# Patient Record
Sex: MALE | Race: OTHER | NOT HISPANIC OR LATINO | URBAN - METROPOLITAN AREA
[De-identification: names, ages, dates, MRNs, and addresses within clinical notes are randomized per-mention and may not be internally consistent; named-entity substitution may affect disease eponyms.]

---

## 2017-02-25 ENCOUNTER — EMERGENCY (EMERGENCY)
Facility: HOSPITAL | Age: 57
LOS: 1 days | Discharge: HOME | End: 2017-02-25
Attending: EMERGENCY MEDICINE | Admitting: EMERGENCY MEDICINE
Payer: MEDICAID

## 2017-02-25 VITALS
RESPIRATION RATE: 16 BRPM | SYSTOLIC BLOOD PRESSURE: 150 MMHG | DIASTOLIC BLOOD PRESSURE: 81 MMHG | OXYGEN SATURATION: 98 % | HEART RATE: 97 BPM

## 2017-02-25 VITALS
DIASTOLIC BLOOD PRESSURE: 96 MMHG | HEART RATE: 88 BPM | OXYGEN SATURATION: 100 % | RESPIRATION RATE: 16 BRPM | TEMPERATURE: 98 F | SYSTOLIC BLOOD PRESSURE: 150 MMHG

## 2017-02-25 LAB
ALBUMIN SERPL ELPH-MCNC: 4.5 G/DL — SIGNIFICANT CHANGE UP (ref 3.3–5)
ALP SERPL-CCNC: 87 U/L — SIGNIFICANT CHANGE UP (ref 40–120)
ALT FLD-CCNC: 23 U/L — SIGNIFICANT CHANGE UP (ref 4–41)
APTT BLD: 37.6 SEC — HIGH (ref 27.5–37.4)
AST SERPL-CCNC: 20 U/L — SIGNIFICANT CHANGE UP (ref 4–40)
BASOPHILS # BLD AUTO: 0.04 K/UL — SIGNIFICANT CHANGE UP (ref 0–0.2)
BASOPHILS NFR BLD AUTO: 0.4 % — SIGNIFICANT CHANGE UP (ref 0–2)
BILIRUB SERPL-MCNC: 0.6 MG/DL — SIGNIFICANT CHANGE UP (ref 0.2–1.2)
BLD GP AB SCN SERPL QL: NEGATIVE — SIGNIFICANT CHANGE UP
BUN SERPL-MCNC: 18 MG/DL — SIGNIFICANT CHANGE UP (ref 7–23)
CALCIUM SERPL-MCNC: 9.4 MG/DL — SIGNIFICANT CHANGE UP (ref 8.4–10.5)
CHLORIDE SERPL-SCNC: 100 MMOL/L — SIGNIFICANT CHANGE UP (ref 98–107)
CK SERPL-CCNC: 110 U/L — SIGNIFICANT CHANGE UP (ref 30–200)
CO2 SERPL-SCNC: 25 MMOL/L — SIGNIFICANT CHANGE UP (ref 22–31)
CREAT SERPL-MCNC: 1.01 MG/DL — SIGNIFICANT CHANGE UP (ref 0.5–1.3)
EOSINOPHIL # BLD AUTO: 0.17 K/UL — SIGNIFICANT CHANGE UP (ref 0–0.5)
EOSINOPHIL NFR BLD AUTO: 1.6 % — SIGNIFICANT CHANGE UP (ref 0–6)
GLUCOSE SERPL-MCNC: 138 MG/DL — HIGH (ref 70–99)
HCT VFR BLD CALC: 44.4 % — SIGNIFICANT CHANGE UP (ref 39–50)
HGB BLD-MCNC: 15.1 G/DL — SIGNIFICANT CHANGE UP (ref 13–17)
IMM GRANULOCYTES NFR BLD AUTO: 0.1 % — SIGNIFICANT CHANGE UP (ref 0–1.5)
INR BLD: 1.07 — SIGNIFICANT CHANGE UP (ref 0.87–1.18)
LYMPHOCYTES # BLD AUTO: 1.01 K/UL — SIGNIFICANT CHANGE UP (ref 1–3.3)
LYMPHOCYTES # BLD AUTO: 9.4 % — LOW (ref 13–44)
MCHC RBC-ENTMCNC: 27.8 PG — SIGNIFICANT CHANGE UP (ref 27–34)
MCHC RBC-ENTMCNC: 34 % — SIGNIFICANT CHANGE UP (ref 32–36)
MCV RBC AUTO: 81.6 FL — SIGNIFICANT CHANGE UP (ref 80–100)
MONOCYTES # BLD AUTO: 0.9 K/UL — SIGNIFICANT CHANGE UP (ref 0–0.9)
MONOCYTES NFR BLD AUTO: 8.4 % — SIGNIFICANT CHANGE UP (ref 2–14)
NEUTROPHILS # BLD AUTO: 8.59 K/UL — HIGH (ref 1.8–7.4)
NEUTROPHILS NFR BLD AUTO: 80.1 % — HIGH (ref 43–77)
OB PNL STL: POSITIVE — SIGNIFICANT CHANGE UP
PLATELET # BLD AUTO: 305 K/UL — SIGNIFICANT CHANGE UP (ref 150–400)
PMV BLD: 10.7 FL — SIGNIFICANT CHANGE UP (ref 7–13)
POTASSIUM SERPL-MCNC: 4 MMOL/L — SIGNIFICANT CHANGE UP (ref 3.5–5.3)
POTASSIUM SERPL-SCNC: 4 MMOL/L — SIGNIFICANT CHANGE UP (ref 3.5–5.3)
PROT SERPL-MCNC: 8 G/DL — SIGNIFICANT CHANGE UP (ref 6–8.3)
PROTHROM AB SERPL-ACNC: 12.2 SEC — SIGNIFICANT CHANGE UP (ref 10–13.1)
RBC # BLD: 5.44 M/UL — SIGNIFICANT CHANGE UP (ref 4.2–5.8)
RBC # FLD: 14.2 % — SIGNIFICANT CHANGE UP (ref 10.3–14.5)
RH IG SCN BLD-IMP: POSITIVE — SIGNIFICANT CHANGE UP
SODIUM SERPL-SCNC: 139 MMOL/L — SIGNIFICANT CHANGE UP (ref 135–145)
TROPONIN T SERPL-MCNC: < 0.06 NG/ML — SIGNIFICANT CHANGE UP (ref 0–0.06)
WBC # BLD: 10.72 K/UL — HIGH (ref 3.8–10.5)
WBC # FLD AUTO: 10.72 K/UL — HIGH (ref 3.8–10.5)

## 2017-02-25 PROCEDURE — 99284 EMERGENCY DEPT VISIT MOD MDM: CPT | Mod: 25

## 2017-02-25 PROCEDURE — 93010 ELECTROCARDIOGRAM REPORT: CPT

## 2017-02-25 PROCEDURE — 71010: CPT | Mod: 26

## 2017-02-25 RX ORDER — PANTOPRAZOLE SODIUM 20 MG/1
1 TABLET, DELAYED RELEASE ORAL
Qty: 30 | Refills: 0 | OUTPATIENT
Start: 2017-02-25 | End: 2017-03-27

## 2017-02-25 RX ORDER — PANTOPRAZOLE SODIUM 20 MG/1
80 TABLET, DELAYED RELEASE ORAL ONCE
Qty: 0 | Refills: 0 | Status: COMPLETED | OUTPATIENT
Start: 2017-02-25 | End: 2017-02-25

## 2017-02-25 RX ADMIN — PANTOPRAZOLE SODIUM 80 MILLIGRAM(S): 20 TABLET, DELAYED RELEASE ORAL at 17:53

## 2017-02-25 NOTE — ED PROVIDER NOTE - OBJECTIVE STATEMENT
57 y/o male with PMH of HTN and sinusitis s/p sinus surgery here with epigastric pain and black stool. Patient reports undergoing sinus surgery on 1/;30/17 and 4 days of decreased PO intake and black stool. Also endorses nausea. Patient reports pain radiates to his back. Denies lightheadedness, SOB, fever, hematemesis, CP, HA, hx of alcohol, skin rash or trauma. 55 y/o male with PMH of HTN and sinusitis s/p sinus surgery here with epigastric pain and black stool. Patient reports undergoing sinus surgery on 1/;30/17 and 4 days of decreased PO intake and black stool. Also endorses nausea. Patient reports pain radiates to his back. Denies lightheadedness, SOB, fever, hematemesis, CP, HA, hx of alcohol, skin rash or trauma.    Josee att: 56M hx htn, sinus surgery 3 wks ago p/w epigastric pain x 6 hours. Patient in usual health, occ passes clots from nare, today sudden onset severe epigastric pain, patient self palpates and elicits pain, nausea, decr po. Denies cp, sob, diaphoresis, rad arm neck jaw. Denies hx gerd, ulcer, egd, nsaid, heamtemesis, etoh. Per resident catarino pos.

## 2017-02-25 NOTE — ED PROVIDER NOTE - MEDICAL DECISION MAKING DETAILS
55 y/o male with HTN here with melena for 4 days and decreased Po intake. Consider PUD vs Gastric ulcer vs Gastrtitis vs Pancreatitis vs Posterior nasal bleed vs Ruptured viscous. Plan CBC, CMP, Guaiac, CXR, EKG,

## 2017-02-25 NOTE — ED PROVIDER NOTE - ATTENDING CONTRIBUTION TO CARE
Dr. Tripp: I have personally seen and examined this patient at the bedside. I have fully participated in the care of this patient. I have reviewed all pertinent clinical information, including history, physical exam, plan and the Resident's note and agree except as noted. HPI above as by me. PE above as by me, afebrile, epigastric exquisitely tender and pos guiac. DDX gerd, pancreatitis, suspect mirza if lft elevated PLAN ekg cbc cmp lipase cxr protonix

## 2017-02-25 NOTE — ED ADULT TRIAGE NOTE - CHIEF COMPLAINT QUOTE
Pt s/p sinus sx 1/30, c/o burning chest/epigastric pain and SOB starting earlier this morning along with weakness the past few days. Pt states the pain is worse when he lies down. 20g in left AC

## 2017-02-25 NOTE — ED PROVIDER NOTE - PHYSICAL EXAMINATION
Josee att: mild discomfort, aaox3, ctabl, rrr, s1s2, abd obese epigas tender to palpation neg guard neg rebound neg winters mcburney, neg le edema, guiac pos

## 2017-02-25 NOTE — ED ADULT NURSE NOTE - OBJECTIVE STATEMENT
Pt presents with c/o epigastric pain radiating to back, and abdominal pain. Positive epigastric tenderness, abdomen mildly distended.  Pt arrives with 20 g butterfly IV.  Pt placed on CM.  IV access obtained, labs drawn and sent.  SR up x 2, call bell within reach. Endorsed to nurse in area.

## 2018-03-05 PROBLEM — Z00.00 ENCOUNTER FOR PREVENTIVE HEALTH EXAMINATION: Status: ACTIVE | Noted: 2018-03-05

## 2018-04-17 ENCOUNTER — APPOINTMENT (OUTPATIENT)
Dept: NEUROLOGY | Facility: CLINIC | Age: 58
End: 2018-04-17
Payer: MEDICAID

## 2018-04-17 VITALS
HEIGHT: 68 IN | HEART RATE: 77 BPM | TEMPERATURE: 97.5 F | WEIGHT: 212 LBS | OXYGEN SATURATION: 96 % | BODY MASS INDEX: 32.13 KG/M2 | SYSTOLIC BLOOD PRESSURE: 100 MMHG | DIASTOLIC BLOOD PRESSURE: 70 MMHG

## 2018-04-17 DIAGNOSIS — R20.0 ANESTHESIA OF SKIN: ICD-10-CM

## 2018-04-17 DIAGNOSIS — R20.2 ANESTHESIA OF SKIN: ICD-10-CM

## 2018-04-17 DIAGNOSIS — Z82.49 FAMILY HISTORY OF ISCHEMIC HEART DISEASE AND OTHER DISEASES OF THE CIRCULATORY SYSTEM: ICD-10-CM

## 2018-04-17 DIAGNOSIS — Z86.79 PERSONAL HISTORY OF OTHER DISEASES OF THE CIRCULATORY SYSTEM: ICD-10-CM

## 2018-04-17 DIAGNOSIS — Z78.9 OTHER SPECIFIED HEALTH STATUS: ICD-10-CM

## 2018-04-17 PROCEDURE — 99205 OFFICE O/P NEW HI 60 MIN: CPT

## 2018-04-18 LAB
ANION GAP SERPL CALC-SCNC: 11 MMOL/L
BASOPHILS # BLD AUTO: 0.05 K/UL
BASOPHILS NFR BLD AUTO: 0.6 %
BUN SERPL-MCNC: 17 MG/DL
CALCIUM SERPL-MCNC: 9.4 MG/DL
CHLORIDE SERPL-SCNC: 101 MMOL/L
CO2 SERPL-SCNC: 28 MMOL/L
CREAT SERPL-MCNC: 0.92 MG/DL
CRP SERPL-MCNC: 0.3 MG/DL
EOSINOPHIL # BLD AUTO: 0.27 K/UL
EOSINOPHIL NFR BLD AUTO: 3 %
ERYTHROCYTE [SEDIMENTATION RATE] IN BLOOD BY WESTERGREN METHOD: 15 MM/HR
GLUCOSE SERPL-MCNC: 84 MG/DL
HCT VFR BLD CALC: 46 %
HGB BLD-MCNC: 14.6 G/DL
IMM GRANULOCYTES NFR BLD AUTO: 0.2 %
LYMPHOCYTES # BLD AUTO: 1.53 K/UL
LYMPHOCYTES NFR BLD AUTO: 16.9 %
MAN DIFF?: NORMAL
MCHC RBC-ENTMCNC: 26.6 PG
MCHC RBC-ENTMCNC: 31.7 GM/DL
MCV RBC AUTO: 83.8 FL
MONOCYTES # BLD AUTO: 1.14 K/UL
MONOCYTES NFR BLD AUTO: 12.6 %
NEUTROPHILS # BLD AUTO: 6.04 K/UL
NEUTROPHILS NFR BLD AUTO: 66.7 %
PLATELET # BLD AUTO: 267 K/UL
POTASSIUM SERPL-SCNC: 4.6 MMOL/L
RBC # BLD: 5.49 M/UL
RBC # FLD: 15.1 %
SODIUM SERPL-SCNC: 140 MMOL/L
WBC # FLD AUTO: 9.05 K/UL

## 2018-04-24 ENCOUNTER — OUTPATIENT (OUTPATIENT)
Dept: OUTPATIENT SERVICES | Facility: HOSPITAL | Age: 58
LOS: 1 days | End: 2018-04-24
Payer: MEDICAID

## 2018-04-24 ENCOUNTER — APPOINTMENT (OUTPATIENT)
Dept: MRI IMAGING | Facility: HOSPITAL | Age: 58
End: 2018-04-24
Payer: MEDICAID

## 2018-04-24 DIAGNOSIS — R51 HEADACHE: ICD-10-CM

## 2018-04-24 PROCEDURE — 70553 MRI BRAIN STEM W/O & W/DYE: CPT | Mod: 26

## 2018-04-24 PROCEDURE — 70553 MRI BRAIN STEM W/O & W/DYE: CPT

## 2018-05-04 LAB
INR PPP: 0.99 RATIO
PT BLD: 11.2 SEC

## 2018-05-08 ENCOUNTER — APPOINTMENT (OUTPATIENT)
Dept: INTERVENTIONAL RADIOLOGY/VASCULAR | Facility: HOSPITAL | Age: 58
End: 2018-05-08

## 2018-05-08 ENCOUNTER — OUTPATIENT (OUTPATIENT)
Dept: OUTPATIENT SERVICES | Facility: HOSPITAL | Age: 58
LOS: 1 days | End: 2018-05-08
Payer: MEDICAID

## 2018-05-08 DIAGNOSIS — R51 HEADACHE: ICD-10-CM

## 2018-05-08 LAB
APPEARANCE CSF: CLEAR — SIGNIFICANT CHANGE UP
APPEARANCE SPUN FLD: COLORLESS — SIGNIFICANT CHANGE UP
COLOR CSF: SIGNIFICANT CHANGE UP
CSF COMMENTS: SIGNIFICANT CHANGE UP
GLUCOSE CSF-MCNC: 68 MG/DL — SIGNIFICANT CHANGE UP (ref 40–70)
GRAM STN FLD: SIGNIFICANT CHANGE UP
LYMPHOCYTES # CSF: 50 % — SIGNIFICANT CHANGE UP (ref 40–80)
MONOS+MACROS NFR CSF: 50 % — HIGH (ref 15–45)
NEUTROPHILS # CSF: 0 % — SIGNIFICANT CHANGE UP (ref 0–6)
NIGHT BLUE STAIN TISS: SIGNIFICANT CHANGE UP
NRBC NFR CSF: <1 /UL — SIGNIFICANT CHANGE UP (ref 0–5)
PROT CSF-MCNC: 50 MG/DL — HIGH (ref 15–45)
RBC # CSF: 1 /UL — HIGH (ref 0–0)
SPECIMEN SOURCE: SIGNIFICANT CHANGE UP
SPECIMEN SOURCE: SIGNIFICANT CHANGE UP
TUBE TYPE: SIGNIFICANT CHANGE UP

## 2018-05-08 PROCEDURE — 89051 BODY FLUID CELL COUNT: CPT

## 2018-05-08 PROCEDURE — 82945 GLUCOSE OTHER FLUID: CPT

## 2018-05-08 PROCEDURE — 87205 SMEAR GRAM STAIN: CPT

## 2018-05-08 PROCEDURE — 87116 MYCOBACTERIA CULTURE: CPT

## 2018-05-08 PROCEDURE — 87070 CULTURE OTHR SPECIMN AEROBIC: CPT

## 2018-05-08 PROCEDURE — 84157 ASSAY OF PROTEIN OTHER: CPT

## 2018-05-09 ENCOUNTER — OUTPATIENT (OUTPATIENT)
Dept: OUTPATIENT SERVICES | Facility: HOSPITAL | Age: 58
LOS: 1 days | End: 2018-05-09
Payer: COMMERCIAL

## 2018-05-09 ENCOUNTER — APPOINTMENT (OUTPATIENT)
Dept: SLEEP CENTER | Facility: HOSPITAL | Age: 58
End: 2018-05-09

## 2018-05-09 DIAGNOSIS — G47.33 OBSTRUCTIVE SLEEP APNEA (ADULT) (PEDIATRIC): ICD-10-CM

## 2018-05-09 PROCEDURE — 95810 POLYSOM 6/> YRS 4/> PARAM: CPT | Mod: 26

## 2018-05-09 PROCEDURE — 95810 POLYSOM 6/> YRS 4/> PARAM: CPT

## 2018-05-11 LAB
CULTURE RESULTS: NO GROWTH — SIGNIFICANT CHANGE UP
SPECIMEN SOURCE: SIGNIFICANT CHANGE UP

## 2018-06-18 ENCOUNTER — APPOINTMENT (OUTPATIENT)
Dept: NEUROLOGY | Facility: CLINIC | Age: 58
End: 2018-06-18
Payer: MEDICAID

## 2018-06-18 VITALS
DIASTOLIC BLOOD PRESSURE: 60 MMHG | OXYGEN SATURATION: 97 % | WEIGHT: 205 LBS | BODY MASS INDEX: 31.07 KG/M2 | HEART RATE: 70 BPM | TEMPERATURE: 97.4 F | SYSTOLIC BLOOD PRESSURE: 104 MMHG | HEIGHT: 68 IN

## 2018-06-18 PROCEDURE — 95886 MUSC TEST DONE W/N TEST COMP: CPT

## 2018-06-18 PROCEDURE — 95913 NRV CNDJ TEST 13/> STUDIES: CPT

## 2018-06-27 LAB
CULTURE RESULTS: SIGNIFICANT CHANGE UP
SPECIMEN SOURCE: SIGNIFICANT CHANGE UP

## 2018-07-05 ENCOUNTER — APPOINTMENT (OUTPATIENT)
Dept: NEUROLOGY | Facility: CLINIC | Age: 58
End: 2018-07-05
Payer: MEDICAID

## 2018-07-05 VITALS
HEIGHT: 68 IN | OXYGEN SATURATION: 98 % | BODY MASS INDEX: 31.07 KG/M2 | TEMPERATURE: 98.4 F | WEIGHT: 205 LBS | DIASTOLIC BLOOD PRESSURE: 76 MMHG | SYSTOLIC BLOOD PRESSURE: 132 MMHG | HEART RATE: 77 BPM

## 2018-07-05 PROCEDURE — 99214 OFFICE O/P EST MOD 30 MIN: CPT

## 2018-08-06 ENCOUNTER — APPOINTMENT (OUTPATIENT)
Dept: PULMONOLOGY | Facility: CLINIC | Age: 58
End: 2018-08-06

## 2018-08-23 ENCOUNTER — APPOINTMENT (OUTPATIENT)
Dept: PULMONOLOGY | Facility: CLINIC | Age: 58
End: 2018-08-23
Payer: MEDICAID

## 2018-08-23 VITALS
RESPIRATION RATE: 15 BRPM | DIASTOLIC BLOOD PRESSURE: 86 MMHG | SYSTOLIC BLOOD PRESSURE: 110 MMHG | WEIGHT: 216 LBS | TEMPERATURE: 97.8 F | HEART RATE: 68 BPM | BODY MASS INDEX: 32.74 KG/M2 | HEIGHT: 68 IN

## 2018-08-23 DIAGNOSIS — G47.00 INSOMNIA, UNSPECIFIED: ICD-10-CM

## 2018-08-23 PROCEDURE — 99203 OFFICE O/P NEW LOW 30 MIN: CPT

## 2018-09-14 ENCOUNTER — INPATIENT (INPATIENT)
Facility: HOSPITAL | Age: 58
LOS: 0 days | Discharge: HOME | DRG: 313 | End: 2018-09-15
Attending: HOSPITALIST | Admitting: HOSPITALIST
Payer: MEDICAID

## 2018-09-14 VITALS
OXYGEN SATURATION: 97 % | HEIGHT: 67.5 IN | WEIGHT: 212.08 LBS | TEMPERATURE: 98 F | HEART RATE: 78 BPM | DIASTOLIC BLOOD PRESSURE: 97 MMHG | RESPIRATION RATE: 20 BRPM | SYSTOLIC BLOOD PRESSURE: 153 MMHG

## 2018-09-14 LAB
ALBUMIN SERPL ELPH-MCNC: 4.4 G/DL — SIGNIFICANT CHANGE UP (ref 3.3–5)
ALP SERPL-CCNC: 71 U/L — SIGNIFICANT CHANGE UP (ref 40–120)
ALT FLD-CCNC: 15 U/L — SIGNIFICANT CHANGE UP (ref 10–45)
ANION GAP SERPL CALC-SCNC: 15 MMOL/L — SIGNIFICANT CHANGE UP (ref 5–17)
APTT BLD: 32.3 SEC — SIGNIFICANT CHANGE UP (ref 27.5–37.4)
AST SERPL-CCNC: 13 U/L — SIGNIFICANT CHANGE UP (ref 10–40)
BASOPHILS # BLD AUTO: 0 K/UL — SIGNIFICANT CHANGE UP (ref 0–0.2)
BASOPHILS NFR BLD AUTO: 0.2 % — SIGNIFICANT CHANGE UP (ref 0–2)
BILIRUB SERPL-MCNC: 0.5 MG/DL — SIGNIFICANT CHANGE UP (ref 0.2–1.2)
BUN SERPL-MCNC: 27 MG/DL — HIGH (ref 7–23)
CALCIUM SERPL-MCNC: 9.6 MG/DL — SIGNIFICANT CHANGE UP (ref 8.4–10.5)
CHLORIDE SERPL-SCNC: 98 MMOL/L — SIGNIFICANT CHANGE UP (ref 96–108)
CO2 SERPL-SCNC: 21 MMOL/L — LOW (ref 22–31)
CREAT SERPL-MCNC: 1.12 MG/DL — SIGNIFICANT CHANGE UP (ref 0.5–1.3)
EOSINOPHIL # BLD AUTO: 0 K/UL — SIGNIFICANT CHANGE UP (ref 0–0.5)
EOSINOPHIL NFR BLD AUTO: 0.1 % — SIGNIFICANT CHANGE UP (ref 0–6)
GLUCOSE SERPL-MCNC: 95 MG/DL — SIGNIFICANT CHANGE UP (ref 70–99)
HCT VFR BLD CALC: 50 % — SIGNIFICANT CHANGE UP (ref 39–50)
HGB BLD-MCNC: 16.8 G/DL — SIGNIFICANT CHANGE UP (ref 13–17)
INR BLD: 1.06 RATIO — SIGNIFICANT CHANGE UP (ref 0.88–1.16)
LYMPHOCYTES # BLD AUTO: 0.9 K/UL — LOW (ref 1–3.3)
LYMPHOCYTES # BLD AUTO: 7.8 % — LOW (ref 13–44)
MCHC RBC-ENTMCNC: 27.9 PG — SIGNIFICANT CHANGE UP (ref 27–34)
MCHC RBC-ENTMCNC: 33.6 GM/DL — SIGNIFICANT CHANGE UP (ref 32–36)
MCV RBC AUTO: 83 FL — SIGNIFICANT CHANGE UP (ref 80–100)
MONOCYTES # BLD AUTO: 1.2 K/UL — HIGH (ref 0–0.9)
MONOCYTES NFR BLD AUTO: 9.7 % — SIGNIFICANT CHANGE UP (ref 2–14)
NEUTROPHILS # BLD AUTO: 9.7 K/UL — HIGH (ref 1.8–7.4)
NEUTROPHILS NFR BLD AUTO: 82.3 % — HIGH (ref 43–77)
PLATELET # BLD AUTO: 292 K/UL — SIGNIFICANT CHANGE UP (ref 150–400)
POTASSIUM SERPL-MCNC: 4.6 MMOL/L — SIGNIFICANT CHANGE UP (ref 3.5–5.3)
POTASSIUM SERPL-SCNC: 4.6 MMOL/L — SIGNIFICANT CHANGE UP (ref 3.5–5.3)
PROT SERPL-MCNC: 7.7 G/DL — SIGNIFICANT CHANGE UP (ref 6–8.3)
PROTHROM AB SERPL-ACNC: 11.6 SEC — SIGNIFICANT CHANGE UP (ref 9.8–12.7)
RBC # BLD: 6.03 M/UL — HIGH (ref 4.2–5.8)
RBC # FLD: 13.5 % — SIGNIFICANT CHANGE UP (ref 10.3–14.5)
SODIUM SERPL-SCNC: 134 MMOL/L — LOW (ref 135–145)
TROPONIN T, HIGH SENSITIVITY RESULT: 17 NG/L — SIGNIFICANT CHANGE UP (ref 0–51)
WBC # BLD: 11.8 K/UL — HIGH (ref 3.8–10.5)
WBC # FLD AUTO: 11.8 K/UL — HIGH (ref 3.8–10.5)

## 2018-09-14 PROCEDURE — 99284 EMERGENCY DEPT VISIT MOD MDM: CPT | Mod: 25

## 2018-09-14 PROCEDURE — 71045 X-RAY EXAM CHEST 1 VIEW: CPT | Mod: 26

## 2018-09-14 PROCEDURE — 93010 ELECTROCARDIOGRAM REPORT: CPT

## 2018-09-14 RX ORDER — ASPIRIN/CALCIUM CARB/MAGNESIUM 324 MG
325 TABLET ORAL ONCE
Qty: 0 | Refills: 0 | Status: COMPLETED | OUTPATIENT
Start: 2018-09-14 | End: 2018-09-14

## 2018-09-14 RX ADMIN — Medication 325 MILLIGRAM(S): at 21:56

## 2018-09-14 NOTE — ED PROVIDER NOTE - PHYSICAL EXAMINATION
CONSTITUTIONAL: Middle aged male in no acute respiratory distress  HEAD: Normocephalic; atraumatic  EYES: PERRLA, EOMI, no nystagmus  ENMT: External appears normal; normal oropharynx  CARD: Normal Sl, S2; no audible murmurs,rubs, or gallops  RESP: Breathing comfortably on RA, normal wob, lungs ctab/l, no audible wheezes/rales/rhonchi  ABD: Soft, non-distended; non-tender; no rebound or guarding  EXT: No cyanosis/clubbing/edema, normal ROM in all four extremities; non-tender to palpation; distal pulses intact  SKIN: Warm, dry, no rashes  NEURO: aaox3 moving all extremities spontaneously

## 2018-09-14 NOTE — ED ADULT TRIAGE NOTE - CHIEF COMPLAINT QUOTE
pressure mid chest from yesterday. Seen by Md today, ekg done & advised to come to ER. Decrease appetite.

## 2018-09-14 NOTE — ED PROVIDER NOTE - OBJECTIVE STATEMENT
57M w/ HTN p/w 1 day of chest pressure, substernal, non radiating, associated with nausea, no diaphoresis, non exertional non pleuritic. States the pressure comes and goes, feels like a gas type pain. States he has this pain about once a year and his PMD typically prescribes him GERD meds which improve his symptoms. Today he had an EKG done which was abnormal, prompting ED visit.

## 2018-09-14 NOTE — ED PROVIDER NOTE - NS ED ROS FT
General: denies fever, chills  HENT: denies nasal congestion, sore throat, rhinorrhea, ear pain  Eyes: denies visual changes, blurred vision  Neck: denies neck pain, neck swelling  CV: denies chest pain, palpitations  Resp: denies difficulty breathing, cough  Abdominal: denies nausea, vomiting, diarrhea, abdominal pain  MSK: denies muscle aches, bony pain, leg pain, leg swelling  Neuro: denies headaches, numbness, tingling

## 2018-09-14 NOTE — ED PROVIDER NOTE - ATTENDING CONTRIBUTION TO CARE
58 y/o male with the above documented history and HPI who on exam appears well and comfortable. VSs noted, neck supple, lungs CTA, cardiac sounds s/ audible m/r/g, abdomen soft, NT/ND, extremities s/ asymmetry, calf ttp or palpable cord, skin s/ rash or edema and neurologically intact. Initial EKG c/ non-specific changes. Repeated EKG no dynamic change. CXR clear. CBC resulted. Other labs pending. If his 1st HST is unremarkable, he should be suitable for placement in the CDU so long as provocative testing can be performed tomorrow. If not, he will then require admission. There is nothing clinically evident to suggest any alternative emergent etiology of his presenting complaint, be it Ao catastrophe, PE, PTX, complicated alexandra/myocarditis, Boerhaave's, etc.

## 2018-09-14 NOTE — ED PROVIDER NOTE - PRIOR EKG STATUS
there is no prior EKG available for comparison the EKG is unchanged from prior EKG/No apparent dynamic change from earlier EKG

## 2018-09-15 ENCOUNTER — TRANSCRIPTION ENCOUNTER (OUTPATIENT)
Age: 58
End: 2018-09-15

## 2018-09-15 VITALS
DIASTOLIC BLOOD PRESSURE: 83 MMHG | OXYGEN SATURATION: 98 % | HEART RATE: 69 BPM | SYSTOLIC BLOOD PRESSURE: 137 MMHG | TEMPERATURE: 97 F | RESPIRATION RATE: 15 BRPM

## 2018-09-15 DIAGNOSIS — R51 HEADACHE: ICD-10-CM

## 2018-09-15 DIAGNOSIS — Z29.9 ENCOUNTER FOR PROPHYLACTIC MEASURES, UNSPECIFIED: ICD-10-CM

## 2018-09-15 DIAGNOSIS — E87.1 HYPO-OSMOLALITY AND HYPONATREMIA: ICD-10-CM

## 2018-09-15 DIAGNOSIS — R07.9 CHEST PAIN, UNSPECIFIED: ICD-10-CM

## 2018-09-15 DIAGNOSIS — R10.13 EPIGASTRIC PAIN: ICD-10-CM

## 2018-09-15 DIAGNOSIS — I10 ESSENTIAL (PRIMARY) HYPERTENSION: ICD-10-CM

## 2018-09-15 DIAGNOSIS — R07.89 OTHER CHEST PAIN: ICD-10-CM

## 2018-09-15 LAB
ANION GAP SERPL CALC-SCNC: 13 MMOL/L — SIGNIFICANT CHANGE UP (ref 5–17)
BUN SERPL-MCNC: 28 MG/DL — HIGH (ref 7–23)
CALCIUM SERPL-MCNC: 9.1 MG/DL — SIGNIFICANT CHANGE UP (ref 8.4–10.5)
CHLORIDE SERPL-SCNC: 101 MMOL/L — SIGNIFICANT CHANGE UP (ref 96–108)
CHOLEST SERPL-MCNC: 210 MG/DL — HIGH (ref 10–199)
CK SERPL-CCNC: 64 U/L — SIGNIFICANT CHANGE UP (ref 30–200)
CO2 SERPL-SCNC: 21 MMOL/L — LOW (ref 22–31)
CREAT SERPL-MCNC: 0.98 MG/DL — SIGNIFICANT CHANGE UP (ref 0.5–1.3)
GLUCOSE SERPL-MCNC: 109 MG/DL — HIGH (ref 70–99)
HBA1C BLD-MCNC: 6.2 % — HIGH (ref 4–5.6)
HCT VFR BLD CALC: 46.2 % — SIGNIFICANT CHANGE UP (ref 39–50)
HDLC SERPL-MCNC: 44 MG/DL — SIGNIFICANT CHANGE UP
HGB BLD-MCNC: 15.2 G/DL — SIGNIFICANT CHANGE UP (ref 13–17)
LIPID PNL WITH DIRECT LDL SERPL: 148 MG/DL — HIGH
MCHC RBC-ENTMCNC: 27.2 PG — SIGNIFICANT CHANGE UP (ref 27–34)
MCHC RBC-ENTMCNC: 32.9 GM/DL — SIGNIFICANT CHANGE UP (ref 32–36)
MCV RBC AUTO: 82.6 FL — SIGNIFICANT CHANGE UP (ref 80–100)
PLATELET # BLD AUTO: 278 K/UL — SIGNIFICANT CHANGE UP (ref 150–400)
POTASSIUM SERPL-MCNC: 4.7 MMOL/L — SIGNIFICANT CHANGE UP (ref 3.5–5.3)
POTASSIUM SERPL-SCNC: 4.7 MMOL/L — SIGNIFICANT CHANGE UP (ref 3.5–5.3)
RBC # BLD: 5.59 M/UL — SIGNIFICANT CHANGE UP (ref 4.2–5.8)
RBC # FLD: 14.6 % — HIGH (ref 10.3–14.5)
SODIUM SERPL-SCNC: 135 MMOL/L — SIGNIFICANT CHANGE UP (ref 135–145)
TOTAL CHOLESTEROL/HDL RATIO MEASUREMENT: 4.8 RATIO — SIGNIFICANT CHANGE UP (ref 3.4–9.6)
TRIGL SERPL-MCNC: 92 MG/DL — SIGNIFICANT CHANGE UP (ref 10–149)
TROPONIN T, HIGH SENSITIVITY RESULT: 18 NG/L — SIGNIFICANT CHANGE UP (ref 0–51)
TSH SERPL-MCNC: 2.36 UIU/ML — SIGNIFICANT CHANGE UP (ref 0.27–4.2)
WBC # BLD: 11.65 K/UL — HIGH (ref 3.8–10.5)
WBC # FLD AUTO: 11.65 K/UL — HIGH (ref 3.8–10.5)

## 2018-09-15 PROCEDURE — 99223 1ST HOSP IP/OBS HIGH 75: CPT

## 2018-09-15 PROCEDURE — 12345: CPT | Mod: NC

## 2018-09-15 RX ORDER — ENOXAPARIN SODIUM 100 MG/ML
40 INJECTION SUBCUTANEOUS EVERY 24 HOURS
Qty: 0 | Refills: 0 | Status: DISCONTINUED | OUTPATIENT
Start: 2018-09-15 | End: 2018-09-15

## 2018-09-15 RX ORDER — LOSARTAN POTASSIUM 100 MG/1
50 TABLET, FILM COATED ORAL DAILY
Qty: 0 | Refills: 0 | Status: DISCONTINUED | OUTPATIENT
Start: 2018-09-15 | End: 2018-09-15

## 2018-09-15 RX ORDER — ASPIRIN/CALCIUM CARB/MAGNESIUM 324 MG
1 TABLET ORAL
Qty: 0 | Refills: 0 | COMMUNITY
Start: 2018-09-15

## 2018-09-15 RX ORDER — PANTOPRAZOLE SODIUM 20 MG/1
1 TABLET, DELAYED RELEASE ORAL
Qty: 0 | Refills: 0 | DISCHARGE
Start: 2018-09-15

## 2018-09-15 RX ORDER — PANTOPRAZOLE SODIUM 20 MG/1
40 TABLET, DELAYED RELEASE ORAL DAILY
Qty: 0 | Refills: 0 | Status: DISCONTINUED | OUTPATIENT
Start: 2018-09-15 | End: 2018-09-15

## 2018-09-15 RX ORDER — ACETAMINOPHEN 500 MG
975 TABLET ORAL EVERY 8 HOURS
Qty: 0 | Refills: 0 | Status: DISCONTINUED | OUTPATIENT
Start: 2018-09-15 | End: 2018-09-15

## 2018-09-15 RX ORDER — PANTOPRAZOLE SODIUM 20 MG/1
1 TABLET, DELAYED RELEASE ORAL
Qty: 0 | Refills: 0 | COMMUNITY
Start: 2018-09-15

## 2018-09-15 RX ORDER — ATORVASTATIN CALCIUM 80 MG/1
20 TABLET, FILM COATED ORAL AT BEDTIME
Qty: 0 | Refills: 0 | Status: DISCONTINUED | OUTPATIENT
Start: 2018-09-15 | End: 2018-09-15

## 2018-09-15 RX ORDER — FAMOTIDINE 10 MG/ML
20 INJECTION INTRAVENOUS
Qty: 0 | Refills: 0 | Status: DISCONTINUED | OUTPATIENT
Start: 2018-09-15 | End: 2018-09-15

## 2018-09-15 RX ORDER — ASPIRIN/CALCIUM CARB/MAGNESIUM 324 MG
81 TABLET ORAL DAILY
Qty: 0 | Refills: 0 | Status: DISCONTINUED | OUTPATIENT
Start: 2018-09-15 | End: 2018-09-15

## 2018-09-15 RX ORDER — INFLUENZA VIRUS VACCINE 15; 15; 15; 15 UG/.5ML; UG/.5ML; UG/.5ML; UG/.5ML
0.5 SUSPENSION INTRAMUSCULAR ONCE
Qty: 0 | Refills: 0 | Status: COMPLETED | OUTPATIENT
Start: 2018-09-15 | End: 2018-09-15

## 2018-09-15 RX ADMIN — INFLUENZA VIRUS VACCINE 0.5 MILLILITER(S): 15; 15; 15; 15 SUSPENSION INTRAMUSCULAR at 18:09

## 2018-09-15 RX ADMIN — LOSARTAN POTASSIUM 50 MILLIGRAM(S): 100 TABLET, FILM COATED ORAL at 05:18

## 2018-09-15 RX ADMIN — Medication 81 MILLIGRAM(S): at 11:19

## 2018-09-15 RX ADMIN — Medication 30 MILLILITER(S): at 15:08

## 2018-09-15 RX ADMIN — PANTOPRAZOLE SODIUM 40 MILLIGRAM(S): 20 TABLET, DELAYED RELEASE ORAL at 15:41

## 2018-09-15 RX ADMIN — Medication 975 MILLIGRAM(S): at 03:15

## 2018-09-15 RX ADMIN — ENOXAPARIN SODIUM 40 MILLIGRAM(S): 100 INJECTION SUBCUTANEOUS at 05:18

## 2018-09-15 RX ADMIN — FAMOTIDINE 20 MILLIGRAM(S): 10 INJECTION INTRAVENOUS at 03:15

## 2018-09-15 RX ADMIN — Medication 30 MILLILITER(S): at 08:52

## 2018-09-15 NOTE — H&P ADULT - PROBLEM SELECTOR PLAN 2
--severe epigastric pain that is short lived, out of proportion with exam. Differential includes gastritis, PUD, mesenteric ischemia, GERD, biliary disease, referred chest pain  --pain seems to have resolved, but if recurs will check CTA A/P to rule out mesenteric ischemia.   --H2 blocker and maalox PRN  --will add on lipase, but low suspicion for pancreatitis

## 2018-09-15 NOTE — H&P ADULT - PROBLEM SELECTOR PLAN 4
--patient with extensive work up and being followed by neurology as an outpatient for exertional headaches with pain being attributed to cervical radiculopathy. Hold NSAIDs for now while evaluating for CAD. Tylenol PRN.

## 2018-09-15 NOTE — ED ADULT NURSE NOTE - OBJECTIVE STATEMENT
2245 pt 57y m c/o cp x 2 days, was seen earlier at his pmd, sent to ed for eval/vss no distress able to verbalize concerns,/angel

## 2018-09-15 NOTE — H&P ADULT - NSHPLABSRESULTS_GEN_ALL_CORE
EKG, labs, and imaging personally reviewed and interpreted.     EKG: NSR, nonspecific ST-T wave changes I, aVL, V2-V6. Repeat EKGs in the ED unchanged during ED stay. No prior EKG is available for comparison.     LABS:                        16.8   11.8  )-----------( 292      ( 14 Sep 2018 22:09 )             50.0     09-14    134<L>  |  98  |  27<H>  ----------------------------<  95  4.6   |  21<L>  |  1.12    Ca    9.6      14 Sep 2018 22:09    TPro  7.7  /  Alb  4.4  /  TBili  0.5  /  DBili  x   /  AST  13  /  ALT  15  /  AlkPhos  71  09-14    PT/INR - ( 14 Sep 2018 23:19 )   PT: 11.6 sec;   INR: 1.06 ratio      PTT - ( 14 Sep 2018 23:19 )  PTT:32.3 sec  CARDIAC MARKERS ( 15 Sep 2018 00:30 )  x     / x     / 64 U/L / x     / x        RADIOLOGY & ADDITIONAL TESTS:  Imaging Personally Reviewed:  CXR: INTERPRETATION:  no emergent finding  follow up official report      Consultant(s) Notes Reviewed:    Care Discussed with Consultants/Other Providers: EKG, labs, and imaging personally reviewed and interpreted.     EKG: NSR, nonspecific ST-T wave changes I, aVL, V2-V6. Repeat EKGs in the ED unchanged during ED stay. No prior EKG is available for comparison.     LABS:                        16.8   11.8  )-----------( 292      ( 14 Sep 2018 22:09 )             50.0     09-14    134<L>  |  98  |  27<H>  ----------------------------<  95  4.6   |  21<L>  |  1.12    Ca    9.6      14 Sep 2018 22:09    TPro  7.7  /  Alb  4.4  /  TBili  0.5  /  DBili  x   /  AST  13  /  ALT  15  /  AlkPhos  71  09-14    PT/INR - ( 14 Sep 2018 23:19 )   PT: 11.6 sec;   INR: 1.06 ratio      PTT - ( 14 Sep 2018 23:19 )  PTT:32.3 sec  CARDIAC MARKERS ( 15 Sep 2018 00:30 )  x     / x     / 64 U/L / x     / x        hsTrop 17 -->18    RADIOLOGY & ADDITIONAL TESTS:  Imaging Personally Reviewed:  CXR: INTERPRETATION:  no emergent finding  follow up official report    Consultant(s) Notes Reviewed:  Yes  Care Discussed with Consultants/Other Providers: Yes

## 2018-09-15 NOTE — H&P ADULT - PROBLEM SELECTOR PLAN 5
--Minimal, Na 134. May be secondary to pain/nausea and SIADH vs mild dehydration (has elevated BUN/Cr ration and concentrated CBC).   --encourage PO hydration for now, trend BMP in the morning.

## 2018-09-15 NOTE — PROGRESS NOTE ADULT - SUBJECTIVE AND OBJECTIVE BOX
Patient is a 57y old  Male who presents with a chief complaint of chest pain/epigastric pain (15 Sep 2018 01:53)      INTERVAL HPI/OVERNIGHT EVENTS:  Admitted overnight pain improved       Chest pain  Family history of MI (myocardial infarction) (Father)  Handoff  MEWS Score  Cervical radiculopathy  HTN (hypertension)  Chest pain  Need for prophylactic measure  Hyponatremia  Headache  HTN (hypertension)  Epigastric abdominal pain  Chest pain of unknown etiology  No significant past surgical history  CHEST PAIN      Review of Systems: 12 point review of systems otherwise negative  ( - )fevers/chills  ( - ) dyspnea  ( - ) cough  ( - ) chest pain  ( - ) palpatations  ( - ) dizziness/lightheadedness  ( - ) nausea/vomiting  ( - ) abd pain  ( - ) diarrhea  ( - ) melena  ( - ) hematochezia  ( - ) dysuria  ( - ) hematuria  ( - ) leg swelling  ( -) calf tenderness  ( - ) motor weakness  ( - ) extremity numbness  ( - ) back pain  ( + ) tolerating POs  ( + ) BM    MEDICATIONS  (STANDING):  aspirin enteric coated 81 milliGRAM(s) Oral daily  atorvastatin 20 milliGRAM(s) Oral at bedtime  enoxaparin Injectable 40 milliGRAM(s) SubCutaneous every 24 hours  influenza   Vaccine 0.5 milliLiter(s) IntraMuscular once  losartan 50 milliGRAM(s) Oral daily    MEDICATIONS  (PRN):  acetaminophen   Tablet .. 975 milliGRAM(s) Oral every 8 hours PRN Mild Pain (1 - 3), Moderate Pain (4 - 6)  aluminum hydroxide/magnesium hydroxide/simethicone Suspension 30 milliLiter(s) Oral every 6 hours PRN Dyspepsia  famotidine    Tablet 20 milliGRAM(s) Oral two times a day PRN abdominal pain      Allergies    No Known Allergies    Intolerances          Vital Signs Last 24 Hrs  T(C): 36.6 (15 Sep 2018 05:14), Max: 36.7 (15 Sep 2018 00:46)  T(F): 97.8 (15 Sep 2018 05:14), Max: 98 (15 Sep 2018 00:46)  HR: 71 (15 Sep 2018 05:14) (66 - 78)  BP: 112/75 (15 Sep 2018 05:14) (112/75 - 160/99)  BP(mean): --  RR: 16 (15 Sep 2018 05:14) (16 - 20)  SpO2: 97% (15 Sep 2018 05:14) (97% - 97%)  CAPILLARY BLOOD GLUCOSE            Physical Exam:    Daily Height in cm: 170.18 (15 Sep 2018 03:03)    Daily   General:  Well appearing, NAD, not cachetic  HEENT:  Nonicteric, PERRLA  CV:  RRR, no murmur  Lungs:  CTA B/L, no wheezes  Abdomen:  Soft, non-tender, no distended  Extremities:  2+ pulses, no c/c, no edema  Skin:  Warm and dry, no rashes  :  No miles  Neuro:  AAOx3, non-focal, CN II-XII grossly intact  No Restraints    LABS:                        15.2   11.65 )-----------( 278      ( 15 Sep 2018 07:06 )             46.2     09-15    135  |  101  |  28<H>  ----------------------------<  109<H>  4.7   |  21<L>  |  0.98    Ca    9.1      15 Sep 2018 06:44    TPro  7.7  /  Alb  4.4  /  TBili  0.5  /  DBili  x   /  AST  13  /  ALT  15  /  AlkPhos  71  09-14    PT/INR - ( 14 Sep 2018 23:19 )   PT: 11.6 sec;   INR: 1.06 ratio         PTT - ( 14 Sep 2018 23:19 )  PTT:32.3 sec        RADIOLOGY & ADDITIONAL TESTS:    ---------------------------------------------------------------------------  I personally reviewed: [  ]EKG   [  ]CXR    [  ] CT    [  ]Other  ---------------------------------------------------------------------------  PLEASE CHECK WHEN PRESENT:     [  ]Heart Failure     [  ] Acute     [  ] Acute on Chronic     [  ] Chronic  -------------------------------------------------------------------     [  ]Diastolic [HFpEF]     [  ]Systolic [HFrEF]     [  ]Combined [HFpEF & HFrEF]     [  ]Other:  -------------------------------------------------------------------  [  ]JUNE     [  ]ATN     [  ]Reneal Medullary Necrosis     [  ]Renal Cortical Necrosis     [  ]Other Pathological Lesions:    [  ]CKD 1  [  ]CKD 2  [  ]CKD 3  [  ]CKD 4  [  ]CKD 5  [  ]Other  -------------------------------------------------------------------  [  ]Other/Unspecified:    --------------------------------------------------------------------    Abdominal Nutritional Status  [  ]Malnutrition: See Nutrition Note  [  ]Cachexia  [  ]Other:   [  ]Supplement Ordered:  [  ]Morbid Obesity (BMI >=40]

## 2018-09-15 NOTE — DISCHARGE NOTE ADULT - CARE PROVIDER_API CALL
Manan Thakkar), Internal Medicine  [address] [place] [address]  Phone: [phone]  Fax: [phone]

## 2018-09-15 NOTE — H&P ADULT - ASSESSMENT
57M PMH HTN, obesity, cervical radiculopathy presents with multiple complaints, including chest pain and epigastric pain. Admitted for provocative testing to eval for CAD given elevated HEART score.

## 2018-09-15 NOTE — H&P ADULT - NSHPPHYSICALEXAM_GEN_ALL_CORE
Vital Signs Last 24 Hrs  T(C): 36.7 (09-15-18 @ 00:46)  T(F): 98 (09-15-18 @ 00:46), Max: 98 (09-15-18 @ 00:46)  HR: 77 (09-15-18 @ 00:46) (77 - 78)  BP: 155/82 (09-15-18 @ 00:46)  BP(mean): --  RR: 18 (09-15-18 @ 00:46) (18 - 20)  SpO2: 97% (09-15-18 @ 00:46) (97% - 97%)  Wt(kg): --    PHYSICAL EXAM:  GENERAL: NAD, well-developed, overweight  HEAD:  Atraumatic, Normocephalic  EYES: EOMI, PERRLA, conjunctiva and sclera clear  NECK: Supple, No JVD  CHEST/LUNG: Clear to auscultation bilaterally; No wheeze  HEART: Regular rate and rhythm; No murmurs, rubs, or gallops  ABDOMEN: Soft, Nontender, Nondistended; Bowel sounds present  EXTREMITIES:  2+ Peripheral Pulses, No clubbing, cyanosis, or edema  PSYCH: AAOx3, pleasant  NEUROLOGY: non-focal  SKIN: No rashes or lesions Vital Signs Last 24 Hrs  T(C): 36.7 (09-15-18 @ 00:46)  T(F): 98 (09-15-18 @ 00:46), Max: 98 (09-15-18 @ 00:46)  HR: 77 (09-15-18 @ 00:46) (77 - 78)  BP: 155/82 (09-15-18 @ 00:46)  BP(mean): --  RR: 18 (09-15-18 @ 00:46) (18 - 20)  SpO2: 97% (09-15-18 @ 00:46) (97% - 97%)  Wt(kg): --    PHYSICAL EXAM:  GENERAL: NAD, well-developed, overweight, well appearing  HEAD:  Atraumatic, Normocephalic  EYES: EOMI, PERRLA, conjunctiva and sclera clear  NECK: Supple, No JVD  CHEST/LUNG: Clear to auscultation bilaterally; No wheeze  HEART: Regular rate and rhythm; No murmurs, rubs, or gallops  ABDOMEN: Soft, Nontender, Nondistended; Bowel sounds present  EXTREMITIES:  2+ Peripheral Pulses, No clubbing, cyanosis, or edema  PSYCH: AAOx3, pleasant  NEUROLOGY: non-focal  SKIN: No rashes or lesions

## 2018-09-15 NOTE — ED ADULT NURSE REASSESSMENT NOTE - NS ED NURSE REASSESS COMMENT FT1
0135 pts floor nurse unable to recd report at this time d/t attending to another pt and wants ed to hold til she is available, informed personnell will call back /noted/angel

## 2018-09-15 NOTE — DISCHARGE NOTE ADULT - MEDICATION SUMMARY - MEDICATIONS TO TAKE
I will START or STAY ON the medications listed below when I get home from the hospital:    meloxicam 15 mg oral tablet  -- 1 tab(s) by mouth once a day  -- Indication: For Analgesic     aspirin 81 mg oral delayed release tablet  -- 1 tab(s) by mouth once a day  -- Indication: For Prophylactic     irbesartan 150 mg oral tablet  -- 1 tab(s) by mouth once a day  -- Indication: For HTN (hypertension)    pantoprazole 40 mg oral delayed release tablet  -- 1 tab(s) by mouth once a day  -- Indication: For GERD

## 2018-09-15 NOTE — H&P ADULT - PROBLEM SELECTOR PLAN 1
--patient with elevated risk for CAD given h/o HTN, family history, obesity, nonspecific EKG changes  --initial troponins 17 and 18 (indeterminant). Will check stress test given elevated HEART score and reasonable suspicion for CAD.   --aspirin 81mg daily  --check lipids, hba1c, tsh  --continue with BP control

## 2018-09-15 NOTE — H&P ADULT - NSHPREVIEWOFSYSTEMS_GEN_ALL_CORE
Review of Systems:   CONSTITUTIONAL: No fever, weight loss, or fatigue  EYES: No eye pain, visual disturbances, or discharge  ENMT:  No difficulty hearing, tinnitus, vertigo; No sinus or throat pain  NECK: +NECK PAIN; No stiffness  BREASTS: No pain, masses, or nipple discharge  RESPIRATORY: No cough, wheezing, chills or hemoptysis; No shortness of breath  CARDIOVASCULAR: +CHEST PAIN; No palpitations, dizziness, or leg swelling  GASTROINTESTINAL: +EPIGASTRIC PAIN, NAUSEA; No abdominal pain. No vomiting, or hematemesis; No diarrhea or constipation. No melena or hematochezia.  GENITOURINARY: No dysuria, frequency, hematuria, or incontinence  NEUROLOGICAL: +HEADACHES WITH EXERTION; No memory loss, loss of strength, numbness, or tremors  SKIN: No itching, burning, rashes, or lesions   LYMPH NODES: No enlarged glands  ENDOCRINE: No heat or cold intolerance; No hair loss  MUSCULOSKELETAL: No joint pain or swelling; No muscle, back, or extremity pain  PSYCHIATRIC: No depression, anxiety, mood swings, or difficulty sleeping  HEME/LYMPH: No easy bruising, or bleeding gums  ALLERY AND IMMUNOLOGIC: No hives or eczema  [X] all other systems negative or as per HPI

## 2018-09-15 NOTE — DISCHARGE NOTE ADULT - CARE PLAN
Principal Discharge DX:	Chest pain  Goal:	Resolved  Assessment and plan of treatment:	Please follow up with Dr. Thakkar to set up outpatient Nuclear Stress Test   HOME CARE INSTRUCTIONS  For the next few days, avoid physical activities that bring on chest pain. Continue physical activities as directed.  Do not smoke.  Avoid drinking alcohol.   Only take over-the-counter or prescription medicine for pain, discomfort, or fever as directed by your caregiver.  Follow your caregiver's suggestions for further testing if your chest pain does not go away.  Keep any follow-up appointments you made. If you do not go to an appointment, you could develop lasting (chronic) problems with pain. If there is any problem keeping an appointment, you must call to reschedule.   SEEK MEDICAL CARE IF:  You think you are having problems from the medicine you are taking. Read your medicine instructions carefully.  Your chest pain does not go away, even after treatment.  You develop a rash with blisters on your chest.  SEEK IMMEDIATE MEDICAL CARE IF:  You have increased chest pain or pain that spreads to your arm, neck, jaw, back, or abdomen.   You develop shortness of breath, an increasing cough, or you are coughing up blood.  You have severe back or abdominal pain, feel nauseous, or vomit.  You develop severe weakness, fainting, or chills.  You have a fever.  THIS IS AN EMERGENCY. Do not wait to see if the pain will go away. Get medical help at once. Call your local emergency services 911. Do not drive yourself to the hospital.  Secondary Diagnosis:	Epigastric abdominal pain  Assessment and plan of treatment:	Continue Protonix and Mylanta for pain or discomfort.   Please continue follow up care with your PMD if symptoms continue and return to Emergency Department if they worsen.  Secondary Diagnosis:	Essential hypertension  Assessment and plan of treatment:	Low salt diet  Activity as tolerated.  Take all medication as prescribed.  Follow up with your medical doctor for routine blood pressure monitoring at your next visit.  Notify your doctor if you have any of the following symptoms:   Dizziness, Lightheadedness, Blurry vision, Headache, Chest pain, Shortness of breath  Secondary Diagnosis:	Cervical radiculopathy  Assessment and plan of treatment:	You may continue pain management regimen   Meloxicam as needed

## 2018-09-15 NOTE — DISCHARGE NOTE ADULT - PLAN OF CARE
Resolved Please follow up with Dr. Thakkar to set up outpatient Nuclear Stress Test   HOME CARE INSTRUCTIONS  For the next few days, avoid physical activities that bring on chest pain. Continue physical activities as directed.  Do not smoke.  Avoid drinking alcohol.   Only take over-the-counter or prescription medicine for pain, discomfort, or fever as directed by your caregiver.  Follow your caregiver's suggestions for further testing if your chest pain does not go away.  Keep any follow-up appointments you made. If you do not go to an appointment, you could develop lasting (chronic) problems with pain. If there is any problem keeping an appointment, you must call to reschedule.   SEEK MEDICAL CARE IF:  You think you are having problems from the medicine you are taking. Read your medicine instructions carefully.  Your chest pain does not go away, even after treatment.  You develop a rash with blisters on your chest.  SEEK IMMEDIATE MEDICAL CARE IF:  You have increased chest pain or pain that spreads to your arm, neck, jaw, back, or abdomen.   You develop shortness of breath, an increasing cough, or you are coughing up blood.  You have severe back or abdominal pain, feel nauseous, or vomit.  You develop severe weakness, fainting, or chills.  You have a fever.  THIS IS AN EMERGENCY. Do not wait to see if the pain will go away. Get medical help at once. Call your local emergency services 911. Do not drive yourself to the hospital. Continue Protonix and Mylanta for pain or discomfort.   Please continue follow up care with your PMD if symptoms continue and return to Emergency Department if they worsen. Low salt diet  Activity as tolerated.  Take all medication as prescribed.  Follow up with your medical doctor for routine blood pressure monitoring at your next visit.  Notify your doctor if you have any of the following symptoms:   Dizziness, Lightheadedness, Blurry vision, Headache, Chest pain, Shortness of breath You may continue pain management regimen   Meloxicam as needed

## 2018-09-15 NOTE — H&P ADULT - HISTORY OF PRESENT ILLNESS
57M PMH HTN, obesity, cervical radiculopathy presents with multiple complaints, including chest pain and epigastric pain. He is asymptomatic currently during my evaluation.    Chest pain - Started one day ago, "pressure" quality, substernal, no radiation, associated with nausea, intermittent. Typically lasting 5-15 minutes. Non exertional. Denies heart burn, SOB, palpitations, syncope. Reports prior cardiac testing >5 years ago, unknown result, no further testing pursued, no stents. Had EKG performed in the office which was abnormal, so was sent to ED. Father had MI at age 59. Never smoker.    Epigastric pain - Same time frame, located in epigastric area, non radiating. Can be severe (8/10). Episodic, lasts 10-15 minutes, sharp, then resolves completely. With associated nausea. Does not notice a correlation or change with food intake or exertional component. Feels like he is "bloated/has gas." No constipation, diarrhea, melena, hematochezia, heartburn. States he has had this before about a year ago that was treated with GERD medication and resolved.     Posterior headaches - occurring for the past few months. Occurring predominantly with activity/exertion/significant upper extremity use, lasts for 15 minutes. Had MRI (unknown if MRA) of head and neck with outpatient neurology which were essentially normal except some disc abnormality around C6-7 which they attributed the pain to. Has been using meloxicam 15mg daily. No other NSAIDs. 57M PMH HTN, obesity, cervical radiculopathy presents with multiple complaints, including chest pain and epigastric pain. He is asymptomatic currently during my evaluation.    Chest pain - Started one day ago, "pressure" quality, substernal, no radiation, associated with nausea, intermittent. Typically lasting 5-15 minutes. Non exertional. Denies heart burn, SOB, palpitations, syncope. Reports prior cardiac testing >5 years ago, unknown result, no further testing pursued, no stents. Had EKG performed in the office which was abnormal, so was sent to ED. Father had MI at age 59. Never smoker.    Epigastric pain - Same time frame, located in epigastric area, non radiating. Can be severe (8/10). Episodic, lasts 10-15 minutes, sharp, then resolves completely. With associated nausea. Does not notice a correlation or change with food intake or exertional component. Feels like he is "bloated/has gas." No constipation, diarrhea, melena, hematochezia, heartburn. States he has had this before about a year ago that was treated with GERD medication and resolved. Reports having this pain about 3 times while in the ED. When it resolves, he is completely asymptomatic.     Posterior headaches - occurring for the past few months. Occurring predominantly with activity/exertion/significant upper extremity use, lasts for 15 minutes. Had MRI (unknown if MRA) of head and neck with outpatient neurology which were essentially normal except some disc abnormality around C6-7 which they attributed the pain to. Has been using meloxicam 15mg daily. No other NSAIDs.

## 2018-09-15 NOTE — DISCHARGE NOTE ADULT - PATIENT PORTAL LINK FT
You can access the [facility], offered by [facility], by registering with the following website: [email or web address]

## 2018-09-15 NOTE — DISCHARGE NOTE ADULT - HOSPITAL COURSE
57M PMH HTN, obesity, cervical radiculopathy presents with multiple complaints, including Headache, chest pain and epigastric pain. Troponin x 3 are neg and symptoms have improved. Patient is eager for discharge home and will follo wup with Dr. Thakkar to schedule outpt Nuclear stress test.

## 2018-09-15 NOTE — DISCHARGE NOTE ADULT - ADDITIONAL INSTRUCTIONS
Please follow up with Dr. Thakkar within 2-3 days. You will need an outpatient Nuclear Stress test and follow up care on your Epigastric pain.

## 2018-09-19 ENCOUNTER — FORM ENCOUNTER (OUTPATIENT)
Age: 58
End: 2018-09-19

## 2018-10-05 PROBLEM — I10 ESSENTIAL (PRIMARY) HYPERTENSION: Chronic | Status: ACTIVE | Noted: 2018-09-14

## 2018-10-05 PROBLEM — M54.12 RADICULOPATHY, CERVICAL REGION: Chronic | Status: ACTIVE | Noted: 2018-09-15

## 2018-11-06 ENCOUNTER — APPOINTMENT (OUTPATIENT)
Dept: NEUROLOGY | Facility: CLINIC | Age: 58
End: 2018-11-06
Payer: MEDICAID

## 2018-11-06 VITALS
TEMPERATURE: 97.9 F | SYSTOLIC BLOOD PRESSURE: 110 MMHG | HEART RATE: 71 BPM | DIASTOLIC BLOOD PRESSURE: 80 MMHG | HEIGHT: 67.5 IN | BODY MASS INDEX: 31.18 KG/M2 | WEIGHT: 201 LBS | OXYGEN SATURATION: 98 %

## 2018-11-06 PROCEDURE — 99213 OFFICE O/P EST LOW 20 MIN: CPT

## 2018-11-06 RX ORDER — GABAPENTIN 300 MG/1
300 CAPSULE ORAL
Qty: 90 | Refills: 2 | Status: DISCONTINUED | COMMUNITY
Start: 2018-04-17 | End: 2018-11-06

## 2018-12-14 ENCOUNTER — APPOINTMENT (OUTPATIENT)
Dept: PULMONOLOGY | Facility: CLINIC | Age: 58
End: 2018-12-14

## 2018-12-26 PROCEDURE — 85027 COMPLETE CBC AUTOMATED: CPT

## 2018-12-26 PROCEDURE — 83036 HEMOGLOBIN GLYCOSYLATED A1C: CPT

## 2018-12-26 PROCEDURE — 80048 BASIC METABOLIC PNL TOTAL CA: CPT

## 2018-12-26 PROCEDURE — 82550 ASSAY OF CK (CPK): CPT

## 2018-12-26 PROCEDURE — 80053 COMPREHEN METABOLIC PANEL: CPT

## 2018-12-26 PROCEDURE — 83690 ASSAY OF LIPASE: CPT

## 2018-12-26 PROCEDURE — 84484 ASSAY OF TROPONIN QUANT: CPT

## 2018-12-26 PROCEDURE — G0378: CPT

## 2018-12-26 PROCEDURE — 99285 EMERGENCY DEPT VISIT HI MDM: CPT

## 2018-12-26 PROCEDURE — 71045 X-RAY EXAM CHEST 1 VIEW: CPT

## 2018-12-26 PROCEDURE — 93005 ELECTROCARDIOGRAM TRACING: CPT

## 2018-12-26 PROCEDURE — 85610 PROTHROMBIN TIME: CPT

## 2018-12-26 PROCEDURE — 80061 LIPID PANEL: CPT

## 2018-12-26 PROCEDURE — 85730 THROMBOPLASTIN TIME PARTIAL: CPT

## 2018-12-26 PROCEDURE — 84443 ASSAY THYROID STIM HORMONE: CPT

## 2018-12-26 PROCEDURE — 90686 IIV4 VACC NO PRSV 0.5 ML IM: CPT

## 2019-01-01 ENCOUNTER — OUTPATIENT (OUTPATIENT)
Dept: OUTPATIENT SERVICES | Facility: HOSPITAL | Age: 59
LOS: 1 days | End: 2019-01-01
Payer: MEDICAID

## 2019-01-01 PROCEDURE — G9001: CPT

## 2019-01-09 ENCOUNTER — INPATIENT (INPATIENT)
Facility: HOSPITAL | Age: 59
LOS: 7 days | Discharge: HOME | DRG: 233 | End: 2019-01-17
Attending: THORACIC SURGERY (CARDIOTHORACIC VASCULAR SURGERY) | Admitting: INTERNAL MEDICINE
Payer: MEDICAID

## 2019-01-09 VITALS
SYSTOLIC BLOOD PRESSURE: 104 MMHG | OXYGEN SATURATION: 100 % | HEIGHT: 67.5 IN | RESPIRATION RATE: 18 BRPM | TEMPERATURE: 97 F | WEIGHT: 201.94 LBS | HEART RATE: 104 BPM | DIASTOLIC BLOOD PRESSURE: 73 MMHG

## 2019-01-09 DIAGNOSIS — I10 ESSENTIAL (PRIMARY) HYPERTENSION: ICD-10-CM

## 2019-01-09 DIAGNOSIS — I21.4 NON-ST ELEVATION (NSTEMI) MYOCARDIAL INFARCTION: ICD-10-CM

## 2019-01-09 DIAGNOSIS — Z96.641 PRESENCE OF RIGHT ARTIFICIAL HIP JOINT: Chronic | ICD-10-CM

## 2019-01-09 LAB
ALBUMIN SERPL ELPH-MCNC: 4 G/DL — SIGNIFICANT CHANGE UP (ref 3.3–5)
ALBUMIN SERPL ELPH-MCNC: 4.3 G/DL — SIGNIFICANT CHANGE UP (ref 3.3–5)
ALP SERPL-CCNC: 64 U/L — SIGNIFICANT CHANGE UP (ref 40–120)
ALP SERPL-CCNC: 68 U/L — SIGNIFICANT CHANGE UP (ref 40–120)
ALT FLD-CCNC: 14 U/L — SIGNIFICANT CHANGE UP (ref 10–45)
ALT FLD-CCNC: 17 U/L — SIGNIFICANT CHANGE UP (ref 10–45)
ANION GAP SERPL CALC-SCNC: 14 MMOL/L — SIGNIFICANT CHANGE UP (ref 5–17)
ANION GAP SERPL CALC-SCNC: 16 MMOL/L — SIGNIFICANT CHANGE UP (ref 5–17)
APPEARANCE UR: CLEAR — SIGNIFICANT CHANGE UP
APTT BLD: 25.5 SEC — LOW (ref 27.5–36.3)
APTT BLD: 34 SEC — SIGNIFICANT CHANGE UP (ref 27.5–36.3)
AST SERPL-CCNC: 12 U/L — SIGNIFICANT CHANGE UP (ref 10–40)
AST SERPL-CCNC: 43 U/L — HIGH (ref 10–40)
BACTERIA # UR AUTO: NEGATIVE — SIGNIFICANT CHANGE UP
BASE EXCESS BLDV CALC-SCNC: 1.2 MMOL/L — SIGNIFICANT CHANGE UP (ref -2–2)
BASOPHILS # BLD AUTO: 0 K/UL — SIGNIFICANT CHANGE UP (ref 0–0.2)
BASOPHILS # BLD AUTO: 0 K/UL — SIGNIFICANT CHANGE UP (ref 0–0.2)
BASOPHILS NFR BLD AUTO: 0.4 % — SIGNIFICANT CHANGE UP (ref 0–2)
BASOPHILS NFR BLD AUTO: 0.5 % — SIGNIFICANT CHANGE UP (ref 0–2)
BILIRUB SERPL-MCNC: 0.4 MG/DL — SIGNIFICANT CHANGE UP (ref 0.2–1.2)
BILIRUB SERPL-MCNC: 0.4 MG/DL — SIGNIFICANT CHANGE UP (ref 0.2–1.2)
BILIRUB UR-MCNC: NEGATIVE — SIGNIFICANT CHANGE UP
BLD GP AB SCN SERPL QL: NEGATIVE — SIGNIFICANT CHANGE UP
BUN SERPL-MCNC: 19 MG/DL — SIGNIFICANT CHANGE UP (ref 7–23)
BUN SERPL-MCNC: 22 MG/DL — SIGNIFICANT CHANGE UP (ref 7–23)
CA-I SERPL-SCNC: 1.27 MMOL/L — SIGNIFICANT CHANGE UP (ref 1.12–1.3)
CALCIUM SERPL-MCNC: 8.8 MG/DL — SIGNIFICANT CHANGE UP (ref 8.4–10.5)
CALCIUM SERPL-MCNC: 9.3 MG/DL — SIGNIFICANT CHANGE UP (ref 8.4–10.5)
CHLORIDE BLDV-SCNC: 106 MMOL/L — SIGNIFICANT CHANGE UP (ref 96–108)
CHLORIDE SERPL-SCNC: 101 MMOL/L — SIGNIFICANT CHANGE UP (ref 96–108)
CHLORIDE SERPL-SCNC: 101 MMOL/L — SIGNIFICANT CHANGE UP (ref 96–108)
CK MB BLD-MCNC: 9 % — HIGH (ref 0–3.5)
CK MB CFR SERPL CALC: 49.5 NG/ML — HIGH (ref 0–6.7)
CK SERPL-CCNC: 143 U/L — SIGNIFICANT CHANGE UP (ref 30–200)
CK SERPL-CCNC: 552 U/L — HIGH (ref 30–200)
CO2 BLDV-SCNC: 28 MMOL/L — SIGNIFICANT CHANGE UP (ref 22–30)
CO2 SERPL-SCNC: 22 MMOL/L — SIGNIFICANT CHANGE UP (ref 22–31)
CO2 SERPL-SCNC: 22 MMOL/L — SIGNIFICANT CHANGE UP (ref 22–31)
COLOR SPEC: COLORLESS — SIGNIFICANT CHANGE UP
CREAT SERPL-MCNC: 0.87 MG/DL — SIGNIFICANT CHANGE UP (ref 0.5–1.3)
CREAT SERPL-MCNC: 0.88 MG/DL — SIGNIFICANT CHANGE UP (ref 0.5–1.3)
DIFF PNL FLD: ABNORMAL
EOSINOPHIL # BLD AUTO: 0 K/UL — SIGNIFICANT CHANGE UP (ref 0–0.5)
EOSINOPHIL # BLD AUTO: 0.1 K/UL — SIGNIFICANT CHANGE UP (ref 0–0.5)
EOSINOPHIL NFR BLD AUTO: 0.2 % — SIGNIFICANT CHANGE UP (ref 0–6)
EOSINOPHIL NFR BLD AUTO: 1 % — SIGNIFICANT CHANGE UP (ref 0–6)
EPI CELLS # UR: 0 /HPF — SIGNIFICANT CHANGE UP
GAS PNL BLDV: 138 MMOL/L — SIGNIFICANT CHANGE UP (ref 136–145)
GAS PNL BLDV: SIGNIFICANT CHANGE UP
GLUCOSE BLDV-MCNC: 98 MG/DL — SIGNIFICANT CHANGE UP (ref 70–99)
GLUCOSE SERPL-MCNC: 101 MG/DL — HIGH (ref 70–99)
GLUCOSE SERPL-MCNC: 142 MG/DL — HIGH (ref 70–99)
GLUCOSE UR QL: NEGATIVE — SIGNIFICANT CHANGE UP
HCO3 BLDV-SCNC: 26 MMOL/L — SIGNIFICANT CHANGE UP (ref 21–29)
HCT VFR BLD CALC: 32.2 % — LOW (ref 39–50)
HCT VFR BLD CALC: 33 % — LOW (ref 39–50)
HCT VFR BLDA CALC: 34 % — LOW (ref 39–50)
HGB BLD CALC-MCNC: 10.9 G/DL — LOW (ref 13–17)
HGB BLD-MCNC: 11 G/DL — LOW (ref 13–17)
HGB BLD-MCNC: 11.2 G/DL — LOW (ref 13–17)
HYALINE CASTS # UR AUTO: 0 /LPF — SIGNIFICANT CHANGE UP (ref 0–2)
INR BLD: 1.08 RATIO — SIGNIFICANT CHANGE UP (ref 0.88–1.16)
INR BLD: 1.09 RATIO — SIGNIFICANT CHANGE UP (ref 0.88–1.16)
KETONES UR-MCNC: NEGATIVE — SIGNIFICANT CHANGE UP
LACTATE BLDV-MCNC: 1.4 MMOL/L — SIGNIFICANT CHANGE UP (ref 0.7–2)
LEUKOCYTE ESTERASE UR-ACNC: NEGATIVE — SIGNIFICANT CHANGE UP
LYMPHOCYTES # BLD AUTO: 0.8 K/UL — LOW (ref 1–3.3)
LYMPHOCYTES # BLD AUTO: 1.3 K/UL — SIGNIFICANT CHANGE UP (ref 1–3.3)
LYMPHOCYTES # BLD AUTO: 16.3 % — SIGNIFICANT CHANGE UP (ref 13–44)
LYMPHOCYTES # BLD AUTO: 8.6 % — LOW (ref 13–44)
MCHC RBC-ENTMCNC: 28.8 PG — SIGNIFICANT CHANGE UP (ref 27–34)
MCHC RBC-ENTMCNC: 29.2 PG — SIGNIFICANT CHANGE UP (ref 27–34)
MCHC RBC-ENTMCNC: 33.9 GM/DL — SIGNIFICANT CHANGE UP (ref 32–36)
MCHC RBC-ENTMCNC: 34.2 GM/DL — SIGNIFICANT CHANGE UP (ref 32–36)
MCV RBC AUTO: 85.1 FL — SIGNIFICANT CHANGE UP (ref 80–100)
MCV RBC AUTO: 85.4 FL — SIGNIFICANT CHANGE UP (ref 80–100)
MONOCYTES # BLD AUTO: 0.5 K/UL — SIGNIFICANT CHANGE UP (ref 0–0.9)
MONOCYTES # BLD AUTO: 0.7 K/UL — SIGNIFICANT CHANGE UP (ref 0–0.9)
MONOCYTES NFR BLD AUTO: 5.2 % — SIGNIFICANT CHANGE UP (ref 2–14)
MONOCYTES NFR BLD AUTO: 8.1 % — SIGNIFICANT CHANGE UP (ref 2–14)
NEUTROPHILS # BLD AUTO: 6 K/UL — SIGNIFICANT CHANGE UP (ref 1.8–7.4)
NEUTROPHILS # BLD AUTO: 7.8 K/UL — HIGH (ref 1.8–7.4)
NEUTROPHILS NFR BLD AUTO: 74 % — SIGNIFICANT CHANGE UP (ref 43–77)
NEUTROPHILS NFR BLD AUTO: 85.7 % — HIGH (ref 43–77)
NITRITE UR-MCNC: NEGATIVE — SIGNIFICANT CHANGE UP
NT-PROBNP SERPL-SCNC: 569 PG/ML — HIGH (ref 0–300)
NT-PROBNP SERPL-SCNC: 774 PG/ML — HIGH (ref 0–300)
OTHER CELLS CSF MANUAL: 4 ML/DL — LOW (ref 18–22)
PCO2 BLDV: 47 MMHG — SIGNIFICANT CHANGE UP (ref 35–50)
PH BLDV: 7.37 — SIGNIFICANT CHANGE UP (ref 7.35–7.45)
PH UR: 7 — SIGNIFICANT CHANGE UP (ref 5–8)
PLATELET # BLD AUTO: 238 K/UL — SIGNIFICANT CHANGE UP (ref 150–400)
PLATELET # BLD AUTO: 247 K/UL — SIGNIFICANT CHANGE UP (ref 150–400)
PO2 BLDV: 23 MMHG — LOW (ref 25–45)
POTASSIUM BLDV-SCNC: 3.7 MMOL/L — SIGNIFICANT CHANGE UP (ref 3.5–5.3)
POTASSIUM SERPL-MCNC: 4 MMOL/L — SIGNIFICANT CHANGE UP (ref 3.5–5.3)
POTASSIUM SERPL-MCNC: 4 MMOL/L — SIGNIFICANT CHANGE UP (ref 3.5–5.3)
POTASSIUM SERPL-SCNC: 4 MMOL/L — SIGNIFICANT CHANGE UP (ref 3.5–5.3)
POTASSIUM SERPL-SCNC: 4 MMOL/L — SIGNIFICANT CHANGE UP (ref 3.5–5.3)
PROT SERPL-MCNC: 6.7 G/DL — SIGNIFICANT CHANGE UP (ref 6–8.3)
PROT SERPL-MCNC: 7.2 G/DL — SIGNIFICANT CHANGE UP (ref 6–8.3)
PROT UR-MCNC: NEGATIVE — SIGNIFICANT CHANGE UP
PROTHROM AB SERPL-ACNC: 12.3 SEC — SIGNIFICANT CHANGE UP (ref 10–12.9)
PROTHROM AB SERPL-ACNC: 12.5 SEC — SIGNIFICANT CHANGE UP (ref 10–12.9)
RBC # BLD: 3.77 M/UL — LOW (ref 4.2–5.8)
RBC # BLD: 3.88 M/UL — LOW (ref 4.2–5.8)
RBC # FLD: 13.3 % — SIGNIFICANT CHANGE UP (ref 10.3–14.5)
RBC # FLD: 13.4 % — SIGNIFICANT CHANGE UP (ref 10.3–14.5)
RBC CASTS # UR COMP ASSIST: 8 /HPF — HIGH (ref 0–4)
RH IG SCN BLD-IMP: POSITIVE — SIGNIFICANT CHANGE UP
SAO2 % BLDV: 28 % — LOW (ref 67–88)
SODIUM SERPL-SCNC: 137 MMOL/L — SIGNIFICANT CHANGE UP (ref 135–145)
SODIUM SERPL-SCNC: 139 MMOL/L — SIGNIFICANT CHANGE UP (ref 135–145)
SP GR SPEC: 1.01 — SIGNIFICANT CHANGE UP (ref 1.01–1.02)
TROPONIN T, HIGH SENSITIVITY RESULT: 514 NG/L — HIGH (ref 0–51)
TROPONIN T, HIGH SENSITIVITY RESULT: 99 NG/L — HIGH (ref 0–51)
UROBILINOGEN FLD QL: NEGATIVE — SIGNIFICANT CHANGE UP
WBC # BLD: 8.2 K/UL — SIGNIFICANT CHANGE UP (ref 3.8–10.5)
WBC # BLD: 9.1 K/UL — SIGNIFICANT CHANGE UP (ref 3.8–10.5)
WBC # FLD AUTO: 8.2 K/UL — SIGNIFICANT CHANGE UP (ref 3.8–10.5)
WBC # FLD AUTO: 9.1 K/UL — SIGNIFICANT CHANGE UP (ref 3.8–10.5)
WBC UR QL: 0 /HPF — SIGNIFICANT CHANGE UP (ref 0–5)

## 2019-01-09 PROCEDURE — 76937 US GUIDE VASCULAR ACCESS: CPT | Mod: 26,GC

## 2019-01-09 PROCEDURE — 71045 X-RAY EXAM CHEST 1 VIEW: CPT | Mod: 26

## 2019-01-09 PROCEDURE — 99291 CRITICAL CARE FIRST HOUR: CPT

## 2019-01-09 PROCEDURE — 99222 1ST HOSP IP/OBS MODERATE 55: CPT

## 2019-01-09 PROCEDURE — 99223 1ST HOSP IP/OBS HIGH 75: CPT | Mod: GC,25

## 2019-01-09 PROCEDURE — 93458 L HRT ARTERY/VENTRICLE ANGIO: CPT | Mod: 26,GC

## 2019-01-09 PROCEDURE — 93010 ELECTROCARDIOGRAM REPORT: CPT

## 2019-01-09 PROCEDURE — 33967 INSERT I-AORT PERCUT DEVICE: CPT | Mod: GC

## 2019-01-09 RX ORDER — ALBUMIN HUMAN 25 %
250 VIAL (ML) INTRAVENOUS ONCE
Qty: 0 | Refills: 0 | Status: COMPLETED | OUTPATIENT
Start: 2019-01-09 | End: 2019-01-09

## 2019-01-09 RX ORDER — PANTOPRAZOLE SODIUM 20 MG/1
40 TABLET, DELAYED RELEASE ORAL
Qty: 0 | Refills: 0 | Status: DISCONTINUED | OUTPATIENT
Start: 2019-01-09 | End: 2019-01-11

## 2019-01-09 RX ORDER — SODIUM CHLORIDE 9 MG/ML
3 INJECTION INTRAMUSCULAR; INTRAVENOUS; SUBCUTANEOUS EVERY 8 HOURS
Qty: 0 | Refills: 0 | Status: DISCONTINUED | OUTPATIENT
Start: 2019-01-09 | End: 2019-01-11

## 2019-01-09 RX ORDER — ASPIRIN/CALCIUM CARB/MAGNESIUM 324 MG
81 TABLET ORAL DAILY
Qty: 0 | Refills: 0 | Status: DISCONTINUED | OUTPATIENT
Start: 2019-01-09 | End: 2019-01-11

## 2019-01-09 RX ORDER — HEPARIN SODIUM 5000 [USP'U]/ML
800 INJECTION INTRAVENOUS; SUBCUTANEOUS
Qty: 25000 | Refills: 0 | Status: DISCONTINUED | OUTPATIENT
Start: 2019-01-09 | End: 2019-01-10

## 2019-01-09 RX ORDER — METOPROLOL TARTRATE 50 MG
25 TABLET ORAL EVERY 12 HOURS
Qty: 0 | Refills: 0 | Status: DISCONTINUED | OUTPATIENT
Start: 2019-01-09 | End: 2019-01-09

## 2019-01-09 RX ORDER — ATORVASTATIN CALCIUM 80 MG/1
40 TABLET, FILM COATED ORAL AT BEDTIME
Qty: 0 | Refills: 0 | Status: DISCONTINUED | OUTPATIENT
Start: 2019-01-09 | End: 2019-01-11

## 2019-01-09 RX ORDER — DOCUSATE SODIUM 100 MG
100 CAPSULE ORAL THREE TIMES A DAY
Qty: 0 | Refills: 0 | Status: DISCONTINUED | OUTPATIENT
Start: 2019-01-09 | End: 2019-01-11

## 2019-01-09 RX ADMIN — SODIUM CHLORIDE 3 MILLILITER(S): 9 INJECTION INTRAMUSCULAR; INTRAVENOUS; SUBCUTANEOUS at 21:55

## 2019-01-09 RX ADMIN — Medication 25 MILLIGRAM(S): at 21:47

## 2019-01-09 RX ADMIN — Medication 125 MILLILITER(S): at 23:15

## 2019-01-09 RX ADMIN — HEPARIN SODIUM 8 UNIT(S)/HR: 5000 INJECTION INTRAVENOUS; SUBCUTANEOUS at 21:47

## 2019-01-09 RX ADMIN — ATORVASTATIN CALCIUM 40 MILLIGRAM(S): 80 TABLET, FILM COATED ORAL at 21:47

## 2019-01-09 RX ADMIN — Medication 100 MILLIGRAM(S): at 21:47

## 2019-01-09 NOTE — ED PROVIDER NOTE - ATTENDING CONTRIBUTION TO CARE
pt is a 59 y/o male with barron with no cp noted, with no significant ekg changes st depression noted, with  avr elevation, refused stress test in september, denies active chest pain, cath consult called due to ekg and risk factors. cardiac work up ordered.

## 2019-01-09 NOTE — H&P ADULT - HISTORY OF PRESENT ILLNESS
59 yo male with PMH HTN, Family hx CAD (father at age 59), who presented to ED today with exertional chest pain a/w EKG changes.  Patient was brought to cardiac cath lab and dx with CAD, IABP placed.  Patient brought to CTU for preoperative workup/managment.  Patient currently without chest pain or SOB.

## 2019-01-09 NOTE — H&P ADULT - NSHPPHYSICALEXAM_GEN_ALL_CORE
Neuro: A+O X 3  Cardiac S1/S2 RRR, no murmur  Pulm CTA B  Abd Soft, NT, ND, no tenderness  Ext 2+ pulse BUE, 1+ BLE, Right Groin IABP site without swelling/hematoma

## 2019-01-09 NOTE — H&P ADULT - FAMILY HISTORY
Father  Still living? Unknown  Family history of coronary artery disease, Age at diagnosis: Age Unknown  Family history of MI (myocardial infarction), Age at diagnosis: Age Unknown

## 2019-01-09 NOTE — ED PROVIDER NOTE - OBJECTIVE STATEMENT
57 yo male with PMH HTN, Family hx CAD (father at age 59), who presented to ED today with exertional sob a/w EKG changes.  no associated cp, seen here in september for chest pain, but refused stress test.

## 2019-01-09 NOTE — ED PROVIDER NOTE - PROGRESS NOTE DETAILS
cath fellow called on arrival to back with abnormal ekg with avr elevation 1mm and significant st depressions over 2mm changed from prior ekg, no current cp, intially going to wait for work up as per fellow, with elevated trop, awaiting cath attending, but signed out pending this dispo.  maria esther singletary

## 2019-01-09 NOTE — H&P ADULT - ATTENDING COMMENTS
Pt seen and examined,  Agree with above  ST MI, Severe TVD  for cabg.  Targets, LAD, diag, PDA  STS risk 2-4%.  Risk/benefits d/w pt and family.  Agree to proceed

## 2019-01-09 NOTE — CHART NOTE - NSCHARTNOTEFT_GEN_A_CORE
HPI:  59M hx HTN, HLD p/w chest heaviness and SOMMER  Was supposed to have outpatient stress in september but did not due to insurance.  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 Also gets headaches with exertion (?anginal equivalent of headaches)    Exam:  notably overloaded 3+ pitting edema  Bilateral rales  Elevated JVD    Trop 99    EKG AVR 1 mm ABDI  2-3 mm STD in V2-V5    Plan:  Appropriate for [facility]  Will likely need diuresis and TTE following cath as well HPI:  59M hx HTN, HLD p/w chest heaviness and SOMMER  Was supposed to have outpatient stress in september but did not due to insurance.  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 Also gets headaches with exertion (?anginal equivalent of headaches)    Exam:  notably overloaded 3+ pitting edema  Bilateral rales  Elevated JVD    Trop 99    EKG AVR 1 mm ABDI  2-3 mm STD in V2-V5    Plan:  Appropriate for [facility]  Will likely need diuresis and TTE following cath as well    ATTENDING:  Given sxs and ECG findings, highly likely that he has a high grade prox LAD or LMCA lesion, albeit w/o unstable plaque.   Proceed w/ urgent cath.    Patient interviewed and examined. I was physically present for the essential portions of the E/M service provided.  Chart reviewed and note edited where appropriate.  Case discussed with fellow.  Agree w/ Assessment and Plan as outlined.    Bossman Bernard MD [facility]  Spectra:  [id number]  Office: [phone]

## 2019-01-09 NOTE — PROGRESS NOTE ADULT - SUBJECTIVE AND OBJECTIVE BOX
SCOOBY QUISPE  MRN-[id number]  Patient is a 58y old  Male who presents with a chief complaint of Chest pain (2019 21:55)    HPI:  57 yo male with PMH HTN, Family hx CAD (father at age 59), who presented to ED today with exertional chest pain a/w EKG changes.  Patient was brought to cardiac cath lab and dx with CAD, IABP placed.  Patient brought to CTU for preoperative workup/managment.  Patient currently without chest pain or SOB. (2019 21:55)      Surgery/Hospital course:    Today:    REVIEW OF SYSTEMS:  Gen:: No fever  EYES/ENT: No visual changes;  No vertigo or throat pain   NECK: No pain or stiffness  RES:  No shortness of breath or Cough  CARD: No chest pain   GI: No abdominal pain  : No dysuria  NEURO: No weakness  SKIN: No itching, rashes, or lesions     Physical Exam:  Vital Signs Last 24 Hrs  T(C): 36.6 (2019 20:00), Max: 37 (2019 15:43)  T(F): 97.9 (2019 20:00), Max: 98.6 (2019 15:43)  HR: 90 (2019 23:00) (90 - 106)  BP: 93/58 (2019 23:00) (93/58 - 134/55)  BP(mean): 71 (2019 23:00) (67 - 87)  RR: 18 (2019 23:00) (17 - 25)  SpO2: 94% (2019 23:00) (94% - 100%)  Gen:  Awake, alert,   CNS: non focal .	  Neck: no JVD  RES : clear , no wheezing      ; chest tubes                     CVS: Regular  rhythm. Normal S1/S2  Abd: Soft, non-distended. Bowel sounds present.  Skin: No rash.  Ext:  no edema, A Line  PSY:    ============================I/O===========================   I&O's Detail    2019 07:01  -  2019 23:35  --------------------------------------------------------  IN:    heparin Infusion: 16 mL    Oral Fluid: 480 mL  Total IN: 496 mL    OUT:    Voided: 1050 mL  Total OUT: 1050 mL    Total NET: -554 mL        ============================ LABS =========================                        11.2   9.1   )-----------( 238      ( 2019 21:36 )             33.0     -    139  |  101  |  19  ----------------------------<  142<H>  4.0   |  22  |  0.88    Ca    8.8      2019 21:43    TPro  6.7  /  Alb  4.0  /  TBili  0.4  /  DBili  x   /  AST  43<H>  /  ALT  17  /  AlkPhos  64  -    LIVER FUNCTIONS - ( 2019 21:43 )  Alb: 4.0 g/dL / Pro: 6.7 g/dL / ALK PHOS: 64 U/L / ALT: 17 U/L / AST: 43 U/L / GGT: x           PT/INR - ( 2019 21:36 )   PT: 12.3 sec;   INR: 1.08 ratio         PTT - ( 2019 21:36 )  PTT:25.5 sec    Urinalysis Basic - ( 2019 21:36 )    Color: Colorless / Appearance: Clear / S.011 / pH: x  Gluc: x / Ketone: Negative  / Bili: Negative / Urobili: Negative   Blood: x / Protein: Negative / Nitrite: Negative   Leuk Esterase: Negative / RBC: 8 /hpf / WBC 0 /HPF   Sq Epi: x / Non Sq Epi: 0 /hpf / Bacteria: Negative          ======================Micro/Rad/Cardio=================  Culture: Reviewed   CXR: Reviewed  Echo:Reviewed  ======================================================  PAST MEDICAL & SURGICAL HISTORY:  Cervical radiculopathy  HTN (hypertension)  Psoriasis  Hypertension  History of hip replacement, total, right  No significant past surgical history      ====================ASSESMENT ==============  CNS:  RES:  CVS:  Hem:  [place]:  GI:  Endo:  ID:  Skin:    Plan:  ====================== NEUROLOGY=====================    ==================== RESPIRATORY======================    Mechanical Ventilation:    ====================CARDIOVASCULAR==================    ===================HEMATOLOGIC/ONC ===================  aspirin enteric coated 81 milliGRAM(s) Oral daily  heparin  Infusion 800 Unit(s)/Hr IV Continuous <Continuous>    ===================== RENAL =========================    ==================== GASTROINTESTINAL===================  albumin human  5% IVPB 250 milliLiter(s) IV Intermittent once  docusate sodium 100 milliGRAM(s) Oral three times a day  pantoprazole    Tablet 40 milliGRAM(s) Oral before breakfast  sodium chloride 0.9% lock flush 3 milliLiter(s) IV Push every 8 hours    =======================    ENDOCRINE  =====================  atorvastatin 40    ========================INFECTIOUS DISEASE================      .crit SCOOBY QUISPE  MRN-[id number]  Patient is a 58y old  Male who presents with a chief complaint of Chest pain (2019 21:55)    HPI:  57 yo male with PMH HTN, Family hx CAD (father at age 59), who presented to ED today with exertional chest pain a/w EKG changes.  Patient was brought to cardiac cath lab and dx with CAD, IABP placed.  Patient brought to CTU for preoperative workup/managment.  Patient currently without chest pain or SOB. (2019 21:55)    Surgery/Hospital course:  2019 cardiac cath with IABP insertion  pt feels better after baloon insertin, no chest pain or sob;   given lasix in cath lab; good diuretic response;    REVIEW OF SYSTEMS:  Gen: No fever  EYES/ENT: No visual changes;  No vertigo or throat pain   NECK: No pain or stiffness  RES:  No shortness of breath or Cough  CARD: No chest pain   GI: No abdominal pain  : No dysuria  NEURO: No weakness  SKIN: No itching, rashes, or lesions     Physical Exam:  Vital Signs Last 24 Hrs  T(C): 36.6 (2019 20:00), Max: 37 (2019 15:43)  T(F): 97.9 (2019 20:00), Max: 98.6 (2019 15:43)  HR: 90 (2019 23:00) (90 - 106)  BP: 93/58 (2019 23:00) (93/58 - 134/55)  BP(mean): 71 (2019 23:00) (67 - 87)  RR: 18 (2019 23:00) (17 - 25)  SpO2: 94% (2019 23:00) (94% - 100%)  Gen:  Awake, alert,   CNS: non focal .	  Neck: no JVD  RES : clear , no wheezing                      CVS: Regular  rhythm. Normal S1/S2  Abd: Soft, non-distended. Bowel sounds present.  Skin: No rash.  Ext:  no edema, A Line    ============================I/O===========================   I&O's Detail    2019 07:01  -  2019 23:35  --------------------------------------------------------  IN:    heparin Infusion: 16 mL    Oral Fluid: 480 mL  Total IN: 496 mL    OUT:    Voided: 1050 mL  Total OUT: 1050 mL    Total NET: -554 mL    ============================ LABS =========================                        11.2   9.1   )-----------( 238      ( 2019 21:36 )             33.0     -    139  |  101  |  19  ----------------------------<  142<H>  4.0   |  22  |  0.88    Ca    8.8      2019 21:43    TPro  6.7  /  Alb  4.0  /  TBili  0.4  /  DBili  x   /  AST  43<H>  /  ALT  17  /  AlkPhos  64  01-09    LIVER FUNCTIONS - ( 2019 21:43 )  Alb: 4.0 g/dL / Pro: 6.7 g/dL / ALK PHOS: 64 U/L / ALT: 17 U/L / AST: 43 U/L / GGT: x           PT/INR - ( 2019 21:36 )   PT: 12.3 sec;   INR: 1.08 ratio         PTT - ( 2019 21:36 )  PTT:25.5 sec    Urinalysis Basic - ( 2019 21:36 )    Color: Colorless / Appearance: Clear / S.011 / pH: x  Gluc: x / Ketone: Negative  / Bili: Negative / Urobili: Negative   Blood: x / Protein: Negative / Nitrite: Negative   Leuk Esterase: Negative / RBC: 8 /hpf / WBC 0 /HPF   Sq Epi: x / Non Sq Epi: 0 /hpf / Bacteria: Negative          ======================Micro/Rad/Cardio=================  CXR: Reviewed  cath result noted   EKG reviewed   ======================================================  PAST MEDICAL & SURGICAL HISTORY:  Cervical radiculopathy  HTN (hypertension)  Psoriasis  Hypertension  History of hip replacement, total, right  No significant past surgical history      ====================ASSESMENT ==============  acute mi (NSTEMI)   atherosclerosis coronary artery  INtra Aortic Baloon pump assist  Hypotension  anemia     Plan:  ====================CARDIOVASCULAR==================  IABP support, with 1:1 augmentation  fluid challenge; pt is diuresing well    ; however he is becoming hypotensive; still asymptomatic;   vasopressors if still low bp; A line inserion  antiplts aspirin  anticoagulation with heparin drip                                     ===================HEMATOLOGIC/ONC ===================  aspirin enteric coated 81 milliGRAM(s) Oral daily  heparin  Infusion 800 Unit(s)/Hr IV Continuous <Continuous>    ===================== RENAL =========================  miles; monitor cardiac output  ==================== GASTROINTESTINAL===================  albumin human  5% IVPB 250 milliLiter(s) IV Intermittent once  docusate sodium 100 milliGRAM(s) Oral three times a day  pantoprazole    Tablet 40 milliGRAM(s) Oral before breakfast  sodium chloride 0.9% lock flush 3 milliLiter(s) IV Push every 8 hours    =======================    ENDOCRINE  =====================  atorvastatin 40    Patient requires continuous monitoring with bedside rhythm monitoring,arterial line,pulse oximetry, intermittent blood gas analysis.  Care plan discussed with ICU care team.  patient remain critical; time spent 35 min

## 2019-01-09 NOTE — H&P ADULT - PROBLEM SELECTOR PLAN 1
Admit to Dr Workman for CABG preoperative w/u  IABP in place, monitor positioning,check CXR  Beta blocker for Tachycardia/HTN, preoperative CAD  Hold ARB/ACEI for perioperative ATN  Start ASA/Statin for plague stabilization  Heparin drip for AMI  routine CABG preoperative testing to include ECHO, Carotid dopplers, HGB A1C

## 2019-01-09 NOTE — ED PROVIDER NOTE - SKIN, MLM
Skin normal color for race, warm, dry and intact. No evidence of rash. pos pitting edema to b/l le plus 3

## 2019-01-09 NOTE — ED PROVIDER NOTE - ENMT, MLM
Airway patent, Nasal mucosa clear. Mouth with normal mucosa. Throat has no vesicles, no oropharyngeal exudates and uvula is midline. pos jvd

## 2019-01-09 NOTE — ED ADULT NURSE NOTE - OBJECTIVE STATEMENT
57 y/o M, reported to ED from PMD office. A&ox3, c/o abnormal EKG. Pt reports that he went to his PMD for a check up and was told that his EKG was abnormal. Pt was 57 y/o M, reported to ED from PMD office. A&ox3, c/o abnormal EKG. Pt reports that he went to his PMD for a check up and was told that his EKG was abnormal. Pt denies C/P or palpitations. Pt denies any discomfort. Pt reports that he has also been having some intermittent fevers. Pt is afebrile upon arrival to ED. Pt denies LOC, SOB, N/V/D, abd pain. Pt denies chills. Wife at bedside, will continue to monitor pt. Pt placed on cardiac monitor.

## 2019-01-10 ENCOUNTER — TRANSCRIPTION ENCOUNTER (OUTPATIENT)
Age: 59
End: 2019-01-10

## 2019-01-10 ENCOUNTER — APPOINTMENT (OUTPATIENT)
Dept: NEUROLOGY | Facility: CLINIC | Age: 59
End: 2019-01-10

## 2019-01-10 DIAGNOSIS — Z71.89 OTHER SPECIFIED COUNSELING: ICD-10-CM

## 2019-01-10 LAB
ALBUMIN SERPL ELPH-MCNC: 4.2 G/DL — SIGNIFICANT CHANGE UP (ref 3.3–5)
ALP SERPL-CCNC: 55 U/L — SIGNIFICANT CHANGE UP (ref 40–120)
ALT FLD-CCNC: 16 U/L — SIGNIFICANT CHANGE UP (ref 10–45)
ANION GAP SERPL CALC-SCNC: 14 MMOL/L — SIGNIFICANT CHANGE UP (ref 5–17)
APTT BLD: 112.7 SEC — HIGH (ref 27.5–36.3)
APTT BLD: 46.3 SEC — HIGH (ref 27.5–36.3)
APTT BLD: 75.4 SEC — HIGH (ref 27.5–36.3)
APTT BLD: 94 SEC — HIGH (ref 27.5–36.3)
AST SERPL-CCNC: 61 U/L — HIGH (ref 10–40)
BASOPHILS # BLD AUTO: 0.1 K/UL — SIGNIFICANT CHANGE UP (ref 0–0.2)
BASOPHILS NFR BLD AUTO: 0.5 % — SIGNIFICANT CHANGE UP (ref 0–2)
BILIRUB SERPL-MCNC: 0.5 MG/DL — SIGNIFICANT CHANGE UP (ref 0.2–1.2)
BLD GP AB SCN SERPL QL: NEGATIVE — SIGNIFICANT CHANGE UP
BUN SERPL-MCNC: 19 MG/DL — SIGNIFICANT CHANGE UP (ref 7–23)
CALCIUM SERPL-MCNC: 8.9 MG/DL — SIGNIFICANT CHANGE UP (ref 8.4–10.5)
CHLORIDE SERPL-SCNC: 100 MMOL/L — SIGNIFICANT CHANGE UP (ref 96–108)
CK MB BLD-MCNC: 6 % — HIGH (ref 0–3.5)
CK MB BLD-MCNC: 7.7 % — HIGH (ref 0–3.5)
CK MB BLD-MCNC: 9.3 % — HIGH (ref 0–3.5)
CK MB CFR SERPL CALC: 40.5 NG/ML — HIGH (ref 0–6.7)
CK MB CFR SERPL CALC: 62.1 NG/ML — HIGH (ref 0–6.7)
CK MB CFR SERPL CALC: 78 NG/ML — HIGH (ref 0–6.7)
CK SERPL-CCNC: 680 U/L — HIGH (ref 30–200)
CK SERPL-CCNC: 808 U/L — HIGH (ref 30–200)
CK SERPL-CCNC: 837 U/L — HIGH (ref 30–200)
CO2 SERPL-SCNC: 22 MMOL/L — SIGNIFICANT CHANGE UP (ref 22–31)
CREAT SERPL-MCNC: 0.86 MG/DL — SIGNIFICANT CHANGE UP (ref 0.5–1.3)
EOSINOPHIL # BLD AUTO: 0 K/UL — SIGNIFICANT CHANGE UP (ref 0–0.5)
EOSINOPHIL NFR BLD AUTO: 0.4 % — SIGNIFICANT CHANGE UP (ref 0–6)
GAS PNL BLDA: SIGNIFICANT CHANGE UP
GLUCOSE SERPL-MCNC: 111 MG/DL — HIGH (ref 70–99)
HBA1C BLD-MCNC: 5.6 % — SIGNIFICANT CHANGE UP (ref 4–5.6)
HCT VFR BLD CALC: 28.1 % — LOW (ref 39–50)
HGB BLD-MCNC: 9.6 G/DL — LOW (ref 13–17)
LYMPHOCYTES # BLD AUTO: 1.9 K/UL — SIGNIFICANT CHANGE UP (ref 1–3.3)
LYMPHOCYTES # BLD AUTO: 17.1 % — SIGNIFICANT CHANGE UP (ref 13–44)
MAGNESIUM SERPL-MCNC: 2.1 MG/DL — SIGNIFICANT CHANGE UP (ref 1.6–2.6)
MCHC RBC-ENTMCNC: 29.1 PG — SIGNIFICANT CHANGE UP (ref 27–34)
MCHC RBC-ENTMCNC: 34.4 GM/DL — SIGNIFICANT CHANGE UP (ref 32–36)
MCV RBC AUTO: 84.6 FL — SIGNIFICANT CHANGE UP (ref 80–100)
MONOCYTES # BLD AUTO: 1 K/UL — HIGH (ref 0–0.9)
MONOCYTES NFR BLD AUTO: 9.2 % — SIGNIFICANT CHANGE UP (ref 2–14)
MRSA PCR RESULT.: SIGNIFICANT CHANGE UP
NEUTROPHILS # BLD AUTO: 8.2 K/UL — HIGH (ref 1.8–7.4)
NEUTROPHILS NFR BLD AUTO: 72.8 % — SIGNIFICANT CHANGE UP (ref 43–77)
PHOSPHATE SERPL-MCNC: 3.8 MG/DL — SIGNIFICANT CHANGE UP (ref 2.5–4.5)
PLATELET # BLD AUTO: 282 K/UL — SIGNIFICANT CHANGE UP (ref 150–400)
POTASSIUM SERPL-MCNC: 3.5 MMOL/L — SIGNIFICANT CHANGE UP (ref 3.5–5.3)
POTASSIUM SERPL-SCNC: 3.5 MMOL/L — SIGNIFICANT CHANGE UP (ref 3.5–5.3)
PROT SERPL-MCNC: 6.6 G/DL — SIGNIFICANT CHANGE UP (ref 6–8.3)
RBC # BLD: 3.32 M/UL — LOW (ref 4.2–5.8)
RBC # FLD: 13.4 % — SIGNIFICANT CHANGE UP (ref 10.3–14.5)
RH IG SCN BLD-IMP: POSITIVE — SIGNIFICANT CHANGE UP
S AUREUS DNA NOSE QL NAA+PROBE: DETECTED
SODIUM SERPL-SCNC: 136 MMOL/L — SIGNIFICANT CHANGE UP (ref 135–145)
T3 SERPL-MCNC: 88 NG/DL — SIGNIFICANT CHANGE UP (ref 80–200)
T4 AB SER-ACNC: 5.4 UG/DL — SIGNIFICANT CHANGE UP (ref 4.6–12)
TROPONIN T, HIGH SENSITIVITY RESULT: 1010 NG/L — HIGH (ref 0–51)
TROPONIN T, HIGH SENSITIVITY RESULT: 1070 NG/L — HIGH (ref 0–51)
TROPONIN T, HIGH SENSITIVITY RESULT: 1286 NG/L — HIGH (ref 0–51)
TSH SERPL-MCNC: 2.44 UIU/ML — SIGNIFICANT CHANGE UP (ref 0.27–4.2)
TSH SERPL-MCNC: 3.19 UIU/ML — SIGNIFICANT CHANGE UP (ref 0.27–4.2)
WBC # BLD: 11.3 K/UL — HIGH (ref 3.8–10.5)
WBC # FLD AUTO: 11.3 K/UL — HIGH (ref 3.8–10.5)

## 2019-01-10 PROCEDURE — 99291 CRITICAL CARE FIRST HOUR: CPT

## 2019-01-10 PROCEDURE — 36620 INSERTION CATHETER ARTERY: CPT

## 2019-01-10 PROCEDURE — 93321 DOPPLER ECHO F-UP/LMTD STD: CPT | Mod: 26

## 2019-01-10 PROCEDURE — 93880 EXTRACRANIAL BILAT STUDY: CPT | Mod: 26

## 2019-01-10 PROCEDURE — 71045 X-RAY EXAM CHEST 1 VIEW: CPT | Mod: 26,59

## 2019-01-10 PROCEDURE — 94010 BREATHING CAPACITY TEST: CPT | Mod: 26

## 2019-01-10 PROCEDURE — 93308 TTE F-UP OR LMTD: CPT | Mod: 26

## 2019-01-10 PROCEDURE — 93306 TTE W/DOPPLER COMPLETE: CPT | Mod: 26

## 2019-01-10 RX ORDER — ALBUMIN HUMAN 25 %
250 VIAL (ML) INTRAVENOUS ONCE
Qty: 0 | Refills: 0 | Status: COMPLETED | OUTPATIENT
Start: 2019-01-10 | End: 2019-01-10

## 2019-01-10 RX ORDER — CHLORHEXIDINE GLUCONATE 213 G/1000ML
1 SOLUTION TOPICAL ONCE
Qty: 0 | Refills: 0 | Status: COMPLETED | OUTPATIENT
Start: 2019-01-10 | End: 2019-01-10

## 2019-01-10 RX ORDER — CHLORHEXIDINE GLUCONATE 213 G/1000ML
15 SOLUTION TOPICAL ONCE
Qty: 0 | Refills: 0 | Status: COMPLETED | OUTPATIENT
Start: 2019-01-10 | End: 2019-01-11

## 2019-01-10 RX ORDER — POTASSIUM CHLORIDE 20 MEQ
20 PACKET (EA) ORAL
Qty: 0 | Refills: 0 | Status: COMPLETED | OUTPATIENT
Start: 2019-01-10 | End: 2019-01-11

## 2019-01-10 RX ORDER — POTASSIUM CHLORIDE 20 MEQ
20 PACKET (EA) ORAL ONCE
Qty: 0 | Refills: 0 | Status: COMPLETED | OUTPATIENT
Start: 2019-01-10 | End: 2019-01-10

## 2019-01-10 RX ORDER — POTASSIUM CHLORIDE 20 MEQ
20 PACKET (EA) ORAL
Qty: 0 | Refills: 0 | Status: COMPLETED | OUTPATIENT
Start: 2019-01-10 | End: 2019-01-10

## 2019-01-10 RX ORDER — HEPARIN SODIUM 5000 [USP'U]/ML
1200 INJECTION INTRAVENOUS; SUBCUTANEOUS
Qty: 25000 | Refills: 0 | Status: DISCONTINUED | OUTPATIENT
Start: 2019-01-10 | End: 2019-01-11

## 2019-01-10 RX ORDER — SODIUM CHLORIDE 9 MG/ML
250 INJECTION, SOLUTION INTRAVENOUS ONCE
Qty: 0 | Refills: 0 | Status: COMPLETED | OUTPATIENT
Start: 2019-01-10 | End: 2019-01-10

## 2019-01-10 RX ORDER — PHENYLEPHRINE HYDROCHLORIDE 10 MG/ML
0.58 INJECTION INTRAVENOUS
Qty: 40 | Refills: 0 | Status: DISCONTINUED | OUTPATIENT
Start: 2019-01-10 | End: 2019-01-11

## 2019-01-10 RX ORDER — PHENYLEPHRINE HYDROCHLORIDE 10 MG/ML
0.29 INJECTION INTRAVENOUS
Qty: 40 | Refills: 0 | Status: DISCONTINUED | OUTPATIENT
Start: 2019-01-10 | End: 2019-01-10

## 2019-01-10 RX ORDER — HEPARIN SODIUM 5000 [USP'U]/ML
2000 INJECTION INTRAVENOUS; SUBCUTANEOUS ONCE
Qty: 0 | Refills: 0 | Status: COMPLETED | OUTPATIENT
Start: 2019-01-10 | End: 2019-01-10

## 2019-01-10 RX ADMIN — SODIUM CHLORIDE 3 MILLILITER(S): 9 INJECTION INTRAMUSCULAR; INTRAVENOUS; SUBCUTANEOUS at 21:21

## 2019-01-10 RX ADMIN — PHENYLEPHRINE HYDROCHLORIDE 20 MICROGRAM(S)/KG/MIN: 10 INJECTION INTRAVENOUS at 05:12

## 2019-01-10 RX ADMIN — Medication 20 MILLIEQUIVALENT(S): at 11:36

## 2019-01-10 RX ADMIN — Medication 100 MILLIGRAM(S): at 21:45

## 2019-01-10 RX ADMIN — Medication 1 DROP(S): at 21:45

## 2019-01-10 RX ADMIN — Medication 100 MILLIGRAM(S): at 14:57

## 2019-01-10 RX ADMIN — Medication 500 MILLILITER(S): at 17:19

## 2019-01-10 RX ADMIN — PANTOPRAZOLE SODIUM 40 MILLIGRAM(S): 20 TABLET, DELAYED RELEASE ORAL at 06:01

## 2019-01-10 RX ADMIN — HEPARIN SODIUM 2000 UNIT(S): 5000 INJECTION INTRAVENOUS; SUBCUTANEOUS at 02:34

## 2019-01-10 RX ADMIN — Medication 500 MILLILITER(S): at 00:23

## 2019-01-10 RX ADMIN — HEPARIN SODIUM 12 UNIT(S)/HR: 5000 INJECTION INTRAVENOUS; SUBCUTANEOUS at 06:02

## 2019-01-10 RX ADMIN — SODIUM CHLORIDE 3 MILLILITER(S): 9 INJECTION INTRAMUSCULAR; INTRAVENOUS; SUBCUTANEOUS at 14:57

## 2019-01-10 RX ADMIN — Medication 81 MILLIGRAM(S): at 11:36

## 2019-01-10 RX ADMIN — HEPARIN SODIUM 10.5 UNIT(S)/HR: 5000 INJECTION INTRAVENOUS; SUBCUTANEOUS at 21:00

## 2019-01-10 RX ADMIN — Medication 20 MILLIEQUIVALENT(S): at 05:09

## 2019-01-10 RX ADMIN — PHENYLEPHRINE HYDROCHLORIDE 20 MICROGRAM(S)/KG/MIN: 10 INJECTION INTRAVENOUS at 20:00

## 2019-01-10 RX ADMIN — ATORVASTATIN CALCIUM 40 MILLIGRAM(S): 80 TABLET, FILM COATED ORAL at 21:45

## 2019-01-10 RX ADMIN — SODIUM CHLORIDE 500 MILLILITER(S): 9 INJECTION, SOLUTION INTRAVENOUS at 01:28

## 2019-01-10 RX ADMIN — Medication 20 MILLIEQUIVALENT(S): at 02:01

## 2019-01-10 RX ADMIN — CHLORHEXIDINE GLUCONATE 1 APPLICATION(S): 213 SOLUTION TOPICAL at 23:52

## 2019-01-10 RX ADMIN — SODIUM CHLORIDE 3 MILLILITER(S): 9 INJECTION INTRAMUSCULAR; INTRAVENOUS; SUBCUTANEOUS at 05:08

## 2019-01-10 RX ADMIN — Medication 100 MILLIGRAM(S): at 05:09

## 2019-01-10 RX ADMIN — Medication 20 MILLIEQUIVALENT(S): at 22:35

## 2019-01-10 NOTE — PROGRESS NOTE ADULT - SUBJECTIVE AND OBJECTIVE BOX
CRITICAL CARE ATTENDING - CTICU    MEDICATIONS  (STANDING):  aspirin enteric coated 81 milliGRAM(s) Oral daily  atorvastatin 40 milliGRAM(s) Oral at bedtime  chlorhexidine 0.12% Liquid 15 milliLiter(s) Swish and Spit once  chlorhexidine 4% Liquid 1 Application(s) Topical once  docusate sodium 100 milliGRAM(s) Oral three times a day  heparin  Infusion 1200 Unit(s)/Hr (11 mL/Hr) IV Continuous <Continuous>  pantoprazole    Tablet 40 milliGRAM(s) Oral before breakfast  phenylephrine    Infusion 0.582 MICROgram(s)/kG/Min (20 mL/Hr) IV Continuous <Continuous>  potassium chloride    Tablet ER 20 milliEquivalent(s) Oral once  sodium chloride 0.9% lock flush 3 milliLiter(s) IV Push every 8 hours                                    9.6    11.3  )-----------( 282      ( 10 Farshad 2019 01:56 )             28.1       -10    136  |  100  |  19  ----------------------------<  111<H>  3.5   |  22  |  0.86    Ca    8.9      10 Farshad 2019 01:56  Phos  3.8     01-10  Mg     2.1     01-10    TPro  6.6  /  Alb  4.2  /  TBili  0.5  /  DBili  x   /  AST  61<H>  /  ALT  16  /  AlkPhos  55  10      PT/INR - ( 2019 21:36 )   PT: 12.3 sec;   INR: 1.08 ratio         PTT - ( 10 Farshad 2019 06:27 )  PTT:112.7 sec        Daily Height in cm: 171.45 (2019 13:27)    Daily Weight in k.6 (10 Farshad 2019 00:00)       @ 07:  -  01-10 @ 07:00  --------------------------------------------------------  IN: 2635 mL / OUT: 2025 mL / NET: 610 mL    01-10 @ 07:  -  01-10 @ 10:25  --------------------------------------------------------  IN: 143 mL / OUT: 115 mL / NET: 28 mL        Critically Ill patient  : [x ] preoperative ,   [ ] post operative    Requires :  [x ] Arterial Line   [ ] Central Line  [ ] PA catheter  [x ] IABP  [ ] ECMO  [ ] LVAD  [ ] Ventilator  [ ] pacemaker [ ] Impella.    Bedside evaluation , monitoring , treatment of hemodynamics , fluids , IVP/ IVCD meds.        Diagnosis:     Pre Op CABG    Admit NSTEMI    IABP   [ x] management   [ ] wean 1:1 1:2 1:3   [ ] removal and f/u vascular checks     Hemodynamic lability,instability. Requires IVCD [ x] vasopressors [ ] inotropes  [ ] vasodilator  [x ]IVSS fluid  to maintain MAP, perfusion, C.I.     Hypotension    Hypovolemia    IVCD anticoagulation with [ x] Heparin  [ ] Argatroban for IABP / NSTEMI                        Discussed with CT surgeon, Physician's Assistant - Nurse Practitioner- Critical care medicine team.   Dicussed at  AM / PM rounds.   Chart, labs , films reviewed.    Total Time: 30 min

## 2019-01-11 ENCOUNTER — APPOINTMENT (OUTPATIENT)
Dept: CARDIOTHORACIC SURGERY | Facility: CLINIC | Age: 59
End: 2019-01-11

## 2019-01-11 LAB
ALBUMIN SERPL ELPH-MCNC: 3.7 G/DL — SIGNIFICANT CHANGE UP (ref 3.3–5)
ALBUMIN SERPL ELPH-MCNC: 4.2 G/DL — SIGNIFICANT CHANGE UP (ref 3.3–5)
ALP SERPL-CCNC: 40 U/L — SIGNIFICANT CHANGE UP (ref 40–120)
ALP SERPL-CCNC: 58 U/L — SIGNIFICANT CHANGE UP (ref 40–120)
ALT FLD-CCNC: 12 U/L — SIGNIFICANT CHANGE UP (ref 10–45)
ALT FLD-CCNC: 15 U/L — SIGNIFICANT CHANGE UP (ref 10–45)
ANION GAP SERPL CALC-SCNC: 11 MMOL/L — SIGNIFICANT CHANGE UP (ref 5–17)
ANION GAP SERPL CALC-SCNC: 18 MMOL/L — HIGH (ref 5–17)
APTT BLD: 36.8 SEC — HIGH (ref 27.5–36.3)
APTT BLD: 58.2 SEC — HIGH (ref 27.5–36.3)
AST SERPL-CCNC: 32 U/L — SIGNIFICANT CHANGE UP (ref 10–40)
AST SERPL-CCNC: 40 U/L — SIGNIFICANT CHANGE UP (ref 10–40)
BASOPHILS # BLD AUTO: 0.1 K/UL — SIGNIFICANT CHANGE UP (ref 0–0.2)
BASOPHILS # BLD AUTO: 0.1 K/UL — SIGNIFICANT CHANGE UP (ref 0–0.2)
BASOPHILS NFR BLD AUTO: 0.3 % — SIGNIFICANT CHANGE UP (ref 0–2)
BASOPHILS NFR BLD AUTO: 0.6 % — SIGNIFICANT CHANGE UP (ref 0–2)
BILIRUB SERPL-MCNC: 0.3 MG/DL — SIGNIFICANT CHANGE UP (ref 0.2–1.2)
BILIRUB SERPL-MCNC: 1.3 MG/DL — HIGH (ref 0.2–1.2)
BUN SERPL-MCNC: 13 MG/DL — SIGNIFICANT CHANGE UP (ref 7–23)
BUN SERPL-MCNC: 18 MG/DL — SIGNIFICANT CHANGE UP (ref 7–23)
CALCIUM SERPL-MCNC: 8.3 MG/DL — LOW (ref 8.4–10.5)
CALCIUM SERPL-MCNC: 9.1 MG/DL — SIGNIFICANT CHANGE UP (ref 8.4–10.5)
CHLORIDE SERPL-SCNC: 102 MMOL/L — SIGNIFICANT CHANGE UP (ref 96–108)
CHLORIDE SERPL-SCNC: 109 MMOL/L — HIGH (ref 96–108)
CK MB BLD-MCNC: 4 % — HIGH (ref 0–3.5)
CK MB BLD-MCNC: 4.6 % — HIGH (ref 0–3.5)
CK MB CFR SERPL CALC: 18.7 NG/ML — HIGH (ref 0–6.7)
CK MB CFR SERPL CALC: 20.4 NG/ML — HIGH (ref 0–6.7)
CK SERPL-CCNC: 441 U/L — HIGH (ref 30–200)
CK SERPL-CCNC: 471 U/L — HIGH (ref 30–200)
CO2 SERPL-SCNC: 21 MMOL/L — LOW (ref 22–31)
CO2 SERPL-SCNC: 22 MMOL/L — SIGNIFICANT CHANGE UP (ref 22–31)
CREAT SERPL-MCNC: 0.64 MG/DL — SIGNIFICANT CHANGE UP (ref 0.5–1.3)
CREAT SERPL-MCNC: 0.86 MG/DL — SIGNIFICANT CHANGE UP (ref 0.5–1.3)
EOSINOPHIL # BLD AUTO: 0.2 K/UL — SIGNIFICANT CHANGE UP (ref 0–0.5)
EOSINOPHIL # BLD AUTO: 0.2 K/UL — SIGNIFICANT CHANGE UP (ref 0–0.5)
EOSINOPHIL NFR BLD AUTO: 0.9 % — SIGNIFICANT CHANGE UP (ref 0–6)
EOSINOPHIL NFR BLD AUTO: 1.5 % — SIGNIFICANT CHANGE UP (ref 0–6)
FIBRINOGEN PPP-MCNC: 443 MG/DL — SIGNIFICANT CHANGE UP (ref 350–510)
GAS PNL BLDA: SIGNIFICANT CHANGE UP
GLUCOSE BLDC GLUCOMTR-MCNC: 165 MG/DL — HIGH (ref 70–99)
GLUCOSE SERPL-MCNC: 116 MG/DL — HIGH (ref 70–99)
GLUCOSE SERPL-MCNC: 129 MG/DL — HIGH (ref 70–99)
HCT VFR BLD CALC: 27.7 % — LOW (ref 39–50)
HCT VFR BLD CALC: 28.5 % — LOW (ref 39–50)
HGB BLD-MCNC: 9.5 G/DL — LOW (ref 13–17)
HGB BLD-MCNC: 9.6 G/DL — LOW (ref 13–17)
INR BLD: 1.2 RATIO — HIGH (ref 0.88–1.16)
INR BLD: 1.83 RATIO — HIGH (ref 0.88–1.16)
LYMPHOCYTES # BLD AUTO: 1.9 K/UL — SIGNIFICANT CHANGE UP (ref 1–3.3)
LYMPHOCYTES # BLD AUTO: 11.1 % — LOW (ref 13–44)
LYMPHOCYTES # BLD AUTO: 19.2 % — SIGNIFICANT CHANGE UP (ref 13–44)
LYMPHOCYTES # BLD AUTO: 2 K/UL — SIGNIFICANT CHANGE UP (ref 1–3.3)
MAGNESIUM SERPL-MCNC: 2.2 MG/DL — SIGNIFICANT CHANGE UP (ref 1.6–2.6)
MCHC RBC-ENTMCNC: 28.6 PG — SIGNIFICANT CHANGE UP (ref 27–34)
MCHC RBC-ENTMCNC: 29.4 PG — SIGNIFICANT CHANGE UP (ref 27–34)
MCHC RBC-ENTMCNC: 33.5 GM/DL — SIGNIFICANT CHANGE UP (ref 32–36)
MCHC RBC-ENTMCNC: 34.3 GM/DL — SIGNIFICANT CHANGE UP (ref 32–36)
MCV RBC AUTO: 85.3 FL — SIGNIFICANT CHANGE UP (ref 80–100)
MCV RBC AUTO: 85.8 FL — SIGNIFICANT CHANGE UP (ref 80–100)
MONOCYTES # BLD AUTO: 0.9 K/UL — SIGNIFICANT CHANGE UP (ref 0–0.9)
MONOCYTES # BLD AUTO: 1.5 K/UL — HIGH (ref 0–0.9)
MONOCYTES NFR BLD AUTO: 8.3 % — SIGNIFICANT CHANGE UP (ref 2–14)
MONOCYTES NFR BLD AUTO: 9.5 % — SIGNIFICANT CHANGE UP (ref 2–14)
NEUTROPHILS # BLD AUTO: 14.1 K/UL — HIGH (ref 1.8–7.4)
NEUTROPHILS # BLD AUTO: 6.8 K/UL — SIGNIFICANT CHANGE UP (ref 1.8–7.4)
NEUTROPHILS NFR BLD AUTO: 69.2 % — SIGNIFICANT CHANGE UP (ref 43–77)
NEUTROPHILS NFR BLD AUTO: 79.3 % — HIGH (ref 43–77)
PHOSPHATE SERPL-MCNC: 2.8 MG/DL — SIGNIFICANT CHANGE UP (ref 2.5–4.5)
PLATELET # BLD AUTO: 252 K/UL — SIGNIFICANT CHANGE UP (ref 150–400)
PLATELET # BLD AUTO: 53 K/UL — LOW (ref 150–400)
POTASSIUM SERPL-MCNC: 4 MMOL/L — SIGNIFICANT CHANGE UP (ref 3.5–5.3)
POTASSIUM SERPL-MCNC: 4.7 MMOL/L — SIGNIFICANT CHANGE UP (ref 3.5–5.3)
POTASSIUM SERPL-SCNC: 4 MMOL/L — SIGNIFICANT CHANGE UP (ref 3.5–5.3)
POTASSIUM SERPL-SCNC: 4.7 MMOL/L — SIGNIFICANT CHANGE UP (ref 3.5–5.3)
PROT SERPL-MCNC: 5.7 G/DL — LOW (ref 6–8.3)
PROT SERPL-MCNC: 5.9 G/DL — LOW (ref 6–8.3)
PROTHROM AB SERPL-ACNC: 13.9 SEC — HIGH (ref 10–12.9)
PROTHROM AB SERPL-ACNC: 21.3 SEC — HIGH (ref 10–12.9)
RBC # BLD: 3.23 M/UL — LOW (ref 4.2–5.8)
RBC # BLD: 3.34 M/UL — LOW (ref 4.2–5.8)
RBC # FLD: 13 % — SIGNIFICANT CHANGE UP (ref 10.3–14.5)
RBC # FLD: 13.3 % — SIGNIFICANT CHANGE UP (ref 10.3–14.5)
SODIUM SERPL-SCNC: 141 MMOL/L — SIGNIFICANT CHANGE UP (ref 135–145)
SODIUM SERPL-SCNC: 142 MMOL/L — SIGNIFICANT CHANGE UP (ref 135–145)
TROPONIN T, HIGH SENSITIVITY RESULT: 1423 NG/L — HIGH (ref 0–51)
TROPONIN T, HIGH SENSITIVITY RESULT: 921 NG/L — HIGH (ref 0–51)
WBC # BLD: 17.2 K/UL — HIGH (ref 3.8–10.5)
WBC # BLD: 9.8 K/UL — SIGNIFICANT CHANGE UP (ref 3.8–10.5)
WBC # FLD AUTO: 17.2 K/UL — HIGH (ref 3.8–10.5)
WBC # FLD AUTO: 9.8 K/UL — SIGNIFICANT CHANGE UP (ref 3.8–10.5)

## 2019-01-11 PROCEDURE — 33533 CABG ARTERIAL SINGLE: CPT

## 2019-01-11 PROCEDURE — 93010 ELECTROCARDIOGRAM REPORT: CPT

## 2019-01-11 PROCEDURE — 33508 ENDOSCOPIC VEIN HARVEST: CPT

## 2019-01-11 PROCEDURE — 71045 X-RAY EXAM CHEST 1 VIEW: CPT | Mod: 26

## 2019-01-11 PROCEDURE — 33519 CABG ARTERY-VEIN THREE: CPT

## 2019-01-11 PROCEDURE — 99291 CRITICAL CARE FIRST HOUR: CPT

## 2019-01-11 RX ORDER — ASPIRIN/CALCIUM CARB/MAGNESIUM 324 MG
300 TABLET ORAL ONCE
Qty: 0 | Refills: 0 | Status: COMPLETED | OUTPATIENT
Start: 2019-01-11

## 2019-01-11 RX ORDER — PANTOPRAZOLE SODIUM 20 MG/1
40 TABLET, DELAYED RELEASE ORAL DAILY
Qty: 0 | Refills: 0 | Status: DISCONTINUED | OUTPATIENT
Start: 2019-01-11 | End: 2019-01-12

## 2019-01-11 RX ORDER — HYDROMORPHONE HYDROCHLORIDE 2 MG/ML
1 INJECTION INTRAMUSCULAR; INTRAVENOUS; SUBCUTANEOUS ONCE
Qty: 0 | Refills: 0 | Status: DISCONTINUED | OUTPATIENT
Start: 2019-01-11 | End: 2019-01-11

## 2019-01-11 RX ORDER — CLOPIDOGREL BISULFATE 75 MG/1
75 TABLET, FILM COATED ORAL DAILY
Qty: 0 | Refills: 0 | Status: DISCONTINUED | OUTPATIENT
Start: 2019-01-12 | End: 2019-01-17

## 2019-01-11 RX ORDER — POTASSIUM CHLORIDE 20 MEQ
10 PACKET (EA) ORAL
Qty: 0 | Refills: 0 | Status: DISCONTINUED | OUTPATIENT
Start: 2019-01-11 | End: 2019-01-11

## 2019-01-11 RX ORDER — VASOPRESSIN 20 [USP'U]/ML
0.03 INJECTION INTRAVENOUS
Qty: 100 | Refills: 0 | Status: DISCONTINUED | OUTPATIENT
Start: 2019-01-11 | End: 2019-01-12

## 2019-01-11 RX ORDER — CALCIUM GLUCONATE 100 MG/ML
1 VIAL (ML) INTRAVENOUS ONCE
Qty: 0 | Refills: 0 | Status: COMPLETED | OUTPATIENT
Start: 2019-01-11 | End: 2019-01-11

## 2019-01-11 RX ORDER — SODIUM CHLORIDE 9 MG/ML
1000 INJECTION INTRAMUSCULAR; INTRAVENOUS; SUBCUTANEOUS
Qty: 0 | Refills: 0 | Status: DISCONTINUED | OUTPATIENT
Start: 2019-01-11 | End: 2019-01-13

## 2019-01-11 RX ORDER — ASPIRIN/CALCIUM CARB/MAGNESIUM 324 MG
300 TABLET ORAL ONCE
Qty: 0 | Refills: 0 | Status: DISCONTINUED | OUTPATIENT
Start: 2019-01-11 | End: 2019-01-11

## 2019-01-11 RX ORDER — POTASSIUM CHLORIDE 20 MEQ
10 PACKET (EA) ORAL
Qty: 0 | Refills: 0 | Status: COMPLETED | OUTPATIENT
Start: 2019-01-11 | End: 2019-01-11

## 2019-01-11 RX ORDER — POTASSIUM CHLORIDE 20 MEQ
10 PACKET (EA) ORAL
Qty: 0 | Refills: 0 | Status: DISCONTINUED | OUTPATIENT
Start: 2019-01-11 | End: 2019-01-12

## 2019-01-11 RX ORDER — CHLORHEXIDINE GLUCONATE 213 G/1000ML
5 SOLUTION TOPICAL EVERY 4 HOURS
Qty: 0 | Refills: 0 | Status: DISCONTINUED | OUTPATIENT
Start: 2019-01-11 | End: 2019-01-12

## 2019-01-11 RX ORDER — DOCUSATE SODIUM 100 MG
100 CAPSULE ORAL THREE TIMES A DAY
Qty: 0 | Refills: 0 | Status: DISCONTINUED | OUTPATIENT
Start: 2019-01-11 | End: 2019-01-17

## 2019-01-11 RX ORDER — ALBUMIN HUMAN 25 %
250 VIAL (ML) INTRAVENOUS ONCE
Qty: 0 | Refills: 0 | Status: COMPLETED | OUTPATIENT
Start: 2019-01-11 | End: 2019-01-11

## 2019-01-11 RX ORDER — PANTOPRAZOLE SODIUM 20 MG/1
40 TABLET, DELAYED RELEASE ORAL DAILY
Qty: 0 | Refills: 0 | Status: DISCONTINUED | OUTPATIENT
Start: 2019-01-11 | End: 2019-01-11

## 2019-01-11 RX ORDER — DOCUSATE SODIUM 100 MG
100 CAPSULE ORAL THREE TIMES A DAY
Qty: 0 | Refills: 0 | Status: DISCONTINUED | OUTPATIENT
Start: 2019-01-11 | End: 2019-01-11

## 2019-01-11 RX ORDER — CHLORHEXIDINE GLUCONATE 213 G/1000ML
5 SOLUTION TOPICAL EVERY 4 HOURS
Qty: 0 | Refills: 0 | Status: DISCONTINUED | OUTPATIENT
Start: 2019-01-11 | End: 2019-01-11

## 2019-01-11 RX ORDER — CEFUROXIME AXETIL 250 MG
1500 TABLET ORAL EVERY 8 HOURS
Qty: 0 | Refills: 0 | Status: COMPLETED | OUTPATIENT
Start: 2019-01-12 | End: 2019-01-13

## 2019-01-11 RX ORDER — ASPIRIN/CALCIUM CARB/MAGNESIUM 324 MG
81 TABLET ORAL DAILY
Qty: 0 | Refills: 0 | Status: DISCONTINUED | OUTPATIENT
Start: 2019-01-12 | End: 2019-01-17

## 2019-01-11 RX ORDER — NOREPINEPHRINE BITARTRATE/D5W 8 MG/250ML
0.08 PLASTIC BAG, INJECTION (ML) INTRAVENOUS
Qty: 8 | Refills: 0 | Status: DISCONTINUED | OUTPATIENT
Start: 2019-01-11 | End: 2019-01-12

## 2019-01-11 RX ORDER — SODIUM CHLORIDE 9 MG/ML
1000 INJECTION INTRAMUSCULAR; INTRAVENOUS; SUBCUTANEOUS
Qty: 0 | Refills: 0 | Status: DISCONTINUED | OUTPATIENT
Start: 2019-01-11 | End: 2019-01-11

## 2019-01-11 RX ORDER — MEPERIDINE HYDROCHLORIDE 50 MG/ML
25 INJECTION INTRAMUSCULAR; INTRAVENOUS; SUBCUTANEOUS ONCE
Qty: 0 | Refills: 0 | Status: DISCONTINUED | OUTPATIENT
Start: 2019-01-11 | End: 2019-01-12

## 2019-01-11 RX ORDER — MEPERIDINE HYDROCHLORIDE 50 MG/ML
25 INJECTION INTRAMUSCULAR; INTRAVENOUS; SUBCUTANEOUS ONCE
Qty: 0 | Refills: 0 | Status: DISCONTINUED | OUTPATIENT
Start: 2019-01-11 | End: 2019-01-11

## 2019-01-11 RX ORDER — INSULIN HUMAN 100 [IU]/ML
3 INJECTION, SOLUTION SUBCUTANEOUS
Qty: 100 | Refills: 0 | Status: DISCONTINUED | OUTPATIENT
Start: 2019-01-11 | End: 2019-01-13

## 2019-01-11 RX ADMIN — Medication 125 MILLILITER(S): at 20:29

## 2019-01-11 RX ADMIN — Medication 125 MILLILITER(S): at 19:57

## 2019-01-11 RX ADMIN — Medication 125 MILLILITER(S): at 20:40

## 2019-01-11 RX ADMIN — VASOPRESSIN 2 UNIT(S)/MIN: 20 INJECTION INTRAVENOUS at 21:17

## 2019-01-11 RX ADMIN — CHLORHEXIDINE GLUCONATE 5 MILLILITER(S): 213 SOLUTION TOPICAL at 23:34

## 2019-01-11 RX ADMIN — CHLORHEXIDINE GLUCONATE 15 MILLILITER(S): 213 SOLUTION TOPICAL at 06:24

## 2019-01-11 RX ADMIN — Medication 81 MILLIGRAM(S): at 11:22

## 2019-01-11 RX ADMIN — INSULIN HUMAN 3 UNIT(S)/HR: 100 INJECTION, SOLUTION SUBCUTANEOUS at 22:27

## 2019-01-11 RX ADMIN — Medication 1 DROP(S): at 06:01

## 2019-01-11 RX ADMIN — Medication 100 MILLIEQUIVALENT(S): at 22:18

## 2019-01-11 RX ADMIN — Medication 1 DROP(S): at 09:49

## 2019-01-11 RX ADMIN — Medication 20 MILLIEQUIVALENT(S): at 01:25

## 2019-01-11 RX ADMIN — PANTOPRAZOLE SODIUM 40 MILLIGRAM(S): 20 TABLET, DELAYED RELEASE ORAL at 08:02

## 2019-01-11 RX ADMIN — Medication 100 MILLIEQUIVALENT(S): at 21:37

## 2019-01-11 RX ADMIN — Medication 125 MILLILITER(S): at 21:10

## 2019-01-11 RX ADMIN — Medication 200 GRAM(S): at 23:55

## 2019-01-11 RX ADMIN — Medication 13.74 MICROGRAM(S)/KG/MIN: at 21:17

## 2019-01-11 RX ADMIN — HYDROMORPHONE HYDROCHLORIDE 1 MILLIGRAM(S): 2 INJECTION INTRAMUSCULAR; INTRAVENOUS; SUBCUTANEOUS at 22:30

## 2019-01-11 RX ADMIN — SODIUM CHLORIDE 3 MILLILITER(S): 9 INJECTION INTRAMUSCULAR; INTRAVENOUS; SUBCUTANEOUS at 13:16

## 2019-01-11 RX ADMIN — HYDROMORPHONE HYDROCHLORIDE 1 MILLIGRAM(S): 2 INJECTION INTRAMUSCULAR; INTRAVENOUS; SUBCUTANEOUS at 22:45

## 2019-01-11 RX ADMIN — Medication 1 DROP(S): at 01:25

## 2019-01-11 RX ADMIN — HEPARIN SODIUM 11 UNIT(S)/HR: 5000 INJECTION INTRAVENOUS; SUBCUTANEOUS at 09:49

## 2019-01-11 RX ADMIN — SODIUM CHLORIDE 3 MILLILITER(S): 9 INJECTION INTRAMUSCULAR; INTRAVENOUS; SUBCUTANEOUS at 04:52

## 2019-01-11 NOTE — BRIEF OPERATIVE NOTE - PROCEDURE
<<-----Click on this checkbox to enter Procedure CABG x 4  01/11/2019  LIMA to LAD, SVG to PL, Sequentialed SVG to OM and Diag  Active  [id number]

## 2019-01-11 NOTE — PROGRESS NOTE ADULT - SUBJECTIVE AND OBJECTIVE BOX
SCOOBY QUISPE  MRN-[id number]  Patient is a 58y old  Male who presents with a chief complaint of Chest pain (10 Farshad 2019 10:25)    HPI:  57 yo male with PMH HTN, Family hx CAD (father at age 59), who presented to ED today with exertional chest pain a/w EKG changes.  Patient was brought to cardiac cath lab and dx with CAD, IABP placed.  Patient brought to CTU for preoperative workup/managment.  Patient currently without chest pain or SOB. (09 Jan 2019 21:55)    surgery /hospital course:  1/11/2019 s/p CABG (C3L)  INtra Aortic Baloon pump assist (preop)    preop NSTEMI, IABP;  remain intubated , post op, started to wake up, no pain, multiple pressors ;  transfused 2 prbc and given fluid colloid resuscitation.     Physical Exam:  Vital Signs Last 24 Hrs  T(C): 37.6 (11 Jan 2019 23:00), Max: 37.6 (11 Jan 2019 23:00)  T(F): 99.7 (11 Jan 2019 23:00), Max: 99.7 (11 Jan 2019 23:00)  HR: 96 (11 Jan 2019 23:30) (73 - 104)  BP: 99/54 (11 Jan 2019 13:00) (78/48 - 115/55)  BP(mean): 68 (11 Jan 2019 13:00) (58 - 87)  RR: 14 (11 Jan 2019 23:30) (13 - 25)  SpO2: 100% (11 Jan 2019 23:30) (94% - 100%)  Gen: intubated  CNS: lethargic l .	  Neck: no JVD  RES : clear , no wheezing      ; +chest tubes                     CVS: Regular  rhythm. Normal S1/S2  Abd: Soft, non-distended. Bowel sounds present.  Skin: No rash.  Ext:  no edema, A Line      ============================I/O===========================   I&O's Detail    10 Farshad 2019 07:01  -  11 Jan 2019 07:00  --------------------------------------------------------  IN:    Albumin 5%  - 250 mL: 250 mL    heparin Infusion: 249.5 mL    Oral Fluid: 360 mL    phenylephrine   Infusion: 682.2 mL  Total IN: 1541.7 mL    OUT:    Indwelling Catheter - Urethral: 1050 mL  Total OUT: 1050 mL    Total NET: 491.7 mL      11 Jan 2019 07:01  -  11 Jan 2019 23:44  --------------------------------------------------------  IN:    Albumin 5%  - 250 mL: 1000 mL    heparin Infusion: 65 mL    insulin Infusion: 11 mL    IV PiggyBack: 100 mL    norepinephrine Infusion: 46 mL    Packed Red Blood Cells: 600 mL    sodium chloride 0.9%.: 50 mL    vasopressin Infusion: 11 mL  Total IN: 1883 mL    OUT:    Chest Tube: 80 mL    Chest Tube: 80 mL    Chest Tube: 120 mL    Indwelling Catheter - Urethral: 1775 mL  Total OUT: 2055 mL    Total NET: -172 mL        ============================ LABS =========================                        9.5    17.2  )-----------( 53       ( 11 Jan 2019 19:56 )             27.7     01-11    142  |  102  |  13  ----------------------------<  129<H>  4.7   |  22  |  0.64    Ca    9.1      11 Jan 2019 19:56  Phos  2.8     01-11  Mg     2.2     01-11    TPro  5.7<L>  /  Alb  4.2  /  TBili  1.3<H>  /  DBili  x   /  AST  32  /  ALT  12  /  AlkPhos  40  01-11    LIVER FUNCTIONS - ( 11 Jan 2019 19:56 )  Alb: 4.2 g/dL / Pro: 5.7 g/dL / ALK PHOS: 40 U/L / ALT: 12 U/L / AST: 32 U/L / GGT: x           PT/INR - ( 11 Jan 2019 19:55 )   PT: 21.3 sec;   INR: 1.83 ratio         PTT - ( 11 Jan 2019 19:55 )  PTT:36.8 sec  ABG - ( 11 Jan 2019 22:01 )  pH, Arterial: 7.50  pH, Blood: x     /  pCO2: 30    /  pO2: 113   / HCO3: 23    / Base Excess: .2    /  SaO2: 99        ======================Micro/Rad/Cardio=================  CXR: Reviewed  Echo:Reviewed  ======================================================  PAST MEDICAL & SURGICAL HISTORY:  Cervical radiculopathy  HTN (hypertension)  Psoriasis  Hypertension  History of hip replacement, total, right  No significant past surgical history    ====================ASSESMENT ==============  1/11/2019 s/p CABG (C3L)  INtra Aortic Baloon pump assist (preop)  acute respiratory failure post op  atherosclerosis coronary artery  Acute MI (NSTEMI)  acute on  chronic systolic CHF  hyperglycemia  anemia , multifactorial, blood loss contributing         Plan:  ====================== NEUROLOGY=====================  aspirin Suppository 300 milliGRAM(s) Rectal  meperidine     Injectable 25 milliGRAM(s) IV Push    ==================== RESPIRATORY======================  full vent support , A/C  Mechanical Ventilation:  Mode: AC/ CMV (Assist Control/ Continuous Mandatory Ventilation)  RR (machine): 14  TV (machine): 700  FiO2: 50  PEEP: 5  wean after removing IABP  ====================CARDIOVASCULAR==================  BP support with iv pressors norepi and vasopressin, IABP  norepinephrine Infusion 0.08 MICROgram(s)/kG/Min IV Continuous <Continuous>  vasopressinn drip    ===================HEMATOLOGIC/ONC ===================  blood transfusion x2 units    ===================== RENAL =========================    ==================== GASTROINTESTINAL===================  docusate sodium 100 milliGRAM(s) Oral three times a day  pantoprazole  Injectable 40 milliGRAM(s) IV Push daily  potassium chloride  10 mEq/50 mL IVPB 10 milliEquivalent(s) IV Intermittent every 1 hour  potassium chloride  10 mEq/50 mL IVPB 10 milliEquivalent(s) IV Intermittent every 1 hour  potassium chloride  10 mEq/50 mL IVPB 10 milliEquivalent(s) IV Intermittent every 1 hour  sodium chloride 0.9%. 1000 milliLiter(s) IV Continuous <Continuous>    =======================    ENDOCRINE  =====================  insulin Infusion 3  vasopressin Infusion 0.033  ========================INFECTIOUS DISEASE================  cefuroxime    Patient requires continuous monitoring with bedside rhythm monitoring, arterial line, pulse oximetry, ventilator monitoring; intermittent blood gas analysis.  blodd transfusion and fluid resuscitation  Care plan discussed with ICU care team.  patient remain critical; required more than usual post op care; I have spent 35 minutes providing non routine post op care.

## 2019-01-11 NOTE — BRIEF OPERATIVE NOTE - PRE-OP DX
Ankle and Foot Surgery    The Dressing/Bandage:  After surgery, you will have a dressing/splint on your ankle or foot.  Please keep the dressing clean and dry.  You will have stitches or staples for the incisions. These will be removed in the office about 14 days after your surgery.    Pain Medication/Ice:  You will be given prescriptions for pain medications after surgery.  Most patients receive Vicodin or Percocet.      Keep your leg elevated as much as possible for the first few days after surgery.  You may apply ice for 20 minutes at a time (with two hours off) in the first few days after surgery to help with swelling and pain.     Bearing Weight:  Most patients need crutches or other assistance after ankle or foot surgery. You are not to bear weight on the operative side unless otherwise instructed.    Postoperative Visits:  Your first appointment will be 7-14 day after surgery. If this appointment has not been scheduled yet please call the office to schedule.    Physical Therapy:   If therapy is needed you will be given an order for this at your first post-op appointment. In case of fracture care, physical therapy may start after your fracture is healed.     Return to Work/Sports:  You may return to desk type work usually within a week or so.  Return to sports or heavy labor will be discussed at your follow up office visit.      Call immediately if you experience any of the following:  · Fever/Chills   · Persistent warmth or redness   · Persistent or increased pain        
CAD (coronary artery disease)  01/11/2019    Thierry Koch

## 2019-01-12 LAB
ALBUMIN SERPL ELPH-MCNC: 4.4 G/DL — SIGNIFICANT CHANGE UP (ref 3.3–5)
ALP SERPL-CCNC: 31 U/L — LOW (ref 40–120)
ALT FLD-CCNC: 6 U/L — LOW (ref 10–45)
ANION GAP SERPL CALC-SCNC: 18 MMOL/L — HIGH (ref 5–17)
APTT BLD: 40.1 SEC — HIGH (ref 27.5–36.3)
AST SERPL-CCNC: 24 U/L — SIGNIFICANT CHANGE UP (ref 10–40)
BASE EXCESS BLDV CALC-SCNC: -1.4 MMOL/L — SIGNIFICANT CHANGE UP (ref -2–2)
BASE EXCESS BLDV CALC-SCNC: 0.2 MMOL/L — SIGNIFICANT CHANGE UP (ref -2–2)
BASE EXCESS BLDV CALC-SCNC: 0.7 MMOL/L — SIGNIFICANT CHANGE UP (ref -2–2)
BASOPHILS # BLD AUTO: 0 K/UL — SIGNIFICANT CHANGE UP (ref 0–0.2)
BASOPHILS NFR BLD AUTO: 0 % — SIGNIFICANT CHANGE UP (ref 0–2)
BILIRUB SERPL-MCNC: 1.8 MG/DL — HIGH (ref 0.2–1.2)
BUN SERPL-MCNC: 16 MG/DL — SIGNIFICANT CHANGE UP (ref 7–23)
CALCIUM SERPL-MCNC: 8.6 MG/DL — SIGNIFICANT CHANGE UP (ref 8.4–10.5)
CHLORIDE SERPL-SCNC: 104 MMOL/L — SIGNIFICANT CHANGE UP (ref 96–108)
CO2 BLDV-SCNC: 23 MMOL/L — SIGNIFICANT CHANGE UP (ref 22–30)
CO2 BLDV-SCNC: 24 MMOL/L — SIGNIFICANT CHANGE UP (ref 22–30)
CO2 BLDV-SCNC: 25 MMOL/L — SIGNIFICANT CHANGE UP (ref 22–30)
CO2 SERPL-SCNC: 20 MMOL/L — LOW (ref 22–31)
CREAT SERPL-MCNC: 0.88 MG/DL — SIGNIFICANT CHANGE UP (ref 0.5–1.3)
EOSINOPHIL # BLD AUTO: 0 K/UL — SIGNIFICANT CHANGE UP (ref 0–0.5)
EOSINOPHIL NFR BLD AUTO: 0.3 % — SIGNIFICANT CHANGE UP (ref 0–6)
GAS PNL BLDA: SIGNIFICANT CHANGE UP
GAS PNL BLDV: SIGNIFICANT CHANGE UP
GLUCOSE BLDC GLUCOMTR-MCNC: 107 MG/DL — HIGH (ref 70–99)
GLUCOSE BLDC GLUCOMTR-MCNC: 109 MG/DL — HIGH (ref 70–99)
GLUCOSE BLDC GLUCOMTR-MCNC: 116 MG/DL — HIGH (ref 70–99)
GLUCOSE BLDC GLUCOMTR-MCNC: 135 MG/DL — HIGH (ref 70–99)
GLUCOSE BLDC GLUCOMTR-MCNC: 140 MG/DL — HIGH (ref 70–99)
GLUCOSE BLDC GLUCOMTR-MCNC: 150 MG/DL — HIGH (ref 70–99)
GLUCOSE BLDC GLUCOMTR-MCNC: 154 MG/DL — HIGH (ref 70–99)
GLUCOSE BLDC GLUCOMTR-MCNC: 160 MG/DL — HIGH (ref 70–99)
GLUCOSE BLDC GLUCOMTR-MCNC: 162 MG/DL — HIGH (ref 70–99)
GLUCOSE BLDC GLUCOMTR-MCNC: 166 MG/DL — HIGH (ref 70–99)
GLUCOSE BLDC GLUCOMTR-MCNC: 176 MG/DL — HIGH (ref 70–99)
GLUCOSE BLDC GLUCOMTR-MCNC: 182 MG/DL — HIGH (ref 70–99)
GLUCOSE BLDC GLUCOMTR-MCNC: 183 MG/DL — HIGH (ref 70–99)
GLUCOSE BLDC GLUCOMTR-MCNC: 62 MG/DL — LOW (ref 70–99)
GLUCOSE BLDC GLUCOMTR-MCNC: 83 MG/DL — SIGNIFICANT CHANGE UP (ref 70–99)
GLUCOSE BLDC GLUCOMTR-MCNC: 84 MG/DL — SIGNIFICANT CHANGE UP (ref 70–99)
GLUCOSE BLDC GLUCOMTR-MCNC: 84 MG/DL — SIGNIFICANT CHANGE UP (ref 70–99)
GLUCOSE BLDC GLUCOMTR-MCNC: 89 MG/DL — SIGNIFICANT CHANGE UP (ref 70–99)
GLUCOSE BLDC GLUCOMTR-MCNC: 93 MG/DL — SIGNIFICANT CHANGE UP (ref 70–99)
GLUCOSE SERPL-MCNC: 171 MG/DL — HIGH (ref 70–99)
HCO3 BLDV-SCNC: 22 MMOL/L — SIGNIFICANT CHANGE UP (ref 21–29)
HCO3 BLDV-SCNC: 23 MMOL/L — SIGNIFICANT CHANGE UP (ref 21–29)
HCO3 BLDV-SCNC: 24 MMOL/L — SIGNIFICANT CHANGE UP (ref 21–29)
HCT VFR BLD CALC: 27.2 % — LOW (ref 39–50)
HGB BLD-MCNC: 9.5 G/DL — LOW (ref 13–17)
HOROWITZ INDEX BLDV+IHG-RTO: 40 — SIGNIFICANT CHANGE UP
HOROWITZ INDEX BLDV+IHG-RTO: 40 — SIGNIFICANT CHANGE UP
HOROWITZ INDEX BLDV+IHG-RTO: 50 — SIGNIFICANT CHANGE UP
INR BLD: 1.78 RATIO — HIGH (ref 0.88–1.16)
LYMPHOCYTES # BLD AUTO: 0.5 K/UL — LOW (ref 1–3.3)
LYMPHOCYTES # BLD AUTO: 5.8 % — LOW (ref 13–44)
MAGNESIUM SERPL-MCNC: 2.4 MG/DL — SIGNIFICANT CHANGE UP (ref 1.6–2.6)
MCHC RBC-ENTMCNC: 28.6 PG — SIGNIFICANT CHANGE UP (ref 27–34)
MCHC RBC-ENTMCNC: 34.9 GM/DL — SIGNIFICANT CHANGE UP (ref 32–36)
MCV RBC AUTO: 81.7 FL — SIGNIFICANT CHANGE UP (ref 80–100)
MONOCYTES # BLD AUTO: 0.3 K/UL — SIGNIFICANT CHANGE UP (ref 0–0.9)
MONOCYTES NFR BLD AUTO: 3.1 % — SIGNIFICANT CHANGE UP (ref 2–14)
NEUTROPHILS # BLD AUTO: 7.7 K/UL — HIGH (ref 1.8–7.4)
NEUTROPHILS NFR BLD AUTO: 90.7 % — HIGH (ref 43–77)
PCO2 BLDV: 32 MMHG — LOW (ref 35–50)
PCO2 BLDV: 32 MMHG — LOW (ref 35–50)
PCO2 BLDV: 34 MMHG — LOW (ref 35–50)
PH BLDV: 7.45 — SIGNIFICANT CHANGE UP (ref 7.35–7.45)
PH BLDV: 7.46 — HIGH (ref 7.35–7.45)
PH BLDV: 7.47 — HIGH (ref 7.35–7.45)
PHOSPHATE SERPL-MCNC: 2.1 MG/DL — LOW (ref 2.5–4.5)
PLATELET # BLD AUTO: 92 K/UL — LOW (ref 150–400)
PO2 BLDV: 38 MMHG — SIGNIFICANT CHANGE UP (ref 25–45)
PO2 BLDV: 39 MMHG — SIGNIFICANT CHANGE UP (ref 25–45)
PO2 BLDV: 47 MMHG — HIGH (ref 25–45)
POTASSIUM SERPL-MCNC: 4.1 MMOL/L — SIGNIFICANT CHANGE UP (ref 3.5–5.3)
POTASSIUM SERPL-SCNC: 4.1 MMOL/L — SIGNIFICANT CHANGE UP (ref 3.5–5.3)
PROT SERPL-MCNC: 5.8 G/DL — LOW (ref 6–8.3)
PROTHROM AB SERPL-ACNC: 20.7 SEC — HIGH (ref 10–12.9)
RBC # BLD: 3.33 M/UL — LOW (ref 4.2–5.8)
RBC # FLD: 15 % — HIGH (ref 10.3–14.5)
SAO2 % BLDV: 75 % — SIGNIFICANT CHANGE UP (ref 67–88)
SAO2 % BLDV: 75 % — SIGNIFICANT CHANGE UP (ref 67–88)
SAO2 % BLDV: 85 % — SIGNIFICANT CHANGE UP (ref 67–88)
SODIUM SERPL-SCNC: 142 MMOL/L — SIGNIFICANT CHANGE UP (ref 135–145)
WBC # BLD: 8.5 K/UL — SIGNIFICANT CHANGE UP (ref 3.8–10.5)
WBC # FLD AUTO: 8.5 K/UL — SIGNIFICANT CHANGE UP (ref 3.8–10.5)

## 2019-01-12 PROCEDURE — 93010 ELECTROCARDIOGRAM REPORT: CPT

## 2019-01-12 PROCEDURE — 71045 X-RAY EXAM CHEST 1 VIEW: CPT | Mod: 26

## 2019-01-12 PROCEDURE — 99291 CRITICAL CARE FIRST HOUR: CPT

## 2019-01-12 RX ORDER — ENOXAPARIN SODIUM 100 MG/ML
40 INJECTION SUBCUTANEOUS DAILY
Qty: 0 | Refills: 0 | Status: DISCONTINUED | OUTPATIENT
Start: 2019-01-12 | End: 2019-01-17

## 2019-01-12 RX ORDER — OXYCODONE AND ACETAMINOPHEN 5; 325 MG/1; MG/1
1 TABLET ORAL EVERY 6 HOURS
Qty: 0 | Refills: 0 | Status: DISCONTINUED | OUTPATIENT
Start: 2019-01-12 | End: 2019-01-17

## 2019-01-12 RX ORDER — POTASSIUM CHLORIDE 20 MEQ
10 PACKET (EA) ORAL
Qty: 0 | Refills: 0 | Status: COMPLETED | OUTPATIENT
Start: 2019-01-12 | End: 2019-01-12

## 2019-01-12 RX ORDER — FUROSEMIDE 40 MG
20 TABLET ORAL ONCE
Qty: 0 | Refills: 0 | Status: COMPLETED | OUTPATIENT
Start: 2019-01-12 | End: 2019-01-12

## 2019-01-12 RX ORDER — ALBUMIN HUMAN 25 %
250 VIAL (ML) INTRAVENOUS ONCE
Qty: 0 | Refills: 0 | Status: COMPLETED | OUTPATIENT
Start: 2019-01-12 | End: 2019-01-12

## 2019-01-12 RX ORDER — ATORVASTATIN CALCIUM 80 MG/1
80 TABLET, FILM COATED ORAL AT BEDTIME
Qty: 0 | Refills: 0 | Status: DISCONTINUED | OUTPATIENT
Start: 2019-01-12 | End: 2019-01-17

## 2019-01-12 RX ORDER — HYDROMORPHONE HYDROCHLORIDE 2 MG/ML
0.5 INJECTION INTRAMUSCULAR; INTRAVENOUS; SUBCUTANEOUS ONCE
Qty: 0 | Refills: 0 | Status: DISCONTINUED | OUTPATIENT
Start: 2019-01-12 | End: 2019-01-12

## 2019-01-12 RX ORDER — ASPIRIN/CALCIUM CARB/MAGNESIUM 324 MG
300 TABLET ORAL ONCE
Qty: 0 | Refills: 0 | Status: COMPLETED | OUTPATIENT
Start: 2019-01-12 | End: 2019-01-12

## 2019-01-12 RX ORDER — PROPOFOL 10 MG/ML
18.2 INJECTION, EMULSION INTRAVENOUS
Qty: 1000 | Refills: 0 | Status: DISCONTINUED | OUTPATIENT
Start: 2019-01-12 | End: 2019-01-12

## 2019-01-12 RX ORDER — PANTOPRAZOLE SODIUM 20 MG/1
40 TABLET, DELAYED RELEASE ORAL
Qty: 0 | Refills: 0 | Status: DISCONTINUED | OUTPATIENT
Start: 2019-01-12 | End: 2019-01-17

## 2019-01-12 RX ORDER — OXYCODONE AND ACETAMINOPHEN 5; 325 MG/1; MG/1
2 TABLET ORAL EVERY 6 HOURS
Qty: 0 | Refills: 0 | Status: DISCONTINUED | OUTPATIENT
Start: 2019-01-12 | End: 2019-01-17

## 2019-01-12 RX ADMIN — Medication 100 MILLIEQUIVALENT(S): at 00:40

## 2019-01-12 RX ADMIN — HYDROMORPHONE HYDROCHLORIDE 0.5 MILLIGRAM(S): 2 INJECTION INTRAMUSCULAR; INTRAVENOUS; SUBCUTANEOUS at 10:14

## 2019-01-12 RX ADMIN — Medication 13.74 MICROGRAM(S)/KG/MIN: at 07:43

## 2019-01-12 RX ADMIN — Medication 125 MILLILITER(S): at 05:43

## 2019-01-12 RX ADMIN — Medication 100 MILLIEQUIVALENT(S): at 06:57

## 2019-01-12 RX ADMIN — Medication 100 MILLIEQUIVALENT(S): at 07:43

## 2019-01-12 RX ADMIN — HYDROMORPHONE HYDROCHLORIDE 0.5 MILLIGRAM(S): 2 INJECTION INTRAMUSCULAR; INTRAVENOUS; SUBCUTANEOUS at 10:30

## 2019-01-12 RX ADMIN — PROPOFOL 10 MICROGRAM(S)/KG/MIN: 10 INJECTION, EMULSION INTRAVENOUS at 05:42

## 2019-01-12 RX ADMIN — Medication 81 MILLIGRAM(S): at 13:40

## 2019-01-12 RX ADMIN — ATORVASTATIN CALCIUM 80 MILLIGRAM(S): 80 TABLET, FILM COATED ORAL at 22:32

## 2019-01-12 RX ADMIN — Medication 300 MILLIGRAM(S): at 02:14

## 2019-01-12 RX ADMIN — ENOXAPARIN SODIUM 40 MILLIGRAM(S): 100 INJECTION SUBCUTANEOUS at 12:40

## 2019-01-12 RX ADMIN — Medication 100 MILLIGRAM(S): at 16:17

## 2019-01-12 RX ADMIN — Medication 100 MILLIEQUIVALENT(S): at 06:10

## 2019-01-12 RX ADMIN — VASOPRESSIN 2 UNIT(S)/MIN: 20 INJECTION INTRAVENOUS at 07:43

## 2019-01-12 RX ADMIN — Medication 100 MILLIGRAM(S): at 10:15

## 2019-01-12 RX ADMIN — Medication 20 MILLIGRAM(S): at 05:10

## 2019-01-12 RX ADMIN — Medication 100 MILLIEQUIVALENT(S): at 01:43

## 2019-01-12 RX ADMIN — CHLORHEXIDINE GLUCONATE 5 MILLILITER(S): 213 SOLUTION TOPICAL at 05:42

## 2019-01-12 RX ADMIN — Medication 100 MILLIGRAM(S): at 23:24

## 2019-01-12 RX ADMIN — Medication 100 MILLIEQUIVALENT(S): at 02:55

## 2019-01-12 RX ADMIN — CHLORHEXIDINE GLUCONATE 5 MILLILITER(S): 213 SOLUTION TOPICAL at 04:31

## 2019-01-12 RX ADMIN — Medication 100 MILLIGRAM(S): at 14:41

## 2019-01-12 RX ADMIN — Medication 100 MILLIGRAM(S): at 22:32

## 2019-01-12 RX ADMIN — Medication 100 MILLIGRAM(S): at 01:06

## 2019-01-12 RX ADMIN — PANTOPRAZOLE SODIUM 40 MILLIGRAM(S): 20 TABLET, DELAYED RELEASE ORAL at 10:17

## 2019-01-12 RX ADMIN — INSULIN HUMAN 3 UNIT(S)/HR: 100 INJECTION, SOLUTION SUBCUTANEOUS at 07:43

## 2019-01-12 RX ADMIN — CLOPIDOGREL BISULFATE 75 MILLIGRAM(S): 75 TABLET, FILM COATED ORAL at 02:14

## 2019-01-12 NOTE — PROGRESS NOTE ADULT - SUBJECTIVE AND OBJECTIVE BOX
SCOOBY QUISPE   MRN#: [id number]     The patient is a 58y Male with PMH HTN, Family hx CAD (father at age 59), who presented to ED today with exertional chest pain a/w EKG changes.  Patient was brought to cardiac cath lab and dx with CAD, IABP placed. s/p C4L on 1/11,  who was seen, evaluated, & examined with the CTICU staff on rounds and later in the day with a multidisciplinary care plan formulated & implemented.  All available clinical, laboratory, radiographic, pharmacologic, and electrocardiographic data were reviewed & analyzed.      The patient was in the CTICU in critical condition secondary to persistent cardiopulmonary dysfunction, hemodynamically significant anemia/hypovolemia, hemodynamically significant bradycardia, persistent cardiovascular dysfunction on IABP & stress hyperglycemia.      Respiratory status required full ventilatory support, the following of ABG’s with A-line monitoring, continuous pulse oximetry monitoring, & an IV Propofol infusion for support & to evaluate for & prevent further decompensation secondary to persistent cardiopulmonary dysfunction.  Wean to extubate today.     Invasive hemodynamic monitoring with a PA catheter & an A-line were required for the following of serial CI’s/MVO2’s & continuous MAP/BP monitoring to ensure adequate cardiovascular support and to evaluate for & help prevent decompensation while receiving intermittent volume expansion, external epicardial pacing, blood transfusions, blood product transfusions, an IV Levophed drip, IABP support, secondary to hemodynamically significant anemia/hypovolemia, cardiovascular dysfunction, bleeding induced anemia, hemodynamically significant bradycardia, & non-infectious SIRS. IABP weaned and and D/C'd.     Metabolic stability, uncontrolled type II Diabetes mellitus-stress hyperglycemia, & infection prophylaxis required an IV regular Insulin drip & the following of serial glucose levels to help achieve & maintain euglycemia.      Patient required more than the usual postoperative critical care management and I provided 30 minutes of non-continuous care to the patient.  Discussed at length with the CTICU staff and helped coordinate care.

## 2019-01-12 NOTE — AIRWAY REMOVAL NOTE  ADULT & PEDS - POSITIVE LEAK TEST
The following prescription was sent to [facility] pharmacy at [place], [place]:    simvastatin (ZOCOR) 20 MG tablet 90 tablet 1 3/30/2017  No   Sig: Take 1 tablet (20 mg) by mouth every evening for cholesterol.   Class: E-Prescribe   Route: Oral   Order: [id number]   E-Prescribing Status: Receipt confirmed by pharmacy (3/30/2017 10:59 AM CDT)     Request denied.  Too soon to fill.    Tammie Yang RN     not applicable

## 2019-01-12 NOTE — PHYSICAL THERAPY INITIAL EVALUATION ADULT - ADDITIONAL COMMENTS
pt lives in private home with spouse, no steps to enter, 1 flight of stairs to bedroom +HR. Prior to admission, pt was I with all functional mobility and ADLs without AD.

## 2019-01-12 NOTE — PHYSICAL THERAPY INITIAL EVALUATION ADULT - PERTINENT HX OF CURRENT PROBLEM, REHAB EVAL
57 yo M p/w exertional SOB and EKG changes. Pt was brought to cardiac cath lab and dx with CAD, IABP placed. Now s/p CABG x4 LIMA to LAD, SVG to PL, Sequentialed SVG to OM and Diag on 1/11.

## 2019-01-12 NOTE — PROGRESS NOTE ADULT - SUBJECTIVE AND OBJECTIVE BOX
PROCEDURE NOTE  REMOVAL OF INTRA AORTIC BALLOON PUMP    1/12   440 am   The IABP (intra-aortic balloon pump) was weaned according to protocol.  Hemodynamics remained stable.  Pulses in the      right          lower extreity are palpable/audible by doppler/absent.  The patient was placed in the supine position.  The insertion site was identified and the sutures were removed.  The IABP was turned off and the balloon deflated.  The IABP was then removed.  Direct pressure was applied for     30          minutes.  A sandbag was applied and is  to remain in place for three hours.    Monitoring of the      right        groin and both lower extremities including neuro-vascular checks and vital signs every 15 minutes  x4, then every 30 minutes x 2, then every 1 hr x 4 was ordered.      Complications:   none

## 2019-01-12 NOTE — PROGRESS NOTE ADULT - SUBJECTIVE AND OBJECTIVE BOX
6-hour post-removal of IABP evaluation      Vital signs are stable, neuro-vascular status of the lower extremities is intact/stable and there is no evidence of hematoma of the right groin.

## 2019-01-12 NOTE — AIRWAY REMOVAL NOTE  ADULT & PEDS - ARTIFICAL AIRWAY REMOVAL COMMENTS
Written order for extubation verified. The patient was identified by full name and birth date compared to the identification band. Present during the procedure was JANENE Wilkinson

## 2019-01-12 NOTE — PHYSICAL THERAPY INITIAL EVALUATION ADULT - BALANCE TRAINING, PT EVAL
GOAL: Pt will increase static/ dynamic standing balance to Good to improve safety with functional activities in 4 weeks.

## 2019-01-12 NOTE — PHYSICAL THERAPY INITIAL EVALUATION ADULT - PLANNED THERAPY INTERVENTIONS, PT EVAL
GOAL: Pt will negotiate 10 steps with 1 HR and step to pattern independently in 4 weeks./transfer training/gait training/balance training/bed mobility training

## 2019-01-13 LAB
ALBUMIN SERPL ELPH-MCNC: 4.2 G/DL — SIGNIFICANT CHANGE UP (ref 3.3–5)
ALP SERPL-CCNC: 36 U/L — LOW (ref 40–120)
ALT FLD-CCNC: 11 U/L — SIGNIFICANT CHANGE UP (ref 10–45)
ANION GAP SERPL CALC-SCNC: 10 MMOL/L — SIGNIFICANT CHANGE UP (ref 5–17)
AST SERPL-CCNC: 22 U/L — SIGNIFICANT CHANGE UP (ref 10–40)
BILIRUB SERPL-MCNC: 0.8 MG/DL — SIGNIFICANT CHANGE UP (ref 0.2–1.2)
BUN SERPL-MCNC: 30 MG/DL — HIGH (ref 7–23)
CALCIUM SERPL-MCNC: 8.9 MG/DL — SIGNIFICANT CHANGE UP (ref 8.4–10.5)
CHLORIDE SERPL-SCNC: 103 MMOL/L — SIGNIFICANT CHANGE UP (ref 96–108)
CO2 SERPL-SCNC: 25 MMOL/L — SIGNIFICANT CHANGE UP (ref 22–31)
CREAT SERPL-MCNC: 1.05 MG/DL — SIGNIFICANT CHANGE UP (ref 0.5–1.3)
GLUCOSE BLDC GLUCOMTR-MCNC: 69 MG/DL — LOW (ref 70–99)
GLUCOSE BLDC GLUCOMTR-MCNC: 77 MG/DL — SIGNIFICANT CHANGE UP (ref 70–99)
GLUCOSE BLDC GLUCOMTR-MCNC: 93 MG/DL — SIGNIFICANT CHANGE UP (ref 70–99)
GLUCOSE BLDC GLUCOMTR-MCNC: 95 MG/DL — SIGNIFICANT CHANGE UP (ref 70–99)
GLUCOSE BLDC GLUCOMTR-MCNC: 98 MG/DL — SIGNIFICANT CHANGE UP (ref 70–99)
GLUCOSE BLDC GLUCOMTR-MCNC: 98 MG/DL — SIGNIFICANT CHANGE UP (ref 70–99)
GLUCOSE BLDC GLUCOMTR-MCNC: 99 MG/DL — SIGNIFICANT CHANGE UP (ref 70–99)
GLUCOSE SERPL-MCNC: 82 MG/DL — SIGNIFICANT CHANGE UP (ref 70–99)
HCT VFR BLD CALC: 26.7 % — LOW (ref 39–50)
HGB BLD-MCNC: 8.9 G/DL — LOW (ref 13–17)
MAGNESIUM SERPL-MCNC: 2.5 MG/DL — SIGNIFICANT CHANGE UP (ref 1.6–2.6)
MCHC RBC-ENTMCNC: 27.7 PG — SIGNIFICANT CHANGE UP (ref 27–34)
MCHC RBC-ENTMCNC: 33.3 GM/DL — SIGNIFICANT CHANGE UP (ref 32–36)
MCV RBC AUTO: 83.2 FL — SIGNIFICANT CHANGE UP (ref 80–100)
PHOSPHATE SERPL-MCNC: 3.2 MG/DL — SIGNIFICANT CHANGE UP (ref 2.5–4.5)
PLATELET # BLD AUTO: 129 K/UL — LOW (ref 150–400)
POTASSIUM SERPL-MCNC: 4.4 MMOL/L — SIGNIFICANT CHANGE UP (ref 3.5–5.3)
POTASSIUM SERPL-SCNC: 4.4 MMOL/L — SIGNIFICANT CHANGE UP (ref 3.5–5.3)
PROT SERPL-MCNC: 5.8 G/DL — LOW (ref 6–8.3)
RBC # BLD: 3.21 M/UL — LOW (ref 4.2–5.8)
RBC # FLD: 15.6 % — HIGH (ref 10.3–14.5)
SODIUM SERPL-SCNC: 138 MMOL/L — SIGNIFICANT CHANGE UP (ref 135–145)
WBC # BLD: 16.6 K/UL — HIGH (ref 3.8–10.5)
WBC # FLD AUTO: 16.6 K/UL — HIGH (ref 3.8–10.5)

## 2019-01-13 PROCEDURE — 93010 ELECTROCARDIOGRAM REPORT: CPT

## 2019-01-13 PROCEDURE — 99291 CRITICAL CARE FIRST HOUR: CPT

## 2019-01-13 PROCEDURE — 71045 X-RAY EXAM CHEST 1 VIEW: CPT | Mod: 26

## 2019-01-13 RX ORDER — METOPROLOL TARTRATE 50 MG
12.5 TABLET ORAL EVERY 12 HOURS
Qty: 0 | Refills: 0 | Status: DISCONTINUED | OUTPATIENT
Start: 2019-01-13 | End: 2019-01-13

## 2019-01-13 RX ORDER — INSULIN LISPRO 100/ML
VIAL (ML) SUBCUTANEOUS
Qty: 0 | Refills: 0 | Status: DISCONTINUED | OUTPATIENT
Start: 2019-01-13 | End: 2019-01-16

## 2019-01-13 RX ADMIN — Medication 81 MILLIGRAM(S): at 13:28

## 2019-01-13 RX ADMIN — Medication 100 MILLIGRAM(S): at 09:06

## 2019-01-13 RX ADMIN — Medication 100 MILLIGRAM(S): at 21:11

## 2019-01-13 RX ADMIN — Medication 100 MILLIGRAM(S): at 06:31

## 2019-01-13 RX ADMIN — CLOPIDOGREL BISULFATE 75 MILLIGRAM(S): 75 TABLET, FILM COATED ORAL at 13:28

## 2019-01-13 RX ADMIN — ENOXAPARIN SODIUM 40 MILLIGRAM(S): 100 INJECTION SUBCUTANEOUS at 13:28

## 2019-01-13 RX ADMIN — PANTOPRAZOLE SODIUM 40 MILLIGRAM(S): 20 TABLET, DELAYED RELEASE ORAL at 06:31

## 2019-01-13 RX ADMIN — Medication 100 MILLIGRAM(S): at 13:28

## 2019-01-13 RX ADMIN — ATORVASTATIN CALCIUM 80 MILLIGRAM(S): 80 TABLET, FILM COATED ORAL at 21:10

## 2019-01-13 NOTE — PROGRESS NOTE ADULT - ASSESSMENT
58 yr old male with H/O HTN, CAD, S/P NSTEMI, S/P CABG X4 POD #1 S/P Removal oF IABP s/p post op anemia requiring 2 PRBC transfusion & hyperglycemia requiring INS gtt.    Plan cont post op care, supplemental oxygen, maintain chest tubes  Off INS gtt sec low glucose, SSI   A1C 5.6  SR, MAP 70, NO BB, back up epicardial pacing in place, HCT 27.2 %  asa, plavix for CAD  STATIN     Pain control    I have spent 30 minutes providing critical care management to this patient.

## 2019-01-13 NOTE — PROGRESS NOTE ADULT - SUBJECTIVE AND OBJECTIVE BOX
12am-12:30am    Remained  on continuos ICU monitoring.      OBJECTIVE:  ICU Vital Signs Last 24 Hrs  T(C): 37.4 (12 Jan 2019 20:00), Max: 37.9 (12 Jan 2019 03:00)  T(F): 99.3 (12 Jan 2019 20:00), Max: 100.2 (12 Jan 2019 03:00)  HR: 94 (12 Jan 2019 23:00) (90 - 109)  BP: 89/59 (12 Jan 2019 23:00) (87/54 - 104/58)  BP(mean): 69 (12 Jan 2019 23:00) (65 - 81)  ABP: 108/78 (12 Jan 2019 12:45) (90/79 - 145/51)  ABP(mean): 100 (12 Jan 2019 12:45) (63 - 100)  RR: 18 (12 Jan 2019 23:00) (6 - 29)  SpO2: 100% (12 Jan 2019 23:00) (94% - 100%)    Mode: CPAP with PS, FiO2: 40, PEEP: 5, PS: 5, MAP: 7, PIP: 15    01-11 @ 07:01 - 01-12 @ 07:00  --------------------------------------------------------  IN: 3009 mL / OUT: 3250 mL / NET: -241 mL    01-12 @ 07:01 - 01-13 @ 00:01  --------------------------------------------------------  IN: 700.5 mL / OUT: 1095 mL / NET: -394.5 mL      CAPILLARY BLOOD GLUCOSE  109 (12 Jan 2019 20:30)      POCT Blood Glucose.: 109 mg/dL (12 Jan 2019 20:36)      PHYSICAL EXAM:        General: WN/WD NAD  Neurology: A&Ox3, nonfocal, SINGLETARY x 4  Eyes: PERRLA/ EOMI, Gross vision intact  ENT/Neck: Neck supple, trachea midline, No JVD, Gross hearing intact  Respiratory:  B/L fine  rales, +sternal dressing MCT X2, L pleural  CV: RRR, S1S2, no murmurs, rubs or gallops  Abdominal: Soft, NT, ND +BS,   Extremities: No edema, + peripheral pulses  Skin: No Rashes, Hematoma, Ecchymosis        HOSPITAL MEDICATIONS:  MEDICATIONS  (STANDING):  aspirin enteric coated 81 milliGRAM(s) Oral daily  atorvastatin 80 milliGRAM(s) Oral at bedtime  cefuroxime  IVPB 1500 milliGRAM(s) IV Intermittent every 8 hours  clopidogrel Tablet 75 milliGRAM(s) Oral daily  docusate sodium 100 milliGRAM(s) Oral three times a day  enoxaparin Injectable 40 milliGRAM(s) SubCutaneous daily  insulin Infusion 3 Unit(s)/Hr (3 mL/Hr) IV Continuous <Continuous>  pantoprazole    Tablet 40 milliGRAM(s) Oral before breakfast  sodium chloride 0.9%. 1000 milliLiter(s) (10 mL/Hr) IV Continuous <Continuous>    MEDICATIONS  (PRN):  oxyCODONE    5 mG/acetaminophen 325 mG 1 Tablet(s) Oral every 6 hours PRN Moderate Pain (4 - 6)  oxyCODONE    5 mG/acetaminophen 325 mG 2 Tablet(s) Oral every 6 hours PRN Severe Pain (7 - 10)      LABS:                        9.5    8.5   )-----------( 92       ( 12 Jan 2019 01:48 )             27.2     01-12    142  |  104  |  16  ----------------------------<  171<H>  4.1   |  20<L>  |  0.88    Ca    8.6      12 Jan 2019 01:48  Phos  2.1     01-12  Mg     2.4     01-12    TPro  5.8<L>  /  Alb  4.4  /  TBili  1.8<H>  /  DBili  x   /  AST  24  /  ALT  6<L>  /  AlkPhos  31<L>  01-12    PT/INR - ( 12 Jan 2019 01:48 )   PT: 20.7 sec;   INR: 1.78 ratio         PTT - ( 12 Jan 2019 01:48 )  PTT:40.1 sec    Arterial Blood Gas:  01-12 @ 10:12  7.47/35/96/25/98/1.9  ABG lactate: --  Arterial Blood Gas:  01-12 @ 08:48  7.47/34/141/24/99/1.3  ABG lactate: --  Arterial Blood Gas:  01-12 @ 06:08  7.48/30/151/22/100/-.2  ABG lactate: --  Arterial Blood Gas:  01-12 @ 03:53  7.50/29/138/22/100/.1  ABG lactate: --  Arterial Blood Gas:  01-12 @ 03:27  7.51/29/137/23/100/.4  ABG lactate: --  Arterial Blood Gas:  01-12 @ 01:53  7.51/28/164/22/100/-.1  ABG lactate: --  Arterial Blood Gas:  01-12 @ 00:13  7.49/27/153/21/100/-1.6  ABG lactate: --  Arterial Blood Gas:  01-11 @ 22:01  7.50/30/113/23/99/.2  ABG lactate: --  Arterial Blood Gas:  01-11 @ 21:05  7.48/31/126/23/99/.2  ABG lactate: --  Arterial Blood Gas:  01-11 @ 19:39  7.44/36/211/24/100/.3  ABG lactate: --  Arterial Blood Gas:  01-11 @ 00:59  7.41/37/78/23/96/-.4  ABG lactate: --    Venous Blood Gas:  01-12 @ 03:27  7.46/34/39/24/75  VBG Lactate: --  Venous Blood Gas:  01-12 @ 01:48  7.47/32/38/23/75  VBG Lactate: --  Venous Blood Gas:  01-12 @ 00:13  7.45/32/47/22/85  VBG Lactate: --    CARDIAC MARKERS ( 11 Jan 2019 19:56 )  x     / x     / 441 U/L / x     / 20.4 ng/mL  CARDIAC MARKERS ( 11 Jan 2019 01:28 )  x     / x     / 471 U/L / x     / 18.7 ng/mL    LIVER FUNCTIONS - ( 12 Jan 2019 01:48 )  Alb: 4.4 g/dL / Pro: 5.8 g/dL / ALK PHOS: 31 U/L / ALT: 6 U/L / AST: 24 U/L / GGT: x           MICROBIOLOGY:     RADIOLOGY:  X Reviewed and interpreted by me

## 2019-01-13 NOTE — PROGRESS NOTE ADULT - ASSESSMENT
57 yo male with PMH HTN, Family hx CAD (father at age 59), who presented to ED today with exertional chest pain a/w EKG changes.  Patient was brought to cardiac cath lab and dx with CAD, IABP placed.

## 2019-01-13 NOTE — PROGRESS NOTE ADULT - SUBJECTIVE AND OBJECTIVE BOX
Transfer noted  VITAL SIGNS  T(F): 98.6  HR: 90  BP: 94/53  RR: 18  SpO2: 100%    I&O's Summary    12 Jan 2019 07:01  -  13 Jan 2019 07:00  --------------------------------------------------------  IN: 780.5 mL / OUT: 1655 mL / NET: -874.5 mL    13 Jan 2019 07:01  -  13 Jan 2019 19:56  --------------------------------------------------------  IN: 50 mL / OUT: 45 mL / NET: 5 mL    LAB                        8.9    16.6  )-----------( 129      ( 13 Jan 2019 00:47 )             26.7   138  |  103  |  30<H>  ----------------------------<  82  4.4   |  25  |  1.05      CAPILLARY BLOOD GLUCOSE  POCT Blood Glucose.: 93 mg/dL (13 Jan 2019 17:23)  POCT Blood Glucose.: 77 mg/dL (13 Jan 2019 16:51)  POCT Blood Glucose.: 69 mg/dL (13 Jan 2019 16:13)  POCT Blood Glucose.: 98 mg/dL (13 Jan 2019 11:30)  POCT Blood Glucose.: 98 mg/dL (13 Jan 2019 08:44)    DIAGNOSTICS : Xray Chest 1 View- PORTABLE-Urgent (01.13.19 @ 07:18)   2:16 AM   Interval extubation and removal of enteric tube and IABP. Right IJ   central venouscatheter with its tip in the SVC. Left-sided chest tubes   and inferior mediastinal drains in place. Unchanged small left-sided   pleural effusion and passive atelectasis. No pneumothorax. The cardiac   silhouette is unchanged. Redemonstration of median sternotomy and CABG.    7:08 AM  Interval removal of left-sided chest tubes and inferior approach   mediastinal drains. No pneumothorax. Unchanged appearance of the lungs.    IMPRESSION:   No pneumothorax following chest tube removal.  Small left pleural effusion and passive atelectasis.    MEDICATIONS  aspirin enteric coated 81 milliGRAM(s) Oral daily  atorvastatin 80 milliGRAM(s) Oral at bedtime  clopidogrel Tablet 75 milliGRAM(s) Oral daily  docusate sodium 100 milliGRAM(s) Oral three times a day  enoxaparin Injectable 40 milliGRAM(s) SubCutaneous daily  insulin lispro (HumaLOG) corrective regimen sliding scale   SubCutaneous Before meals and at bedtime  pantoprazole    Tablet 40 milliGRAM(s) Oral before breakfast    PHYSICAL EXAM  GEN: No apparent distress, examined in   PSYCH: Appropriate, communicative, A+Ox3  NEURO: Non-focal,  A+Ox 3  CV: S1 S2 without rub or murmur  MEDIASTINUM: Sternal incision covered with dressing, CDI, stable  PACING WIRES:   VVI [  ]  setting:      Isolated/Insulated [  ]  DRAINS:  Adolfo [  ]  Drainage:         Pleural [  ]  Drainage:    PULMONARY:  CTA, Decreased left base   INCENTIVE SPIROMETRY:  ABDOMEN:  Soft, non-tender, non-distended, bowel sounds active x 4  LBM:  : voiding   VASCULAR: +pp +radial  SKIN: Warm, dry, intact   leg incision:  ACE [  ]  MUSCULOSKELETAL:  Moves all extremities equally  PHYSICAL THERAPY REC:  Home [  ]  Home w PT [   ]   ESTEFANÍA [   ] Transfer noted  VITAL SIGNS  T(F): 98.6  HR: 90  BP: 94/53  RR: 18  SpO2: 100%    I&O's Summary    12 Jan 2019 07:01  -  13 Jan 2019 07:00  --------------------------------------------------------  IN: 780.5 mL / OUT: 1655 mL / NET: -874.5 mL    13 Jan 2019 07:01  -  13 Jan 2019 19:56  --------------------------------------------------------  IN: 50 mL / OUT: 45 mL / NET: 5 mL    LAB                        8.9    16.6  )-----------( 129      ( 13 Jan 2019 00:47 )             26.7   138  |  103  |  30<H>  ----------------------------<  82  4.4   |  25  |  1.05      CAPILLARY BLOOD GLUCOSE  POCT Blood Glucose.: 93 mg/dL (13 Jan 2019 17:23)  POCT Blood Glucose.: 77 mg/dL (13 Jan 2019 16:51)  POCT Blood Glucose.: 69 mg/dL (13 Jan 2019 16:13)  POCT Blood Glucose.: 98 mg/dL (13 Jan 2019 11:30)  POCT Blood Glucose.: 98 mg/dL (13 Jan 2019 08:44)    DIAGNOSTICS : Xray Chest 1 View- PORTABLE-Urgent (01.13.19 @ 07:18)   2:16 AM   Interval extubation and removal of enteric tube and IABP. Right IJ   central venouscatheter with its tip in the SVC. Left-sided chest tubes   and inferior mediastinal drains in place. Unchanged small left-sided   pleural effusion and passive atelectasis. No pneumothorax. The cardiac   silhouette is unchanged. Redemonstration of median sternotomy and CABG.    7:08 AM  Interval removal of left-sided chest tubes and inferior approach   mediastinal drains. No pneumothorax. Unchanged appearance of the lungs.    IMPRESSION:   No pneumothorax following chest tube removal.  Small left pleural effusion and passive atelectasis.    MEDICATIONS  aspirin enteric coated 81 milliGRAM(s) Oral daily  atorvastatin 80 milliGRAM(s) Oral at bedtime  clopidogrel Tablet 75 milliGRAM(s) Oral daily  docusate sodium 100 milliGRAM(s) Oral three times a day  enoxaparin Injectable 40 milliGRAM(s) SubCutaneous daily  insulin lispro (HumaLOG) corrective regimen sliding scale   SubCutaneous Before meals and at bedtime  pantoprazole    Tablet 40 milliGRAM(s) Oral before breakfast    PHYSICAL EXAM  GEN: No apparent distress, examined in bed,   PSYCH: Appropriate, communicative  NEURO: Non-focal,  A+Ox 3  CV: S1 S2 without rub or murmur  MEDIASTINUM: Sternal incision covered with dressing, CDI, stable  PACING WIRES: isolated and insulated  PULMONARY:  CTA, Decreased left base   ABDOMEN:  Soft, non-tender, non-distended, bowel sounds active x 4  : voiding   VASCULAR: +pp +radial  SKIN: Warm, dry, intact   leg incision:  ACE [  ]  MUSCULOSKELETAL:  Moves all extremities equally  PHYSICAL THERAPY REC:  Home [x  ]  Home w PT [   ]   ESTEFANÍA [   ]

## 2019-01-14 DIAGNOSIS — Z29.9 ENCOUNTER FOR PROPHYLACTIC MEASURES, UNSPECIFIED: ICD-10-CM

## 2019-01-14 DIAGNOSIS — Z95.1 PRESENCE OF AORTOCORONARY BYPASS GRAFT: ICD-10-CM

## 2019-01-14 LAB
ALBUMIN SERPL ELPH-MCNC: 4.1 G/DL — SIGNIFICANT CHANGE UP (ref 3.3–5)
ALP SERPL-CCNC: 53 U/L — SIGNIFICANT CHANGE UP (ref 40–120)
ALT FLD-CCNC: 27 U/L — SIGNIFICANT CHANGE UP (ref 10–45)
ANION GAP SERPL CALC-SCNC: 12 MMOL/L — SIGNIFICANT CHANGE UP (ref 5–17)
ANION GAP SERPL CALC-SCNC: 12 MMOL/L — SIGNIFICANT CHANGE UP (ref 5–17)
AST SERPL-CCNC: 19 U/L — SIGNIFICANT CHANGE UP (ref 10–40)
BASOPHILS # BLD AUTO: 0 K/UL — SIGNIFICANT CHANGE UP (ref 0–0.2)
BASOPHILS NFR BLD AUTO: 0 % — SIGNIFICANT CHANGE UP (ref 0–2)
BILIRUB SERPL-MCNC: 0.9 MG/DL — SIGNIFICANT CHANGE UP (ref 0.2–1.2)
BUN SERPL-MCNC: 32 MG/DL — HIGH (ref 7–23)
BUN SERPL-MCNC: 37 MG/DL — HIGH (ref 7–23)
CALCIUM SERPL-MCNC: 8.4 MG/DL — SIGNIFICANT CHANGE UP (ref 8.4–10.5)
CALCIUM SERPL-MCNC: 8.5 MG/DL — SIGNIFICANT CHANGE UP (ref 8.4–10.5)
CHLORIDE SERPL-SCNC: 104 MMOL/L — SIGNIFICANT CHANGE UP (ref 96–108)
CHLORIDE SERPL-SCNC: 105 MMOL/L — SIGNIFICANT CHANGE UP (ref 96–108)
CO2 SERPL-SCNC: 22 MMOL/L — SIGNIFICANT CHANGE UP (ref 22–31)
CO2 SERPL-SCNC: 24 MMOL/L — SIGNIFICANT CHANGE UP (ref 22–31)
CREAT SERPL-MCNC: 0.94 MG/DL — SIGNIFICANT CHANGE UP (ref 0.5–1.3)
CREAT SERPL-MCNC: 0.96 MG/DL — SIGNIFICANT CHANGE UP (ref 0.5–1.3)
EOSINOPHIL # BLD AUTO: 0.1 K/UL — SIGNIFICANT CHANGE UP (ref 0–0.5)
EOSINOPHIL NFR BLD AUTO: 0.4 % — SIGNIFICANT CHANGE UP (ref 0–6)
GLUCOSE BLDC GLUCOMTR-MCNC: 112 MG/DL — HIGH (ref 70–99)
GLUCOSE BLDC GLUCOMTR-MCNC: 131 MG/DL — HIGH (ref 70–99)
GLUCOSE BLDC GLUCOMTR-MCNC: 94 MG/DL — SIGNIFICANT CHANGE UP (ref 70–99)
GLUCOSE BLDC GLUCOMTR-MCNC: 99 MG/DL — SIGNIFICANT CHANGE UP (ref 70–99)
GLUCOSE SERPL-MCNC: 90 MG/DL — SIGNIFICANT CHANGE UP (ref 70–99)
GLUCOSE SERPL-MCNC: 96 MG/DL — SIGNIFICANT CHANGE UP (ref 70–99)
HCT VFR BLD CALC: 31.1 % — LOW (ref 39–50)
HCT VFR BLD CALC: 33 % — LOW (ref 39–50)
HGB BLD-MCNC: 10.7 G/DL — LOW (ref 13–17)
HGB BLD-MCNC: 11.2 G/DL — LOW (ref 13–17)
LYMPHOCYTES # BLD AUTO: 1.5 K/UL — SIGNIFICANT CHANGE UP (ref 1–3.3)
LYMPHOCYTES # BLD AUTO: 9.9 % — LOW (ref 13–44)
MCHC RBC-ENTMCNC: 28.2 PG — SIGNIFICANT CHANGE UP (ref 27–34)
MCHC RBC-ENTMCNC: 28.9 PG — SIGNIFICANT CHANGE UP (ref 27–34)
MCHC RBC-ENTMCNC: 33.9 GM/DL — SIGNIFICANT CHANGE UP (ref 32–36)
MCHC RBC-ENTMCNC: 34.4 GM/DL — SIGNIFICANT CHANGE UP (ref 32–36)
MCV RBC AUTO: 83.2 FL — SIGNIFICANT CHANGE UP (ref 80–100)
MCV RBC AUTO: 84 FL — SIGNIFICANT CHANGE UP (ref 80–100)
MONOCYTES # BLD AUTO: 1.6 K/UL — HIGH (ref 0–0.9)
MONOCYTES NFR BLD AUTO: 10.4 % — SIGNIFICANT CHANGE UP (ref 2–14)
NEUTROPHILS # BLD AUTO: 12 K/UL — HIGH (ref 1.8–7.4)
NEUTROPHILS NFR BLD AUTO: 79.3 % — HIGH (ref 43–77)
PLAT MORPH BLD: NORMAL — SIGNIFICANT CHANGE UP
PLATELET # BLD AUTO: 157 K/UL — SIGNIFICANT CHANGE UP (ref 150–400)
PLATELET # BLD AUTO: 197 K/UL — SIGNIFICANT CHANGE UP (ref 150–400)
POTASSIUM SERPL-MCNC: 4 MMOL/L — SIGNIFICANT CHANGE UP (ref 3.5–5.3)
POTASSIUM SERPL-MCNC: 4.9 MMOL/L — SIGNIFICANT CHANGE UP (ref 3.5–5.3)
POTASSIUM SERPL-SCNC: 4 MMOL/L — SIGNIFICANT CHANGE UP (ref 3.5–5.3)
POTASSIUM SERPL-SCNC: 4.9 MMOL/L — SIGNIFICANT CHANGE UP (ref 3.5–5.3)
PROT SERPL-MCNC: 6.4 G/DL — SIGNIFICANT CHANGE UP (ref 6–8.3)
RBC # BLD: 3.7 M/UL — LOW (ref 4.2–5.8)
RBC # BLD: 3.97 M/UL — LOW (ref 4.2–5.8)
RBC # FLD: 15.3 % — HIGH (ref 10.3–14.5)
RBC # FLD: 15.4 % — HIGH (ref 10.3–14.5)
RBC BLD AUTO: SIGNIFICANT CHANGE UP
SODIUM SERPL-SCNC: 139 MMOL/L — SIGNIFICANT CHANGE UP (ref 135–145)
SODIUM SERPL-SCNC: 140 MMOL/L — SIGNIFICANT CHANGE UP (ref 135–145)
WBC # BLD: 10.1 K/UL — SIGNIFICANT CHANGE UP (ref 3.8–10.5)
WBC # BLD: 15.1 K/UL — HIGH (ref 3.8–10.5)
WBC # FLD AUTO: 10.1 K/UL — SIGNIFICANT CHANGE UP (ref 3.8–10.5)
WBC # FLD AUTO: 15.1 K/UL — HIGH (ref 3.8–10.5)

## 2019-01-14 PROCEDURE — 93308 TTE F-UP OR LMTD: CPT | Mod: 26

## 2019-01-14 PROCEDURE — 71045 X-RAY EXAM CHEST 1 VIEW: CPT | Mod: 26

## 2019-01-14 PROCEDURE — 93321 DOPPLER ECHO F-UP/LMTD STD: CPT | Mod: 26

## 2019-01-14 RX ORDER — PHENYLEPHRINE HYDROCHLORIDE 10 MG/ML
0.1 INJECTION INTRAVENOUS
Qty: 40 | Refills: 0 | Status: DISCONTINUED | OUTPATIENT
Start: 2019-01-14 | End: 2019-01-16

## 2019-01-14 RX ORDER — ALBUMIN HUMAN 25 %
250 VIAL (ML) INTRAVENOUS ONCE
Qty: 0 | Refills: 0 | Status: COMPLETED | OUTPATIENT
Start: 2019-01-14 | End: 2019-01-14

## 2019-01-14 RX ORDER — SODIUM CHLORIDE 9 MG/ML
250 INJECTION INTRAMUSCULAR; INTRAVENOUS; SUBCUTANEOUS ONCE
Qty: 0 | Refills: 0 | Status: COMPLETED | OUTPATIENT
Start: 2019-01-14 | End: 2019-01-14

## 2019-01-14 RX ADMIN — SODIUM CHLORIDE 750 MILLILITER(S): 9 INJECTION INTRAMUSCULAR; INTRAVENOUS; SUBCUTANEOUS at 17:34

## 2019-01-14 RX ADMIN — Medication 100 MILLIGRAM(S): at 05:35

## 2019-01-14 RX ADMIN — PANTOPRAZOLE SODIUM 40 MILLIGRAM(S): 20 TABLET, DELAYED RELEASE ORAL at 07:19

## 2019-01-14 RX ADMIN — CLOPIDOGREL BISULFATE 75 MILLIGRAM(S): 75 TABLET, FILM COATED ORAL at 11:43

## 2019-01-14 RX ADMIN — ATORVASTATIN CALCIUM 80 MILLIGRAM(S): 80 TABLET, FILM COATED ORAL at 22:10

## 2019-01-14 RX ADMIN — Medication 81 MILLIGRAM(S): at 11:43

## 2019-01-14 RX ADMIN — Medication 125 MILLILITER(S): at 20:24

## 2019-01-14 RX ADMIN — ENOXAPARIN SODIUM 40 MILLIGRAM(S): 100 INJECTION SUBCUTANEOUS at 11:43

## 2019-01-14 NOTE — PROGRESS NOTE ADULT - SUBJECTIVE AND OBJECTIVE BOX
im ok    VITAL SIGNS    Telemetry:  nst 70-90  Vital Signs Last 24 Hrs  T(C): 36.8 (19 @ 11:06), Max: 37 (19 @ 19:30)  T(F): 98.3 (19 @ 11:06), Max: 98.6 (19 @ 19:30)  HR: 89 (19 @ 11:06) (84 - 91)  BP: 82/52 (19 @ 11:06) (82/52 - 99/61)  RR: 18 (19 @ 11:06) (18 - 18)  SpO2: 97% (19 @ 11:06) (94% - 100%)                       10.7<L>                139  | 22   | 37<H>        15.1<H> >-----------< 157     ------------------------< 90                    31.1<L>                4.9  | 105  | 0.96                                                                      Ca 8.5   Mg x     Ph x        ,             x                    138  | 25   | 30<H>        x     >-----------< x       ------------------------< 82                    x                    4.4  | 103  | 1.05                                                                      Ca 8.9   Mg 2.5   Ph 3.2                Daily     Daily Weight in k.5 (2019 05:47)        CAPILLARY BLOOD GLUCOSE      POCT Blood Glucose.: 131 mg/dL (2019 11:24)  POCT Blood Glucose.: 99 mg/dL (2019 07:24)  POCT Blood Glucose.: 99 mg/dL (2019 21:00)  POCT Blood Glucose.: 93 mg/dL (2019 17:23)  POCT Blood Glucose.: 77 mg/dL (2019 16:51)  POCT Blood Glucose.: 69 mg/dL (2019 16:13)                Coumadin    [ ] YES          [ x ]      NO                                   PHYSICAL EXAM        Neurology: alert and oriented x 3, nonfocal, no gross deficits  CV : .S1S2 RRR  Sternal Wound :  CDI , Stable  Pacing Wires        [x  ]   Settings:                                  Isolated  [  ]  Lungs: bibasilar crackles   Drains:     MS         [  ] Drainage:                 L Pleural  [  ]  Drainage:                R Pleural  [  ]  Drainage:  Abdomen: soft, nontender, nondistended, positive bowel sounds, last bowel movement  -  :         voiding    Extremities:     - edema - calve tenderness          aspirin enteric coated 81 milliGRAM(s) Oral daily  atorvastatin 80 milliGRAM(s) Oral at bedtime  clopidogrel Tablet 75 milliGRAM(s) Oral daily  docusate sodium 100 milliGRAM(s) Oral three times a day  enoxaparin Injectable 40 milliGRAM(s) SubCutaneous daily  insulin lispro (HumaLOG) corrective regimen sliding scale   SubCutaneous Before meals and at bedtime  oxyCODONE    5 mG/acetaminophen 325 mG 1 Tablet(s) Oral every 6 hours PRN  oxyCODONE    5 mG/acetaminophen 325 mG 2 Tablet(s) Oral every 6 hours PRN  pantoprazole    Tablet 40 milliGRAM(s) Oral before breakfast                    Physical Therapy Rec:   Home  [  ]   Home w/ PT  [  ]  Rehab  [  ]  Discussed with Cardiothoracic Team at AM rounds.

## 2019-01-14 NOTE — PROGRESS NOTE ADULT - ASSESSMENT
59 yo male with PMH HTN, Family hx CAD (father at age 59), who presented to ED today with exertional chest pain a/w EKG changes.  Patient was brought to cardiac cath lab and dx with CAD, IABP placed.  CABG x 4  01/11/2019  LIMA to LAD, SVG to PL, Sequentialed SVG to OM and Diag  Extubated d 1  post op inotropes pressors and acute blood loss anemia requiring prbc  IABP d/c  The pt has been hypotensive, sbp 88-94, no Beta blocker started.  Transferred to sdu  1/14 Remains with asymptomatic episodes of hypotension

## 2019-01-15 LAB
ALBUMIN SERPL ELPH-MCNC: 3.5 G/DL — SIGNIFICANT CHANGE UP (ref 3.3–5)
ALP SERPL-CCNC: 44 U/L — SIGNIFICANT CHANGE UP (ref 40–120)
ALT FLD-CCNC: 20 U/L — SIGNIFICANT CHANGE UP (ref 10–45)
ANION GAP SERPL CALC-SCNC: 10 MMOL/L — SIGNIFICANT CHANGE UP (ref 5–17)
AST SERPL-CCNC: 15 U/L — SIGNIFICANT CHANGE UP (ref 10–40)
BASOPHILS # BLD AUTO: 0 K/UL — SIGNIFICANT CHANGE UP (ref 0–0.2)
BASOPHILS NFR BLD AUTO: 0.3 % — SIGNIFICANT CHANGE UP (ref 0–2)
BILIRUB SERPL-MCNC: 0.9 MG/DL — SIGNIFICANT CHANGE UP (ref 0.2–1.2)
BUN SERPL-MCNC: 25 MG/DL — HIGH (ref 7–23)
CALCIUM SERPL-MCNC: 8.1 MG/DL — LOW (ref 8.4–10.5)
CHLORIDE SERPL-SCNC: 106 MMOL/L — SIGNIFICANT CHANGE UP (ref 96–108)
CO2 SERPL-SCNC: 25 MMOL/L — SIGNIFICANT CHANGE UP (ref 22–31)
CREAT SERPL-MCNC: 0.99 MG/DL — SIGNIFICANT CHANGE UP (ref 0.5–1.3)
EOSINOPHIL # BLD AUTO: 0.2 K/UL — SIGNIFICANT CHANGE UP (ref 0–0.5)
EOSINOPHIL NFR BLD AUTO: 1.9 % — SIGNIFICANT CHANGE UP (ref 0–6)
GLUCOSE BLDC GLUCOMTR-MCNC: 100 MG/DL — HIGH (ref 70–99)
GLUCOSE BLDC GLUCOMTR-MCNC: 112 MG/DL — HIGH (ref 70–99)
GLUCOSE BLDC GLUCOMTR-MCNC: 82 MG/DL — SIGNIFICANT CHANGE UP (ref 70–99)
GLUCOSE BLDC GLUCOMTR-MCNC: 99 MG/DL — SIGNIFICANT CHANGE UP (ref 70–99)
GLUCOSE SERPL-MCNC: 103 MG/DL — HIGH (ref 70–99)
HCT VFR BLD CALC: 27.6 % — LOW (ref 39–50)
HGB BLD-MCNC: 9.5 G/DL — LOW (ref 13–17)
LYMPHOCYTES # BLD AUTO: 1 K/UL — SIGNIFICANT CHANGE UP (ref 1–3.3)
LYMPHOCYTES # BLD AUTO: 9.7 % — LOW (ref 13–44)
MCHC RBC-ENTMCNC: 28.6 PG — SIGNIFICANT CHANGE UP (ref 27–34)
MCHC RBC-ENTMCNC: 34.2 GM/DL — SIGNIFICANT CHANGE UP (ref 32–36)
MCV RBC AUTO: 83.4 FL — SIGNIFICANT CHANGE UP (ref 80–100)
MONOCYTES # BLD AUTO: 1.1 K/UL — HIGH (ref 0–0.9)
MONOCYTES NFR BLD AUTO: 11.4 % — SIGNIFICANT CHANGE UP (ref 2–14)
NEUTROPHILS # BLD AUTO: 7.7 K/UL — HIGH (ref 1.8–7.4)
NEUTROPHILS NFR BLD AUTO: 76.8 % — SIGNIFICANT CHANGE UP (ref 43–77)
PLATELET # BLD AUTO: 179 K/UL — SIGNIFICANT CHANGE UP (ref 150–400)
POTASSIUM SERPL-MCNC: 4.1 MMOL/L — SIGNIFICANT CHANGE UP (ref 3.5–5.3)
POTASSIUM SERPL-SCNC: 4.1 MMOL/L — SIGNIFICANT CHANGE UP (ref 3.5–5.3)
PROT SERPL-MCNC: 5.4 G/DL — LOW (ref 6–8.3)
RBC # BLD: 3.31 M/UL — LOW (ref 4.2–5.8)
RBC # FLD: 14.9 % — HIGH (ref 10.3–14.5)
SODIUM SERPL-SCNC: 141 MMOL/L — SIGNIFICANT CHANGE UP (ref 135–145)
WBC # BLD: 10.1 K/UL — SIGNIFICANT CHANGE UP (ref 3.8–10.5)
WBC # FLD AUTO: 10.1 K/UL — SIGNIFICANT CHANGE UP (ref 3.8–10.5)

## 2019-01-15 RX ADMIN — Medication 100 MILLIGRAM(S): at 22:12

## 2019-01-15 RX ADMIN — Medication 100 MILLIGRAM(S): at 13:13

## 2019-01-15 RX ADMIN — PANTOPRAZOLE SODIUM 40 MILLIGRAM(S): 20 TABLET, DELAYED RELEASE ORAL at 05:13

## 2019-01-15 RX ADMIN — ENOXAPARIN SODIUM 40 MILLIGRAM(S): 100 INJECTION SUBCUTANEOUS at 13:12

## 2019-01-15 RX ADMIN — ATORVASTATIN CALCIUM 80 MILLIGRAM(S): 80 TABLET, FILM COATED ORAL at 22:12

## 2019-01-15 RX ADMIN — Medication 81 MILLIGRAM(S): at 13:12

## 2019-01-15 RX ADMIN — CLOPIDOGREL BISULFATE 75 MILLIGRAM(S): 75 TABLET, FILM COATED ORAL at 13:12

## 2019-01-15 NOTE — DIETITIAN INITIAL EVALUATION ADULT. - ENERGY NEEDS
ht: 67.5 inches. wt: 206.7 pounds (current, standing, no edema noted). Pre op/admit wt: 201 pounds. pre-op BMI: 31.0 kG/m2. UBW: 229 pounds x 4 months ago. IBW: 154 pounds +/- 10%. %IBW: 131%  Other pertinent objective information: 58 year old male pt with PMH HTN, family hx CAD (father at age 59), who presented to ED today with exertional chest pain a/w EKG changes.  Patient was brought to cardiac cath lab and dx with CAD, IABP placed. Now s/p CABG x 4  on 1/11/2019. Surgical incision noted.

## 2019-01-15 NOTE — DIETITIAN INITIAL EVALUATION ADULT. - OTHER INFO
Pt seen for LOS admission. Pt currently reports good po intake/appetite post-op. Denies chewing/swallowing difficulty, denies GI distress no N+V, diarrhea or constipation. Pt reports recent weight change PTA, states he was 229 pounds ~4 months ago but began losing weight as he was not feeling well, state he was ~202 pounds just prior to admission. Acknowledges potential benefits of further intentional wt loss. Pt did not follow a particular diet PTA, is amenable to discuss nutrition recommendations for post-op and heart healthy diet.

## 2019-01-15 NOTE — DIETITIAN INITIAL EVALUATION ADULT. - ADHERENCE
Pt did not adhere to a particular modified diet PTA. Pt did not adhere to a particular modified diet PTA. pt's spouse responsible for most of the cooking.

## 2019-01-15 NOTE — DIETITIAN INITIAL EVALUATION ADULT. - NS AS NUTRI INTERV MEALS SNACK
Recommend change diet to lactovegetarian diet per pt's preference. DASH/TLC to promote heart health. Pt's food preferences obtained and honored on menu./General/healthful diet

## 2019-01-15 NOTE — DIETITIAN INITIAL EVALUATION ADULT. - NS AS NUTRI INTERV ED CONTENT
Educated pt on post-op, heart healthy, and wt loss recommendations. Discussed the following: Adequate protein and energy intake post-op, plant-based and dairy products rich in protein, low sodium recommendations, review of food items high in sodium to limit/avoid, limiting intake of saturated fat and added sugars/refined carbohydrates, adequate intake of non-starchy vegetables, wt loss to promote improved overall metabolic profile, recommended at least 5-10% of body weight loss, 1800 kcal diet, physical activity as toelrated and per MD discretion upon d/c. Pt voiced understanding and accepted Low Sodium nutrition therapy handout; Heart-healthy nutrition therapy handout and 1800 kcal sample meal plan for review at his leisure.

## 2019-01-15 NOTE — DIETITIAN INITIAL EVALUATION ADULT. - ORAL INTAKE PTA
Pt reports good po intake PTA. Pt is a lacto-vegetarian (accepts dairy products). Typical meal intake includes: porridge for breakfast with tea, lunch and dinner often roti and rice with vegetable yates dish, and milk. Pt denies EtOH use. Denies taking vitamin/mineral/herbal supplements PTA. Denies food allergies or intolerance./good

## 2019-01-15 NOTE — PROGRESS NOTE ADULT - SUBJECTIVE AND OBJECTIVE BOX
im ok today    VITAL SIGNS    Telemetry:  nsr 90  Vital Signs Last 24 Hrs  T(C): 36.7 (01-15-19 @ 06:57), Max: 37.1 (19 @ 15:19)  T(F): 98.1 (01-15-19 @ 06:57), Max: 98.7 (19 @ 15:19)  HR: 98 (01-15-19 @ 06:57) (85 - 101)  BP: 97/61 (01-15-19 @ 06:57) (80/51 - 114/71)  RR: 18 (01-15-19 @ 06:57) (18 - 18)  SpO2: 99% (01-15-19 @ 06:57) (97% - 100%)                       9.5<L>                141  | 25   | 25<H>        10.1  >-----------< 179     ------------------------< 103<H>                 27.6<L>                4.1  | 106  | 0.99                                                                      Ca 8.1<L> Mg x     Ph x        ,             11.2<L>                140  | 24   | 32<H>        10.1  >-----------< 197     ------------------------< 96                    33.0<L>                4.0  | 104  | 0.94                                                                      Ca 8.4   Mg x     Ph x                  Daily     Daily Weight in k.8 (15 Farshad 2019 05:49)        CAPILLARY BLOOD GLUCOSE      POCT Blood Glucose.: 112 mg/dL (15 Farshad 2019 07:42)  POCT Blood Glucose.: 112 mg/dL (2019 21:42)  POCT Blood Glucose.: 94 mg/dL (2019 16:54)  POCT Blood Glucose.: 131 mg/dL (2019 11:24)                Coumadin    [ ] YES          [ x ]      NO                                   PHYSICAL EXAM        Neurology: alert and oriented x 3, nonfocal, no gross deficits  CV : .S1S2 RRR  Sternal Wound :  CDI , Stable  Pacing Wires        [  ]   Settings:                                  Isolated  [x  ]  Lungs: bibasilar crackles   Drains:     MS         [  ] Drainage:                 L Pleural  [  ]  Drainage:                R Pleural  [  ]  Drainage:  Abdomen: soft, nontender, nondistended, positive bowel sounds, last bowel movement  +  :         voiding    Extremities:       Trace edema - calf tenderness, L leg inc cdi        aspirin enteric coated 81 milliGRAM(s) Oral daily  atorvastatin 80 milliGRAM(s) Oral at bedtime  clopidogrel Tablet 75 milliGRAM(s) Oral daily  docusate sodium 100 milliGRAM(s) Oral three times a day  enoxaparin Injectable 40 milliGRAM(s) SubCutaneous daily  insulin lispro (HumaLOG) corrective regimen sliding scale   SubCutaneous Before meals and at bedtime  oxyCODONE    5 mG/acetaminophen 325 mG 1 Tablet(s) Oral every 6 hours PRN  oxyCODONE    5 mG/acetaminophen 325 mG 2 Tablet(s) Oral every 6 hours PRN  pantoprazole    Tablet 40 milliGRAM(s) Oral before breakfast  phenylephrine    Infusion 0.1 MICROgram(s)/kG/Min IV Continuous <Continuous>                    Physical Therapy Rec:   Home  [  ]   Home w/ PT  [  ]  Rehab  [  ]  Discussed with Cardiothoracic Team at AM rounds.

## 2019-01-15 NOTE — PROGRESS NOTE ADULT - ASSESSMENT
57 yo male with PMH HTN, Family hx CAD (father at age 59), who presented to ED today with exertional chest pain a/w EKG changes.  Patient was brought to cardiac cath lab and dx with CAD, IABP placed.  CABG x 4  01/11/2019  LIMA to LAD, SVG to PL, Sequentialed SVG to OM and Diag  Extubated d 1  post op inotropes pressors and acute blood loss anemia requiring prbc  IABP d/c  The pt has been hypotensive, sbp 88-94, no Beta blocker started.  Transferred to sdu  1/14 Remains with asymptomatic episodes of hypotension , UO marginal, ivf given. BUN 37  TTE done, no pericard effusion  1/15 Bp improved today.  Consider beta blocker tomorrow if bp increases.  1650/1600 I/O.  May transfer to floor

## 2019-01-16 DIAGNOSIS — I95.89 OTHER HYPOTENSION: ICD-10-CM

## 2019-01-16 LAB
ANION GAP SERPL CALC-SCNC: 10 MMOL/L — SIGNIFICANT CHANGE UP (ref 5–17)
BASOPHILS # BLD AUTO: 0 K/UL — SIGNIFICANT CHANGE UP (ref 0–0.2)
BASOPHILS NFR BLD AUTO: 0.3 % — SIGNIFICANT CHANGE UP (ref 0–2)
BUN SERPL-MCNC: 18 MG/DL — SIGNIFICANT CHANGE UP (ref 7–23)
CALCIUM SERPL-MCNC: 8.6 MG/DL — SIGNIFICANT CHANGE UP (ref 8.4–10.5)
CHLORIDE SERPL-SCNC: 106 MMOL/L — SIGNIFICANT CHANGE UP (ref 96–108)
CO2 SERPL-SCNC: 24 MMOL/L — SIGNIFICANT CHANGE UP (ref 22–31)
CREAT SERPL-MCNC: 0.89 MG/DL — SIGNIFICANT CHANGE UP (ref 0.5–1.3)
EOSINOPHIL # BLD AUTO: 0.4 K/UL — SIGNIFICANT CHANGE UP (ref 0–0.5)
EOSINOPHIL NFR BLD AUTO: 4.3 % — SIGNIFICANT CHANGE UP (ref 0–6)
GLUCOSE BLDC GLUCOMTR-MCNC: 102 MG/DL — HIGH (ref 70–99)
GLUCOSE SERPL-MCNC: 104 MG/DL — HIGH (ref 70–99)
HCT VFR BLD CALC: 32.4 % — LOW (ref 39–50)
HGB BLD-MCNC: 10.6 G/DL — LOW (ref 13–17)
LYMPHOCYTES # BLD AUTO: 0.9 K/UL — LOW (ref 1–3.3)
LYMPHOCYTES # BLD AUTO: 8.5 % — LOW (ref 13–44)
MCHC RBC-ENTMCNC: 27.3 PG — SIGNIFICANT CHANGE UP (ref 27–34)
MCHC RBC-ENTMCNC: 32.6 GM/DL — SIGNIFICANT CHANGE UP (ref 32–36)
MCV RBC AUTO: 83.7 FL — SIGNIFICANT CHANGE UP (ref 80–100)
MONOCYTES # BLD AUTO: 1 K/UL — HIGH (ref 0–0.9)
MONOCYTES NFR BLD AUTO: 9.3 % — SIGNIFICANT CHANGE UP (ref 2–14)
NEUTROPHILS # BLD AUTO: 8.1 K/UL — HIGH (ref 1.8–7.4)
NEUTROPHILS NFR BLD AUTO: 77.7 % — HIGH (ref 43–77)
PLATELET # BLD AUTO: 227 K/UL — SIGNIFICANT CHANGE UP (ref 150–400)
POTASSIUM SERPL-MCNC: 4.3 MMOL/L — SIGNIFICANT CHANGE UP (ref 3.5–5.3)
POTASSIUM SERPL-SCNC: 4.3 MMOL/L — SIGNIFICANT CHANGE UP (ref 3.5–5.3)
RBC # BLD: 3.87 M/UL — LOW (ref 4.2–5.8)
RBC # FLD: 14.7 % — HIGH (ref 10.3–14.5)
SODIUM SERPL-SCNC: 140 MMOL/L — SIGNIFICANT CHANGE UP (ref 135–145)
WBC # BLD: 10.4 K/UL — SIGNIFICANT CHANGE UP (ref 3.8–10.5)
WBC # FLD AUTO: 10.4 K/UL — SIGNIFICANT CHANGE UP (ref 3.8–10.5)

## 2019-01-16 RX ORDER — METOPROLOL TARTRATE 50 MG
12.5 TABLET ORAL
Qty: 0 | Refills: 0 | Status: DISCONTINUED | OUTPATIENT
Start: 2019-01-16 | End: 2019-01-17

## 2019-01-16 RX ADMIN — OXYCODONE AND ACETAMINOPHEN 1 TABLET(S): 5; 325 TABLET ORAL at 08:00

## 2019-01-16 RX ADMIN — ENOXAPARIN SODIUM 40 MILLIGRAM(S): 100 INJECTION SUBCUTANEOUS at 14:00

## 2019-01-16 RX ADMIN — CLOPIDOGREL BISULFATE 75 MILLIGRAM(S): 75 TABLET, FILM COATED ORAL at 11:58

## 2019-01-16 RX ADMIN — Medication 12.5 MILLIGRAM(S): at 17:30

## 2019-01-16 RX ADMIN — OXYCODONE AND ACETAMINOPHEN 1 TABLET(S): 5; 325 TABLET ORAL at 08:30

## 2019-01-16 RX ADMIN — ATORVASTATIN CALCIUM 80 MILLIGRAM(S): 80 TABLET, FILM COATED ORAL at 21:39

## 2019-01-16 RX ADMIN — PANTOPRAZOLE SODIUM 40 MILLIGRAM(S): 20 TABLET, DELAYED RELEASE ORAL at 06:52

## 2019-01-16 RX ADMIN — Medication 100 MILLIGRAM(S): at 06:53

## 2019-01-16 RX ADMIN — Medication 100 MILLIGRAM(S): at 21:39

## 2019-01-16 RX ADMIN — Medication 100 MILLIGRAM(S): at 15:59

## 2019-01-16 RX ADMIN — Medication 81 MILLIGRAM(S): at 11:58

## 2019-01-16 NOTE — PROGRESS NOTE ADULT - PROBLEM SELECTOR PLAN 3
hypotension continued but pt asymptomatic, lopressor 12.5 bid as per Dr. ta  monitor vitals as per rountine   cbc in am

## 2019-01-16 NOTE — PROGRESS NOTE ADULT - PROVIDER SPECIALTY LIST ADULT
CT Surgery
CTU
Critical Care

## 2019-01-16 NOTE — PROGRESS NOTE ADULT - PROBLEM SELECTOR PLAN 1
Asa, Statin,  Chest PT,  Incentive spirometry, wound care and assessment.  Ambulate continue postop care  continue asa/ statin/ low dose bb initiated today  pw isolated  pulm toilet  pain management  increase activity as tolerated  Discharge planning- home thur if stable overnight as per Dr. ta

## 2019-01-16 NOTE — PROGRESS NOTE ADULT - REASON FOR ADMISSION
Chest pain

## 2019-01-16 NOTE — PROGRESS NOTE ADULT - SUBJECTIVE AND OBJECTIVE BOX
VITAL SIGNS    Telemetry:    Vital Signs Last 24 Hrs  T(C): 36.7 (19 @ 04:41), Max: 36.8 (01-15-19 @ 13:12)  T(F): 98 (19 @ 04:41), Max: 98.2 (01-15-19 @ 13:12)  HR: 102 (19 @ 04:41) (92 - 102)  BP: 95/64 (19 @ 08:36) (91/60 - 130/84)  RR: 18 (19 @ 04:41) (18 - 18)  SpO2: 100% (19 @ 04:41) (98% - 100%)            01-15 @ 07:  -   @ 07:00  --------------------------------------------------------  IN: 810 mL / OUT: 850 mL / NET: -40 mL     @ 07:  -   @ 12:07  --------------------------------------------------------  IN: 240 mL / OUT: 0 mL / NET: 240 mL       Daily     Daily Weight in k.4 (2019 07:37)  Admit Wt: Drug Dosing Weight  Height (cm): 171.45 (2019 13:27)  Weight (kg): 91.6 (2019 13:27)  BMI (kg/m2): 31.2 (2019 13:27)  BSA (m2): 2.04 (2019 13:27)      CAPILLARY BLOOD GLUCOSE      POCT Blood Glucose.: 102 mg/dL (2019 07:31)  POCT Blood Glucose.: 99 mg/dL (15 Farshad 2019 21:46)  POCT Blood Glucose.: 100 mg/dL (15 Farshad 2019 16:13)          aspirin enteric coated 81 milliGRAM(s) Oral daily  atorvastatin 80 milliGRAM(s) Oral at bedtime  clopidogrel Tablet 75 milliGRAM(s) Oral daily  docusate sodium 100 milliGRAM(s) Oral three times a day  enoxaparin Injectable 40 milliGRAM(s) SubCutaneous daily  metoprolol tartrate 12.5 milliGRAM(s) Oral two times a day  oxyCODONE    5 mG/acetaminophen 325 mG 1 Tablet(s) Oral every 6 hours PRN  oxyCODONE    5 mG/acetaminophen 325 mG 2 Tablet(s) Oral every 6 hours PRN  pantoprazole    Tablet 40 milliGRAM(s) Oral before breakfast      PHYSICAL EXAM    Subjective: "Hi.   Neurology: alert and oriented x 3, nonfocal, no gross deficits  CV : tele:  RSR  Sternal Wound :  CDI with dressing , Stable  Lungs: clear. RR easy, unlabored   Abdomen: soft, nontender, nondistended, positive bowel sounds, bowel movement   Neg N/V/D   :  pt voiding without difficulty   Extremities:   SINGLETARY; edema, neg calf tenderness.   PPP bilaterally      PW:  Chest tubes: VITAL SIGNS    Telemetry:  RSR    Vital Signs Last 24 Hrs  T(C): 36.7 (19 @ 04:41), Max: 36.8 (01-15-19 @ 13:12)  T(F): 98 (19 @ 04:41), Max: 98.2 (01-15-19 @ 13:12)  HR: 102 (19 @ 04:41) (92 - 102)  BP: 95/64 (19 @ 08:36) (91/60 - 130/84)  RR: 18 (19 @ 04:41) (18 - 18)  SpO2: 100% (19 @ 04:41) (98% - 100%)            01-15 @ 07:  -   @ 07:00  --------------------------------------------------------  IN: 810 mL / OUT: 850 mL / NET: -40 mL     @ 07:01  -   @ 12:07  --------------------------------------------------------  IN: 240 mL / OUT: 0 mL / NET: 240 mL       Daily     Daily Weight in k.4 (2019 07:37)  Admit Wt: Drug Dosing Weight  Height (cm): 171.45 (2019 13:27)  Weight (kg): 91.6 (2019 13:27)  BMI (kg/m2): 31.2 (2019 13:27)  BSA (m2): 2.04 (2019 13:27)      CAPILLARY BLOOD GLUCOSE      POCT Blood Glucose.: 102 mg/dL (2019 07:31)  POCT Blood Glucose.: 99 mg/dL (15 Farshad 2019 21:46)  POCT Blood Glucose.: 100 mg/dL (15 Farshad 2019 16:13)          aspirin enteric coated 81 milliGRAM(s) Oral daily  atorvastatin 80 milliGRAM(s) Oral at bedtime  clopidogrel Tablet 75 milliGRAM(s) Oral daily  docusate sodium 100 milliGRAM(s) Oral three times a day  enoxaparin Injectable 40 milliGRAM(s) SubCutaneous daily  metoprolol tartrate 12.5 milliGRAM(s) Oral two times a day  oxyCODONE    5 mG/acetaminophen 325 mG 1 Tablet(s) Oral every 6 hours PRN  oxyCODONE    5 mG/acetaminophen 325 mG 2 Tablet(s) Oral every 6 hours PRN  pantoprazole    Tablet 40 milliGRAM(s) Oral before breakfast      PHYSICAL EXAM    Subjective: "When can I leave."   Neurology: alert and oriented x 3, nonfocal, no gross deficits  CV : tele:  RSR    Sternal Wound :  CDI CHARLES; sternum stable  Lungs: clear but diminished at the bases. RR easy, unlabored   Abdomen: soft, nontender, nondistended, positive bowel sounds, +bowel movement   Neg N/V/D   :  pt voiding without difficulty   Extremities:   SINGLETARY; trace LE edema, neg calf tenderness.   PPP bilaterally; bilateral SVG sites cdi charles       PW: + isolated   Chest tubes: none

## 2019-01-16 NOTE — PROGRESS NOTE ADULT - ASSESSMENT
57 yo male with PMH HTN, Family hx CAD (father at age 59), who presented to ED today with exertional chest pain a/w EKG changes.  Patient was brought to cardiac cath lab and dx with CAD, IABP placed.  CABG x 4  01/11/2019  LIMA to LAD, SVG to PL, Sequentialed SVG to OM and Diag  Extubated d 1  post op inotropes pressors and acute blood loss anemia requiring prbc  IABP d/c  The pt has been hypotensive, sbp 88-94, no Beta blocker started.  Transferred to sdu  1/14 Remains with asymptomatic episodes of hypotension , UO marginal, ivf given. BUN 37  TTE done, no pericard effusion  1/15 Bp improved today.  Consider beta blocker tomorrow if bp increases.  1650/1600 I/O.  May transfer to floor 57 yo male with PMH HTN, Family hx CAD (father at age 59), who presented to ED today with exertional chest pain a/w EKG changes.  Patient was brought to cardiac cath lab and dx with CAD, IABP placed.  CABG x 4  01/11/2019  LIMA to LAD, SVG to PL, Sequentialed SVG to OM and Diag  Extubated d 1  post op inotropes pressors and acute blood loss anemia requiring prbc  IABP d/c  The pt has been hypotensive, sbp 88-94, no Beta blocker started.  Transferred to sdu  1/14 Remains with asymptomatic episodes of hypotension , UO marginal, ivf given. BUN 37  TTE done, no pericard effusion  1/15 Bp improved today.  Consider beta blocker tomorrow if bp increases.  1/16 + hypotension continued but pt asymptomatic, lopressor 12.5 bid as per Dr. ta; pw isolated  Discharge planning - home thur if stable overnight

## 2019-01-17 ENCOUNTER — TRANSCRIPTION ENCOUNTER (OUTPATIENT)
Age: 59
End: 2019-01-17

## 2019-01-17 VITALS — WEIGHT: 205.91 LBS

## 2019-01-17 LAB
ANION GAP SERPL CALC-SCNC: 12 MMOL/L — SIGNIFICANT CHANGE UP (ref 5–17)
BUN SERPL-MCNC: 16 MG/DL — SIGNIFICANT CHANGE UP (ref 7–23)
CALCIUM SERPL-MCNC: 9 MG/DL — SIGNIFICANT CHANGE UP (ref 8.4–10.5)
CHLORIDE SERPL-SCNC: 104 MMOL/L — SIGNIFICANT CHANGE UP (ref 96–108)
CO2 SERPL-SCNC: 23 MMOL/L — SIGNIFICANT CHANGE UP (ref 22–31)
CREAT SERPL-MCNC: 0.99 MG/DL — SIGNIFICANT CHANGE UP (ref 0.5–1.3)
GLUCOSE SERPL-MCNC: 109 MG/DL — HIGH (ref 70–99)
HCT VFR BLD CALC: 35.8 % — LOW (ref 39–50)
HGB BLD-MCNC: 11.9 G/DL — LOW (ref 13–17)
MCHC RBC-ENTMCNC: 27.9 PG — SIGNIFICANT CHANGE UP (ref 27–34)
MCHC RBC-ENTMCNC: 33.4 GM/DL — SIGNIFICANT CHANGE UP (ref 32–36)
MCV RBC AUTO: 83.5 FL — SIGNIFICANT CHANGE UP (ref 80–100)
PLATELET # BLD AUTO: 292 K/UL — SIGNIFICANT CHANGE UP (ref 150–400)
POTASSIUM SERPL-MCNC: 4.3 MMOL/L — SIGNIFICANT CHANGE UP (ref 3.5–5.3)
POTASSIUM SERPL-SCNC: 4.3 MMOL/L — SIGNIFICANT CHANGE UP (ref 3.5–5.3)
RBC # BLD: 4.29 M/UL — SIGNIFICANT CHANGE UP (ref 4.2–5.8)
RBC # FLD: 14.6 % — HIGH (ref 10.3–14.5)
SODIUM SERPL-SCNC: 139 MMOL/L — SIGNIFICANT CHANGE UP (ref 135–145)
WBC # BLD: 12 K/UL — HIGH (ref 3.8–10.5)
WBC # FLD AUTO: 12 K/UL — HIGH (ref 3.8–10.5)

## 2019-01-17 PROCEDURE — 84443 ASSAY THYROID STIM HORMONE: CPT

## 2019-01-17 PROCEDURE — 86923 COMPATIBILITY TEST ELECTRIC: CPT

## 2019-01-17 PROCEDURE — 84480 ASSAY TRIIODOTHYRONINE (T3): CPT

## 2019-01-17 PROCEDURE — 84132 ASSAY OF SERUM POTASSIUM: CPT

## 2019-01-17 PROCEDURE — 84436 ASSAY OF TOTAL THYROXINE: CPT

## 2019-01-17 PROCEDURE — 93308 TTE F-UP OR LMTD: CPT

## 2019-01-17 PROCEDURE — 80048 BASIC METABOLIC PNL TOTAL CA: CPT

## 2019-01-17 PROCEDURE — 97161 PT EVAL LOW COMPLEX 20 MIN: CPT

## 2019-01-17 PROCEDURE — 84484 ASSAY OF TROPONIN QUANT: CPT

## 2019-01-17 PROCEDURE — 82803 BLOOD GASES ANY COMBINATION: CPT

## 2019-01-17 PROCEDURE — 97530 THERAPEUTIC ACTIVITIES: CPT

## 2019-01-17 PROCEDURE — 36430 TRANSFUSION BLD/BLD COMPNT: CPT

## 2019-01-17 PROCEDURE — P9041: CPT

## 2019-01-17 PROCEDURE — 85610 PROTHROMBIN TIME: CPT

## 2019-01-17 PROCEDURE — 85027 COMPLETE CBC AUTOMATED: CPT

## 2019-01-17 PROCEDURE — 82435 ASSAY OF BLOOD CHLORIDE: CPT

## 2019-01-17 PROCEDURE — 33967 INSERT I-AORT PERCUT DEVICE: CPT

## 2019-01-17 PROCEDURE — 80053 COMPREHEN METABOLIC PANEL: CPT

## 2019-01-17 PROCEDURE — 83036 HEMOGLOBIN GLYCOSYLATED A1C: CPT

## 2019-01-17 PROCEDURE — C8929: CPT

## 2019-01-17 PROCEDURE — 87640 STAPH A DNA AMP PROBE: CPT

## 2019-01-17 PROCEDURE — 93458 L HRT ARTERY/VENTRICLE ANGIO: CPT

## 2019-01-17 PROCEDURE — 86891 AUTOLOGOUS BLOOD OP SALVAGE: CPT

## 2019-01-17 PROCEDURE — P9016: CPT

## 2019-01-17 PROCEDURE — 82565 ASSAY OF CREATININE: CPT

## 2019-01-17 PROCEDURE — 76937 US GUIDE VASCULAR ACCESS: CPT

## 2019-01-17 PROCEDURE — 97116 GAIT TRAINING THERAPY: CPT

## 2019-01-17 PROCEDURE — 85384 FIBRINOGEN ACTIVITY: CPT

## 2019-01-17 PROCEDURE — P9047: CPT

## 2019-01-17 PROCEDURE — P9017: CPT

## 2019-01-17 PROCEDURE — 93005 ELECTROCARDIOGRAM TRACING: CPT

## 2019-01-17 PROCEDURE — 82962 GLUCOSE BLOOD TEST: CPT

## 2019-01-17 PROCEDURE — 99238 HOSP IP/OBS DSCHRG MGMT 30/<: CPT | Mod: 24

## 2019-01-17 PROCEDURE — 83605 ASSAY OF LACTIC ACID: CPT

## 2019-01-17 PROCEDURE — P9045: CPT

## 2019-01-17 PROCEDURE — 93880 EXTRACRANIAL BILAT STUDY: CPT

## 2019-01-17 PROCEDURE — 71045 X-RAY EXAM CHEST 1 VIEW: CPT

## 2019-01-17 PROCEDURE — 85730 THROMBOPLASTIN TIME PARTIAL: CPT

## 2019-01-17 PROCEDURE — 86901 BLOOD TYPING SEROLOGIC RH(D): CPT

## 2019-01-17 PROCEDURE — 82330 ASSAY OF CALCIUM: CPT

## 2019-01-17 PROCEDURE — 86900 BLOOD TYPING SEROLOGIC ABO: CPT

## 2019-01-17 PROCEDURE — 94010 BREATHING CAPACITY TEST: CPT

## 2019-01-17 PROCEDURE — 93321 DOPPLER ECHO F-UP/LMTD STD: CPT

## 2019-01-17 PROCEDURE — 84295 ASSAY OF SERUM SODIUM: CPT

## 2019-01-17 PROCEDURE — 84100 ASSAY OF PHOSPHORUS: CPT

## 2019-01-17 PROCEDURE — 87641 MR-STAPH DNA AMP PROBE: CPT

## 2019-01-17 PROCEDURE — 81001 URINALYSIS AUTO W/SCOPE: CPT

## 2019-01-17 PROCEDURE — C1769: CPT

## 2019-01-17 PROCEDURE — 82550 ASSAY OF CK (CPK): CPT

## 2019-01-17 PROCEDURE — 85014 HEMATOCRIT: CPT

## 2019-01-17 PROCEDURE — C1887: CPT

## 2019-01-17 PROCEDURE — 82947 ASSAY GLUCOSE BLOOD QUANT: CPT

## 2019-01-17 PROCEDURE — C1894: CPT

## 2019-01-17 PROCEDURE — 82553 CREATINE MB FRACTION: CPT

## 2019-01-17 PROCEDURE — 83735 ASSAY OF MAGNESIUM: CPT

## 2019-01-17 PROCEDURE — 94003 VENT MGMT INPAT SUBQ DAY: CPT

## 2019-01-17 PROCEDURE — 83880 ASSAY OF NATRIURETIC PEPTIDE: CPT

## 2019-01-17 PROCEDURE — 99291 CRITICAL CARE FIRST HOUR: CPT | Mod: 25

## 2019-01-17 PROCEDURE — C1889: CPT

## 2019-01-17 PROCEDURE — 31720 CLEARANCE OF AIRWAYS: CPT

## 2019-01-17 PROCEDURE — C1751: CPT

## 2019-01-17 PROCEDURE — 86850 RBC ANTIBODY SCREEN: CPT

## 2019-01-17 PROCEDURE — 94002 VENT MGMT INPAT INIT DAY: CPT

## 2019-01-17 RX ORDER — DOCUSATE SODIUM 100 MG
1 CAPSULE ORAL
Qty: 100 | Refills: 0 | OUTPATIENT
Start: 2019-01-17

## 2019-01-17 RX ORDER — METOPROLOL TARTRATE 50 MG
0.5 TABLET ORAL
Qty: 30 | Refills: 0 | OUTPATIENT
Start: 2019-01-17 | End: 2019-02-15

## 2019-01-17 RX ORDER — IRBESARTAN 75 MG/1
1 TABLET ORAL
Qty: 0 | Refills: 0 | COMMUNITY

## 2019-01-17 RX ORDER — ATORVASTATIN CALCIUM 80 MG/1
1 TABLET, FILM COATED ORAL
Qty: 30 | Refills: 0 | OUTPATIENT
Start: 2019-01-17

## 2019-01-17 RX ORDER — MELOXICAM 15 MG/1
1 TABLET ORAL
Qty: 0 | Refills: 0 | COMMUNITY

## 2019-01-17 RX ORDER — ASPIRIN/CALCIUM CARB/MAGNESIUM 324 MG
1 TABLET ORAL
Qty: 100 | Refills: 0 | OUTPATIENT
Start: 2019-01-17

## 2019-01-17 RX ORDER — CLOPIDOGREL BISULFATE 75 MG/1
1 TABLET, FILM COATED ORAL
Qty: 30 | Refills: 0 | OUTPATIENT
Start: 2019-01-17

## 2019-01-17 RX ADMIN — CLOPIDOGREL BISULFATE 75 MILLIGRAM(S): 75 TABLET, FILM COATED ORAL at 13:06

## 2019-01-17 RX ADMIN — Medication 100 MILLIGRAM(S): at 05:56

## 2019-01-17 RX ADMIN — Medication 100 MILLIGRAM(S): at 13:06

## 2019-01-17 RX ADMIN — Medication 12.5 MILLIGRAM(S): at 05:55

## 2019-01-17 RX ADMIN — Medication 81 MILLIGRAM(S): at 13:06

## 2019-01-17 RX ADMIN — PANTOPRAZOLE SODIUM 40 MILLIGRAM(S): 20 TABLET, DELAYED RELEASE ORAL at 05:56

## 2019-01-17 RX ADMIN — ENOXAPARIN SODIUM 40 MILLIGRAM(S): 100 INJECTION SUBCUTANEOUS at 13:06

## 2019-01-17 NOTE — DISCHARGE NOTE ADULT - NS AS ACTIVITY OBS
No Heavy lifting/straining/Showering allowed/Walking-Indoors allowed/Walking-Outdoors allowed/Do not make important decisions/Do not drive or operate machinery/Stairs allowed

## 2019-01-17 NOTE — DISCHARGE NOTE ADULT - PATIENT PORTAL LINK FT
You can access the [facility] Patient Portal, offered by [facility], by registering with the following website: [email or web address]

## 2019-01-17 NOTE — DISCHARGE NOTE ADULT - ADDITIONAL INSTRUCTIONS
BOBBY Workman 1/31/19 @ [facility].  Call [phone] with questions or concerns

## 2019-01-17 NOTE — DISCHARGE NOTE ADULT - HOSPITAL COURSE
· Assessment		  59 yo male with PMH HTN, Family hx CAD (father at age 59), who presented to ED today with exertional chest pain a/w EKG changes.  Patient was brought to cardiac cath lab and dx with CAD, IABP placed.  CABG x 4  01/11/2019  LIMA to LAD, SVG to PL, Sequentialed SVG to OM and Diag  Extubated d 1  post op inotropes pressors and acute blood loss anemia requiring prbc  IABP d/c  The pt has been hypotensive, sbp 88-94, no Beta blocker started.  Transferred to sdu  1/14 Remains with asymptomatic episodes of hypotension , UO marginal, ivf given. BUN 37  TTE done, no pericard effusion  1/15 Bp improved today.  Consider beta blocker tomorrow if bp increases.  1/16 + hypotension continued but pt asymptomatic, lopressor 12.5 bid as per Dr. ta; pw isolated  Discharge planning - home thur if stable overnight   1/18 HD stable.  Scant drainage from sternum mostly from old sump site,

## 2019-01-17 NOTE — DISCHARGE NOTE ADULT - OTHER SIGNIFICANT FINDINGS
PE    A&Ox3 NAD  100/70  HR 95    Cor S1S2 heard RRR.  Scant serous drainage  mostly from sump site  Lungs clear to ausc no w/r/r  ext no edema

## 2019-01-17 NOTE — DISCHARGE NOTE ADULT - MEDICATION SUMMARY - MEDICATIONS TO CHANGE
I will SWITCH the dose or number of times a day I take the medications listed below when I get home from the hospital:    Protonix 20 mg oral delayed release tablet  -- 1 tab(s) by mouth once a day  -- It is very important that you take or use this exactly as directed.  Do not skip doses or discontinue unless directed by your doctor.  Obtain medical advice before taking any non-prescription drugs as some may affect the action of this medication.  Swallow whole.  Do not crush.

## 2019-01-17 NOTE — DISCHARGE NOTE ADULT - PLAN OF CARE
recovery take meds as prescribed  ambulate at home at least 4x daily  use cough pillow  use incentive spirometry  place gauze over site that is oozing.  May remove to shower

## 2019-01-17 NOTE — DISCHARGE NOTE ADULT - CARE PLAN
Principal Discharge DX:	S/P CABG x 4  Goal:	recovery  Assessment and plan of treatment:	take meds as prescribed  ambulate at home at least 4x daily  use cough pillow  use incentive spirometry  place gauze over site that is oozing.  May remove to shower

## 2019-01-17 NOTE — DISCHARGE NOTE ADULT - MEDICATION SUMMARY - MEDICATIONS TO TAKE
I will START or STAY ON the medications listed below when I get home from the hospital:    Percocet 5/325 oral tablet  -- 1 tab(s) by mouth every 6 hours, As needed, Moderate Pain (4 - 6) MDD:4  -- Indication: For Pain    aspirin 81 mg oral delayed release tablet  -- 1 tab(s) by mouth once a day  -- Indication: For antiplatelet    atorvastatin 80 mg oral tablet  -- 1 tab(s) by mouth once a day (at bedtime)  -- Indication: For cholesterol    clopidogrel 75 mg oral tablet  -- 1 tab(s) by mouth once a day  -- Indication: For blood thinner    metoprolol tartrate 25 mg oral tablet  -- 0.5 tab(s) by mouth 2 times a day   -- It is very important that you take or use this exactly as directed.  Do not skip doses or discontinue unless directed by your doctor.  May cause drowsiness.  Alcohol may intensify this effect.  Use care when operating dangerous machinery.  Some non-prescription drugs may aggravate your condition.  Read all labels carefully.  If a warning appears, check with your doctor before taking.  Take with food or milk.  This drug may impair the ability to drive or operate machinery.  Use care until you become familiar with its effects.    -- Indication: For Heart rate    docusate sodium 100 mg oral capsule  -- 1 cap(s) by mouth 3 times a day  -- Indication: For Stool softener    pantoprazole 40 mg oral delayed release tablet  -- 1 tab(s) by mouth once a day  -- Indication: For gerd

## 2019-01-17 NOTE — DISCHARGE NOTE ADULT - CARE PROVIDER_API CALL
Justin Workman), Surgery; Thoracic Surgery  [address] [place] [address]  Phone: [phone]  Fax: [phone]

## 2019-01-17 NOTE — DISCHARGE NOTE ADULT - HOME CARE AGENCY
[facility] at [place], [phone].  Visiting nurse to call & arrange home care appointment for day after hospital discharge.

## 2019-01-17 NOTE — DISCHARGE NOTE ADULT - MEDICATION SUMMARY - MEDICATIONS TO STOP TAKING
I will STOP taking the medications listed below when I get home from the hospital:    Diovan 160 mg oral tablet  -- 1 tab(s) by mouth once a day     mg oral tablet  -- 1 tab(s) by mouth every 6 hours, As Needed  -- Do not take this drug if you are pregnant.  It is very important that you take or use this exactly as directed.  Do not skip doses or discontinue unless directed by your doctor.  May cause drowsiness or dizziness.  Obtain medical advice before taking any non-prescription drugs as some may affect the action of this medication.  Take with food or milk.    meloxicam 15 mg oral tablet  -- 1 tab(s) by mouth once a day    irbesartan 150 mg oral tablet  -- 1 tab(s) by mouth once a day

## 2019-01-22 ENCOUNTER — APPOINTMENT (OUTPATIENT)
Age: 59
End: 2019-01-22
Payer: MEDICAID

## 2019-01-22 VITALS
OXYGEN SATURATION: 99 % | BODY MASS INDEX: 31.17 KG/M2 | DIASTOLIC BLOOD PRESSURE: 72 MMHG | WEIGHT: 202 LBS | RESPIRATION RATE: 16 BRPM | HEART RATE: 99 BPM | SYSTOLIC BLOOD PRESSURE: 112 MMHG

## 2019-01-22 PROCEDURE — 99024 POSTOP FOLLOW-UP VISIT: CPT

## 2019-01-22 RX ORDER — VALSARTAN 320 MG/1
320 TABLET ORAL DAILY
Qty: 30 | Refills: 0 | Status: DISCONTINUED | COMMUNITY
End: 2019-01-22

## 2019-01-22 NOTE — PHYSICAL EXAM
[] : no respiratory distress [Respiration, Rhythm And Depth] : normal respiratory rhythm and effort [Exaggerated Use Of Accessory Muscles For Inspiration] : no accessory muscle use [Heart Rate And Rhythm] : heart rate was normal and rhythm regular [Heart Sounds] : normal S1 and S2 [Bowel Sounds] : normal bowel sounds [Abdomen Soft] : soft [Abdomen Tenderness] : non-tender [FreeTextEntry1] : MSI, CT sites and SVG sites without erythema, drainage or warmth, with edges well approximated.  Sternum stable. BLE 1+ edema

## 2019-01-22 NOTE — HISTORY OF PRESENT ILLNESS
[FreeTextEntry1] : 58M s/p CABG Dr. Workman after acute MI.  Pt owns business making uniforms.  c/o dry cough

## 2019-01-31 ENCOUNTER — APPOINTMENT (OUTPATIENT)
Dept: CARDIOTHORACIC SURGERY | Facility: CLINIC | Age: 59
End: 2019-01-31
Payer: MEDICAID

## 2019-01-31 VITALS
DIASTOLIC BLOOD PRESSURE: 79 MMHG | BODY MASS INDEX: 30.79 KG/M2 | OXYGEN SATURATION: 100 % | HEIGHT: 67 IN | TEMPERATURE: 98.3 F | WEIGHT: 196.2 LBS | HEART RATE: 110 BPM | SYSTOLIC BLOOD PRESSURE: 121 MMHG | RESPIRATION RATE: 14 BRPM

## 2019-01-31 PROCEDURE — 99024 POSTOP FOLLOW-UP VISIT: CPT

## 2019-01-31 RX ORDER — OXYCODONE HYDROCHLORIDE AND ACETAMINOPHEN 5; 325 MG/1; MG/1
5-325 TABLET ORAL
Refills: 0 | Status: DISCONTINUED | COMMUNITY
Start: 2019-01-22 | End: 2019-01-31

## 2019-01-31 RX ORDER — DOCUSATE SODIUM 100 MG/1
100 CAPSULE ORAL 3 TIMES DAILY
Refills: 0 | Status: DISCONTINUED | COMMUNITY
End: 2019-01-31

## 2019-02-14 ENCOUNTER — APPOINTMENT (OUTPATIENT)
Dept: CARDIOTHORACIC SURGERY | Facility: CLINIC | Age: 59
End: 2019-02-14
Payer: MEDICAID

## 2019-02-14 VITALS
RESPIRATION RATE: 16 BRPM | TEMPERATURE: 98.1 F | HEIGHT: 67 IN | DIASTOLIC BLOOD PRESSURE: 68 MMHG | OXYGEN SATURATION: 100 % | BODY MASS INDEX: 31.08 KG/M2 | HEART RATE: 85 BPM | SYSTOLIC BLOOD PRESSURE: 102 MMHG | WEIGHT: 198 LBS

## 2019-02-14 PROCEDURE — 99024 POSTOP FOLLOW-UP VISIT: CPT

## 2019-02-19 ENCOUNTER — APPOINTMENT (OUTPATIENT)
Dept: CARDIOLOGY | Facility: CLINIC | Age: 59
End: 2019-02-19
Payer: MEDICAID

## 2019-02-19 VITALS
WEIGHT: 198 LBS | OXYGEN SATURATION: 98 % | BODY MASS INDEX: 31.08 KG/M2 | HEART RATE: 89 BPM | HEIGHT: 67 IN | SYSTOLIC BLOOD PRESSURE: 120 MMHG | DIASTOLIC BLOOD PRESSURE: 81 MMHG

## 2019-02-19 PROCEDURE — 99204 OFFICE O/P NEW MOD 45 MIN: CPT

## 2019-02-19 PROCEDURE — 93000 ELECTROCARDIOGRAM COMPLETE: CPT

## 2019-03-05 NOTE — REASON FOR VISIT
[Consultation] : a consultation regarding [Coronary Artery Disease] : coronary artery disease [CABG Follow-up] : bypass graft

## 2019-03-05 NOTE — HISTORY OF PRESENT ILLNESS
[FreeTextEntry1] : Mr. Kennedy is a 58 year-old man with known MV CAD s/p CABG with Dr. sin in 2/2019. He has been doing well without issues.

## 2019-03-05 NOTE — DISCUSSION/SUMMARY
[FreeTextEntry1] : Mr. Kennedy is a 57 yo man with known CAD s/p CABG.\par \par Plan:\par 1. Given the CAD with CABG - c/w medical management of severe CAD\par 2. Old records requested and reviewed with performing physician.\par 3. Primary and secondary prevention of cardiovascular and related conditions discussed at length, including but not limited to diet and lifestyle modification.\par 4. Patient to return to the office in 2-3 months.\par \par Thank you for allowing me to participate in the care of your patient. If you have any questions, please feel free to contact me at [phone] or via email at [email or web address].\par \par Sincerely,\par \par Michelle Bower MD FACC

## 2019-03-05 NOTE — PHYSICAL EXAM
[General Appearance - Well Developed] : well developed [Normal Appearance] : normal appearance [Well Groomed] : well groomed [General Appearance - Well Nourished] : well nourished [No Deformities] : no deformities [General Appearance - In No Acute Distress] : no acute distress [Normal Conjunctiva] : the conjunctiva exhibited no abnormalities [Eyelids - No Xanthelasma] : the eyelids demonstrated no xanthelasmas [Normal Oral Mucosa] : normal oral mucosa [No Oral Pallor] : no oral pallor [No Oral Cyanosis] : no oral cyanosis [Normal Jugular Venous A Waves Present] : normal jugular venous A waves present [Normal Jugular Venous V Waves Present] : normal jugular venous V waves present [No Jugular Venous Maldonado A Waves] : no jugular venous maldonado A waves [Heart Rate And Rhythm] : heart rate and rhythm were normal [Heart Sounds] : normal S1 and S2 [Murmurs] : no murmurs present [Respiration, Rhythm And Depth] : normal respiratory rhythm and effort [Exaggerated Use Of Accessory Muscles For Inspiration] : no accessory muscle use [Auscultation Breath Sounds / Voice Sounds] : lungs were clear to auscultation bilaterally [Abdomen Soft] : soft [Abdomen Tenderness] : non-tender [Abdomen Mass (___ Cm)] : no abdominal mass palpated [Abnormal Walk] : normal gait [Gait - Sufficient For Exercise Testing] : the gait was sufficient for exercise testing [Nail Clubbing] : no clubbing of the fingernails [Cyanosis, Localized] : no localized cyanosis [Petechial Hemorrhages (___cm)] : no petechial hemorrhages [Skin Color & Pigmentation] : normal skin color and pigmentation [] : no rash [No Venous Stasis] : no venous stasis [Skin Lesions] : no skin lesions [No Skin Ulcers] : no skin ulcer [No Xanthoma] : no  xanthoma was observed [Oriented To Time, Place, And Person] : oriented to person, place, and time [Affect] : the affect was normal [Mood] : the mood was normal [No Anxiety] : not feeling anxious

## 2019-03-20 ENCOUNTER — INPATIENT (INPATIENT)
Facility: HOSPITAL | Age: 59
LOS: 0 days | Discharge: HOME | DRG: 188 | End: 2019-03-21
Attending: THORACIC SURGERY (CARDIOTHORACIC VASCULAR SURGERY) | Admitting: THORACIC SURGERY (CARDIOTHORACIC VASCULAR SURGERY)
Payer: MEDICAID

## 2019-03-20 VITALS
DIASTOLIC BLOOD PRESSURE: 81 MMHG | HEART RATE: 99 BPM | OXYGEN SATURATION: 97 % | WEIGHT: 197.09 LBS | SYSTOLIC BLOOD PRESSURE: 125 MMHG | TEMPERATURE: 98 F | HEIGHT: 67 IN | RESPIRATION RATE: 18 BRPM

## 2019-03-20 DIAGNOSIS — Z95.1 PRESENCE OF AORTOCORONARY BYPASS GRAFT: Chronic | ICD-10-CM

## 2019-03-20 DIAGNOSIS — Z96.641 PRESENCE OF RIGHT ARTIFICIAL HIP JOINT: Chronic | ICD-10-CM

## 2019-03-20 DIAGNOSIS — J90 PLEURAL EFFUSION, NOT ELSEWHERE CLASSIFIED: ICD-10-CM

## 2019-03-20 LAB
ALBUMIN SERPL ELPH-MCNC: 3.9 G/DL — SIGNIFICANT CHANGE UP (ref 3.3–5)
ALP SERPL-CCNC: 84 U/L — SIGNIFICANT CHANGE UP (ref 40–120)
ALT FLD-CCNC: 10 U/L — SIGNIFICANT CHANGE UP (ref 10–45)
ANION GAP SERPL CALC-SCNC: 13 MMOL/L — SIGNIFICANT CHANGE UP (ref 5–17)
AST SERPL-CCNC: 14 U/L — SIGNIFICANT CHANGE UP (ref 10–40)
BASOPHILS # BLD AUTO: 0 K/UL — SIGNIFICANT CHANGE UP (ref 0–0.2)
BASOPHILS NFR BLD AUTO: 0.5 % — SIGNIFICANT CHANGE UP (ref 0–2)
BILIRUB SERPL-MCNC: 0.4 MG/DL — SIGNIFICANT CHANGE UP (ref 0.2–1.2)
BUN SERPL-MCNC: 12 MG/DL — SIGNIFICANT CHANGE UP (ref 7–23)
CALCIUM SERPL-MCNC: 9.4 MG/DL — SIGNIFICANT CHANGE UP (ref 8.4–10.5)
CHLORIDE SERPL-SCNC: 103 MMOL/L — SIGNIFICANT CHANGE UP (ref 96–108)
CO2 SERPL-SCNC: 24 MMOL/L — SIGNIFICANT CHANGE UP (ref 22–31)
CREAT SERPL-MCNC: 0.82 MG/DL — SIGNIFICANT CHANGE UP (ref 0.5–1.3)
EOSINOPHIL # BLD AUTO: 0.1 K/UL — SIGNIFICANT CHANGE UP (ref 0–0.5)
EOSINOPHIL NFR BLD AUTO: 1.6 % — SIGNIFICANT CHANGE UP (ref 0–6)
GLUCOSE SERPL-MCNC: 106 MG/DL — HIGH (ref 70–99)
HCT VFR BLD CALC: 34.9 % — LOW (ref 39–50)
HGB BLD-MCNC: 11.1 G/DL — LOW (ref 13–17)
LYMPHOCYTES # BLD AUTO: 1.5 K/UL — SIGNIFICANT CHANGE UP (ref 1–3.3)
LYMPHOCYTES # BLD AUTO: 18.5 % — SIGNIFICANT CHANGE UP (ref 13–44)
MCHC RBC-ENTMCNC: 24 PG — LOW (ref 27–34)
MCHC RBC-ENTMCNC: 31.8 GM/DL — LOW (ref 32–36)
MCV RBC AUTO: 75.7 FL — LOW (ref 80–100)
MONOCYTES # BLD AUTO: 0.9 K/UL — SIGNIFICANT CHANGE UP (ref 0–0.9)
MONOCYTES NFR BLD AUTO: 11 % — SIGNIFICANT CHANGE UP (ref 2–14)
NEUTROPHILS # BLD AUTO: 5.5 K/UL — SIGNIFICANT CHANGE UP (ref 1.8–7.4)
NEUTROPHILS NFR BLD AUTO: 68.5 % — SIGNIFICANT CHANGE UP (ref 43–77)
NT-PROBNP SERPL-SCNC: 752 PG/ML — HIGH (ref 0–300)
PLATELET # BLD AUTO: 338 K/UL — SIGNIFICANT CHANGE UP (ref 150–400)
POTASSIUM SERPL-MCNC: 3.8 MMOL/L — SIGNIFICANT CHANGE UP (ref 3.5–5.3)
POTASSIUM SERPL-SCNC: 3.8 MMOL/L — SIGNIFICANT CHANGE UP (ref 3.5–5.3)
PROT SERPL-MCNC: 8 G/DL — SIGNIFICANT CHANGE UP (ref 6–8.3)
RBC # BLD: 4.62 M/UL — SIGNIFICANT CHANGE UP (ref 4.2–5.8)
RBC # FLD: 15.3 % — HIGH (ref 10.3–14.5)
SODIUM SERPL-SCNC: 140 MMOL/L — SIGNIFICANT CHANGE UP (ref 135–145)
WBC # BLD: 8 K/UL — SIGNIFICANT CHANGE UP (ref 3.8–10.5)
WBC # FLD AUTO: 8 K/UL — SIGNIFICANT CHANGE UP (ref 3.8–10.5)

## 2019-03-20 PROCEDURE — 99285 EMERGENCY DEPT VISIT HI MDM: CPT | Mod: 25

## 2019-03-20 PROCEDURE — 71250 CT THORAX DX C-: CPT | Mod: 26

## 2019-03-20 PROCEDURE — 71045 X-RAY EXAM CHEST 1 VIEW: CPT | Mod: 26

## 2019-03-20 PROCEDURE — 99222 1ST HOSP IP/OBS MODERATE 55: CPT | Mod: 24

## 2019-03-20 PROCEDURE — 93010 ELECTROCARDIOGRAM REPORT: CPT

## 2019-03-20 RX ORDER — DOCUSATE SODIUM 100 MG
100 CAPSULE ORAL THREE TIMES A DAY
Qty: 0 | Refills: 0 | Status: DISCONTINUED | OUTPATIENT
Start: 2019-03-20 | End: 2019-03-21

## 2019-03-20 RX ORDER — CLOPIDOGREL BISULFATE 75 MG/1
75 TABLET, FILM COATED ORAL DAILY
Qty: 0 | Refills: 0 | Status: DISCONTINUED | OUTPATIENT
Start: 2019-03-21 | End: 2019-03-21

## 2019-03-20 RX ORDER — ATORVASTATIN CALCIUM 80 MG/1
80 TABLET, FILM COATED ORAL AT BEDTIME
Qty: 0 | Refills: 0 | Status: DISCONTINUED | OUTPATIENT
Start: 2019-03-20 | End: 2019-03-21

## 2019-03-20 RX ORDER — METOPROLOL TARTRATE 50 MG
25 TABLET ORAL
Qty: 0 | Refills: 0 | Status: DISCONTINUED | OUTPATIENT
Start: 2019-03-20 | End: 2019-03-21

## 2019-03-20 RX ORDER — ASPIRIN/CALCIUM CARB/MAGNESIUM 324 MG
81 TABLET ORAL DAILY
Qty: 0 | Refills: 0 | Status: DISCONTINUED | OUTPATIENT
Start: 2019-03-20 | End: 2019-03-21

## 2019-03-20 RX ORDER — PANTOPRAZOLE SODIUM 20 MG/1
40 TABLET, DELAYED RELEASE ORAL
Qty: 0 | Refills: 0 | Status: DISCONTINUED | OUTPATIENT
Start: 2019-03-20 | End: 2019-03-21

## 2019-03-20 RX ORDER — ACETAMINOPHEN 500 MG
650 TABLET ORAL EVERY 6 HOURS
Qty: 0 | Refills: 0 | Status: DISCONTINUED | OUTPATIENT
Start: 2019-03-20 | End: 2019-03-21

## 2019-03-20 RX ORDER — HEPARIN SODIUM 5000 [USP'U]/ML
5000 INJECTION INTRAVENOUS; SUBCUTANEOUS EVERY 8 HOURS
Qty: 0 | Refills: 0 | Status: DISCONTINUED | OUTPATIENT
Start: 2019-03-21 | End: 2019-03-21

## 2019-03-20 RX ORDER — OXYCODONE AND ACETAMINOPHEN 5; 325 MG/1; MG/1
1 TABLET ORAL EVERY 6 HOURS
Qty: 0 | Refills: 0 | Status: DISCONTINUED | OUTPATIENT
Start: 2019-03-20 | End: 2019-03-21

## 2019-03-20 RX ORDER — HYDROMORPHONE HYDROCHLORIDE 2 MG/ML
0.5 INJECTION INTRAMUSCULAR; INTRAVENOUS; SUBCUTANEOUS ONCE
Qty: 0 | Refills: 0 | Status: DISCONTINUED | OUTPATIENT
Start: 2019-03-20 | End: 2019-03-20

## 2019-03-20 RX ADMIN — HYDROMORPHONE HYDROCHLORIDE 0.5 MILLIGRAM(S): 2 INJECTION INTRAMUSCULAR; INTRAVENOUS; SUBCUTANEOUS at 23:35

## 2019-03-20 RX ADMIN — Medication 100 MILLIGRAM(S): at 22:56

## 2019-03-20 RX ADMIN — Medication 25 MILLIGRAM(S): at 22:56

## 2019-03-20 RX ADMIN — HYDROMORPHONE HYDROCHLORIDE 0.5 MILLIGRAM(S): 2 INJECTION INTRAMUSCULAR; INTRAVENOUS; SUBCUTANEOUS at 23:00

## 2019-03-20 NOTE — H&P ADULT - NSHPPHYSICALEXAM_GEN_ALL_CORE
Neuro: A+O X 3  Cardiac S1/S2 RRR, no murmur  Pulm CTA B, diminished at bases  Abd Soft, NT, ND, no tenderness  Ext 2+ PP, b/l +1 trace edema

## 2019-03-20 NOTE — ED ADULT NURSE NOTE - OBJECTIVE STATEMENT
59 yo male with a pmh of CAD, s/p CABG with Dr. Workman in january 2019 presenting from PMDs office pleural effusions seen on outpatient MRI. Pt c/o SOB x yesterday & chest heaviness. Pt c/o orthopnea. Upon exam pt A&Ox3 gross neuro intact, lungs cta bilaterally, no difficulty speaking in complete sentences, s1s2 heart sounds heard, pulses x 4, knight x4, abdomen soft nontender nondistended, skin intact. Pt denies chest pain, sob at this time, ha, n/v/d, abdominal pain, f/c, urinary symptoms, hematuria.

## 2019-03-20 NOTE — H&P ADULT - NSHPLABSRESULTS_GEN_ALL_CORE
< from: CT Chest No Cont (03.20.19 @ 19:33) >    IMPRESSION:     Moderate to large left pleural effusion with associated near complete   collapse of the left lower lobe.    < end of copied text >

## 2019-03-20 NOTE — H&P ADULT - NSICDXFAMILYHX_GEN_ALL_CORE_FT
FAMILY HISTORY:  Family history of coronary artery disease, father when 59  Family history of MI (myocardial infarction)

## 2019-03-20 NOTE — ED ADULT NURSE NOTE - NSIMPLEMENTINTERV_GEN_ALL_ED
Implemented All Universal Safety Interventions:  [place] to call system. Call bell, personal items and telephone within reach. Instruct patient to call for assistance. Room bathroom lighting operational. Non-slip footwear when patient is off stretcher. Physically safe environment: no spills, clutter or unnecessary equipment. Stretcher in lowest position, wheels locked, appropriate side rails in place.

## 2019-03-20 NOTE — H&P ADULT - ASSESSMENT
59 yo male pmHx HTN, HLD,  recent CABG on 1/2019, presents to ED w/ shortness of breath found to have moderate to large Left pleural effusion. Admitted to CT surgery for thoracentesis of effusion.

## 2019-03-20 NOTE — ED PROVIDER NOTE - PROGRESS NOTE DETAILS
Spoke with Dr. Workman Spoke with Dr. Workman, discussed pts care here, if CT surgery does not take pt can admit to Dr. Mc [phone], or [phone]. -Annmarie Paige PA-C TONO Pepper saw pt in CT, will await CT scan results, will discuss. [phone] to discuss with NP. -Annmarie Paige PA-C

## 2019-03-20 NOTE — ED PROVIDER NOTE - CLINICAL SUMMARY MEDICAL DECISION MAKING FREE TEXT BOX
Pt with worsening sob on minimal exertion also orthopnea over last few days No fever no nv/ H/i chest discomfort had recent surgery for CABG about 4 weeks ago alert mild distress no cyanosis Markedly reduced air entry left lung Heart regular No JVD Bilateral pedal edema + aabdomen soft CABG scar healed concern for CAD recurrence Left leural p? pericardial effusion Labs xrays CT chest echo CT surgery eval admit for definitive care --Cerrato

## 2019-03-20 NOTE — ED PROVIDER NOTE - OBJECTIVE STATEMENT
57 yo male with a pmh of CAD, s/p CABG with Dr. Workman in january 2019 presenting from PMDs office for findings of large pleural effusion seen on outpatient MRI. He states that yesterday he was having shortness of breath and chest heaviness along with left 5th and 6th digit numbness, presented to PMD and ordered an MRI. He notes +orthopnea, and dry cough denies SOB, difficulty breathing or chest pain today, no fevers or chills.

## 2019-03-20 NOTE — ED PROVIDER NOTE - ATTENDING CONTRIBUTION TO CARE
I have seen and evaluated this patient with the advance practice clinician.   I agree with the findings  unless other wise stated. I have amended notes where needed.  After my face to face bedside evaluation, I am noting: Pt with worsening sob on minimal exertion also orthopnea over last few days No fever no nv/ H/i chest discomfort had recent surgery for CABG about 4 weeks ago alert mild distress no cyanosis Markedly reduced air entry left lung Heart regular No JVD Bilateral pedal edema + aabdomen soft CABG scar healed concern for CAD recurrence Left leural p? pericardial effusion Labs xrays CT chest echo CT surgery eval admit for definitive care --Cerrato

## 2019-03-20 NOTE — H&P ADULT - HISTORY OF PRESENT ILLNESS
59 yo male with a pmh of CAD, s/p CABG with Dr. Workman in January 2019 presenting from PMDs office for findings of large pleural effusion seen on outpatient MRI. He states that yesterday he was having shortness of breath and chest heaviness along with left 5th and 6th digit numbness, presented to PMD and ordered an MRI which shows an incidental finding of large left pleural effusion Pt was told by PMD to go to ED.

## 2019-03-20 NOTE — ED PROVIDER NOTE - PHYSICAL EXAMINATION
A&Ox3, NAD. NCAT. PERRL, EOMI. Neck supple, no LAD. Lungs dimished LLL, CTA right side. +S1S2, RRR, No m/r/g. Abd soft, NT/ND, +BS, no rebound or guarding. Extremities WWP, no edema. Skin without rash. CN II-XII intact. Strength 5/5 UE/LE. Sensation normal throughout. Gait normal. A&Ox3, NAD. NCAT. PERRL, EOMI. Neck supple, no LAD. Lungs dimished LLL, CTA right side and MELODIE,  +S1S2, RRR, No m/r/g. Abd soft, NT/ND, +BS, no rebound or guarding. Extremities WWP, no edema. Skin without rash. CN II-XII intact. Strength 5/5 UE/LE. Sensation normal throughout. Gait normal.

## 2019-03-20 NOTE — ED PROVIDER NOTE - NS ED ROS FT
Constitutional: No fever or chills  Eyes: No visual changes, eye pain or redness  HEENT: No throat pain, ear pain, nasal pain. No nose bleeding.  CV: No chest pain or lower extremity edema  Resp: No SOB no cough  GI: No abd pain. No nausea or vomiting. No diarrhea. No constipation.   : No dysuria, hematuria.   MSK: No musculoskeletal pain  Skin: No rash  Neuro: No headache. No numbness or tingling. No weakness. Constitutional: No fever or chills  Eyes: No visual changes, eye pain or redness  HEENT: No throat pain, ear pain, nasal pain. No nose bleeding.  CV: No chest pain or lower extremity edema  Resp: No SOB or difficulty breathing  GI: No abd pain. No nausea or vomiting. No diarrhea. No constipation.   : No dysuria, hematuria.   MSK: No musculoskeletal pain  Skin: No rash  Neuro: No headache. No numbness or tingling. No weakness.

## 2019-03-21 ENCOUNTER — TRANSCRIPTION ENCOUNTER (OUTPATIENT)
Age: 59
End: 2019-03-21

## 2019-03-21 VITALS
HEART RATE: 82 BPM | RESPIRATION RATE: 18 BRPM | SYSTOLIC BLOOD PRESSURE: 120 MMHG | DIASTOLIC BLOOD PRESSURE: 83 MMHG | OXYGEN SATURATION: 97 %

## 2019-03-21 LAB
ALBUMIN SERPL ELPH-MCNC: 3.6 G/DL — SIGNIFICANT CHANGE UP (ref 3.3–5)
ALP SERPL-CCNC: 86 U/L — SIGNIFICANT CHANGE UP (ref 40–120)
ALT FLD-CCNC: 10 U/L — SIGNIFICANT CHANGE UP (ref 10–45)
ANION GAP SERPL CALC-SCNC: 12 MMOL/L — SIGNIFICANT CHANGE UP (ref 5–17)
AST SERPL-CCNC: 12 U/L — SIGNIFICANT CHANGE UP (ref 10–40)
B PERT IGG+IGM PNL SER: ABNORMAL
BASOPHILS # BLD AUTO: 0 K/UL — SIGNIFICANT CHANGE UP (ref 0–0.2)
BASOPHILS NFR BLD AUTO: 0.4 % — SIGNIFICANT CHANGE UP (ref 0–2)
BILIRUB SERPL-MCNC: 0.5 MG/DL — SIGNIFICANT CHANGE UP (ref 0.2–1.2)
BUN SERPL-MCNC: 10 MG/DL — SIGNIFICANT CHANGE UP (ref 7–23)
CALCIUM SERPL-MCNC: 9.2 MG/DL — SIGNIFICANT CHANGE UP (ref 8.4–10.5)
CHLORIDE SERPL-SCNC: 104 MMOL/L — SIGNIFICANT CHANGE UP (ref 96–108)
CO2 SERPL-SCNC: 25 MMOL/L — SIGNIFICANT CHANGE UP (ref 22–31)
COLOR FLD: YELLOW — SIGNIFICANT CHANGE UP
CREAT SERPL-MCNC: 0.9 MG/DL — SIGNIFICANT CHANGE UP (ref 0.5–1.3)
EOSINOPHIL # BLD AUTO: 0.1 K/UL — SIGNIFICANT CHANGE UP (ref 0–0.5)
EOSINOPHIL NFR BLD AUTO: 1.2 % — SIGNIFICANT CHANGE UP (ref 0–6)
FLUID INTAKE SUBSTANCE CLASS: SIGNIFICANT CHANGE UP
FLUID SEGMENTED GRANULOCYTES: 1 % — SIGNIFICANT CHANGE UP
GLUCOSE SERPL-MCNC: 94 MG/DL — SIGNIFICANT CHANGE UP (ref 70–99)
HCT VFR BLD CALC: 37.4 % — LOW (ref 39–50)
HGB BLD-MCNC: 11.4 G/DL — LOW (ref 13–17)
LDH SERPL L TO P-CCNC: 185 U/L — SIGNIFICANT CHANGE UP
LYMPHOCYTES # BLD AUTO: 1 K/UL — SIGNIFICANT CHANGE UP (ref 1–3.3)
LYMPHOCYTES # BLD AUTO: 9.8 % — LOW (ref 13–44)
LYMPHOCYTES # FLD: 84 % — SIGNIFICANT CHANGE UP
MCHC RBC-ENTMCNC: 23.2 PG — LOW (ref 27–34)
MCHC RBC-ENTMCNC: 30.6 GM/DL — LOW (ref 32–36)
MCV RBC AUTO: 75.7 FL — LOW (ref 80–100)
MONOCYTES # BLD AUTO: 1 K/UL — HIGH (ref 0–0.9)
MONOCYTES NFR BLD AUTO: 9.6 % — SIGNIFICANT CHANGE UP (ref 2–14)
MONOS+MACROS # FLD: 15 % — SIGNIFICANT CHANGE UP
NEUTROPHILS # BLD AUTO: 7.9 K/UL — HIGH (ref 1.8–7.4)
NEUTROPHILS NFR BLD AUTO: 78.9 % — HIGH (ref 43–77)
PLATELET # BLD AUTO: 342 K/UL — SIGNIFICANT CHANGE UP (ref 150–400)
POTASSIUM SERPL-MCNC: 4 MMOL/L — SIGNIFICANT CHANGE UP (ref 3.5–5.3)
POTASSIUM SERPL-SCNC: 4 MMOL/L — SIGNIFICANT CHANGE UP (ref 3.5–5.3)
PROT FLD-MCNC: 4.9 G/DL — SIGNIFICANT CHANGE UP
PROT SERPL-MCNC: 7.1 G/DL — SIGNIFICANT CHANGE UP (ref 6–8.3)
RBC # BLD: 4.94 M/UL — SIGNIFICANT CHANGE UP (ref 4.2–5.8)
RBC # FLD: 15 % — HIGH (ref 10.3–14.5)
RCV VOL RI: 2960 /UL — HIGH (ref 0–5)
SODIUM SERPL-SCNC: 141 MMOL/L — SIGNIFICANT CHANGE UP (ref 135–145)
TOTAL NUCLEATED CELL COUNT, BODY FLUID: 855 /UL — HIGH (ref 0–5)
TUBE TYPE: SIGNIFICANT CHANGE UP
WBC # BLD: 10 K/UL — SIGNIFICANT CHANGE UP (ref 3.8–10.5)
WBC # FLD AUTO: 10 K/UL — SIGNIFICANT CHANGE UP (ref 3.8–10.5)

## 2019-03-21 PROCEDURE — 93010 ELECTROCARDIOGRAM REPORT: CPT

## 2019-03-21 PROCEDURE — 71045 X-RAY EXAM CHEST 1 VIEW: CPT | Mod: 26,76

## 2019-03-21 PROCEDURE — 32556 INSERT CATH PLEURA W/O IMAGE: CPT

## 2019-03-21 PROCEDURE — 99238 HOSP IP/OBS DSCHRG MGMT 30/<: CPT | Mod: 24

## 2019-03-21 RX ORDER — CLOPIDOGREL BISULFATE 75 MG/1
1 TABLET, FILM COATED ORAL
Qty: 30 | Refills: 0 | OUTPATIENT
Start: 2019-03-21

## 2019-03-21 RX ORDER — ASPIRIN/CALCIUM CARB/MAGNESIUM 324 MG
1 TABLET ORAL
Qty: 100 | Refills: 0
Start: 2019-03-21

## 2019-03-21 RX ORDER — ACETAMINOPHEN 500 MG
2 TABLET ORAL
Qty: 0 | Refills: 0 | DISCHARGE
Start: 2019-03-21

## 2019-03-21 RX ORDER — ATORVASTATIN CALCIUM 80 MG/1
1 TABLET, FILM COATED ORAL
Qty: 30 | Refills: 0 | OUTPATIENT
Start: 2019-03-21

## 2019-03-21 RX ORDER — ACETAMINOPHEN 500 MG
2 TABLET ORAL
Qty: 0 | Refills: 0 | COMMUNITY
Start: 2019-03-21

## 2019-03-21 RX ORDER — ASPIRIN/CALCIUM CARB/MAGNESIUM 324 MG
1 TABLET ORAL
Qty: 100 | Refills: 0 | OUTPATIENT
Start: 2019-03-21

## 2019-03-21 RX ORDER — DOCUSATE SODIUM 100 MG
1 CAPSULE ORAL
Qty: 100 | Refills: 0
Start: 2019-03-21

## 2019-03-21 RX ORDER — CLOPIDOGREL BISULFATE 75 MG/1
1 TABLET, FILM COATED ORAL
Qty: 30 | Refills: 0
Start: 2019-03-21

## 2019-03-21 RX ORDER — DOCUSATE SODIUM 100 MG
1 CAPSULE ORAL
Qty: 100 | Refills: 0 | OUTPATIENT
Start: 2019-03-21

## 2019-03-21 RX ORDER — METOPROLOL TARTRATE 50 MG
0.5 TABLET ORAL
Qty: 30 | Refills: 0
Start: 2019-03-21 | End: 2019-04-19

## 2019-03-21 RX ORDER — METOPROLOL TARTRATE 50 MG
0.5 TABLET ORAL
Qty: 30 | Refills: 0 | OUTPATIENT
Start: 2019-03-21 | End: 2019-04-19

## 2019-03-21 RX ORDER — HYDROMORPHONE HYDROCHLORIDE 2 MG/ML
0.5 INJECTION INTRAMUSCULAR; INTRAVENOUS; SUBCUTANEOUS ONCE
Qty: 0 | Refills: 0 | Status: DISCONTINUED | OUTPATIENT
Start: 2019-03-21 | End: 2019-03-21

## 2019-03-21 RX ORDER — ATORVASTATIN CALCIUM 80 MG/1
1 TABLET, FILM COATED ORAL
Qty: 30 | Refills: 0
Start: 2019-03-21

## 2019-03-21 RX ADMIN — HYDROMORPHONE HYDROCHLORIDE 0.5 MILLIGRAM(S): 2 INJECTION INTRAMUSCULAR; INTRAVENOUS; SUBCUTANEOUS at 00:00

## 2019-03-21 RX ADMIN — HEPARIN SODIUM 5000 UNIT(S): 5000 INJECTION INTRAVENOUS; SUBCUTANEOUS at 05:38

## 2019-03-21 RX ADMIN — HYDROMORPHONE HYDROCHLORIDE 0.5 MILLIGRAM(S): 2 INJECTION INTRAMUSCULAR; INTRAVENOUS; SUBCUTANEOUS at 00:28

## 2019-03-21 RX ADMIN — CLOPIDOGREL BISULFATE 75 MILLIGRAM(S): 75 TABLET, FILM COATED ORAL at 11:18

## 2019-03-21 RX ADMIN — Medication 25 MILLIGRAM(S): at 05:38

## 2019-03-21 RX ADMIN — PANTOPRAZOLE SODIUM 40 MILLIGRAM(S): 20 TABLET, DELAYED RELEASE ORAL at 05:38

## 2019-03-21 RX ADMIN — Medication 100 MILLIGRAM(S): at 05:37

## 2019-03-21 RX ADMIN — Medication 81 MILLIGRAM(S): at 11:18

## 2019-03-21 NOTE — DISCHARGE NOTE NURSING/CASE MANAGEMENT/SOCIAL WORK - NSDCDPATPORTLINK_GEN_ALL_CORE
You can access the [facility] Patient Portal, offered by [facility], by registering with the following website: [email or web address]

## 2019-03-21 NOTE — PROCEDURE NOTE - NSPROCDETAILS_GEN_ALL_CORE
Trendelenburg position/thoracostomy tube placed percutaneously/sterile dressing applied/supine position/secured in place/ultrasound assessment of fluid (location)/Seldinger technique/dressing applied/percutaneous

## 2019-03-21 NOTE — DISCHARGE NOTE PROVIDER - HOSPITAL COURSE
58  male readmit post cabg  for lt pleural effusion  requiring  lt pigtail placement    3/21   tube dc    tolerated well,   dc home

## 2019-03-21 NOTE — DISCHARGE NOTE PROVIDER - NSDCCPCAREPLAN_GEN_ALL_CORE_FT
PRINCIPAL DISCHARGE DIAGNOSIS  Diagnosis: Pleural effusion  Assessment and Plan of Treatment: take dressing off on friday  and shower

## 2019-03-21 NOTE — DISCHARGE NOTE PROVIDER - NSDCPNSUBOBJ_GEN_ALL_CORE
s  no sob  no fever or chills    COR  RRR    MT   stable    lungs  CTA    GI    soft  ND  NT    ext trace  le edema   no calf tenderness s  no sob  no fever or chills    COR  RRR    MT   stable    lungs  CTA    GI    soft  ND  NT    ext trace  le edema   no calf tenderness            35  min spent on dc instructions

## 2019-03-21 NOTE — DISCHARGE NOTE PROVIDER - CARE PROVIDER_API CALL
Justin Workman)  Surgery; Thoracic Surgery  [address] [place] [address]  Phone: [phone]  Fax: [phone]  Follow Up Time:

## 2019-03-21 NOTE — DISCHARGE NOTE PROVIDER - REASON FOR ADMISSION
shortness of breath   sp  Lt tube placed for pl eff shortness of breath   sp  chest tube placed for pl eff shortness of breath

## 2019-04-04 ENCOUNTER — APPOINTMENT (OUTPATIENT)
Age: 59
End: 2019-04-04
Payer: MEDICAID

## 2019-04-04 VITALS
RESPIRATION RATE: 14 BRPM | HEIGHT: 67 IN | TEMPERATURE: 97.3 F | BODY MASS INDEX: 30.92 KG/M2 | WEIGHT: 197 LBS | DIASTOLIC BLOOD PRESSURE: 78 MMHG | HEART RATE: 72 BPM | OXYGEN SATURATION: 100 % | SYSTOLIC BLOOD PRESSURE: 119 MMHG

## 2019-04-04 PROCEDURE — 99024 POSTOP FOLLOW-UP VISIT: CPT

## 2019-04-05 ENCOUNTER — APPOINTMENT (OUTPATIENT)
Dept: PULMONOLOGY | Facility: CLINIC | Age: 59
End: 2019-04-05
Payer: MEDICAID

## 2019-04-05 VITALS
HEART RATE: 81 BPM | SYSTOLIC BLOOD PRESSURE: 100 MMHG | BODY MASS INDEX: 31.23 KG/M2 | DIASTOLIC BLOOD PRESSURE: 68 MMHG | OXYGEN SATURATION: 100 % | HEIGHT: 67 IN | RESPIRATION RATE: 18 BRPM | WEIGHT: 199 LBS | TEMPERATURE: 97.3 F

## 2019-04-05 DIAGNOSIS — J98.11 ATELECTASIS: ICD-10-CM

## 2019-04-05 PROCEDURE — 99215 OFFICE O/P EST HI 40 MIN: CPT

## 2019-04-05 RX ORDER — BENZONATATE 200 MG/1
200 CAPSULE ORAL
Qty: 45 | Refills: 0 | Status: DISCONTINUED | COMMUNITY
Start: 1900-01-01 | End: 2019-04-05

## 2019-04-05 NOTE — ASSESSMENT
[FreeTextEntry1] : 57M with severe obstructive sleep apnea, No hx of asthma, nonsmoker. recent CABG x 4. s/p MI.\par \par Severe CRISTIAN, AHI 39. Compliance data 43/5/19-4/3/19 demonstrates 93.3% usage for at least 4 hours. On CPAP pressure of 11 cm H2O, AHI is 0.7. No significant air leak.\par -continue PAP therapy as pt benefitting \par \par DME [facility]\par \par \par CAD s/p CABG x 4\par -f/u with cardiology as instructed\par \par Pleural Effusion likely post-CABG.\par -s/p pigtail placement for one day March 2019 by CT sx. negative cultures, no analysis of fluid.\par -still with persistent dry cough repeat CXR script provided today - repeat in 1 month.\par Increase incentive spirometry use TID.\par - will f.u with pt when I receive CXR results.

## 2019-04-05 NOTE — REVIEW OF SYSTEMS
[EDS: ESS=____] : daytime somnolence: ESS=[unfilled] [Negative] : Neurologic [Fatigue] : no fatigue [Recent Wt Gain (___ Lbs)] : no recent weight gain [Difficulty Maintaining Sleep] : no difficulty maintaining sleep [Acute Insomnia] : no acute insomnia [Lower Extremity Discomfort] : no lower extremity discomfort [Irresistible urge to move legs] : no irresistible urge to move legs because of lower extremity discomfort [Late day/ Evening symptoms] : no late day/evening symptoms [Sleep Disturbances due to LE symptoms] : ~T no sleep disturbances due to lower extremity symptoms [Unusual Sleep Behavior] : no unusual sleep behavior [Hypersomnolence] : not sleeping much more than usual [Cataplexy] :  no cataplexy [Hypnogogic Hallucinations] : no hypnogogic hallucinations [Hypnopompic Hallucinations] : no hypnopompic hallucinations [Sleep Paralysis] : no sleep paralysis [FreeTextEntry6] : see HPI

## 2019-04-05 NOTE — PHYSICAL EXAM
[Normal Oropharynx] : normal oropharynx [II] : II [Heart Rate And Rhythm] : heart rate was normal and rhythm regular [Heart Sounds] : normal S1 and S2 [Murmurs] : no murmurs [] : no respiratory distress [Respiration, Rhythm And Depth] : normal respiratory rhythm and effort [Abnormal Walk] : normal gait [Musculoskeletal - Swelling] : no joint swelling seen [Nail Clubbing] : no clubbing of the fingernails [Cyanosis, Localized] : no localized cyanosis [No Focal Deficits] : no focal deficits [Oriented To Time, Place, And Person] : oriented to person, place, and time [Neck Cervical Mass (___cm)] : no neck mass was observed [Thyroid Nodule] : there were no palpable thyroid nodules [General Appearance - Well Developed] : well developed [Normal Appearance] : normal appearance [Well Groomed] : well groomed [General Appearance - Well Nourished] : well nourished [No Deformities] : no deformities [General Appearance - In No Acute Distress] : no acute distress [Exaggerated Use Of Accessory Muscles For Inspiration] : no accessory muscle use [FreeTextEntry1] : short neck.

## 2019-04-05 NOTE — END OF VISIT
[] : Fellow [FreeTextEntry3] : I agree with the nurse practitioners history, physical examination and plan of care. I personally elicited a history and examined the patient\par \par

## 2019-04-09 ENCOUNTER — FORM ENCOUNTER (OUTPATIENT)
Age: 59
End: 2019-04-09

## 2019-04-10 ENCOUNTER — APPOINTMENT (OUTPATIENT)
Dept: RADIOLOGY | Facility: IMAGING CENTER | Age: 59
End: 2019-04-10
Payer: MEDICAID

## 2019-04-10 ENCOUNTER — OUTPATIENT (OUTPATIENT)
Dept: OUTPATIENT SERVICES | Facility: HOSPITAL | Age: 59
LOS: 1 days | End: 2019-04-10
Payer: MEDICAID

## 2019-04-10 DIAGNOSIS — J90 PLEURAL EFFUSION, NOT ELSEWHERE CLASSIFIED: ICD-10-CM

## 2019-04-10 DIAGNOSIS — Z95.1 PRESENCE OF AORTOCORONARY BYPASS GRAFT: Chronic | ICD-10-CM

## 2019-04-10 DIAGNOSIS — Z96.641 PRESENCE OF RIGHT ARTIFICIAL HIP JOINT: Chronic | ICD-10-CM

## 2019-04-10 DIAGNOSIS — R05 COUGH: ICD-10-CM

## 2019-04-10 PROCEDURE — 71046 X-RAY EXAM CHEST 2 VIEWS: CPT

## 2019-04-10 PROCEDURE — 71046 X-RAY EXAM CHEST 2 VIEWS: CPT | Mod: 26

## 2019-04-20 LAB
CULTURE RESULTS: SIGNIFICANT CHANGE UP
SPECIMEN SOURCE: SIGNIFICANT CHANGE UP

## 2019-05-21 ENCOUNTER — APPOINTMENT (OUTPATIENT)
Dept: CARDIOLOGY | Facility: CLINIC | Age: 59
End: 2019-05-21

## 2019-05-30 ENCOUNTER — APPOINTMENT (OUTPATIENT)
Dept: NEUROLOGY | Facility: CLINIC | Age: 59
End: 2019-05-30
Payer: MEDICAID

## 2019-05-30 VITALS
OXYGEN SATURATION: 100 % | BODY MASS INDEX: 31.39 KG/M2 | DIASTOLIC BLOOD PRESSURE: 66 MMHG | HEART RATE: 82 BPM | TEMPERATURE: 98 F | SYSTOLIC BLOOD PRESSURE: 100 MMHG | HEIGHT: 67 IN | WEIGHT: 200 LBS

## 2019-05-30 PROCEDURE — 99214 OFFICE O/P EST MOD 30 MIN: CPT

## 2019-05-30 RX ORDER — ASPIRIN 81 MG/1
81 TABLET ORAL DAILY
Refills: 0 | Status: DISCONTINUED | COMMUNITY
Start: 2019-01-22 | End: 2019-05-30

## 2019-05-30 NOTE — PHYSICAL EXAM
[General Appearance - Alert] : alert [General Appearance - In No Acute Distress] : in no acute distress [Person] : oriented to person [Place] : oriented to place [Time] : oriented to time [Registration Intact] : recent registration memory intact [Concentration Intact] : normal concentrating ability [Repeating Phrases] : no difficulty repeating a phrase [Fluency] : fluency intact [Comprehension] : comprehension intact [Vocabulary] : adequate range of vocabulary [Cranial Nerves Optic (II)] : visual acuity intact bilaterally,  visual fields full to confrontation, pupils equal round and reactive to light [Cranial Nerves Oculomotor (III)] : extraocular motion intact [Cranial Nerves Trigeminal (V)] : facial sensation intact symmetrically [Cranial Nerves Facial (VII)] : face symmetrical [Motor Tone] : muscle tone was normal in all four extremities [Motor Strength] : muscle strength was normal in all four extremities [Abnormal Walk] : normal gait [Balance] : balance was intact [FreeTextEntry7] : sensation decreased left digit 5

## 2019-05-30 NOTE — ASSESSMENT
[FreeTextEntry1] : Patient has headaches with some migrainous features, however there is concerning flags for obstructive sleep apnea, found to have moderate to severe obstructive sleep apnea on sleep study.  Headaches resolved with BIPAP machine.\par \par Left hand with numbness left hand digit 5 possibly due to cervical radiculopathy.  Will get ncs.emg arms and refer for PT.\par \par fu after emg test

## 2019-05-30 NOTE — HISTORY OF PRESENT ILLNESS
[FreeTextEntry1] : The patient is a right handed obese man with history of hypertension here for headaches that started about a year ago. The headaches are described as posterior region, bilaterally, described as pressure pain with associated phonophobia. There is no photophobia, nausea or vomiting. There is occasional blurry vision with the headaches. The headaches are exacerbated with change of position. They also present usually in the morning and sometimes wake up the patient for sleep. There is no vision loss. There is no joint pain. No proximal weakness. No actual pain medications.\par \par Patient was found to have severe sleep apnea, started BIPAP 12/2019, headaches resolved.  He also had heart surgery, 4 vessel bipass, 1/2019, has had left hand, digits 4 and 5 numbness/ tingling.  No weakness.  Had MRI C spine.  \par

## 2019-06-06 ENCOUNTER — APPOINTMENT (OUTPATIENT)
Dept: PULMONOLOGY | Facility: CLINIC | Age: 59
End: 2019-06-06
Payer: MEDICAID

## 2019-06-06 VITALS
HEART RATE: 80 BPM | BODY MASS INDEX: 32.02 KG/M2 | DIASTOLIC BLOOD PRESSURE: 67 MMHG | OXYGEN SATURATION: 98 % | SYSTOLIC BLOOD PRESSURE: 104 MMHG | TEMPERATURE: 97.6 F | WEIGHT: 204 LBS | RESPIRATION RATE: 17 BRPM | HEIGHT: 67 IN

## 2019-06-06 PROCEDURE — 99214 OFFICE O/P EST MOD 30 MIN: CPT | Mod: 25

## 2019-06-06 PROCEDURE — 76604 US EXAM CHEST: CPT

## 2019-06-13 NOTE — PHYSICAL EXAM
[General Appearance - Well Developed] : well developed [Normal Appearance] : normal appearance [Well Groomed] : well groomed [General Appearance - Well Nourished] : well nourished [No Deformities] : no deformities [General Appearance - In No Acute Distress] : no acute distress [Neck Cervical Mass (___cm)] : no neck mass was observed [Thyroid Nodule] : there were no palpable thyroid nodules [Cyanosis, Localized] : no localized cyanosis [No Focal Deficits] : no focal deficits [Oriented To Time, Place, And Person] : oriented to person, place, and time [Normal Oropharynx] : normal oropharynx [II] : II [Heart Rate And Rhythm] : heart rate was normal and rhythm regular [Heart Sounds] : normal S1 and S2 [Murmurs] : no murmurs [] : no respiratory distress [Exaggerated Use Of Accessory Muscles For Inspiration] : no accessory muscle use [Respiration, Rhythm And Depth] : normal respiratory rhythm and effort [Abnormal Walk] : normal gait [Nail Clubbing] : no clubbing  or cyanosis of the fingernails [Musculoskeletal - Swelling] : no joint swelling seen [Auscultation Breath Sounds / Voice Sounds] : lungs were clear to auscultation bilaterally [FreeTextEntry1] : short neck.

## 2019-06-13 NOTE — HISTORY OF PRESENT ILLNESS
[Obstructive Sleep Apnea] : obstructive sleep apnea [Snoring] : snoring [ESS: ___] : ESS score [unfilled] [Awakes Unrefreshed] : awakening unrefreshed [Awakes with Headache] : headache upon awakening [To Bed: ___] : ~he/she~ goes to bed at [unfilled] [Arises: ___] : arises at [unfilled] [Sleep Onset Latency: ___ minutes] : sleep onset latency of [unfilled] minutes reported [Nocturnal Awakenings: ___] : ~he/she~ typically has [unfilled] nocturnal awakenings [WASO: ___] : Wake time after sleep onset is [unfilled] [TST: ___] : Total sleep time is [unfilled] [Date: ___] : Date of most recent diagnostic polysomnogram: [unfilled] [AHI: ___ per hour] : Apnea-hypopnea index:  [unfilled] per hour [Beka desatuation%: ___] : Beka desaturation:  [unfilled]% [Witnessed Apneas] : no witnessed sleep apnea [Frequent Nocturnal Awakening] : no nocturnal awakening [Unintentional Sleep while Active] : no unintentional sleep while active [Unintentional Sleep While Inactive] : no unintentional sleep while inactive [Awakening With Dry Mouth] : no dry mouth upon awakening [Recent  Weight Gain] : no recent weight gain [DIS] : no DIS [DMS] : no DMS [Unusual Sleep Behavior] : no unusual sleep behavior [Hypersomnolence] : no hypersomnolence [Cataplexy] : no cataplexy [Sleep Paralysis] : no sleep paralysis [Hypnagogic Hallucinations] : no hallucinations when falling asleep [Hypnopompic Hallucinations] : no hallucinations when awakening [Lower Extremity Discomfort] : no lower extremity discomfort in evening or at bedtime [Nocturnal Oxygen] : The patient does not use nocturnal oxygen [FreeTextEntry1] : 58M with h/o HTN, severe obstructive sleep apnea 5/9/2018 AHI 39 on PAP with pressure of 11cm H20.  DME: [facility]\par \par Recieved pap machine ~4months prior has been using nightly and benefitting. ESS 2, denies daily HAs since starting PAP therapy. Endorsing increase in consolidated sleep and decrease in MAAME.\par \par Jan 9th 2019- went to [facility] for CP w/ EKG changes. Had CABG x 4 performed on 1/11/19 and was subsequently discharged on 1/18. \par on f/u endorsing SOB, CT chest performed and demonstrated large pleural effusion on left. Was admitted on 3/20 for thoracentesis endorsing drained 1.1L was discharged the next day, and since pleural effusion hasn't reoccurred. \par \par Presents today for f/u for persistent left pleural effusion s/p MI with CABG. Denies cough, self resolved ~1 month prior. Using PAP at 11cm H20 nightly +compliance and benefitting from therapy. DME: [facility]

## 2019-06-13 NOTE — REVIEW OF SYSTEMS
[EDS: ESS=____] : daytime somnolence: ESS=[unfilled] [Negative] : Genitourinary [Fatigue] : no fatigue [Recent Wt Gain (___ Lbs)] : no recent weight gain [Difficulty Maintaining Sleep] : no difficulty maintaining sleep [Acute Insomnia] : no acute insomnia [Lower Extremity Discomfort] : no lower extremity discomfort [Irresistible urge to move legs] : no irresistible urge to move legs because of lower extremity discomfort [Late day/ Evening symptoms] : no late day/evening symptoms [Sleep Disturbances due to LE symptoms] : ~T no sleep disturbances due to lower extremity symptoms [Unusual Sleep Behavior] : no unusual sleep behavior [Hypersomnolence] : not sleeping much more than usual [Cataplexy] :  no cataplexy [Hypnogogic Hallucinations] : no hypnogogic hallucinations [Hypnopompic Hallucinations] : no hypnopompic hallucinations [Sleep Paralysis] : no sleep paralysis [FreeTextEntry6] : see HPI

## 2019-06-13 NOTE — ASSESSMENT
[FreeTextEntry1] : 58M with severe obstructive sleep apnea, No hx of asthma, nonsmoker. recent CABG x 4. s/p MI. Left sided pleural effusion requiring pigtail placement. \par \par Severe CRISTIAN, AHI 39. Compliance data 43/5/19-4/3/19 demonstrates 93.3% usage for at least 4 hours. On CPAP pressure of 11 cm H2O, AHI is 0.7. No significant air leak.\par -continue PAP therapy as pt benefitting \par DME [facility]\par \par CAD s/p CABG x 4\par -f/u with cardiology as instructed\par \par Pleural Effusion likely post-CABG.\par -s/p pigtail placement for one day March 2019 by CT sx. negative cultures, no analysis of fluid.\par -cough resolved. Ultrasound performed today in office no evidence of pleural effusion\par \par f/u annually for CRISTIAN.

## 2019-07-02 ENCOUNTER — APPOINTMENT (OUTPATIENT)
Dept: CARDIOLOGY | Facility: CLINIC | Age: 59
End: 2019-07-02
Payer: MEDICAID

## 2019-07-02 VITALS
HEART RATE: 83 BPM | OXYGEN SATURATION: 97 % | SYSTOLIC BLOOD PRESSURE: 109 MMHG | DIASTOLIC BLOOD PRESSURE: 71 MMHG | HEIGHT: 67 IN | BODY MASS INDEX: 31.23 KG/M2 | WEIGHT: 199 LBS

## 2019-07-02 PROCEDURE — 99214 OFFICE O/P EST MOD 30 MIN: CPT

## 2019-07-02 PROCEDURE — 93000 ELECTROCARDIOGRAM COMPLETE: CPT

## 2019-07-10 PROCEDURE — 80053 COMPREHEN METABOLIC PANEL: CPT

## 2019-07-10 PROCEDURE — 83880 ASSAY OF NATRIURETIC PEPTIDE: CPT

## 2019-07-10 PROCEDURE — 93005 ELECTROCARDIOGRAM TRACING: CPT

## 2019-07-10 PROCEDURE — 87070 CULTURE OTHR SPECIMN AEROBIC: CPT

## 2019-07-10 PROCEDURE — 87075 CULTR BACTERIA EXCEPT BLOOD: CPT

## 2019-07-10 PROCEDURE — 87102 FUNGUS ISOLATION CULTURE: CPT

## 2019-07-10 PROCEDURE — 99285 EMERGENCY DEPT VISIT HI MDM: CPT

## 2019-07-10 PROCEDURE — 85027 COMPLETE CBC AUTOMATED: CPT

## 2019-07-10 PROCEDURE — 71250 CT THORAX DX C-: CPT

## 2019-07-10 PROCEDURE — 84157 ASSAY OF PROTEIN OTHER: CPT

## 2019-07-10 PROCEDURE — 71045 X-RAY EXAM CHEST 1 VIEW: CPT

## 2019-07-10 PROCEDURE — 87205 SMEAR GRAM STAIN: CPT

## 2019-07-10 PROCEDURE — 83615 LACTATE (LD) (LDH) ENZYME: CPT

## 2019-07-10 PROCEDURE — 89051 BODY FLUID CELL COUNT: CPT

## 2019-07-16 NOTE — PHYSICAL EXAM
[General Appearance - Well Developed] : well developed [Normal Appearance] : normal appearance [Well Groomed] : well groomed [General Appearance - Well Nourished] : well nourished [No Deformities] : no deformities [General Appearance - In No Acute Distress] : no acute distress [Normal Conjunctiva] : the conjunctiva exhibited no abnormalities [Eyelids - No Xanthelasma] : the eyelids demonstrated no xanthelasmas [Normal Oral Mucosa] : normal oral mucosa [No Oral Pallor] : no oral pallor [No Oral Cyanosis] : no oral cyanosis [Normal Jugular Venous A Waves Present] : normal jugular venous A waves present [Normal Jugular Venous V Waves Present] : normal jugular venous V waves present [No Jugular Venous Maldonado A Waves] : no jugular venous maldonado A waves [Respiration, Rhythm And Depth] : normal respiratory rhythm and effort [Exaggerated Use Of Accessory Muscles For Inspiration] : no accessory muscle use [Auscultation Breath Sounds / Voice Sounds] : lungs were clear to auscultation bilaterally [Heart Rate And Rhythm] : heart rate and rhythm were normal [Heart Sounds] : normal S1 and S2 [Murmurs] : no murmurs present [Abdomen Soft] : soft [Abdomen Tenderness] : non-tender [Abdomen Mass (___ Cm)] : no abdominal mass palpated [Abnormal Walk] : normal gait [Gait - Sufficient For Exercise Testing] : the gait was sufficient for exercise testing [Nail Clubbing] : no clubbing of the fingernails [Cyanosis, Localized] : no localized cyanosis [Petechial Hemorrhages (___cm)] : no petechial hemorrhages [Skin Color & Pigmentation] : normal skin color and pigmentation [] : no rash [No Venous Stasis] : no venous stasis [Skin Lesions] : no skin lesions [No Skin Ulcers] : no skin ulcer [No Xanthoma] : no  xanthoma was observed [Oriented To Time, Place, And Person] : oriented to person, place, and time [Affect] : the affect was normal [Mood] : the mood was normal [No Anxiety] : not feeling anxious

## 2019-07-16 NOTE — DISCUSSION/SUMMARY
[FreeTextEntry1] : Mr. Kennedy is a 59 yo man with known CAD s/p CABG.\par \par Plan:\par 1. Given the CAD with CABG - c/w medical management of severe CAD\par 2. Old records requested and reviewed with performing physician.\par 3. Primary and secondary prevention of cardiovascular and related conditions discussed at length, including but not limited to diet and lifestyle modification.\par 4. Patient to return to the office in 2-3 months.\par \par Thank you for allowing me to participate in the care of your patient. If you have any questions, please feel free to contact me at [phone] or via email at [email or web address].\par \par Sincerely,\par \par Michelle Bower MD FACC

## 2019-08-09 ENCOUNTER — OUTPATIENT (OUTPATIENT)
Dept: OUTPATIENT SERVICES | Facility: HOSPITAL | Age: 59
LOS: 1 days | End: 2019-08-09
Payer: MEDICAID

## 2019-08-09 ENCOUNTER — APPOINTMENT (OUTPATIENT)
Dept: RADIOLOGY | Facility: IMAGING CENTER | Age: 59
End: 2019-08-09
Payer: MEDICAID

## 2019-08-09 ENCOUNTER — APPOINTMENT (OUTPATIENT)
Dept: PULMONOLOGY | Facility: CLINIC | Age: 59
End: 2019-08-09
Payer: MEDICAID

## 2019-08-09 VITALS
OXYGEN SATURATION: 97 % | RESPIRATION RATE: 16 BRPM | SYSTOLIC BLOOD PRESSURE: 111 MMHG | WEIGHT: 200 LBS | DIASTOLIC BLOOD PRESSURE: 77 MMHG | BODY MASS INDEX: 31.39 KG/M2 | HEIGHT: 67 IN | TEMPERATURE: 98 F | HEART RATE: 69 BPM

## 2019-08-09 DIAGNOSIS — J90 PLEURAL EFFUSION, NOT ELSEWHERE CLASSIFIED: ICD-10-CM

## 2019-08-09 DIAGNOSIS — Z95.1 PRESENCE OF AORTOCORONARY BYPASS GRAFT: Chronic | ICD-10-CM

## 2019-08-09 DIAGNOSIS — R07.81 PLEURODYNIA: ICD-10-CM

## 2019-08-09 DIAGNOSIS — Z96.641 PRESENCE OF RIGHT ARTIFICIAL HIP JOINT: Chronic | ICD-10-CM

## 2019-08-09 PROCEDURE — 71046 X-RAY EXAM CHEST 2 VIEWS: CPT | Mod: 26

## 2019-08-09 PROCEDURE — 99215 OFFICE O/P EST HI 40 MIN: CPT

## 2019-08-09 PROCEDURE — 71046 X-RAY EXAM CHEST 2 VIEWS: CPT

## 2019-08-09 NOTE — REVIEW OF SYSTEMS
[Hypertension] : ~T hypertension [Back Pain] : ~T back pain [Headache] : headache [As Noted in HPI] : as noted in HPI [Negative] : Endocrine [Murmurs] : no murmurs were heard [Edema] : ~T edema was not present [Chest Discomfort] : no chest discomfort [Trauma] : no ~T physical trauma [Fracture] : no fracture [Myalgias] : no myalgias [Arthralgias] : no arthralgias

## 2019-08-09 NOTE — HISTORY OF PRESENT ILLNESS
[FreeTextEntry1] : 57yo male presenting with L side rib pain x1 week now.  PMH CRISTIAN (on PAP, 11cm H2O), CAD, s/p CABG x4 (at [facility], 1/2019), L pleural effusion, s/p thoracentesis (1.1L drained; unknown etiology, 3/2019), HTN, psoriasis.\par \par Patient reports pain in his L rib (12th) anteriorly that travels towards his backside.  Only precipitating factor is walking.  Otherwise, pain is not reproducible with position change or deep breathing, nor is it worsened by eating or coughing.  He denies any recent injuries or heavy lifting.  Denies cough, dyspnea, chest tightness or discomfort, recent illness, or fevers/chills.  He is concerned with L fluid in the lung, given his history.

## 2019-08-09 NOTE — ASSESSMENT
[FreeTextEntry1] : 59yo male presenting with L side rib/abdominal pain x1 week now.  PMH CRISTIAN (on PAP, 11cm H2O), CAD, s/p CABG x4 (at [facility], 1/2019), L pleural effusion, s/p thoracentesis (1.1L drained; unknown etiology, 3/2019), HTN, psoriasis.\par \par On physical exam, lung sounds CTA with no suggestion of fluid accumulation.  Last CXR from April demonstrated small L pleural effusion.  When palpating, pain was elicited at approximately 11th and 12th rib anteriorly and posteriorly at MCL.  Given these findings, it is less likely that there is a recurrence of pleural effusion, and more likely, pain seems musculoskeletal in origin.  Will send patient for CXR to evaluate.  He will call our office once completed.\par Repeat chest XRay no effusion and lungs clear

## 2019-08-09 NOTE — PHYSICAL EXAM
[Normal Appearance] : normal appearance [Well Groomed] : well groomed [Normal Conjunctiva] : the conjunctiva exhibited no abnormalities [General Appearance - In No Acute Distress] : no acute distress [II] : II [Neck Appearance] : the appearance of the neck was normal [Heart Rate And Rhythm] : heart rate and rhythm were normal [Heart Sounds] : normal S1 and S2 [Murmurs] : no murmurs present [Arterial Pulses Normal] : the arterial pulses were normal [Edema] : no peripheral edema present [Respiration, Rhythm And Depth] : normal respiratory rhythm and effort [] : no respiratory distress [Exaggerated Use Of Accessory Muscles For Inspiration] : no accessory muscle use [Auscultation Breath Sounds / Voice Sounds] : lungs were clear to auscultation bilaterally [Bowel Sounds] : normal bowel sounds [Abdomen Soft] : soft [Abdomen Tenderness] : non-tender [Abdomen Mass (___ Cm)] : no abdominal mass palpated [Abnormal Walk] : normal gait [Nail Clubbing] : no clubbing of the fingernails [Cyanosis, Localized] : no localized cyanosis [No Focal Deficits] : no focal deficits [Mood] : the mood was normal [Affect] : the affect was normal [FreeTextEntry2] : n [FreeTextEntry1] : psoriasis; generalized rash on torso and arms

## 2019-10-15 ENCOUNTER — APPOINTMENT (OUTPATIENT)
Dept: NEUROLOGY | Facility: CLINIC | Age: 59
End: 2019-10-15
Payer: MEDICAID

## 2019-10-15 VITALS
HEIGHT: 67 IN | OXYGEN SATURATION: 98 % | BODY MASS INDEX: 31.39 KG/M2 | SYSTOLIC BLOOD PRESSURE: 112 MMHG | WEIGHT: 200 LBS | DIASTOLIC BLOOD PRESSURE: 73 MMHG | HEART RATE: 76 BPM

## 2019-10-15 PROCEDURE — 95886 MUSC TEST DONE W/N TEST COMP: CPT

## 2019-10-15 PROCEDURE — 95912 NRV CNDJ TEST 11-12 STUDIES: CPT

## 2019-10-30 NOTE — PATIENT PROFILE ADULT. - EXTENSIONS OF SELF_ADULT
Given reports of continued diarrhea, would have him hold colchicine for now.  Given history of low K and diarrhea, please have him obtain BMP and Mg today.  Would also have him obtain an EKG to assess rhythm (can peak at his groin at that time if it is done at the clinic) along [facility] echo to rule out evidence of pericardial effusion given ongoing issues with SOB and chest discomfort. Would also update Dr. Garzon about symptoms/complaints.          Eyeglasses

## 2019-11-19 ENCOUNTER — APPOINTMENT (OUTPATIENT)
Dept: CARDIOLOGY | Facility: CLINIC | Age: 59
End: 2019-11-19
Payer: MEDICAID

## 2019-11-19 VITALS
WEIGHT: 205 LBS | BODY MASS INDEX: 32.18 KG/M2 | OXYGEN SATURATION: 99 % | HEIGHT: 67 IN | SYSTOLIC BLOOD PRESSURE: 114 MMHG | DIASTOLIC BLOOD PRESSURE: 77 MMHG | HEART RATE: 84 BPM

## 2019-11-19 PROCEDURE — 99214 OFFICE O/P EST MOD 30 MIN: CPT

## 2019-11-19 PROCEDURE — 93000 ELECTROCARDIOGRAM COMPLETE: CPT

## 2019-11-19 NOTE — REASON FOR VISIT
[Follow-Up - From Hospitalization] : follow-up of a recent hospitalization for [Coronary Artery Disease] : coronary artery disease [CABG Follow-up] : bypass graft

## 2019-11-19 NOTE — DISCUSSION/SUMMARY
[FreeTextEntry1] : Mr. Kennedy is a 60 yo man with known CAD s/p CABG.\par \par Plan:\par 1. Given the CAD with CABG - c/w medical management of severe CAD\par 2. Old records requested and reviewed with performing physician.\par 3. Primary and secondary prevention of cardiovascular and related conditions discussed at length, including but not limited to diet and lifestyle modification.\par 4. Patient to return to the office in 2-3 months.\par \par Thank you for allowing me to participate in the care of your patient. If you have any questions, please feel free to contact me at [phone] or via email at [email or web address].\par \par Sincerely,\par \par Michelle Bower MD FACC

## 2019-11-19 NOTE — HISTORY OF PRESENT ILLNESS
[FreeTextEntry1] : Mr. Kennedy is a 59 year-old man with known MV CAD s/p CABG with Dr. sin in 2/2019. He has been doing well without issues. He has not been able to lose weight however has been walking over 2 miles a day.

## 2019-11-19 NOTE — PHYSICAL EXAM
[General Appearance - Well Developed] : well developed [Normal Appearance] : normal appearance [Well Groomed] : well groomed [General Appearance - Well Nourished] : well nourished [No Deformities] : no deformities [General Appearance - In No Acute Distress] : no acute distress [Normal Conjunctiva] : the conjunctiva exhibited no abnormalities [Eyelids - No Xanthelasma] : the eyelids demonstrated no xanthelasmas [Normal Oral Mucosa] : normal oral mucosa [No Oral Pallor] : no oral pallor [No Oral Cyanosis] : no oral cyanosis [Normal Jugular Venous A Waves Present] : normal jugular venous A waves present [Normal Jugular Venous V Waves Present] : normal jugular venous V waves present [No Jugular Venous Madlonado A Waves] : no jugular venous maldonado A waves [Respiration, Rhythm And Depth] : normal respiratory rhythm and effort [Exaggerated Use Of Accessory Muscles For Inspiration] : no accessory muscle use [Heart Rate And Rhythm] : heart rate and rhythm were normal [Auscultation Breath Sounds / Voice Sounds] : lungs were clear to auscultation bilaterally [Heart Sounds] : normal S1 and S2 [Murmurs] : no murmurs present [Abdomen Soft] : soft [Abdomen Tenderness] : non-tender [Abdomen Mass (___ Cm)] : no abdominal mass palpated [Abnormal Walk] : normal gait [Nail Clubbing] : no clubbing of the fingernails [Gait - Sufficient For Exercise Testing] : the gait was sufficient for exercise testing [Cyanosis, Localized] : no localized cyanosis [Petechial Hemorrhages (___cm)] : no petechial hemorrhages [Skin Color & Pigmentation] : normal skin color and pigmentation [No Venous Stasis] : no venous stasis [] : no rash [No Skin Ulcers] : no skin ulcer [Skin Lesions] : no skin lesions [Oriented To Time, Place, And Person] : oriented to person, place, and time [No Xanthoma] : no  xanthoma was observed [Affect] : the affect was normal [Mood] : the mood was normal [No Anxiety] : not feeling anxious

## 2020-04-08 ENCOUNTER — APPOINTMENT (OUTPATIENT)
Dept: NEUROLOGY | Facility: CLINIC | Age: 60
End: 2020-04-08
Payer: MEDICAID

## 2020-04-08 DIAGNOSIS — R51 HEADACHE: ICD-10-CM

## 2020-04-08 DIAGNOSIS — M54.12 RADICULOPATHY, CERVICAL REGION: ICD-10-CM

## 2020-04-08 PROCEDURE — 99213 OFFICE O/P EST LOW 20 MIN: CPT | Mod: 95

## 2020-04-08 NOTE — HISTORY OF PRESENT ILLNESS
[FreeTextEntry1] : The patient is a right handed obese man with history of hypertension here for headaches that started about a year ago. The headaches are described as posterior region, bilaterally, described as pressure pain with associated phonophobia. There is no photophobia, nausea or vomiting. There is occasional blurry vision with the headaches. The headaches are exacerbated with change of position. They also present usually in the morning and sometimes wake up the patient for sleep. There is no vision loss. There is no joint pain. No proximal weakness. No actual pain medications.\par \par Patient was found to have severe sleep apnea, started BIPAP 12/2019, headaches resolved. He also had heart surgery, 4 vessel bipass, 1/2019, has had left hand, digits 4 and 5 numbness/ tingling. No weakness. Ncs/emg was unremarkable.  The patient did PT and does the exercises at home with only occasional numbness/ tingling the left hand.  Overall, the patient feels that his symptoms are well controlled.

## 2020-04-08 NOTE — ASSESSMENT
[FreeTextEntry1] : Patient has headaches with some migrainous features, however there is concerning flags for obstructive sleep apnea, found to have moderate to severe obstructive sleep apnea on sleep study. Headaches resolved with BIPAP machine.\par \par Left hand with numbness left hand digit 5 possibly due to cervical radiculopathy.  Ncs.emg unremarkable.  The patient' symptoms significantly improved with PT, he does it at home, and will cw once COVID crisis is resolved.\par \par RTC prn\par

## 2020-04-08 NOTE — PHYSICAL EXAM
[General Appearance - Alert] : alert [General Appearance - In No Acute Distress] : in no acute distress [Person] : oriented to person [Place] : oriented to place [Time] : oriented to time [Registration Intact] : recent registration memory intact [Concentration Intact] : normal concentrating ability [Naming Objects] : no difficulty naming common objects [Repeating Phrases] : no difficulty repeating a phrase [Fluency] : fluency intact [Comprehension] : comprehension intact [Vocabulary] : adequate range of vocabulary [Cranial Nerves Oculomotor (III)] : extraocular motion intact [Cranial Nerves Facial (VII)] : face symmetrical

## 2020-06-01 ENCOUNTER — OUTPATIENT (OUTPATIENT)
Dept: OUTPATIENT SERVICES | Facility: HOSPITAL | Age: 60
LOS: 1 days | End: 2020-06-01
Payer: MEDICAID

## 2020-06-01 DIAGNOSIS — Z95.1 PRESENCE OF AORTOCORONARY BYPASS GRAFT: Chronic | ICD-10-CM

## 2020-06-01 DIAGNOSIS — Z96.641 PRESENCE OF RIGHT ARTIFICIAL HIP JOINT: Chronic | ICD-10-CM

## 2020-06-15 PROCEDURE — G9001: CPT

## 2020-06-23 ENCOUNTER — APPOINTMENT (OUTPATIENT)
Dept: CARDIOLOGY | Facility: CLINIC | Age: 60
End: 2020-06-23
Payer: MEDICAID

## 2020-06-23 VITALS
DIASTOLIC BLOOD PRESSURE: 72 MMHG | OXYGEN SATURATION: 97 % | SYSTOLIC BLOOD PRESSURE: 106 MMHG | BODY MASS INDEX: 32.18 KG/M2 | HEART RATE: 72 BPM | HEIGHT: 67 IN | WEIGHT: 205 LBS

## 2020-06-23 PROCEDURE — 99214 OFFICE O/P EST MOD 30 MIN: CPT

## 2020-06-23 PROCEDURE — 93000 ELECTROCARDIOGRAM COMPLETE: CPT

## 2020-06-23 RX ORDER — ATORVASTATIN CALCIUM 80 MG/1
80 TABLET, FILM COATED ORAL
Qty: 90 | Refills: 3 | Status: ACTIVE | COMMUNITY
Start: 2019-01-22 | End: 1900-01-01

## 2020-06-30 NOTE — DISCUSSION/SUMMARY
[FreeTextEntry1] : Mr. Kennedy is a 60 yo man with known CAD s/p CABG.\par \par Plan:\par 1. Given the CAD with CABG - c/w medical management of severe CAD\par 2. Old records requested and reviewed with performing physician.\par 3. Primary and secondary prevention of cardiovascular and related conditions discussed at length, including but not limited to diet and lifestyle modification.\par 4. Patient to return to the office in 2-3 months.\par \par Thank you for allowing me to participate in the care of your patient. If you have any questions, please feel free to contact me at [phone] or via email at [email or web address].\par \par Sincerely,\par \par Michelle Bower MD FACC

## 2020-06-30 NOTE — HISTORY OF PRESENT ILLNESS
[FreeTextEntry1] : Mr. Kennedy is a 59 year-old man with known MV CAD s/p CABG with Dr. sin in 2/2019. He has been doing well without issues. He has not been able to lose weight however has been walking over 2 miles a day.\par \par Active Issues:\par 1. Actively treated CAD\par 2. Actively treated cough\par 3. Actively treated HLD\par 4. Actively treated HTN

## 2020-06-30 NOTE — PHYSICAL EXAM
[General Appearance - Well Developed] : well developed [Normal Appearance] : normal appearance [Well Groomed] : well groomed [General Appearance - Well Nourished] : well nourished [No Deformities] : no deformities [Normal Conjunctiva] : the conjunctiva exhibited no abnormalities [General Appearance - In No Acute Distress] : no acute distress [Eyelids - No Xanthelasma] : the eyelids demonstrated no xanthelasmas [Normal Oral Mucosa] : normal oral mucosa [No Oral Pallor] : no oral pallor [No Oral Cyanosis] : no oral cyanosis [Normal Jugular Venous A Waves Present] : normal jugular venous A waves present [No Jugular Venous Maldonado A Waves] : no jugular venous maldonado A waves [Normal Jugular Venous V Waves Present] : normal jugular venous V waves present [Respiration, Rhythm And Depth] : normal respiratory rhythm and effort [Exaggerated Use Of Accessory Muscles For Inspiration] : no accessory muscle use [Auscultation Breath Sounds / Voice Sounds] : lungs were clear to auscultation bilaterally [Heart Rate And Rhythm] : heart rate and rhythm were normal [Heart Sounds] : normal S1 and S2 [Murmurs] : no murmurs present [Abdomen Soft] : soft [Abdomen Tenderness] : non-tender [Gait - Sufficient For Exercise Testing] : the gait was sufficient for exercise testing [Abnormal Walk] : normal gait [Abdomen Mass (___ Cm)] : no abdominal mass palpated [Nail Clubbing] : no clubbing of the fingernails [Cyanosis, Localized] : no localized cyanosis [Petechial Hemorrhages (___cm)] : no petechial hemorrhages [Skin Color & Pigmentation] : normal skin color and pigmentation [] : no rash [Skin Lesions] : no skin lesions [No Venous Stasis] : no venous stasis [No Skin Ulcers] : no skin ulcer [No Xanthoma] : no  xanthoma was observed [Affect] : the affect was normal [Mood] : the mood was normal [Oriented To Time, Place, And Person] : oriented to person, place, and time [No Anxiety] : not feeling anxious

## 2020-07-06 DIAGNOSIS — Z71.89 OTHER SPECIFIED COUNSELING: ICD-10-CM

## 2020-09-29 ENCOUNTER — APPOINTMENT (OUTPATIENT)
Dept: CARDIOLOGY | Facility: CLINIC | Age: 60
End: 2020-09-29
Payer: MEDICAID

## 2020-09-29 VITALS
WEIGHT: 207 LBS | OXYGEN SATURATION: 94 % | BODY MASS INDEX: 32.42 KG/M2 | SYSTOLIC BLOOD PRESSURE: 114 MMHG | DIASTOLIC BLOOD PRESSURE: 76 MMHG | HEART RATE: 87 BPM

## 2020-09-29 DIAGNOSIS — J90 PLEURAL EFFUSION, NOT ELSEWHERE CLASSIFIED: ICD-10-CM

## 2020-09-29 PROCEDURE — 99214 OFFICE O/P EST MOD 30 MIN: CPT

## 2020-09-29 RX ORDER — PANTOPRAZOLE 40 MG/1
40 TABLET, DELAYED RELEASE ORAL DAILY
Refills: 0 | Status: DISCONTINUED | COMMUNITY
Start: 2019-01-22 | End: 2020-09-29

## 2020-09-29 RX ORDER — VALSARTAN 80 MG/1
80 TABLET, COATED ORAL
Qty: 30 | Refills: 11 | Status: DISCONTINUED | COMMUNITY
End: 2020-09-29

## 2020-09-29 RX ORDER — METOPROLOL TARTRATE 25 MG/1
25 TABLET, FILM COATED ORAL TWICE DAILY
Qty: 60 | Refills: 11 | Status: DISCONTINUED | COMMUNITY
Start: 2019-01-22 | End: 2020-09-29

## 2020-09-29 RX ORDER — CHOLECALCIFEROL (VITAMIN D3) 1250 MCG
1.25 MG CAPSULE ORAL
Refills: 0 | Status: DISCONTINUED | COMMUNITY
End: 2020-09-29

## 2020-10-19 ENCOUNTER — APPOINTMENT (OUTPATIENT)
Dept: PLASTIC SURGERY | Facility: CLINIC | Age: 60
End: 2020-10-19
Payer: MEDICAID

## 2020-10-19 VITALS — BODY MASS INDEX: 31.86 KG/M2 | WEIGHT: 203 LBS | HEIGHT: 67 IN

## 2020-10-19 PROCEDURE — 99072 ADDL SUPL MATRL&STAF TM PHE: CPT

## 2020-10-19 PROCEDURE — 99203 OFFICE O/P NEW LOW 30 MIN: CPT

## 2020-10-19 NOTE — REASON FOR VISIT
[Consultation] : a consultation visit [FreeTextEntry1] : Pt is present today regarding lipoma on left cheek of face. Pt reports having the lipoma for a year and a half. Pt reports having the lipoma excised 2 times. Pt states he feels a pressure and pain in the area. Pt reports having a biopsy done and an MRI 9/1/20 .

## 2020-10-21 ENCOUNTER — APPOINTMENT (OUTPATIENT)
Dept: CV DIAGNOSITCS | Facility: HOSPITAL | Age: 60
End: 2020-10-21

## 2020-11-05 PROBLEM — J90 PLEURAL EFFUSION: Status: ACTIVE | Noted: 2019-04-04

## 2020-11-19 ENCOUNTER — OUTPATIENT (OUTPATIENT)
Dept: OUTPATIENT SERVICES | Facility: HOSPITAL | Age: 60
LOS: 1 days | End: 2020-11-19
Payer: MEDICAID

## 2020-11-19 ENCOUNTER — APPOINTMENT (OUTPATIENT)
Dept: CV DIAGNOSITCS | Facility: HOSPITAL | Age: 60
End: 2020-11-19

## 2020-11-19 DIAGNOSIS — Z96.641 PRESENCE OF RIGHT ARTIFICIAL HIP JOINT: Chronic | ICD-10-CM

## 2020-11-19 DIAGNOSIS — Z95.1 PRESENCE OF AORTOCORONARY BYPASS GRAFT: Chronic | ICD-10-CM

## 2020-11-19 DIAGNOSIS — I25.10 ATHEROSCLEROTIC HEART DISEASE OF NATIVE CORONARY ARTERY WITHOUT ANGINA PECTORIS: ICD-10-CM

## 2020-11-19 PROCEDURE — C8929: CPT

## 2020-11-19 PROCEDURE — 93306 TTE W/DOPPLER COMPLETE: CPT | Mod: 26

## 2020-11-27 ENCOUNTER — OUTPATIENT (OUTPATIENT)
Dept: OUTPATIENT SERVICES | Facility: HOSPITAL | Age: 60
LOS: 1 days | End: 2020-11-27
Payer: MEDICAID

## 2020-11-27 VITALS
HEART RATE: 90 BPM | RESPIRATION RATE: 16 BRPM | HEIGHT: 66.5 IN | OXYGEN SATURATION: 98 % | DIASTOLIC BLOOD PRESSURE: 70 MMHG | WEIGHT: 214.07 LBS | TEMPERATURE: 98 F | SYSTOLIC BLOOD PRESSURE: 124 MMHG

## 2020-11-27 DIAGNOSIS — I10 ESSENTIAL (PRIMARY) HYPERTENSION: ICD-10-CM

## 2020-11-27 DIAGNOSIS — G47.33 OBSTRUCTIVE SLEEP APNEA (ADULT) (PEDIATRIC): ICD-10-CM

## 2020-11-27 DIAGNOSIS — Z96.641 PRESENCE OF RIGHT ARTIFICIAL HIP JOINT: Chronic | ICD-10-CM

## 2020-11-27 DIAGNOSIS — R22.0 LOCALIZED SWELLING, MASS AND LUMP, HEAD: Chronic | ICD-10-CM

## 2020-11-27 DIAGNOSIS — M79.89 OTHER SPECIFIED SOFT TISSUE DISORDERS: ICD-10-CM

## 2020-11-27 DIAGNOSIS — I25.10 ATHEROSCLEROTIC HEART DISEASE OF NATIVE CORONARY ARTERY WITHOUT ANGINA PECTORIS: ICD-10-CM

## 2020-11-27 DIAGNOSIS — Z95.1 PRESENCE OF AORTOCORONARY BYPASS GRAFT: Chronic | ICD-10-CM

## 2020-11-27 DIAGNOSIS — Z98.890 OTHER SPECIFIED POSTPROCEDURAL STATES: Chronic | ICD-10-CM

## 2020-11-27 LAB
ANION GAP SERPL CALC-SCNC: 11 MMO/L — SIGNIFICANT CHANGE UP (ref 7–14)
BUN SERPL-MCNC: 15 MG/DL — SIGNIFICANT CHANGE UP (ref 7–23)
CALCIUM SERPL-MCNC: 9.4 MG/DL — SIGNIFICANT CHANGE UP (ref 8.4–10.5)
CHLORIDE SERPL-SCNC: 102 MMOL/L — SIGNIFICANT CHANGE UP (ref 98–107)
CO2 SERPL-SCNC: 26 MMOL/L — SIGNIFICANT CHANGE UP (ref 22–31)
CREAT SERPL-MCNC: 1.01 MG/DL — SIGNIFICANT CHANGE UP (ref 0.5–1.3)
GLUCOSE SERPL-MCNC: 115 MG/DL — HIGH (ref 70–99)
HCT VFR BLD CALC: 48.3 % — SIGNIFICANT CHANGE UP (ref 39–50)
HGB BLD-MCNC: 15.4 G/DL — SIGNIFICANT CHANGE UP (ref 13–17)
MCHC RBC-ENTMCNC: 27.5 PG — SIGNIFICANT CHANGE UP (ref 27–34)
MCHC RBC-ENTMCNC: 31.9 % — LOW (ref 32–36)
MCV RBC AUTO: 86.4 FL — SIGNIFICANT CHANGE UP (ref 80–100)
NRBC # FLD: 0 K/UL — SIGNIFICANT CHANGE UP (ref 0–0)
PLATELET # BLD AUTO: 237 K/UL — SIGNIFICANT CHANGE UP (ref 150–400)
PMV BLD: 11.2 FL — SIGNIFICANT CHANGE UP (ref 7–13)
POTASSIUM SERPL-MCNC: 3.6 MMOL/L — SIGNIFICANT CHANGE UP (ref 3.5–5.3)
POTASSIUM SERPL-SCNC: 3.6 MMOL/L — SIGNIFICANT CHANGE UP (ref 3.5–5.3)
RBC # BLD: 5.59 M/UL — SIGNIFICANT CHANGE UP (ref 4.2–5.8)
RBC # FLD: 14.9 % — HIGH (ref 10.3–14.5)
SODIUM SERPL-SCNC: 139 MMOL/L — SIGNIFICANT CHANGE UP (ref 135–145)
WBC # BLD: 6.5 K/UL — SIGNIFICANT CHANGE UP (ref 3.8–10.5)
WBC # FLD AUTO: 6.5 K/UL — SIGNIFICANT CHANGE UP (ref 3.8–10.5)

## 2020-11-27 PROCEDURE — 93010 ELECTROCARDIOGRAM REPORT: CPT

## 2020-11-27 NOTE — H&P PST ADULT - NSICDXPASTSURGICALHX_GEN_ALL_CORE_FT
PAST SURGICAL HISTORY:  Cheek mass surgery x 2 to left cheek    H/O sinus surgery     History of hip replacement, total, right 2001    S/P CABG x 4 1/2019

## 2020-11-27 NOTE — H&P PST ADULT - NSICDXPASTMEDICALHX_GEN_ALL_CORE_FT
PAST MEDICAL HISTORY:  CAD (coronary artery disease)     Cervical radiculopathy     Cheek mass     HTN (hypertension)     Psoriasis     Sleep apnea uses CPAP

## 2020-11-27 NOTE — H&P PST ADULT - HISTORY OF PRESENT ILLNESS
60 year old male with mass to left cheek x over 2 years, states he had surgery x2 but with recurrence. Pt presents today for presurgical evaluation for .. 60 year old male with mass to left cheek x over 2 years, states he had surgery x2 but with recurrence. Pt presents today for presurgical evaluation for Resection of Left Facial Mass 6 cm.

## 2020-11-27 NOTE — H&P PST ADULT - NSICDXPROBLEM_GEN_ALL_CORE_FT
PROBLEM DIAGNOSES  Problem: Other specified soft tissue disorders  Assessment and Plan: Pt scheduled for surgery on 12/3/2020.  Pre-op instructions provided. Pt verbalized understanding.   Pepcid provided for GI prophylaxis.   Pt states he is already scheduled for preop COVID testing.     Problem: CAD (coronary artery disease)  Assessment and Plan: Need plavix instructions.   Pt is going for cardiac evaluation per surgeon's request - requested by PST as well    Problem: HTN (hypertension)  Assessment and Plan: Pt instructed to take his Irbesartan the morning of surgery.     Problem: CRISTIAN (obstructive sleep apnea)  Assessment and Plan: OR booking notified.

## 2020-11-27 NOTE — H&P PST ADULT - NEGATIVE ENMT SYMPTOMS
no dysphagia/no vertigo/no tinnitus/no sinus symptoms/no hearing difficulty/no ear pain/no throat pain

## 2020-11-28 DIAGNOSIS — Z01.818 ENCOUNTER FOR OTHER PREPROCEDURAL EXAMINATION: ICD-10-CM

## 2020-11-30 ENCOUNTER — APPOINTMENT (OUTPATIENT)
Dept: PLASTIC SURGERY | Facility: CLINIC | Age: 60
End: 2020-11-30
Payer: MEDICAID

## 2020-11-30 ENCOUNTER — APPOINTMENT (OUTPATIENT)
Dept: DISASTER EMERGENCY | Facility: CLINIC | Age: 60
End: 2020-11-30

## 2020-11-30 PROBLEM — G47.30 SLEEP APNEA, UNSPECIFIED: Chronic | Status: ACTIVE | Noted: 2020-11-27

## 2020-11-30 PROBLEM — I25.10 ATHEROSCLEROTIC HEART DISEASE OF NATIVE CORONARY ARTERY WITHOUT ANGINA PECTORIS: Chronic | Status: ACTIVE | Noted: 2020-11-27

## 2020-11-30 PROCEDURE — 99213 OFFICE O/P EST LOW 20 MIN: CPT

## 2020-11-30 PROCEDURE — 99072 ADDL SUPL MATRL&STAF TM PHE: CPT

## 2020-12-01 LAB — SARS-COV-2 N GENE NPH QL NAA+PROBE: NOT DETECTED

## 2020-12-01 NOTE — HISTORY OF PRESENT ILLNESS
[FreeTextEntry1] : Mr. Kennedy is a 60 year-old man with known MV CAD s/p CABG with Dr. sin in 2/2019. He has been doing well without issues. He has not been able to lose weight however has been walking over 2 miles a day.\par \par Active Issues:\par 1. Actively treated CAD\par 2. Actively treated cough\par 3. Actively treated HLD\par 4. Actively treated HTN

## 2020-12-01 NOTE — DISCUSSION/SUMMARY
[FreeTextEntry1] : Mr. Kennedy is a 61 yo man with known CAD s/p CABG.\par \par Plan:\par 1. Given the CAD with CABG - c/w medical management of severe CAD\par 2. Old records requested and reviewed with performing physician. Pt may proceed with procedure remaining on all alexandra-procedural meds. He is only on clopidogrel and should remain on it.\par 3. Primary and secondary prevention of cardiovascular and related conditions discussed at length, including but not limited to diet and lifestyle modification.\par 4. Patient to return to the office in 2-3 months.\par \par Thank you for allowing me to participate in the care of your patient. If you have any questions, please feel free to contact me at [phone] or via email at [email or web address].\par \par Sincerely,\par \par Michelle Bower MD [facility]

## 2020-12-01 NOTE — REVIEW OF SYSTEMS
[Negative] : Heme/Lymph [FreeTextEntry4] : SOB on exertion.  [FreeTextEntry5] : Dyspnea on exertion. [de-identified] : Intermittent headaches.

## 2020-12-01 NOTE — HISTORY OF PRESENT ILLNESS
[FreeTextEntry1] : Jax is a 60 year old male patient that presents to the office today for a pre operative appointment to discuss upcoming surgery for resection of large cheek buccal mass. Patient states that he is waiting for final clearance from cardiologist. Patient denies having any new complaints. Dr. Osorio reviewed MRI with radiologist. \par

## 2020-12-01 NOTE — REASON FOR VISIT
[Follow-Up: _____] : a [unfilled] follow-up visit [FreeTextEntry1] : Patient is being seen for follow up for large left cheek buccal mass. Pt is here to discuss w/MD.

## 2020-12-01 NOTE — PHYSICAL EXAM
[de-identified] : Alert, calm, cooperative.\par  [de-identified] : + left buccal mass palpable within mouth with protrusion, mucosa smooth, +smile fairly symmetrical except for mass stretching tissues\par  [de-identified] : Respirations even and unlabored.\par

## 2020-12-14 ENCOUNTER — APPOINTMENT (OUTPATIENT)
Dept: PLASTIC SURGERY | Facility: CLINIC | Age: 60
End: 2020-12-14

## 2021-01-04 ENCOUNTER — OUTPATIENT (OUTPATIENT)
Dept: OUTPATIENT SERVICES | Facility: HOSPITAL | Age: 61
LOS: 1 days | End: 2021-01-04

## 2021-01-04 VITALS
RESPIRATION RATE: 16 BRPM | HEIGHT: 67 IN | DIASTOLIC BLOOD PRESSURE: 70 MMHG | TEMPERATURE: 97 F | OXYGEN SATURATION: 98 % | HEART RATE: 88 BPM | WEIGHT: 210.98 LBS | SYSTOLIC BLOOD PRESSURE: 110 MMHG

## 2021-01-04 DIAGNOSIS — I25.10 ATHEROSCLEROTIC HEART DISEASE OF NATIVE CORONARY ARTERY WITHOUT ANGINA PECTORIS: ICD-10-CM

## 2021-01-04 DIAGNOSIS — Z96.641 PRESENCE OF RIGHT ARTIFICIAL HIP JOINT: Chronic | ICD-10-CM

## 2021-01-04 DIAGNOSIS — G47.33 OBSTRUCTIVE SLEEP APNEA (ADULT) (PEDIATRIC): ICD-10-CM

## 2021-01-04 DIAGNOSIS — Z01.812 ENCOUNTER FOR PREPROCEDURAL LABORATORY EXAMINATION: ICD-10-CM

## 2021-01-04 DIAGNOSIS — I10 ESSENTIAL (PRIMARY) HYPERTENSION: ICD-10-CM

## 2021-01-04 DIAGNOSIS — R22.0 LOCALIZED SWELLING, MASS AND LUMP, HEAD: Chronic | ICD-10-CM

## 2021-01-04 DIAGNOSIS — Z95.1 PRESENCE OF AORTOCORONARY BYPASS GRAFT: Chronic | ICD-10-CM

## 2021-01-04 DIAGNOSIS — M79.89 OTHER SPECIFIED SOFT TISSUE DISORDERS: ICD-10-CM

## 2021-01-04 DIAGNOSIS — Z98.890 OTHER SPECIFIED POSTPROCEDURAL STATES: Chronic | ICD-10-CM

## 2021-01-04 RX ORDER — SODIUM CHLORIDE 9 MG/ML
1000 INJECTION, SOLUTION INTRAVENOUS
Refills: 0 | Status: DISCONTINUED | OUTPATIENT
Start: 2021-01-14 | End: 2021-01-15

## 2021-01-04 NOTE — H&P PST ADULT - NSICDXPASTSURGICALHX_GEN_ALL_CORE_FT
PAST SURGICAL HISTORY:  Cheek mass liposuction x 2 to left cheek    H/O sinus surgery     History of hip replacement, total, right 2001    S/P CABG x 4 1/2019

## 2021-01-04 NOTE — H&P PST ADULT - NSICDXPASTMEDICALHX_GEN_ALL_CORE_FT
PAST MEDICAL HISTORY:  CAD (coronary artery disease)     Cataract left    Cervical radiculopathy     Cheek mass     HTN (hypertension)     Other specified soft tissue disorders     Psoriasis     Sleep apnea uses CPAP     PAST MEDICAL HISTORY:  CAD (coronary artery disease)     Cataract left    Cervical radiculopathy     Cheek mass     HTN (hypertension)     Obesity     Other specified soft tissue disorders     Psoriasis     Sleep apnea uses CPAP

## 2021-01-04 NOTE — H&P PST ADULT - ALCOHOL USE HISTORY SINGLE SELECT
After Visit Summary   9/26/2018    Esa Rivera    MRN: [id number]           Patient Information     Date Of Birth          1986        Visit Information        Provider Department      9/26/2018 8:30 AM Mayito Motley MD [facility]        Today's Diagnoses     Bipolar I disorder, most recent episode (or current) manic (H)    -  1      Care Instructions    Increase Depakote to 1250 mg at night  Please get a blood test to check her blood level in 1 week  Please return for follow-up in 1 month          Follow-ups after your visit        Follow-up notes from your care team     Return in about 4 weeks (around 10/24/2018).      Your next 10 appointments already scheduled     Oct 22, 2018  8:00 AM CDT   Adult Med Follow UP with Mayito Motley MD   Psychiatry Clinic ([facility])    [place] [id number]  [address] [place] [id number] [phone]              Who to contact     Please call your clinic at [phone] to:    Ask questions about your health    Make or cancel appointments    Discuss your medicines    Learn about your test results    Speak to your doctor            Additional Information About Your Visit        MyChart Information     [facility] [employer] gives you secure access to your electronic health record. If you see a primary care provider, you can also send messages to your care team and make appointments. If you have questions, please call your primary care clinic.  If you do not have a primary care provider, please call [phone] and they will assist you.      [facility] [employer] is an electronic gateway that provides easy, online access to your medical records. With [facility], you can request a clinic appointment, read your test results, renew a prescription or communicate with your care team.     To access your existing account, please contact your [facility] or call [phone] for assistance.        Care  EveryWhere ID     This is your Care EveryWhere ID. This could be used by other organizations to access your [place] medical records  [id number]        Your Vitals Were     Pulse BMI (Body Mass Index)                67 28.82 kg/m2           Blood Pressure from Last 3 Encounters:   09/26/18 124/81   08/22/18 119/80   08/08/18 110/70    Weight from Last 3 Encounters:   09/26/18 104.6 kg (230 lb 9.6 oz)   08/22/18 97.5 kg (215 lb)   08/08/18 99.3 kg (219 lb)              Today, you had the following     No orders found for display       Primary Care Provider Office Phone # Fax #    Aristides Jenkins [phone]       [address] [address]        Equal Access to Services     [facility] AngelesALEJANDRINA : Hadii aad ku hadasho Soameena, waaxda luqadaha, qaybta kaalmada treva, jermaine castillo . So [facility] [phone].    ATENCIÓN: Si habla español, tiene a osorio disposición servicios gratuitos de asistencia lingüística. Sadie al [phone].    We comply with applicable federal civil rights laws and [place] laws. We do not discriminate on the basis of race, color, national origin, age, disability, sex, sexual orientation, or gender identity.            Thank you!     Thank you for choosing [facility]  for your care. Our goal is always to provide you with excellent care. Hearing back from our patients is one way we can continue to improve our services. Please take a few minutes to complete the written survey that you may receive in the mail after your visit with us. Thank you!             Your Updated Medication List - Protect others around you: Learn how to safely use, store and throw away your medicines at www.disposemymeds.org.          This list is accurate as of 9/26/18  9:32 AM.  Always use your most recent med list.                   Brand Name Dispense Instructions for use Diagnosis    cyanocobalamin 1000 MCG/ML injection    VITAMIN B12    10 mL     Inject 1 mL (1,000 mcg) into the muscle every 3 months    Vitamin B12 deficiency (non anemic)       divalproex sodium extended-release 500 MG 24 hr tablet    DEPAKOTE ER    120 tablet    Take 2 tablets (1,000 mg) by mouth At Bedtime    Bipolar affective disorder, currently manic, severe, with psychotic features (H)       QUEtiapine 50 MG tablet    SEROquel    180 tablet    Take 3 tablets (150 mg) by mouth At Bedtime    Bipolar I disorder, most recent episode depressed (H)       risperiDONE 2 MG tablet    risperDAL    14 tablet    Take 1 tablet (2 mg) by mouth At Bedtime    Bipolar I disorder, most recent episode depressed (H)          never

## 2021-01-04 NOTE — H&P PST ADULT - NSICDXPROBLEM_GEN_ALL_CORE_FT
PROBLEM DIAGNOSES  Problem: CRISTIAN (obstructive sleep apnea)  Assessment and Plan: Patient with obstructive sleep apnea on CPAP, OR booking notified    Problem: Other specified soft tissue disorders  Assessment and Plan: Patient scheduled for resection of left facial mass 6cm on 1/14/2021  Written & verbal preop instructions, gi prophylaxis given  Pt verbalized good understanding.    Problem: CAD (coronary artery disease)  Assessment and Plan: Patient s/p CABGx4 in 2019, instructions on Plavix as per Cardiologist  Patient has preop Cardiology evaluation on 1/5/2021, pending copy of report and instructions on Plavix, copy of Echo report in chart    Problem: Hypertension  Assessment and Plan: Patient instructed to take Irbesartan on AM of surgery    Problem: Encounter for preprocedure screening laboratory testing for COVID-19  Assessment and Plan: Patient aware of need for COVID testing prior to procedure and advised to co ordinate with surgeon.

## 2021-01-04 NOTE — H&P PST ADULT - NSICDXFAMILYHX_GEN_ALL_CORE_FT
FAMILY HISTORY:  Family history of coronary artery disease, father when 59  Family history of MI (myocardial infarction), father

## 2021-01-04 NOTE — H&P PST ADULT - OTHER CARE PROVIDERS
[phone] Dr. Michelle Bower - Cardiologist [phone]

## 2021-01-04 NOTE — H&P PST ADULT - NEGATIVE MUSCULOSKELETAL SYMPTOMS
no arthritis/no joint swelling/no myalgia/no muscle cramps/no muscle weakness/no stiffness/no neck pain/no back pain

## 2021-01-04 NOTE — H&P PST ADULT - HISTORY OF PRESENT ILLNESS
59 yo male presents to [facility] for preop evaluation for resection of left facial mass 6cm.  Patient reports mass in left cheek for the past 2 years that has increased in size.  Patient states he had 2 liposuctions but mass continued to grow.  Patient had diagnostic imaging and diagnosed with other specified soft tissue disorders.

## 2021-01-04 NOTE — H&P PST ADULT - NEGATIVE NEUROLOGICAL SYMPTOMS
no weakness/no paresthesias/no generalized seizures/no focal seizures/no syncope/no vertigo/no loss of sensation/no difficulty walking/no headache/no loss of consciousness

## 2021-01-05 ENCOUNTER — APPOINTMENT (OUTPATIENT)
Dept: CARDIOLOGY | Facility: CLINIC | Age: 61
End: 2021-01-05
Payer: MEDICAID

## 2021-01-05 ENCOUNTER — NON-APPOINTMENT (OUTPATIENT)
Age: 61
End: 2021-01-05

## 2021-01-05 VITALS — SYSTOLIC BLOOD PRESSURE: 118 MMHG | OXYGEN SATURATION: 98 % | DIASTOLIC BLOOD PRESSURE: 78 MMHG | HEART RATE: 94 BPM

## 2021-01-05 PROBLEM — H26.9 UNSPECIFIED CATARACT: Chronic | Status: ACTIVE | Noted: 2021-01-04

## 2021-01-05 PROBLEM — M79.89 OTHER SPECIFIED SOFT TISSUE DISORDERS: Chronic | Status: ACTIVE | Noted: 2021-01-04

## 2021-01-05 PROBLEM — E66.9 OBESITY, UNSPECIFIED: Chronic | Status: ACTIVE | Noted: 2021-01-04

## 2021-01-05 PROCEDURE — 99072 ADDL SUPL MATRL&STAF TM PHE: CPT

## 2021-01-05 PROCEDURE — 99214 OFFICE O/P EST MOD 30 MIN: CPT

## 2021-01-05 NOTE — DISCUSSION/SUMMARY
[FreeTextEntry1] : Mr. Kennedy is a 61 yo man with known CAD s/p CABG.\par \par Plan:\par 1. Given the CAD with CABG - c/w medical management of severe CAD\par 2. Old records requested and reviewed with performing physician. Pt may proceed with procedure remaining on all alexandra-procedural meds. He will remain on aspirin throughout the procedure and stop clopidogrel 5 days prior to the procedure.\par 3. Primary and secondary prevention of cardiovascular and related conditions discussed at length, including but not limited to diet and lifestyle modification.\par 4. Patient to return to the office in 2-3 months.\par \par Thank you for allowing me to participate in the care of your patient. If you have any questions, please feel free to contact me at [phone] or via email at [email or web address].\par \par Sincerely,\par \par Michelle Bower MD [facility]

## 2021-01-11 ENCOUNTER — APPOINTMENT (OUTPATIENT)
Dept: DISASTER EMERGENCY | Facility: CLINIC | Age: 61
End: 2021-01-11

## 2021-01-12 LAB — SARS-COV-2 N GENE NPH QL NAA+PROBE: NOT DETECTED

## 2021-01-13 ENCOUNTER — TRANSCRIPTION ENCOUNTER (OUTPATIENT)
Age: 61
End: 2021-01-13

## 2021-01-13 NOTE — ASU PATIENT PROFILE, ADULT - PSH
Cheek mass  liposuction x 2 to left cheek  H/O sinus surgery    History of hip replacement, total, right  2001  S/P CABG x 4  1/2019

## 2021-01-13 NOTE — ASU PATIENT PROFILE, ADULT - PMH
CAD (coronary artery disease)    Cataract  left  Cervical radiculopathy    Cheek mass    HTN (hypertension)    Obesity    Other specified soft tissue disorders    Psoriasis    Sleep apnea  uses CPAP

## 2021-01-14 ENCOUNTER — INPATIENT (INPATIENT)
Facility: HOSPITAL | Age: 61
LOS: 0 days | Discharge: HOME | End: 2021-01-15
Attending: SURGERY | Admitting: SURGERY
Payer: MEDICAID

## 2021-01-14 ENCOUNTER — APPOINTMENT (OUTPATIENT)
Dept: PLASTIC SURGERY | Facility: HOSPITAL | Age: 61
End: 2021-01-14
Payer: MEDICAID

## 2021-01-14 ENCOUNTER — RESULT REVIEW (OUTPATIENT)
Age: 61
End: 2021-01-14

## 2021-01-14 VITALS
TEMPERATURE: 98 F | SYSTOLIC BLOOD PRESSURE: 119 MMHG | DIASTOLIC BLOOD PRESSURE: 75 MMHG | OXYGEN SATURATION: 94 % | HEIGHT: 67 IN | WEIGHT: 210.98 LBS | HEART RATE: 70 BPM | RESPIRATION RATE: 18 BRPM

## 2021-01-14 DIAGNOSIS — M79.89 OTHER SPECIFIED SOFT TISSUE DISORDERS: ICD-10-CM

## 2021-01-14 DIAGNOSIS — Z96.641 PRESENCE OF RIGHT ARTIFICIAL HIP JOINT: Chronic | ICD-10-CM

## 2021-01-14 DIAGNOSIS — Z95.1 PRESENCE OF AORTOCORONARY BYPASS GRAFT: Chronic | ICD-10-CM

## 2021-01-14 DIAGNOSIS — Z98.890 OTHER SPECIFIED POSTPROCEDURAL STATES: Chronic | ICD-10-CM

## 2021-01-14 DIAGNOSIS — R22.0 LOCALIZED SWELLING, MASS AND LUMP, HEAD: Chronic | ICD-10-CM

## 2021-01-14 PROCEDURE — 21014 EXC FACE TUM DEEP 2 CM/>: CPT

## 2021-01-14 PROCEDURE — 42420 EXCISE PAROTID GLAND/LESION: CPT

## 2021-01-14 PROCEDURE — 88304 TISSUE EXAM BY PATHOLOGIST: CPT | Mod: 26

## 2021-01-14 RX ORDER — SENNA PLUS 8.6 MG/1
1 TABLET ORAL AT BEDTIME
Refills: 0 | Status: DISCONTINUED | OUTPATIENT
Start: 2021-01-14 | End: 2021-01-15

## 2021-01-14 RX ORDER — METOCLOPRAMIDE HCL 10 MG
10 TABLET ORAL ONCE
Refills: 0 | Status: DISCONTINUED | OUTPATIENT
Start: 2021-01-14 | End: 2021-01-15

## 2021-01-14 RX ORDER — CALCIUM CARBONATE 500(1250)
1 TABLET ORAL EVERY 4 HOURS
Refills: 0 | Status: DISCONTINUED | OUTPATIENT
Start: 2021-01-14 | End: 2021-01-15

## 2021-01-14 RX ORDER — HEPARIN SODIUM 5000 [USP'U]/ML
5000 INJECTION INTRAVENOUS; SUBCUTANEOUS EVERY 8 HOURS
Refills: 0 | Status: DISCONTINUED | OUTPATIENT
Start: 2021-01-14 | End: 2021-01-14

## 2021-01-14 RX ORDER — BENZOCAINE AND MENTHOL 5; 1 G/100ML; G/100ML
1 LIQUID ORAL
Refills: 0 | Status: DISCONTINUED | OUTPATIENT
Start: 2021-01-14 | End: 2021-01-15

## 2021-01-14 RX ORDER — ENOXAPARIN SODIUM 100 MG/ML
40 INJECTION SUBCUTANEOUS DAILY
Refills: 0 | Status: DISCONTINUED | OUTPATIENT
Start: 2021-01-14 | End: 2021-01-15

## 2021-01-14 RX ORDER — LOSARTAN POTASSIUM 100 MG/1
100 TABLET, FILM COATED ORAL DAILY
Refills: 0 | Status: DISCONTINUED | OUTPATIENT
Start: 2021-01-14 | End: 2021-01-15

## 2021-01-14 RX ORDER — FENTANYL CITRATE 50 UG/ML
50 INJECTION INTRAVENOUS
Refills: 0 | Status: DISCONTINUED | OUTPATIENT
Start: 2021-01-14 | End: 2021-01-15

## 2021-01-14 RX ORDER — ATORVASTATIN CALCIUM 80 MG/1
80 TABLET, FILM COATED ORAL AT BEDTIME
Refills: 0 | Status: DISCONTINUED | OUTPATIENT
Start: 2021-01-14 | End: 2021-01-15

## 2021-01-14 RX ORDER — CEFAZOLIN SODIUM 1 G
2000 VIAL (EA) INJECTION EVERY 8 HOURS
Refills: 0 | Status: DISCONTINUED | OUTPATIENT
Start: 2021-01-14 | End: 2021-01-15

## 2021-01-14 RX ORDER — DIPHENHYDRAMINE HCL 50 MG
25 CAPSULE ORAL EVERY 6 HOURS
Refills: 0 | Status: DISCONTINUED | OUTPATIENT
Start: 2021-01-14 | End: 2021-01-15

## 2021-01-14 RX ORDER — DEXAMETHASONE 0.5 MG/5ML
10 ELIXIR ORAL EVERY 12 HOURS
Refills: 0 | Status: DISCONTINUED | OUTPATIENT
Start: 2021-01-14 | End: 2021-01-15

## 2021-01-14 RX ORDER — LANOLIN ALCOHOL/MO/W.PET/CERES
3 CREAM (GRAM) TOPICAL AT BEDTIME
Refills: 0 | Status: DISCONTINUED | OUTPATIENT
Start: 2021-01-14 | End: 2021-01-15

## 2021-01-14 RX ORDER — ONDANSETRON 8 MG/1
4 TABLET, FILM COATED ORAL ONCE
Refills: 0 | Status: DISCONTINUED | OUTPATIENT
Start: 2021-01-14 | End: 2021-01-15

## 2021-01-14 RX ORDER — ACETAMINOPHEN 500 MG
975 TABLET ORAL EVERY 6 HOURS
Refills: 0 | Status: DISCONTINUED | OUTPATIENT
Start: 2021-01-14 | End: 2021-01-15

## 2021-01-14 RX ADMIN — Medication 100 MILLIGRAM(S): at 23:37

## 2021-01-14 RX ADMIN — SODIUM CHLORIDE 125 MILLILITER(S): 9 INJECTION, SOLUTION INTRAVENOUS at 18:51

## 2021-01-14 RX ADMIN — SODIUM CHLORIDE 125 MILLILITER(S): 9 INJECTION, SOLUTION INTRAVENOUS at 23:37

## 2021-01-14 RX ADMIN — Medication 975 MILLIGRAM(S): at 23:37

## 2021-01-14 RX ADMIN — ATORVASTATIN CALCIUM 80 MILLIGRAM(S): 80 TABLET, FILM COATED ORAL at 23:36

## 2021-01-14 NOTE — BRIEF OPERATIVE NOTE - CONDITION POST OP
HPI  Mr. Sreekanth Irizarry is a 79y.o. year old male, he is seen today for AWV, follow up HTN, COPD, accompanied by son and sister. Followed by Dr. Pedrito Long for metastatic prostate cancer, sister tells me psa went from 6-38. He feels fine. No pain. No chest pain or sob, +sob with exertion - now on 3L continuous O2 which helps. No cough or wheezing  No n/v/abd pain  Appetite is good - but has lost 6-7# in 6 mos  No bone pain  Tells me Dr. Kj Ceja for xytiga + prednisone  Hasn't checked bp lately  Now on xtandi but psa rising. Rare use pepcid - no recent gerd symptoms. Chief Complaint   Patient presents with    Annual Wellness Visit    Medication Evaluation     Currently taking Tony Kobus // Wants to know Dr. Thom Cortes opinion on taking Zytiga with prednisone on an empty stomach everyday         Prior to Admission medications    Medication Sig Start Date End Date Taking? Authorizing Provider   famotidine (PEPCID) 20 mg tablet Take 1 Tab by mouth as needed for Heartburn. 7/15/20  Yes Angelica Rodriguez MD   metoprolol tartrate (LOPRESSOR) 50 mg tablet TAKE ONE TABLET BY MOUTH TWICE DAILY 7/13/20  Yes Katya Lanza MD   lisinopriL (PRINIVIL, ZESTRIL) 2.5 mg tablet Take 1 tablet by mouth once daily 3/21/20  Yes Katya Lanza MD   albuterol-ipratropium (DUO-NEB) 2.5 mg-0.5 mg/3 ml nebu 3 mL by Nebulization route every six (6) hours as needed (sob). 10/19/19  Yes Pro Arnodl NP   fluticasone furoate-vilanterol (BREO ELLIPTA) 100-25 mcg/dose inhaler Take 1 Puff by inhalation daily. 3/25/19  Yes Jl Gary, DO   Oxygen 3L continouus   Yes Provider, Historical   albuterol (PROVENTIL HFA, VENTOLIN HFA, PROAIR HFA) 90 mcg/actuation inhaler Use 2 puffs very 4 hours prn 2/18/19  Yes Jolie Perez MD   fluticasone [facility]) 50 mcg/actuation nasal spray 2 Sprays by Both Nostrils route daily.  2/18/19  Yes Jolie Perez MD   atorvastatin (LIPITOR) 40 mg tablet TAKE 1 TABLET EVERY DAY 9/20/18  Yes Leigha Del Rio MD   XTANDI 40 mg capsule Take 160 mg by mouth daily. 1/11/18  Yes Provider, Historical   aspirin 81 mg chewable tablet Take 1 Tab by mouth daily. RISK OF HEART ATTACK IF ANY MISSED DOSES 10/21/15  Yes Maddi Yusuf MD         No Known Allergies      REVIEW OF SYSTEMS:  Per HPI    PHYSICAL EXAM:  Visit Vitals  /69 (BP 1 Location: Left arm, BP Patient Position: Sitting)   Pulse 65   Temp 97.5 °F (36.4 °C) (Oral)   Resp 14   Ht 6' (1.829 m)   Wt 194 lb 6.4 oz (88.2 kg)   SpO2 97%   BMI 26.37 kg/m²     Constitutional: Appears well-developed and well-nourished. No distress. HENT:   Head: Normocephalic and atraumatic. Eyes: No scleral icterus. Cardiovascular: Normal S1/S2, regular rhythm. No murmurs, rubs, or gallops. Pulmonary/Chest: Effort normal and breath sounds normal. No respiratory distress. No wheezes, rhonchi, or rales. Abdomen: Soft, NT/ND, +BS, no rebound or guarding. Ext: No edema. Neurological: Alert. Psychiatric: Normal mood and affect. Behavior is normal.     Lab Results   Component Value Date/Time    Sodium 142 03/25/2019 04:05 AM    Potassium 3.7 03/25/2019 04:05 AM    Chloride 110 (H) 03/25/2019 04:05 AM    CO2 22 03/25/2019 04:05 AM    Anion gap 10 03/25/2019 04:05 AM    Glucose 90 03/25/2019 04:05 AM    BUN 23 (H) 03/25/2019 04:05 AM    Creatinine 1.14 03/25/2019 04:05 AM    BUN/Creatinine ratio 20 03/25/2019 04:05 AM    GFR est AA >60 03/25/2019 04:05 AM    GFR est non-AA >60 03/25/2019 04:05 AM    Calcium 8.9 03/25/2019 04:05 AM    Bilirubin, total 0.5 03/23/2019 06:56 AM    Alk.  phosphatase 62 03/23/2019 06:56 AM    Protein, total 7.6 03/23/2019 06:56 AM    Albumin 3.6 03/23/2019 06:56 AM    Globulin 4.0 03/23/2019 06:56 AM    A-G Ratio 0.9 (L) 03/23/2019 06:56 AM    ALT (SGPT) 12 03/23/2019 06:56 AM     Lab Results   Component Value Date/Time    Hemoglobin A1c 6.0 (H) 12/12/2011 03:22 PM    Hemoglobin A1c 6.0 (H) 07/05/2011 01:44 PM Lab Results   Component Value Date/Time    Cholesterol, total 73 (L) 04/25/2017 02:38 PM    HDL Cholesterol 31 (L) 04/25/2017 02:38 PM    LDL, calculated 19 04/25/2017 02:38 PM    VLDL, calculated 23 04/25/2017 02:38 PM    Triglyceride 116 04/25/2017 02:38 PM    CHOL/HDL Ratio 2.9 10/20/2015 04:06 AM          ASSESSMENT/PLAN  Diagnoses and all orders for this visit:    1. Medicare annual wellness visit, subsequent    2. Gastroesophageal reflux disease, esophagitis presence not specified  -     famotidine (PEPCID) 20 mg tablet; Take 1 Tab by mouth as needed for Heartburn. Controlled on current regimen, continue   3. Essential hypertension  -     METABOLIC PANEL, COMPREHENSIVE; Future  -     CBC WITH AUTOMATED DIFF; Future  Controlled on current regimen, continue   4. Mixed hyperlipidemia  -     LIPID PANEL; Future    5. Chronic obstructive pulmonary disease, unspecified COPD type ([facility]  Controlled on current regimen, continue - on O2 as well  6. Prostate cancer [facility])  Will soon start new treatment as outlined in HPI - followed by urology        Health Maintenance Due   Topic Date Due    Shingrix Vaccine Age 49> (1 of 2) 02/28/1988    Lipid Screen  04/25/2018        Follow-up and Dispositions    · Return in about 6 months (around 1/15/2021) for bp, copd. Reviewed plan of care. Patient has provided input and agrees with goals. The nurse provided the patient and/or family with advanced directive information if needed and encouraged the patient to provide a copy to the office when available. This is the Subsequent Medicare Annual Wellness Exam, performed 12 months or more after the Initial AWV or the last Subsequent AWV    I have reviewed the patient's medical history in detail and updated the computerized patient record.      History     Patient Active Problem List   Diagnosis Code    ED (erectile dysfunction) N52.9    HTN (hypertension) I10    Prostate cancer ([facility] [address] C61    PAD (peripheral artery disease) ([facility]) I73.9    STEMI (ST elevation myocardial infarction) ([facility]) I21.3    Chronic interstitial lung disease ([facility]) J84.9    S/P PTCA (percutaneous transluminal coronary angioplasty) Z98.61    Mucopurulent chronic bronchitis ([facility]) J41.1    Acute myocardial infarction of anterior wall, subsequent episode of care [facility] I21.09    Penetrating atherosclerotic ulcer of aorta ([facility]) I71.9    Hyperlipidemia E78.5    Advance directive discussed with patient Z70.80    Coronary artery disease involving native coronary artery of native heart without angina pectoris I25.10    Hypoxemia R09.02    COPD (chronic obstructive pulmonary disease) ([facility]) J44.9    Mixed simple and mucopurulent chronic bronchitis ([facility]) J41.8     Past Medical History:   Diagnosis Date    CAD (coronary artery disease)     mi 10/19/2015 with stent    COPD [facility] disease) [facility]     Hypertension     Prostate cancer ([facility] 5/8/2012    Dr. Yoav Piedra - diagnosed 2/2012 - s/p EBRT on Lupron       Past Surgical History:   Procedure Laterality Date    HX ORTHOPAEDIC  1961    left knee surgery     Current Outpatient Medications   Medication Sig Dispense Refill    famotidine (PEPCID) 20 mg tablet Take 1 Tab by mouth as needed for Heartburn. 90 Tab 3    metoprolol tartrate (LOPRESSOR) 50 mg tablet TAKE ONE TABLET BY MOUTH TWICE DAILY 180 Tab 3    lisinopriL (PRINIVIL, ZESTRIL) 2.5 mg tablet Take 1 tablet by mouth once daily 90 Tab 3    albuterol-ipratropium (DUO-NEB) 2.5 mg-0.5 mg/3 ml nebu 3 mL by Nebulization route every six (6) hours as needed (sob). 30 Nebule 4    fluticasone furoate-vilanterol (BREO ELLIPTA) 100-25 mcg/dose inhaler Take 1 Puff by inhalation daily.  1 Inhaler 4    Oxygen 3L continouus      albuterol (PROVENTIL HFA, VENTOLIN HFA, PROAIR HFA) 90 mcg/actuation inhaler Use 2 puffs very 4 hours prn 1 Inhaler 1    fluticasone (FLONASE) 50 mcg/actuation nasal spray 2 Sprays by Both Nostrils route daily. 1 Bottle 1    atorvastatin (LIPITOR) 40 mg tablet TAKE 1 TABLET EVERY DAY 90 Tab 4    XTANDI 40 mg capsule Take 160 mg by mouth daily.  aspirin 81 mg chewable tablet Take 1 Tab by mouth daily. RISK OF HEART ATTACK IF ANY MISSED DOSES 80 Tab 3     No Known Allergies    Family History   Problem Relation Age of Onset    Heart Disease Mother         pacemaker   [employer] Diabetes Mother     Diabetes Sister     Heart Disease Brother      Social History     Tobacco Use    Smoking status: Former Smoker     Packs/day: 0.50     Years: 40.00     Pack years: 20.00     Types: Cigarettes     Last attempt to quit: 10/19/2015     Years since quittin.7    Smokeless tobacco: Never Used    Tobacco comment: trying quit 10/13/15 -  ppd   Substance Use Topics    Alcohol use: No     Alcohol/week: 0.0 standard drinks       Depression Risk Factor Screening:     3 most recent PHQ Screens 3/5/2020   Little interest or pleasure in doing things Not at all   Feeling down, depressed, irritable, or hopeless Not at all   Total Score PHQ 2 0       Alcohol Risk Factor Screening (MALE > 65): Do you average more 1 drink per night or more than 7 drinks a week: No    In the past three months have you have had more than 4 drinks containing alcohol on one occasion: No      Functional Ability and Level of Safety:   Hearing: Hearing is good. Activities of Daily Living: The home contains: handrails and grab bars  Patient needs help with:  transportation, shopping, laundry and housework     Ambulation: with difficulty, uses a wheelchair     Fall Risk:  Fall Risk Assessment, last 12 mths 3/5/2020   Able to walk? Yes   Fall in past 12 months?  No     Abuse Screen:  Patient is not abused       Cognitive Screening   Has your family/caregiver stated any concerns about your memory: no     Cognitive Screening: normal    Patient Care Team   Patient Care Team:  Lilia Cardenas MD as PCP - General  Lilia Cardenas, MD as PCP - [facility]  Rich Horton MD as Physician (Urology)  Priya Castrejon NP as Nurse Practitioner (Nurse Practitioner)  Duane Lowe MD as Physician (Cardiology)  Isela Harden MD as Surgeon (Cardiothoracic Surgery)  Sue Rayo MD as Physician (Urology)  Arik Javier MD (Pulmonary Disease)    Assessment/Plan   Education and counseling provided:  Are appropriate based on today's review and evaluation    Diagnoses and all orders for this visit:    1. Medicare annual wellness visit, subsequent    2. Gastroesophageal reflux disease, esophagitis presence not specified  -     famotidine (PEPCID) 20 mg tablet; Take 1 Tab by mouth as needed for Heartburn. 3. Essential hypertension  -     METABOLIC PANEL, COMPREHENSIVE; Future  -     CBC WITH AUTOMATED DIFF; Future    4. Mixed hyperlipidemia  -     LIPID PANEL; Future    5. Chronic obstructive pulmonary disease, unspecified COPD type ([facility]    6.  Prostate cancer [facility])        Health Maintenance Due   Topic Date Due    Shingrix Vaccine Age 49> (1 of 2) 02/28/1988    Lipid Screen  04/25/2018 Satisfactory

## 2021-01-14 NOTE — BRIEF OPERATIVE NOTE - NSICDXBRIEFPROCEDURE_GEN_ALL_CORE_FT
PROCEDURES:  Excision, lesion, face, greater than 4.0 cm in diameter 14-Jan-2021 18:49:58  Claudio Peck

## 2021-01-15 ENCOUNTER — TRANSCRIPTION ENCOUNTER (OUTPATIENT)
Age: 61
End: 2021-01-15

## 2021-01-15 VITALS
HEART RATE: 79 BPM | OXYGEN SATURATION: 99 % | TEMPERATURE: 98 F | SYSTOLIC BLOOD PRESSURE: 120 MMHG | RESPIRATION RATE: 18 BRPM | DIASTOLIC BLOOD PRESSURE: 81 MMHG

## 2021-01-15 RX ORDER — CHLORHEXIDINE GLUCONATE 213 G/1000ML
1 SOLUTION TOPICAL
Qty: 1 | Refills: 0
Start: 2021-01-15 | End: 2021-01-21

## 2021-01-15 RX ORDER — CLOPIDOGREL BISULFATE 75 MG/1
1 TABLET, FILM COATED ORAL
Qty: 30 | Refills: 0
Start: 2021-01-15

## 2021-01-15 RX ORDER — BACITRACIN ZINC 500 UNIT/G
1 OINTMENT IN PACKET (EA) TOPICAL
Qty: 1 | Refills: 0
Start: 2021-01-15

## 2021-01-15 RX ADMIN — Medication 975 MILLIGRAM(S): at 13:11

## 2021-01-15 RX ADMIN — Medication 100 MILLIGRAM(S): at 15:27

## 2021-01-15 RX ADMIN — ENOXAPARIN SODIUM 40 MILLIGRAM(S): 100 INJECTION SUBCUTANEOUS at 13:11

## 2021-01-15 RX ADMIN — Medication 100 MILLIGRAM(S): at 05:49

## 2021-01-15 RX ADMIN — Medication 975 MILLIGRAM(S): at 05:49

## 2021-01-15 RX ADMIN — LOSARTAN POTASSIUM 100 MILLIGRAM(S): 100 TABLET, FILM COATED ORAL at 05:50

## 2021-01-15 NOTE — PROGRESS NOTE ADULT - SUBJECTIVE AND OBJECTIVE BOX
Plastic Surgery    SUBJECTIVE: Pt seen and examined on rounds with team. No acute events overnight.        OBJECTIVE    PHYSICAL EXAM:   General: NAD, Lying in bed   Neuro: Awake and alert  HEENT: incision clean, dry, and intact. intraoral incisions also intact. ADEOLA s/s     VITALS  T(C): 36.2 (01-15-21 @ 05:45), Max: 36.9 (01-14-21 @ 21:05)  HR: 77 (01-15-21 @ 05:45) (70 - 110)  BP: 117/80 (01-15-21 @ 05:45) (107/78 - 126/84)  RR: 17 (01-15-21 @ 05:45) (13 - 19)  SpO2: 100% (01-15-21 @ 05:45) (94% - 100%)  CAPILLARY BLOOD GLUCOSE          Is/Os    01-14 @ 07:01  -  01-15 @ 07:00  --------------------------------------------------------  IN:    Lactated Ringers: 125 mL  Total IN: 125 mL    OUT:    Bulb (mL): 5 mL    Voided (mL): 900 mL  Total OUT: 905 mL    Total NET: -780 mL          MEDICATIONS (STANDING): acetaminophen   Tablet .. 975 milliGRAM(s) Oral every 6 hours  atorvastatin 80 milliGRAM(s) Oral at bedtime  ceFAZolin   IVPB 2000 milliGRAM(s) IV Intermittent every 8 hours  dexAMETHasone     Tablet 10 milliGRAM(s) Oral every 12 hours  enoxaparin Injectable 40 milliGRAM(s) SubCutaneous daily  lactated ringers. 1000 milliLiter(s) IV Continuous <Continuous>  losartan 100 milliGRAM(s) Oral daily    MEDICATIONS (PRN):calcium carbonate    500 mG (Tums) Chewable 1 Tablet(s) Chew every 4 hours PRN Dyspepsia  diphenhydrAMINE 25 milliGRAM(s) Oral every 6 hours PRN Rash and/or Itching  fentaNYL    Injectable 50 MICROGram(s) IV Push every 15 minutes PRN Severe Pain (7 - 10)  melatonin 3 milliGRAM(s) Oral at bedtime PRN Insomnia  metoclopramide Injectable 10 milliGRAM(s) IV Push once PRN n/V  ondansetron Injectable 4 milliGRAM(s) IV Push once PRN Nausea and/or Vomiting  senna 1 Tablet(s) Oral at bedtime PRN Constipation      LABS                  IMAGING STUDIES

## 2021-01-15 NOTE — PROGRESS NOTE ADULT - ASSESSMENT
ASSESSMENT/PLAN:   NELDA QUISPE is a 60yMale s/p L facial lipoma     - drains d/c'd  - ok to d/c, restart clopidogrel/asa in 3 days     Plastic Surgery  [facility]: [id number]

## 2021-01-15 NOTE — DISCHARGE NOTE PROVIDER - CARE PROVIDER_API CALL
Henrique Osorio (MD)  Plastic Surgery; Surgery  [address], [address]  [place] [address]  Phone: [phone]  Fax: [phone]  Follow Up Time:

## 2021-01-15 NOTE — DISCHARGE NOTE PROVIDER - NSDCMRMEDTOKEN_GEN_ALL_CORE_FT
atorvastatin 80 mg oral tablet: 1 tab(s) orally once a day  clopidogrel 75 mg oral tablet: 1 tab(s) orally once a day  ezetimibe 10 mg oral tablet: 1 tab(s) orally once a day  irbesartan 300 mg oral tablet: 1 tab(s) orally once a day   atorvastatin 80 mg oral tablet: 1 tab(s) orally once a day  bacitracin 500 units/g topical ointment: Apply topically to affected area 2 times a day to incision line.   clopidogrel 75 mg oral tablet: 1 tab(s) orally once a day, RESTART IN 3 DAYS  ezetimibe 10 mg oral tablet: 1 tab(s) orally once a day  irbesartan 300 mg oral tablet: 1 tab(s) orally once a day  Peridex 0.12% mucous membrane liquid: 1 swish and spit 3 times per day for a week

## 2021-01-15 NOTE — DISCHARGE NOTE PROVIDER - NSDCFUADDINST_GEN_ALL_CORE_FT
NOTIFY YOUR SURGEON IF YOU HAVE: any bleeding that does not stop, any pus draining from your wound(s), any fever (over 100.4 F) persistent nausea/vomiting, or if your pain is not controlled on your discharge pain medications, unable to urinate.    ACTIVITY: Please refrain from increased physical activity for a period of 2 weeks. Please do not lift anything heavier than a gallon of milk or about 5 pounds. Upon follow up you will be given further instructions on how much physical activity you may do.     MEDICATIONS: you have been prescribed oxycodone 5mg tablets for as needed severe breakthrough pain not treated by tylenol. Please take 1000mg Tylenol every 6 hours for the first 48 hours around the clock. Then after every 6 hours as needed. Please do not exceed 4000mg Tylenol in any 24 hour period.     ANTIBIOTICS: not required     HOME MEDICATIONS: Please restart your aspirin and plavix on 1/17/2021.     SLEEP: Please sleep with your head elevated on an extra pillow to help minimize swelling.     Please follow up with Dr. Osorio within 1 week of discharge from the hospital. You may call [phone] to schedule an appointment.     BATHING: Please keep surgical site clean and dry until follow up.  NOTIFY YOUR SURGEON IF YOU HAVE: any bleeding that does not stop, any pus draining from your wound(s), any fever (over 100.4 F) persistent nausea/vomiting, or if your pain is not controlled on your discharge pain medications, unable to urinate.    ACTIVITY: Please refrain from increased physical activity for a period of 2 weeks. Please do not lift anything heavier than a gallon of milk or about 5 pounds. Upon follow up you will be given further instructions on how much physical activity you may do.     MEDICATIONS: you have been prescribed oxycodone 5mg tablets for as needed severe breakthrough pain not treated by tylenol. Please take 1000mg Tylenol every 6 hours for the first 48 hours around the clock. Then after every 6 hours as needed. Please do not exceed 4000mg Tylenol in any 24 hour period.     ANTIBIOTICS: not required     HOME MEDICATIONS: Please restart your aspirin and plavix on 1/17/2021.     SLEEP: Please sleep with your head elevated on an extra pillow to help minimize swelling.     Please follow up with Dr. Osorio within 1 week of discharge from the hospital. You may call [phone] to schedule an appointment.     BATHING: Please keep surgical site clean and dry until follow up.     Apply bacitracin ointment to incision line twice daily. Swish and spit 3 times per day with peridex mouthwash that was sent to pharmacy.

## 2021-01-15 NOTE — DISCHARGE NOTE PROVIDER - HOSPITAL COURSE
Patient Brent is a 60y male now s/p excisional biopsy of left cheek mass through external and intraoral approaches. Patient tolerated the procedure well and was brought to PACU for initial recovery. After PACU recovery patient was admitted for overnight observation. Patient had no acute events overnight. Patient was seen in the morning by the plastic surgery resident team where his ADEOLA drain and dressings were removed.     At time of discharge, pt was tolerating a regular diet, voiding/stooling spontaneously, ambulating, and pain was well-controlled. Patient and family felt ready for discharge.

## 2021-01-15 NOTE — DISCHARGE NOTE NURSING/CASE MANAGEMENT/SOCIAL WORK - PATIENT PORTAL LINK FT
You can access the FollowMyHealth Patient Portal offered by [facility] by registering at the following website: [email or web address]. By joining [employer]’s FollowMyHealth portal, you will also be able to view your health information using other applications (apps) compatible with our system.

## 2021-01-15 NOTE — DISCHARGE NOTE PROVIDER - NSDCCPTREATMENT_GEN_ALL_CORE_FT
PRINCIPAL PROCEDURE  Procedure: Excision, lesion, face, greater than 4.0 cm in diameter  Findings and Treatment:

## 2021-01-15 NOTE — DISCHARGE NOTE PROVIDER - CARE PROVIDERS DIRECT ADDRESSES
[email or web address]

## 2021-01-16 ENCOUNTER — APPOINTMENT (OUTPATIENT)
Dept: DISASTER EMERGENCY | Facility: CLINIC | Age: 61
End: 2021-01-16

## 2021-01-17 ENCOUNTER — APPOINTMENT (OUTPATIENT)
Dept: DISASTER EMERGENCY | Facility: CLINIC | Age: 61
End: 2021-01-17

## 2021-01-17 ENCOUNTER — LABORATORY RESULT (OUTPATIENT)
Age: 61
End: 2021-01-17

## 2021-01-18 ENCOUNTER — NON-APPOINTMENT (OUTPATIENT)
Age: 61
End: 2021-01-18

## 2021-01-20 ENCOUNTER — APPOINTMENT (OUTPATIENT)
Dept: PLASTIC SURGERY | Facility: CLINIC | Age: 61
End: 2021-01-20
Payer: MEDICAID

## 2021-01-20 PROCEDURE — 99024 POSTOP FOLLOW-UP VISIT: CPT

## 2021-01-20 NOTE — REASON FOR VISIT
[Post Op: _________] : a [unfilled] post op visit [FreeTextEntry1] : DOS 01/14/21 s/p resection of left facial mass.

## 2021-01-20 NOTE — PHYSICAL EXAM
[de-identified] : Alert, calm, cooperative. [de-identified] : Moderate edema of left cheek. L facelift incision is well approximated with no signs of wound dehiscence or fluctuance. Moderate edema noted. Oral incisions are well approximated with no signs of wound dehiscence or fluctuance. Mild edema and mild ecchymosis noted.\par \par  [de-identified] : Respirations even and unlabored.

## 2021-01-20 NOTE — HISTORY OF PRESENT ILLNESS
[FreeTextEntry1] : Jax is a 60 year old M patient that presents to the office today for a post operative evaluation s/p resection of large cheek buccal mass on 1/14/2021. Patient is happy with the results. Patient denies having any significant concerns or complaints.\par

## 2021-01-25 ENCOUNTER — APPOINTMENT (OUTPATIENT)
Dept: PLASTIC SURGERY | Facility: CLINIC | Age: 61
End: 2021-01-25
Payer: MEDICAID

## 2021-01-25 PROCEDURE — 99024 POSTOP FOLLOW-UP VISIT: CPT

## 2021-02-01 NOTE — PHYSICAL EXAM
[de-identified] : Alert, calm, cooperative. [de-identified] : Oral incisions with no signs of wound dehiscence or fluctuance. Moderate edema of left cheek but improving. L facelift incision with no signs of wound dehiscence or fluctuance. Mild edema noted. [de-identified] : Respirations even and unlabored.

## 2021-02-01 NOTE — REASON FOR VISIT
[Post Op: _________] : a [unfilled] post op visit [FreeTextEntry1] : DOS 01/14/21 s/p resection of left facial mass. \par

## 2021-02-04 LAB — SURGICAL PATHOLOGY STUDY: SIGNIFICANT CHANGE UP

## 2021-02-08 ENCOUNTER — APPOINTMENT (OUTPATIENT)
Dept: PLASTIC SURGERY | Facility: CLINIC | Age: 61
End: 2021-02-08
Payer: MEDICAID

## 2021-02-08 PROCEDURE — 99024 POSTOP FOLLOW-UP VISIT: CPT

## 2021-02-10 NOTE — HISTORY OF PRESENT ILLNESS
[FreeTextEntry1] : Jax is a 60 year old male patient that presents to the office today for a post operative evaluation s/p resection of large cheek buccal mass on 1/14/2021. Patient denies having any significant concerns or complaints.\par

## 2021-02-10 NOTE — PHYSICAL EXAM
[de-identified] : Alert, calm, cooperative. [de-identified] : Oral incisions with no signs of wound dehiscence or fluctuance. Mild edema of left cheek but improving. L facelift incision with no signs of wound dehiscence or fluctuance. Mild edema noted but improving. [de-identified] : Respirations even and unlabored.

## 2021-02-10 NOTE — REASON FOR VISIT
[Post Op: _________] : a [unfilled] post op visit [FreeTextEntry1] : DOS 01/14/21 s/p resection of left facial mass. Patient complains of slight pain, \par

## 2021-02-22 ENCOUNTER — APPOINTMENT (OUTPATIENT)
Dept: RADIATION ONCOLOGY | Facility: CLINIC | Age: 61
End: 2021-02-22
Payer: MEDICAID

## 2021-02-22 VITALS
SYSTOLIC BLOOD PRESSURE: 117 MMHG | DIASTOLIC BLOOD PRESSURE: 79 MMHG | OXYGEN SATURATION: 99 % | TEMPERATURE: 96.6 F | WEIGHT: 208.89 LBS | BODY MASS INDEX: 32.72 KG/M2 | RESPIRATION RATE: 16 BRPM | HEART RATE: 91 BPM

## 2021-02-22 DIAGNOSIS — Z78.9 OTHER SPECIFIED HEALTH STATUS: ICD-10-CM

## 2021-02-22 PROCEDURE — 99204 OFFICE O/P NEW MOD 45 MIN: CPT | Mod: 25,GC

## 2021-02-22 PROCEDURE — 99072 ADDL SUPL MATRL&STAF TM PHE: CPT

## 2021-02-22 RX ORDER — OXYCODONE 5 MG/1
5 TABLET ORAL
Qty: 18 | Refills: 0 | Status: DISCONTINUED | COMMUNITY
Start: 2021-01-13 | End: 2021-02-22

## 2021-02-22 RX ORDER — CLOPIDOGREL BISULFATE 75 MG/1
75 TABLET, FILM COATED ORAL
Qty: 90 | Refills: 3 | Status: DISCONTINUED | COMMUNITY
Start: 2019-01-22 | End: 2021-02-22

## 2021-02-23 ENCOUNTER — NON-APPOINTMENT (OUTPATIENT)
Age: 61
End: 2021-02-23

## 2021-02-23 NOTE — PHYSICAL EXAM
[Sclera] : the sclera and conjunctiva were normal [Outer Ear] : the ears and nose were normal in appearance [] : no respiratory distress [Heart Rate And Rhythm] : heart rate and rhythm were normal [Abdomen Soft] : soft [Nondistended] : nondistended [Abdomen Tenderness] : non-tender [Cervical Lymph Nodes Enlarged Posterior Bilaterally] : posterior cervical [Cervical Lymph Nodes Enlarged Anterior Bilaterally] : anterior cervical [Supraclavicular Lymph Nodes Enlarged Bilaterally] : supraclavicular [Skin Color & Pigmentation] : normal skin color and pigmentation [No Focal Deficits] : no focal deficits [Oriented To Time, Place, And Person] : oriented to person, place, and time [Normal] : supple with no thyromegaly or masses appreciated [de-identified] : oral cavity well healed, surgical scar well healed.  [de-identified] : sensory on left cheek mildly decreased.

## 2021-02-23 NOTE — HISTORY OF PRESENT ILLNESS
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 Patient presents to discuss radiation therapy.\par \par Brief Oncological History:\par Noted initially as lipoma left cheek, excised x2. \par \par 9/1/20 - MRI showing 6cm x 5cm soft tissue, multiloculated mass with no significantly enlarged lymph nodes.\par \par 1/14/21 - Resection left facial mass/ intraoral mass showing atypical lipomatous tumor, well differentiated liposarcoma. Two separate resection masses measure 5.2x5x3.5cm and 5x3.8x2.3cm. Grade 1. <1/10 mitotic rate. No LVI, no LN, unable to asses margins on pathology due to 2 pieces. MDM2 positive. pT3Nx. Per operative note, mass had to be resected in 2 pieces - no concern for margin.\par \par Patient today is feeling well, post surgical recovery is doing well. Only notes occasional residual numbness/tingling in the area. \par

## 2021-02-23 NOTE — VITALS
[Maximal Pain Intensity: 4/10] : 4/10 [Least Pain Intensity: 0/10] : 0/10 [Pain Description/Quality: ___] : Pain description/quality: [unfilled] [Pain Duration: ___] : Pain duration: [unfilled] [Pain Location: ___] : Pain Location: [unfilled] [OTC] : OTC [NSAID/Non-Opioid] : NSAID/Non-Opioid [80: Normal activity with effort; some signs or symptoms of disease.] : 80: Normal activity with effort; some signs or symptoms of disease.  [ECOG Performance Status: 1 - Restricted in physically strenuous activity but ambulatory and able to carry out work of a light or sedentary nature] : Performance Status: 1 - Restricted in physically strenuous activity but ambulatory and able to carry out work of a light or sedentary nature, e.g., light house work, office work [Pain Interferes with ADLs] : Pain does not interfere with activities of daily living

## 2021-02-26 ENCOUNTER — RESULT REVIEW (OUTPATIENT)
Age: 61
End: 2021-02-26

## 2021-02-26 ENCOUNTER — OUTPATIENT (OUTPATIENT)
Dept: OUTPATIENT SERVICES | Facility: HOSPITAL | Age: 61
LOS: 1 days | End: 2021-02-26
Payer: MEDICAID

## 2021-02-26 ENCOUNTER — APPOINTMENT (OUTPATIENT)
Dept: MRI IMAGING | Facility: CLINIC | Age: 61
End: 2021-02-26

## 2021-02-26 DIAGNOSIS — C49.9 MALIGNANT NEOPLASM OF CONNECTIVE AND SOFT TISSUE, UNSPECIFIED: ICD-10-CM

## 2021-02-26 DIAGNOSIS — Z98.890 OTHER SPECIFIED POSTPROCEDURAL STATES: Chronic | ICD-10-CM

## 2021-02-26 DIAGNOSIS — Z96.641 PRESENCE OF RIGHT ARTIFICIAL HIP JOINT: Chronic | ICD-10-CM

## 2021-02-26 DIAGNOSIS — R22.0 LOCALIZED SWELLING, MASS AND LUMP, HEAD: Chronic | ICD-10-CM

## 2021-02-26 DIAGNOSIS — Z95.1 PRESENCE OF AORTOCORONARY BYPASS GRAFT: Chronic | ICD-10-CM

## 2021-02-26 PROCEDURE — 70543 MRI ORBT/FAC/NCK W/O &W/DYE: CPT

## 2021-02-26 PROCEDURE — A9585: CPT

## 2021-02-26 PROCEDURE — 70543 MRI ORBT/FAC/NCK W/O &W/DYE: CPT | Mod: 26

## 2021-02-27 ENCOUNTER — RESULT REVIEW (OUTPATIENT)
Age: 61
End: 2021-02-27

## 2021-02-27 ENCOUNTER — OUTPATIENT (OUTPATIENT)
Dept: OUTPATIENT SERVICES | Facility: HOSPITAL | Age: 61
LOS: 1 days | End: 2021-02-27
Payer: MEDICAID

## 2021-02-27 ENCOUNTER — APPOINTMENT (OUTPATIENT)
Dept: CT IMAGING | Facility: IMAGING CENTER | Age: 61
End: 2021-02-27
Payer: MEDICAID

## 2021-02-27 DIAGNOSIS — Z98.890 OTHER SPECIFIED POSTPROCEDURAL STATES: Chronic | ICD-10-CM

## 2021-02-27 DIAGNOSIS — Z96.641 PRESENCE OF RIGHT ARTIFICIAL HIP JOINT: Chronic | ICD-10-CM

## 2021-02-27 DIAGNOSIS — Z95.1 PRESENCE OF AORTOCORONARY BYPASS GRAFT: Chronic | ICD-10-CM

## 2021-02-27 DIAGNOSIS — R22.0 LOCALIZED SWELLING, MASS AND LUMP, HEAD: Chronic | ICD-10-CM

## 2021-02-27 DIAGNOSIS — C49.9 MALIGNANT NEOPLASM OF CONNECTIVE AND SOFT TISSUE, UNSPECIFIED: ICD-10-CM

## 2021-02-27 PROCEDURE — 71260 CT THORAX DX C+: CPT | Mod: 26

## 2021-02-27 PROCEDURE — 71260 CT THORAX DX C+: CPT

## 2021-02-27 PROCEDURE — 82565 ASSAY OF CREATININE: CPT

## 2021-03-02 ENCOUNTER — NON-APPOINTMENT (OUTPATIENT)
Age: 61
End: 2021-03-02

## 2021-03-02 ENCOUNTER — OUTPATIENT (OUTPATIENT)
Dept: OUTPATIENT SERVICES | Facility: HOSPITAL | Age: 61
LOS: 1 days | Discharge: HOME | End: 2021-03-02
Payer: MEDICAID

## 2021-03-02 ENCOUNTER — APPOINTMENT (OUTPATIENT)
Dept: CARDIOLOGY | Facility: CLINIC | Age: 61
End: 2021-03-02
Payer: MEDICAID

## 2021-03-02 VITALS
WEIGHT: 198 LBS | SYSTOLIC BLOOD PRESSURE: 119 MMHG | HEIGHT: 67 IN | HEART RATE: 81 BPM | DIASTOLIC BLOOD PRESSURE: 78 MMHG | OXYGEN SATURATION: 99 % | BODY MASS INDEX: 31.08 KG/M2

## 2021-03-02 DIAGNOSIS — Z98.890 OTHER SPECIFIED POSTPROCEDURAL STATES: Chronic | ICD-10-CM

## 2021-03-02 DIAGNOSIS — Z95.1 PRESENCE OF AORTOCORONARY BYPASS GRAFT: Chronic | ICD-10-CM

## 2021-03-02 DIAGNOSIS — Z96.641 PRESENCE OF RIGHT ARTIFICIAL HIP JOINT: Chronic | ICD-10-CM

## 2021-03-02 DIAGNOSIS — R22.0 LOCALIZED SWELLING, MASS AND LUMP, HEAD: Chronic | ICD-10-CM

## 2021-03-02 PROCEDURE — 93000 ELECTROCARDIOGRAM COMPLETE: CPT

## 2021-03-02 PROCEDURE — 99214 OFFICE O/P EST MOD 30 MIN: CPT

## 2021-03-02 PROCEDURE — 99072 ADDL SUPL MATRL&STAF TM PHE: CPT

## 2021-03-08 ENCOUNTER — APPOINTMENT (OUTPATIENT)
Dept: PLASTIC SURGERY | Facility: CLINIC | Age: 61
End: 2021-03-08
Payer: MEDICAID

## 2021-03-08 PROCEDURE — 99024 POSTOP FOLLOW-UP VISIT: CPT

## 2021-03-09 NOTE — HISTORY OF PRESENT ILLNESS
[FreeTextEntry1] : Jax is a 60 year old male patient that presents to the office today for a post operative evaluation s/p resection of large cheek buccal mass on 1/14/2021. Patient had an appointment with radiation oncology. Radiation oncology referred Jax to dental department for pre-radiation dental evaluation and Jax had a CT Chest and MRI orbit/face. He states that he has swelling after having a tooth removed. Jax states that he will receive radiation therapy as treatment. Patient denies having any significant concerns or complaints.\par

## 2021-03-09 NOTE — PHYSICAL EXAM
[de-identified] : Alert, calm, cooperative. [de-identified] : Oral incisions with no signs of wound dehiscence or fluctuance. Mild edema of left cheek but improving. L facelift incision healing well. [de-identified] : Respirations even and unlabored.

## 2021-03-09 NOTE — REASON FOR VISIT
[Post Op: _________] : a [unfilled] post op visit [FreeTextEntry1] : DOS 01/14/21 s/p resection of left facial mass

## 2021-03-11 ENCOUNTER — NON-APPOINTMENT (OUTPATIENT)
Age: 61
End: 2021-03-11

## 2021-03-15 PROCEDURE — 77263 THER RADIOLOGY TX PLNG CPLX: CPT

## 2021-03-15 PROCEDURE — 99072 ADDL SUPL MATRL&STAF TM PHE: CPT

## 2021-03-22 NOTE — HISTORY OF PRESENT ILLNESS
[FreeTextEntry1] : Mr. Kennedy is a 60 year-old man with known MV CAD s/p CABG with Dr. Turpin in 2/2019. He has been doing well without issues. He has not been able to lose weight however has been walking over 2 miles a day. He recently underwent a lipoma removal from his face/cheek - may need radiation therapy due to the possibility of mild sarcoma.\par \par Active Issues:\par 1. Actively treated CAD\par 2. Actively treated cough\par 3. Actively treated HLD\par 4. Actively treated HTN

## 2021-03-22 NOTE — DISCUSSION/SUMMARY
[FreeTextEntry1] : Mr. Kennedy is a 59 yo man with known CAD s/p CABG.\par \par Plan:\par 1. Given the CAD with CABG - c/w medical management of severe CAD\par 2. Old records requested and reviewed with performing physician. \par 3. Primary and secondary prevention of cardiovascular and related conditions discussed at length, including but not limited to diet and lifestyle modification.\par 4. Patient to return to the office in 2-3 months.\par \par Thank you for allowing me to participate in the care of your patient. If you have any questions, please feel free to contact me at [phone] or via email at [email or web address].\par \par Sincerely,\par \par Michelle Bower MD FACC

## 2021-03-25 PROCEDURE — 77301 RADIOTHERAPY DOSE PLAN IMRT: CPT | Mod: 26

## 2021-03-25 PROCEDURE — 77338 DESIGN MLC DEVICE FOR IMRT: CPT | Mod: 26

## 2021-03-25 PROCEDURE — 77300 RADIATION THERAPY DOSE PLAN: CPT | Mod: 26

## 2021-04-01 ENCOUNTER — TRANSCRIPTION ENCOUNTER (OUTPATIENT)
Age: 61
End: 2021-04-01

## 2021-04-16 PROCEDURE — G6002: CPT | Mod: 26

## 2021-04-19 PROCEDURE — G6002: CPT | Mod: 26

## 2021-04-20 ENCOUNTER — NON-APPOINTMENT (OUTPATIENT)
Age: 61
End: 2021-04-20

## 2021-04-20 VITALS
OXYGEN SATURATION: 100 % | DIASTOLIC BLOOD PRESSURE: 86 MMHG | BODY MASS INDEX: 31.53 KG/M2 | WEIGHT: 201.28 LBS | SYSTOLIC BLOOD PRESSURE: 122 MMHG | RESPIRATION RATE: 16 BRPM | HEART RATE: 91 BPM

## 2021-04-20 PROCEDURE — G6002: CPT | Mod: 26

## 2021-04-20 NOTE — DISEASE MANAGEMENT
[Pathological] : TNM Stage: p [IB] : IB [TTNM] : 3 [NTNM] : x [MTNM] : x [de-identified] : 053 [de-identified] : 9490 [de-identified] : Oral cavity

## 2021-04-20 NOTE — HISTORY OF PRESENT ILLNESS
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 Completed 3/33 fractions to oral cavity. Tolerating treatment well. Denies pain. Skin and mouth care reviewed.

## 2021-04-21 PROCEDURE — G6002: CPT | Mod: 26

## 2021-04-22 PROCEDURE — 77014: CPT | Mod: 26

## 2021-04-22 PROCEDURE — 77427 RADIATION TX MANAGEMENT X5: CPT

## 2021-04-23 PROCEDURE — G6002: CPT | Mod: 26

## 2021-04-26 PROCEDURE — G6002: CPT | Mod: 26

## 2021-04-27 ENCOUNTER — NON-APPOINTMENT (OUTPATIENT)
Age: 61
End: 2021-04-27

## 2021-04-27 VITALS — WEIGHT: 200 LBS | SYSTOLIC BLOOD PRESSURE: 120 MMHG | DIASTOLIC BLOOD PRESSURE: 83 MMHG | BODY MASS INDEX: 31.32 KG/M2

## 2021-04-27 PROCEDURE — G6002: CPT | Mod: 26

## 2021-04-27 NOTE — HISTORY OF PRESENT ILLNESS
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 Completed 8/33 fractions to oral cavity. Tolerating treatment well. Denies pain. Skin and mouth care reviewed.

## 2021-04-27 NOTE — DISEASE MANAGEMENT
[Pathological] : TNM Stage: p [IB] : IB [TTNM] : 3 [NTNM] : x [MTNM] : x [de-identified] : 8590 [de-identified] : 5456 [de-identified] : Oral cavity

## 2021-04-28 PROCEDURE — G6002: CPT | Mod: 26

## 2021-04-29 PROCEDURE — 77427 RADIATION TX MANAGEMENT X5: CPT

## 2021-04-29 PROCEDURE — 77014: CPT | Mod: 26

## 2021-04-30 PROCEDURE — G6002: CPT | Mod: 26

## 2021-05-03 PROCEDURE — G6002: CPT | Mod: 26

## 2021-05-04 ENCOUNTER — NON-APPOINTMENT (OUTPATIENT)
Age: 61
End: 2021-05-04

## 2021-05-04 VITALS
OXYGEN SATURATION: 97 % | RESPIRATION RATE: 16 BRPM | HEART RATE: 92 BPM | SYSTOLIC BLOOD PRESSURE: 117 MMHG | WEIGHT: 198.75 LBS | BODY MASS INDEX: 31.13 KG/M2 | DIASTOLIC BLOOD PRESSURE: 80 MMHG

## 2021-05-04 PROCEDURE — G6002: CPT | Mod: 26

## 2021-05-04 NOTE — REVIEW OF SYSTEMS
[Dysphagia: Grade 1 - Symptomatic, able to eat regular diet] : Dysphagia: Grade 1 - Symptomatic, able to eat regular diet [Fatigue: Grade 0] : Fatigue: Grade 0 [Mucositis Oral: Grade 0] : Mucositis Oral: Grade 0  [Xerostomia: Grade 1 - Symptomatic (e.g., dry or thick saliva) without significant dietary alteration; unstimulated saliva flow >0.2 ml/min] : Xerostomia: Grade 1 - Symptomatic (e.g., dry or thick saliva) without significant dietary alteration; unstimulated saliva flow >0.2 ml/min [Oral Pain: Grade 1 - Mild pain] : Oral Pain: Grade 1 - Mild pain [Dysgeusia: Grade 1- Altered taste but no change in diet] : Dysgeusia: Grade 1 - Altered taste but no change in diet [Skin Hyperpigmentation: Grade 1 - Hyperpigmentation covering <10% BSA; no psychosocial impact] : Skin Hyperpigmentation: Grade 1 - Hyperpigmentation covering <10% BSA; no psychosocial impact [Dermatitis Radiation: Grade 1 - Faint erythema or dry desquamation] : Dermatitis Radiation: Grade 1 - Faint erythema or dry desquamation

## 2021-05-04 NOTE — DISEASE MANAGEMENT
[Pathological] : TNM Stage: p [IB] : IB [TTNM] : 3 [NTNM] : x [MTNM] : x [de-identified] : 3810 [de-identified] : 6684 [de-identified] : Oral cavity

## 2021-05-04 NOTE — HISTORY OF PRESENT ILLNESS
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 Completed 13/33 fractions to oral cavity. C/o pain and soreness to mouth and throat. Magic mouthwash e-scribed.  Maintaining weight

## 2021-05-04 NOTE — VITALS
[Maximal Pain Intensity: 8/10] : 8/10 [Least Pain Intensity: 0/10] : 0/10 [Pain Description/Quality: ___] : Pain description/quality: [unfilled] [Pain Duration: ___] : Pain duration: [unfilled] [Pain Location: ___] : Pain Location: [unfilled] [OTC] : OTC [NSAID/Non-Opioid] : NSAID/Non-Opioid [80: Normal activity with effort; some signs or symptoms of disease.] : 80: Normal activity with effort; some signs or symptoms of disease.  [ECOG Performance Status: 1 - Restricted in physically strenuous activity but ambulatory and able to carry out work of a light or sedentary nature] : Performance Status: 1 - Restricted in physically strenuous activity but ambulatory and able to carry out work of a light or sedentary nature, e.g., light house work, office work [Pain Interferes with ADLs] : Pain does not interfere with activities of daily living

## 2021-05-05 PROCEDURE — G6002: CPT | Mod: 26

## 2021-05-06 PROCEDURE — 77427 RADIATION TX MANAGEMENT X5: CPT

## 2021-05-06 PROCEDURE — 77014: CPT | Mod: 26

## 2021-05-07 PROCEDURE — G6002: CPT | Mod: 26

## 2021-05-10 PROCEDURE — G6002: CPT | Mod: 26

## 2021-05-11 ENCOUNTER — NON-APPOINTMENT (OUTPATIENT)
Age: 61
End: 2021-05-11

## 2021-05-11 VITALS
TEMPERATURE: 96.8 F | WEIGHT: 197.86 LBS | SYSTOLIC BLOOD PRESSURE: 124 MMHG | OXYGEN SATURATION: 98 % | RESPIRATION RATE: 18 BRPM | DIASTOLIC BLOOD PRESSURE: 85 MMHG | HEART RATE: 78 BPM | BODY MASS INDEX: 30.99 KG/M2

## 2021-05-11 PROCEDURE — G6002: CPT | Mod: 26

## 2021-05-11 NOTE — DISEASE MANAGEMENT
[Pathological] : TNM Stage: p [IB] : IB [TTNM] : 3 [NTNM] : x [MTNM] : x [de-identified] : 6091 [de-identified] : 6200 [de-identified] : Oral cavity

## 2021-05-11 NOTE — HISTORY OF PRESENT ILLNESS
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 Completed 18/33 fractions to oral cavity. pain and soreness to left mouth is worse this week. notes some headaches. Not able to eat much.

## 2021-05-12 PROCEDURE — G6002: CPT | Mod: 26

## 2021-05-13 PROCEDURE — 77427 RADIATION TX MANAGEMENT X5: CPT

## 2021-05-13 PROCEDURE — 77014: CPT | Mod: 26

## 2021-05-14 PROCEDURE — G6002: CPT | Mod: 26

## 2021-05-17 PROCEDURE — G6002: CPT | Mod: 26

## 2021-05-18 ENCOUNTER — NON-APPOINTMENT (OUTPATIENT)
Age: 61
End: 2021-05-18

## 2021-05-18 ENCOUNTER — APPOINTMENT (OUTPATIENT)
Dept: PULMONOLOGY | Facility: CLINIC | Age: 61
End: 2021-05-18
Payer: MEDICAID

## 2021-05-18 VITALS
HEIGHT: 67 IN | HEART RATE: 82 BPM | WEIGHT: 198 LBS | RESPIRATION RATE: 16 BRPM | BODY MASS INDEX: 31.08 KG/M2 | TEMPERATURE: 97.6 F | OXYGEN SATURATION: 96 % | SYSTOLIC BLOOD PRESSURE: 106 MMHG | DIASTOLIC BLOOD PRESSURE: 72 MMHG

## 2021-05-18 VITALS
HEART RATE: 80 BPM | SYSTOLIC BLOOD PRESSURE: 114 MMHG | WEIGHT: 200.62 LBS | BODY MASS INDEX: 31.42 KG/M2 | OXYGEN SATURATION: 98 % | RESPIRATION RATE: 16 BRPM | DIASTOLIC BLOOD PRESSURE: 75 MMHG

## 2021-05-18 DIAGNOSIS — Z23 ENCOUNTER FOR IMMUNIZATION: ICD-10-CM

## 2021-05-18 DIAGNOSIS — G47.33 OBSTRUCTIVE SLEEP APNEA (ADULT) (PEDIATRIC): ICD-10-CM

## 2021-05-18 PROCEDURE — 99213 OFFICE O/P EST LOW 20 MIN: CPT

## 2021-05-18 PROCEDURE — G6002: CPT | Mod: 26

## 2021-05-18 NOTE — HISTORY OF PRESENT ILLNESS
[Obstructive Sleep Apnea] : obstructive sleep apnea [Snoring] : snoring [Awakes Unrefreshed] : awakening unrefreshed [Awakes with Headache] : headache upon awakening [To Bed: ___] : ~he/she~ goes to bed at [unfilled] [Arises: ___] : arises at [unfilled] [Sleep Onset Latency: ___ minutes] : sleep onset latency of [unfilled] minutes reported [Nocturnal Awakenings: ___] : ~he/she~ typically has [unfilled] nocturnal awakenings [WASO: ___] : Wake time after sleep onset is [unfilled] [TST: ___] : Total sleep time is [unfilled] [Date: ___] : Date of most recent diagnostic polysomnogram: [unfilled] [AHI: ___ per hour] : Apnea-hypopnea index:  [unfilled] per hour [Beka desatuation%: ___] : Beka desaturation:  [unfilled]% [FreeTextEntry1] : 59 y/o M with h/o HTN, CAD s/p CABG x4 (1/2019), left cheek liposarcoma, severe obstructive sleep apnea 5/9/2018 AHI 39 on PAP with pressure of 9 cm H2O.  \par \par Patient presents today for CRISTIAN f/u. Endorses using machine nightly and overall benefit from PAP therapy. Only experiences morning HA's if he does not use his CPAP machine. Goes to bed around 10pm, wakes up around 5am without any overnight awakenings. Endorses some daytime fatigue from radiation therapy. Denies daytime naps.\par Pt is satisfied with the quality of sleep he is getting. \par \par Hospitalization History: \par Jan 9th 2019- went to [facility] ER for CP w/ EKG changes. Had CABG x 4 performed on 1/11/19 and was subsequently discharged on 1/18. On f/u endorsing SOB, CT chest performed and demonstrated large pleural effusion on left. Was admitted on 3/20 for thoracentesis endorsing drained 1.1L was discharged the next day, and since pleural effusion hasn't reoccurred. \par \par PULM: Denies any respiratory complaints today. No SOB, SOMMER, wheeze, chest tightness, chest pain, palpitations or LE swelling. \par \par DME: [facility]\par  [Witnessed Apneas] : no witnessed sleep apnea [Frequent Nocturnal Awakening] : no nocturnal awakening [Unintentional Sleep while Active] : no unintentional sleep while active [Unintentional Sleep While Inactive] : no unintentional sleep while inactive [Awakening With Dry Mouth] : no dry mouth upon awakening [Recent  Weight Gain] : no recent weight gain [DIS] : no DIS [DMS] : no DMS [Unusual Sleep Behavior] : no unusual sleep behavior [Cataplexy] : no cataplexy [Hypersomnolence] : no hypersomnolence [Sleep Paralysis] : no sleep paralysis [Hypnagogic Hallucinations] : no hallucinations when falling asleep [Hypnopompic Hallucinations] : no hallucinations when awakening [Lower Extremity Discomfort] : no lower extremity discomfort in evening or at bedtime [Nocturnal Oxygen] : The patient does not use nocturnal oxygen

## 2021-05-18 NOTE — DISEASE MANAGEMENT
[Pathological] : TNM Stage: p [IB] : IB [TTNM] : 3 [NTNM] : x [MTNM] : x [de-identified] : 9429 [de-identified] : 3433 [de-identified] : Oral cavity

## 2021-05-18 NOTE — HISTORY OF PRESENT ILLNESS
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 Completed 23/33 fractions to oral cavity. Continues to have pain and soreness to left mouth. States not able to eat much. Maintaining weight

## 2021-05-18 NOTE — ASSESSMENT
[FreeTextEntry1] : ATTENDING ATTESTATION\par \par 59 y/o M with h/o HTN, CAD s/p CABG x4 (1/2019), left cheek liposarcoma, severe obstructive sleep apnea 5/9/2018 AHI 39 on PAP with pressure of 9 cm H2O.  \par \par Patient presents today for CRISTIAN f/u. Endorses using machine nightly and overall benefit from PAP therapy. \par DME: [facility] \par Compliance report 4/17/2021 - 5/16/2021 download: AHI of 1.4 on CPAP of 9 cm H2O. Average time in large leak per day 29 min 12 secs. 87% usage for at least 4 hours over 30 day period. \par -Continue PAP therapy at current settings. \par - Will reorder supplies and fax [facility] to [facility]. \par \par PULM:  Hx of Left sided pleural effusion post CABG requiring pigtail placement x 1 day in March 2019. negative cultures, no analysis of fluid. \par No hx of asthma, nonsmoker. Denies any pulmonary symptoms today.\par 2/27/21- CT chest- No metastatic disease. No pleural effusion. Patient central airways. Mild linear atelectasis at the lung bases. The lungs are otherwise clear. \par -COVID-19 vaccine series completed. \par \par CAD s/p CABG x 4\par -f/u with cardiology\par \par Left cheek liposarcoma, excised x2, presently undergoing radiation therapy. \par -continue f/u with rad/onc \par \par \par f/u annually for CRISTIAN \par

## 2021-05-18 NOTE — END OF VISIT
[FreeTextEntry3] : I agree with the advanced clinical provider's history, physical examination and plan of care. I personally elicited a history and examined the patient. See above attestation. 25 minutes time spent for patient education related to CRISTIAN documentation.\par \par \par  [Time Spent: ___ minutes] : I have spent [unfilled] minutes of time on the encounter.

## 2021-05-19 PROCEDURE — G6002: CPT | Mod: 26

## 2021-05-20 PROCEDURE — 77014: CPT | Mod: 26

## 2021-05-20 PROCEDURE — 77427 RADIATION TX MANAGEMENT X5: CPT

## 2021-05-21 PROCEDURE — G6002: CPT | Mod: 26

## 2021-05-24 PROCEDURE — G6002: CPT | Mod: 26

## 2021-05-25 PROCEDURE — G6002: CPT | Mod: 26

## 2021-05-26 PROCEDURE — G6002: CPT | Mod: 26

## 2021-05-27 ENCOUNTER — NON-APPOINTMENT (OUTPATIENT)
Age: 61
End: 2021-05-27

## 2021-05-27 VITALS
TEMPERATURE: 95.7 F | HEART RATE: 79 BPM | OXYGEN SATURATION: 98 % | WEIGHT: 195 LBS | DIASTOLIC BLOOD PRESSURE: 78 MMHG | SYSTOLIC BLOOD PRESSURE: 112 MMHG | BODY MASS INDEX: 30.54 KG/M2 | RESPIRATION RATE: 16 BRPM

## 2021-05-27 PROCEDURE — 77014: CPT | Mod: 26

## 2021-05-27 PROCEDURE — 77427 RADIATION TX MANAGEMENT X5: CPT

## 2021-05-27 NOTE — REVIEW OF SYSTEMS
[Dysphagia: Grade 1 - Symptomatic, able to eat regular diet] : Dysphagia: Grade 1 - Symptomatic, able to eat regular diet [Mucositis Oral: Grade 0] : Mucositis Oral: Grade 0  [Xerostomia: Grade 1 - Symptomatic (e.g., dry or thick saliva) without significant dietary alteration; unstimulated saliva flow >0.2 ml/min] : Xerostomia: Grade 1 - Symptomatic (e.g., dry or thick saliva) without significant dietary alteration; unstimulated saliva flow >0.2 ml/min [Oral Pain: Grade 1 - Mild pain] : Oral Pain: Grade 1 - Mild pain [Dysgeusia: Grade 1- Altered taste but no change in diet] : Dysgeusia: Grade 1 - Altered taste but no change in diet [Skin Hyperpigmentation: Grade 1 - Hyperpigmentation covering <10% BSA; no psychosocial impact] : Skin Hyperpigmentation: Grade 1 - Hyperpigmentation covering <10% BSA; no psychosocial impact [Dermatitis Radiation: Grade 1 - Faint erythema or dry desquamation] : Dermatitis Radiation: Grade 1 - Faint erythema or dry desquamation [Fatigue: Grade 1 - Fatigue relieved by rest] : Fatigue: Grade 1 - Fatigue relieved by rest

## 2021-06-01 PROCEDURE — G6002: CPT | Mod: 26

## 2021-06-02 PROCEDURE — G6002: CPT | Mod: 26

## 2021-06-03 PROCEDURE — G6002: CPT | Mod: 26

## 2021-06-15 ENCOUNTER — APPOINTMENT (OUTPATIENT)
Dept: CARDIOLOGY | Facility: CLINIC | Age: 61
End: 2021-06-15
Payer: MEDICAID

## 2021-06-15 ENCOUNTER — NON-APPOINTMENT (OUTPATIENT)
Age: 61
End: 2021-06-15

## 2021-06-15 VITALS
HEART RATE: 81 BPM | BODY MASS INDEX: 29.82 KG/M2 | DIASTOLIC BLOOD PRESSURE: 67 MMHG | OXYGEN SATURATION: 97 % | SYSTOLIC BLOOD PRESSURE: 97 MMHG | WEIGHT: 190 LBS | HEIGHT: 67 IN

## 2021-06-15 DIAGNOSIS — Z01.818 ENCOUNTER FOR OTHER PREPROCEDURAL EXAMINATION: ICD-10-CM

## 2021-06-15 PROCEDURE — 93000 ELECTROCARDIOGRAM COMPLETE: CPT

## 2021-06-15 PROCEDURE — 99214 OFFICE O/P EST MOD 30 MIN: CPT

## 2021-06-15 NOTE — DISCUSSION/SUMMARY
[FreeTextEntry1] : Mr. Kennedy is a 61 yo man with known CAD s/p CABG.\par \par Plan:\par 1. Given the CAD with CABG - c/w medical management of severe CAD. Patient may proceed with achilles surgery without any further CV workup. Must keep aspirin on board throughout. \par 2. Old records requested and reviewed with performing physician. \par 3. Primary and secondary prevention of cardiovascular and related conditions discussed at length, including but not limited to diet and lifestyle modification.\par 4. Patient to return to the office in 2-3 months.\par \par Thank you for allowing me to participate in the care of your patient. If you have any questions, please feel free to contact me at [phone] or via email at [email or web address].\par \par Sincerely,\par \par Michelle Bower MD FACC

## 2021-06-15 NOTE — PHYSICAL EXAM
[General Appearance - Well Developed] : well developed [Normal Appearance] : normal appearance [Well Groomed] : well groomed [General Appearance - Well Nourished] : well nourished [No Deformities] : no deformities [General Appearance - In No Acute Distress] : no acute distress [Normal Conjunctiva] : the conjunctiva exhibited no abnormalities [Eyelids - No Xanthelasma] : the eyelids demonstrated no xanthelasmas [Normal Oral Mucosa] : normal oral mucosa [No Oral Pallor] : no oral pallor [No Oral Cyanosis] : no oral cyanosis [Normal Jugular Venous A Waves Present] : normal jugular venous A waves present [Normal Jugular Venous V Waves Present] : normal jugular venous V waves present [No Jugular Venous Maldonado A Waves] : no jugular venous maldonado A waves [Respiration, Rhythm And Depth] : normal respiratory rhythm and effort [Exaggerated Use Of Accessory Muscles For Inspiration] : no accessory muscle use [Auscultation Breath Sounds / Voice Sounds] : lungs were clear to auscultation bilaterally [Heart Rate And Rhythm] : heart rate and rhythm were normal [Heart Sounds] : normal S1 and S2 [Murmurs] : no murmurs present [Abdomen Soft] : soft [Abdomen Tenderness] : non-tender [Abdomen Mass (___ Cm)] : no abdominal mass palpated [Abnormal Walk] : normal gait [Gait - Sufficient For Exercise Testing] : the gait was sufficient for exercise testing [Nail Clubbing] : no clubbing of the fingernails [Cyanosis, Localized] : no localized cyanosis [Petechial Hemorrhages (___cm)] : no petechial hemorrhages [Skin Color & Pigmentation] : normal skin color and pigmentation [] : no rash [Skin Lesions] : no skin lesions [No Venous Stasis] : no venous stasis [No Skin Ulcers] : no skin ulcer [No Xanthoma] : no  xanthoma was observed [Oriented To Time, Place, And Person] : oriented to person, place, and time [Affect] : the affect was normal [Mood] : the mood was normal [No Anxiety] : not feeling anxious

## 2021-06-15 NOTE — HISTORY OF PRESENT ILLNESS
[FreeTextEntry1] : Mr. Kennedy is a 60 year-old man with known MV CAD s/p CABG with Dr. Turpin in 2/2019. He has been doing well without issues. He has not been able to lose weight however has been walking over 2 miles a day. He recently underwent a lipoma removal from his face/cheek and radiation therapy. Needs achilles surgery for tear.\par \par Active Issues:\par 1. Actively treated CAD\par 2. Actively treated cough\par 3. Actively treated HLD\par 4. Actively treated HTN

## 2021-06-16 ENCOUNTER — APPOINTMENT (OUTPATIENT)
Dept: ORTHOPEDIC SURGERY | Facility: CLINIC | Age: 61
End: 2021-06-16
Payer: MEDICAID

## 2021-06-16 VITALS
HEIGHT: 67 IN | SYSTOLIC BLOOD PRESSURE: 103 MMHG | HEART RATE: 90 BPM | BODY MASS INDEX: 28.72 KG/M2 | WEIGHT: 183 LBS | DIASTOLIC BLOOD PRESSURE: 71 MMHG

## 2021-06-16 PROCEDURE — 73600 X-RAY EXAM OF ANKLE: CPT | Mod: RT

## 2021-06-16 PROCEDURE — 99204 OFFICE O/P NEW MOD 45 MIN: CPT

## 2021-06-16 PROCEDURE — 73630 X-RAY EXAM OF FOOT: CPT | Mod: RT

## 2021-06-17 NOTE — DISCUSSION/SUMMARY
[Surgical risks reviewed] : Surgical risks reviewed [de-identified] : Long discussion with patient regarding nature of condition, potential course / sequelae, options reviewed and patient requesting surgical intervention Achilles reconstruction procedure with flexor hallucis longus tendon transfer.  Risks, benefits, indications, alternatives reviewed in detail, risks including but not specifically limited to bleeding, infection, neurovascular / tendon injury, residual pain weakness stiffness swelling instability, fracture, implant loosening / failure of reconstruction, retear, wound drainage sequelae, thrombosis, cardiopulmonary sequelae, amongst others; patient also aware potential necessity ancillary surgery in future, amongst others.  All questions answered, patient understands and wishes to proceed.  Consent SOC / for scheduling in upcoming month secondary to recent steroid injection.  Cam boot immobilization / ambulation in the interim.

## 2021-06-17 NOTE — HISTORY OF PRESENT ILLNESS
[FreeTextEntry1] : 60-year-old patient is here for right ankle pain that began 2.5 months ago after walking on an uneven surface, and residual difficulty with ambulation. He was seen by his pain management provider. He was treated with a cortisone injection about 8 weeks ago into R Achilles tendon region which did not provide relief. He was then sent for an MRI and was told that he could not receive any more injections. He is on Oxycodone for pain relief. Denies prior injury. 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 He presents today wearing regular shoes. States having just completed XRT for facial liposarcoma (earlier this month).\par \par The patient's past medical history, past surgical history, medications and allergies were reviewed by me today and documented accordingly. In addition, the patient's family and social history, which were noncontributory to this visit, were reviewed also. Intake form was reviewed. The patient has no family history of arthritis.

## 2021-06-17 NOTE — PHYSICAL EXAM
[de-identified] : Extremity: palpable defect R Achilles tendon region, Felder testing abnormal R LE, marked weakness with plantar flexion R ankle (plantaris and deep flexors substitution).  Nontender R ankle, peroneals, syndesmosis, hindfoot ST, midfoot LF and PTT insertional, and forefoot.  +PPAV B feet, unable to perform R SLHR testing, calves soft, sensorimotor as aforementioned, skin intact R LE, 1+ R DP pulse.  AOx3, mood / affect normal. [de-identified] : I reviewed, interpreted and clinically correlated the following outside imaging studies,\par \par Date of Exam: 05-\par  \par EXAM:  MRI RIGHT ANKLE WITHOUT CONTRAST\par \par HISTORY: right ankle pain. Question Achilles tendon tear.\par \par TECHNIQUE:  Multiplanar, multi-sequence MRI of the right ankle was obtained on a 1.2T scanner according to the Achilles tendon protocol. \par \par COMPARISON:  None.\par \par FINDINGS:  \par \par Achilles tendon: Complete full-thickness tear of the Achilles tendon. There is a thin distal stump at the calcaneus that terminates 1.7 cm above the superior margin of the calcaneus. The torn Achilles tendon is retracted superiorly by distance of 3.5 cm. There is severe thickening and heterogeneous signal intensity of the retracted tendon representing tendinosis. There is hemorrhage and fluid within the retraction gap. Given the attenuation of the thickness of the distal stump, portion of the proximal stump may have avulsed from the calcaneus and retracted proximally.\par \par Osseous structures: No fracture or osteonecrosis.\par \par Joints: High-grade partial-thickness cartilage loss over the central to medial talar dome with patchy subchondral marrow edema. Moderate to high-grade cartilage loss over the central to medial tibial plafond with subchondral marrow edema.\par Moderate to large ankle joint effusion with synovitis. Small posterior subtalar joint effusion.\par \par Sinus Tarsi: Normal.\par \par Ligaments: Syndesmotic ligaments are intact. \par Mild scar remodeling of the anterior talofibular ligament related to prior injury.\par Scar remodeling the calcaneofibular ligament.\par Posterior talofibular ligament is preserved.\par Scar remodeled deep deltoid ligament related to prior injury. \par \par Tendons: Long medial flexor tendons are preserved. \par Peroneal tendons are unremarkable.\par Extensor tendons are intact.\par \par Plantar fascia: Severe thickening of the central cord of the plantar fascia with inferior calcaneal spur. No plantar fascial tear.\par \par Nerves, soft tissues, and muscles: No mass in the tarsal tunnel. \par Subcutaneous edema surrounding the ankle. No fluid collection.\par No muscle atrophy, mass, or hematoma.\par \par IMPRESSION: MRI of the right ankle demonstrates: \par 1.  Complete full-thickness tear of the Achilles tendon. The attenuated distal stump terminates approximately 2 cm above the superior margin of the calcaneus. The proximal stump is retracted superiorly by a distance of 3.5 cm. Severe pre-existing tendinosis of the proximal stump. A portion of the proximal stump has avulsed from the calcaneus given the attenuated thickness of the distal stump.\par 2.  Moderate arthrosis of the medial ankle joint. Moderate to large ankle joint effusion with synovitis. Small posterior subtalar joint effusion.\par 3.  Scar remodeled anterior talofibular, calcaneofibular, and deep deltoid ligaments.\par 4.  Severe plantar fasciopathy with inferior calcaneal spur. \par 5.  Subcutaneous edema surrounding the ankle.\par \par Radiographs (3v R foot and 2v R ankle) reveal footballer's R ankle / degenerative changes, plantar calcaneal enthesophyte R hindfoot, ST calcifications R Achilles region, HR right forefoot.

## 2021-06-21 ENCOUNTER — RESULT REVIEW (OUTPATIENT)
Age: 61
End: 2021-06-21

## 2021-06-22 ENCOUNTER — OUTPATIENT (OUTPATIENT)
Dept: OUTPATIENT SERVICES | Facility: HOSPITAL | Age: 61
LOS: 1 days | End: 2021-06-22
Payer: MEDICAID

## 2021-06-22 ENCOUNTER — INPATIENT (INPATIENT)
Facility: HOSPITAL | Age: 61
LOS: 5 days | Discharge: HOME HEALTH | End: 2021-06-28
Attending: HOSPITALIST | Admitting: HOSPITALIST
Payer: MEDICAID

## 2021-06-22 ENCOUNTER — APPOINTMENT (OUTPATIENT)
Dept: CT IMAGING | Facility: IMAGING CENTER | Age: 61
End: 2021-06-22
Payer: MEDICAID

## 2021-06-22 VITALS
TEMPERATURE: 98 F | HEART RATE: 102 BPM | DIASTOLIC BLOOD PRESSURE: 73 MMHG | HEIGHT: 67 IN | OXYGEN SATURATION: 99 % | RESPIRATION RATE: 16 BRPM | SYSTOLIC BLOOD PRESSURE: 111 MMHG

## 2021-06-22 DIAGNOSIS — Z96.641 PRESENCE OF RIGHT ARTIFICIAL HIP JOINT: Chronic | ICD-10-CM

## 2021-06-22 DIAGNOSIS — Z98.890 OTHER SPECIFIED POSTPROCEDURAL STATES: Chronic | ICD-10-CM

## 2021-06-22 DIAGNOSIS — R22.0 LOCALIZED SWELLING, MASS AND LUMP, HEAD: Chronic | ICD-10-CM

## 2021-06-22 DIAGNOSIS — Z95.1 PRESENCE OF AORTOCORONARY BYPASS GRAFT: Chronic | ICD-10-CM

## 2021-06-22 DIAGNOSIS — R22.0 LOCALIZED SWELLING, MASS AND LUMP, HEAD: ICD-10-CM

## 2021-06-22 DIAGNOSIS — C49.9 MALIGNANT NEOPLASM OF CONNECTIVE AND SOFT TISSUE, UNSPECIFIED: ICD-10-CM

## 2021-06-22 LAB
ALBUMIN SERPL ELPH-MCNC: 3.1 G/DL — LOW (ref 3.3–5)
ALP SERPL-CCNC: 140 U/L — HIGH (ref 40–120)
ALT FLD-CCNC: 85 U/L — HIGH (ref 4–41)
ANION GAP SERPL CALC-SCNC: 20 MMOL/L — HIGH (ref 7–14)
APTT BLD: 32.1 SEC — SIGNIFICANT CHANGE UP (ref 27–36.3)
AST SERPL-CCNC: 70 U/L — HIGH (ref 4–40)
BASE EXCESS BLDV CALC-SCNC: -1.1 MMOL/L — SIGNIFICANT CHANGE UP (ref -3–2)
BASOPHILS # BLD AUTO: 0.03 K/UL — SIGNIFICANT CHANGE UP (ref 0–0.2)
BASOPHILS NFR BLD AUTO: 0.2 % — SIGNIFICANT CHANGE UP (ref 0–2)
BILIRUB SERPL-MCNC: 0.7 MG/DL — SIGNIFICANT CHANGE UP (ref 0.2–1.2)
BLOOD GAS VENOUS - CREATININE: 1 MG/DL — SIGNIFICANT CHANGE UP (ref 0.5–1.3)
BLOOD GAS VENOUS COMPREHENSIVE RESULT: SIGNIFICANT CHANGE UP
BUN SERPL-MCNC: 27 MG/DL — HIGH (ref 7–23)
CALCIUM SERPL-MCNC: 9.3 MG/DL — SIGNIFICANT CHANGE UP (ref 8.4–10.5)
CHLORIDE BLDV-SCNC: 102 MMOL/L — SIGNIFICANT CHANGE UP (ref 96–108)
CHLORIDE SERPL-SCNC: 98 MMOL/L — SIGNIFICANT CHANGE UP (ref 98–107)
CO2 SERPL-SCNC: 15 MMOL/L — LOW (ref 22–31)
CREAT SERPL-MCNC: 0.83 MG/DL — SIGNIFICANT CHANGE UP (ref 0.5–1.3)
EOSINOPHIL # BLD AUTO: 0 K/UL — SIGNIFICANT CHANGE UP (ref 0–0.5)
EOSINOPHIL NFR BLD AUTO: 0 % — SIGNIFICANT CHANGE UP (ref 0–6)
GAS PNL BLDV: 130 MMOL/L — LOW (ref 136–146)
GLUCOSE BLDV-MCNC: 96 MG/DL — SIGNIFICANT CHANGE UP (ref 70–99)
GLUCOSE SERPL-MCNC: 101 MG/DL — HIGH (ref 70–99)
HCO3 BLDV-SCNC: 21 MMOL/L — SIGNIFICANT CHANGE UP (ref 20–27)
HCT VFR BLD CALC: 43.8 % — SIGNIFICANT CHANGE UP (ref 39–50)
HCT VFR BLDA CALC: 46.5 % — SIGNIFICANT CHANGE UP (ref 39–51)
HGB BLD CALC-MCNC: 15.2 G/DL — SIGNIFICANT CHANGE UP (ref 13–17)
HGB BLD-MCNC: 14.3 G/DL — SIGNIFICANT CHANGE UP (ref 13–17)
IANC: 14.24 K/UL — HIGH (ref 1.5–8.5)
IMM GRANULOCYTES NFR BLD AUTO: 0.5 % — SIGNIFICANT CHANGE UP (ref 0–1.5)
INR BLD: 1.38 RATIO — HIGH (ref 0.88–1.16)
LACTATE BLDV-MCNC: 4.7 MMOL/L — CRITICAL HIGH (ref 0.5–2)
LYMPHOCYTES # BLD AUTO: 0.53 K/UL — LOW (ref 1–3.3)
LYMPHOCYTES # BLD AUTO: 3.2 % — LOW (ref 13–44)
MCHC RBC-ENTMCNC: 28 PG — SIGNIFICANT CHANGE UP (ref 27–34)
MCHC RBC-ENTMCNC: 32.6 GM/DL — SIGNIFICANT CHANGE UP (ref 32–36)
MCV RBC AUTO: 85.7 FL — SIGNIFICANT CHANGE UP (ref 80–100)
MONOCYTES # BLD AUTO: 1.52 K/UL — HIGH (ref 0–0.9)
MONOCYTES NFR BLD AUTO: 9.3 % — SIGNIFICANT CHANGE UP (ref 2–14)
NEUTROPHILS # BLD AUTO: 14.24 K/UL — HIGH (ref 1.8–7.4)
NEUTROPHILS NFR BLD AUTO: 86.8 % — HIGH (ref 43–77)
NRBC # BLD: 0 /100 WBCS — SIGNIFICANT CHANGE UP
NRBC # FLD: 0 K/UL — SIGNIFICANT CHANGE UP
PCO2 BLDV: 47 MMHG — SIGNIFICANT CHANGE UP (ref 41–51)
PH BLDV: 7.33 — SIGNIFICANT CHANGE UP (ref 7.32–7.43)
PLATELET # BLD AUTO: 297 K/UL — SIGNIFICANT CHANGE UP (ref 150–400)
PO2 BLDV: <24 MMHG — LOW (ref 35–40)
POTASSIUM BLDV-SCNC: 5 MMOL/L — HIGH (ref 3.4–4.5)
POTASSIUM SERPL-MCNC: 4.6 MMOL/L — SIGNIFICANT CHANGE UP (ref 3.5–5.3)
POTASSIUM SERPL-SCNC: 4.6 MMOL/L — SIGNIFICANT CHANGE UP (ref 3.5–5.3)
PROT SERPL-MCNC: 8.5 G/DL — HIGH (ref 6–8.3)
PROTHROM AB SERPL-ACNC: 15.6 SEC — HIGH (ref 10.6–13.6)
RBC # BLD: 5.11 M/UL — SIGNIFICANT CHANGE UP (ref 4.2–5.8)
RBC # FLD: 13.4 % — SIGNIFICANT CHANGE UP (ref 10.3–14.5)
SAO2 % BLDV: 28.1 % — LOW (ref 60–85)
SODIUM SERPL-SCNC: 133 MMOL/L — LOW (ref 135–145)
WBC # BLD: 16.41 K/UL — HIGH (ref 3.8–10.5)
WBC # FLD AUTO: 16.41 K/UL — HIGH (ref 3.8–10.5)

## 2021-06-22 PROCEDURE — 99285 EMERGENCY DEPT VISIT HI MDM: CPT

## 2021-06-22 PROCEDURE — 71045 X-RAY EXAM CHEST 1 VIEW: CPT | Mod: 26

## 2021-06-22 PROCEDURE — 70487 CT MAXILLOFACIAL W/DYE: CPT | Mod: 26

## 2021-06-22 PROCEDURE — 82565 ASSAY OF CREATININE: CPT

## 2021-06-22 PROCEDURE — 70487 CT MAXILLOFACIAL W/DYE: CPT

## 2021-06-22 RX ORDER — SODIUM CHLORIDE 9 MG/ML
1000 INJECTION, SOLUTION INTRAVENOUS ONCE
Refills: 0 | Status: COMPLETED | OUTPATIENT
Start: 2021-06-22 | End: 2021-06-22

## 2021-06-22 RX ORDER — MORPHINE SULFATE 50 MG/1
2 CAPSULE, EXTENDED RELEASE ORAL ONCE
Refills: 0 | Status: DISCONTINUED | OUTPATIENT
Start: 2021-06-22 | End: 2021-06-22

## 2021-06-22 RX ORDER — DEXAMETHASONE 0.5 MG/5ML
10 ELIXIR ORAL ONCE
Refills: 0 | Status: DISCONTINUED | OUTPATIENT
Start: 2021-06-22 | End: 2021-06-22

## 2021-06-22 RX ORDER — SODIUM CHLORIDE 9 MG/ML
1000 INJECTION INTRAMUSCULAR; INTRAVENOUS; SUBCUTANEOUS ONCE
Refills: 0 | Status: COMPLETED | OUTPATIENT
Start: 2021-06-22 | End: 2021-06-22

## 2021-06-22 RX ORDER — DEXAMETHASONE 0.5 MG/5ML
10 ELIXIR ORAL ONCE
Refills: 0 | Status: COMPLETED | OUTPATIENT
Start: 2021-06-22 | End: 2021-06-22

## 2021-06-22 RX ORDER — VANCOMYCIN HCL 1 G
1000 VIAL (EA) INTRAVENOUS ONCE
Refills: 0 | Status: COMPLETED | OUTPATIENT
Start: 2021-06-22 | End: 2021-06-23

## 2021-06-22 RX ORDER — CEFEPIME 1 G/1
2000 INJECTION, POWDER, FOR SOLUTION INTRAMUSCULAR; INTRAVENOUS ONCE
Refills: 0 | Status: COMPLETED | OUTPATIENT
Start: 2021-06-22 | End: 2021-06-22

## 2021-06-22 RX ORDER — PIPERACILLIN AND TAZOBACTAM 4; .5 G/20ML; G/20ML
3.38 INJECTION, POWDER, LYOPHILIZED, FOR SOLUTION INTRAVENOUS ONCE
Refills: 0 | Status: DISCONTINUED | OUTPATIENT
Start: 2021-06-22 | End: 2021-06-22

## 2021-06-22 RX ADMIN — SODIUM CHLORIDE 1000 MILLILITER(S): 9 INJECTION INTRAMUSCULAR; INTRAVENOUS; SUBCUTANEOUS at 22:25

## 2021-06-22 RX ADMIN — MORPHINE SULFATE 2 MILLIGRAM(S): 50 CAPSULE, EXTENDED RELEASE ORAL at 22:25

## 2021-06-22 RX ADMIN — Medication 102 MILLIGRAM(S): at 23:26

## 2021-06-22 RX ADMIN — CEFEPIME 100 MILLIGRAM(S): 1 INJECTION, POWDER, FOR SOLUTION INTRAMUSCULAR; INTRAVENOUS at 22:47

## 2021-06-22 NOTE — ED ADULT NURSE NOTE - OBJECTIVE STATEMENT
Pt received in rm 20. C/o left facial swelling for more than a week. Pt saw PCP earlier and had CT showing infection. Hx of HTN, sleep apnea, liposarcoma, last radiation 6/3. Pt has trouble eating solid food. Denies SOB. A&Ox4, ambulatory. Breathing even and unlabored, spo2 100% on room air. NSR on the cardiac monitor. Pt suctioning secretions and given emesis bag. Able to speak in complete sentences. Pt NPO at this time. 22 G IV placed on right hand. Labs drawn and sent. Medicated per MAR. Will continue to monitor.

## 2021-06-22 NOTE — ED PROVIDER NOTE - PROGRESS NOTE DETAILS
ENT evaluated pt, no swelling of larynx. no airway compromise. Recommend abx and steroids, may admit to floor. pending labs and admission. sign out to night team

## 2021-06-22 NOTE — CONSULT NOTE ADULT - SUBJECTIVE AND OBJECTIVE BOX
HPI:   60M with HTN, CAD, CRISTIAN, psoriases, right facial liposarcoma undergoing radiotherapy presenting with right facial, neck swelling  ORL consulted to evaluate right neck, facial swelling     ROS:   See HPI    Vital Signs Last 24 Hrs  T(C): 36.7 (22 Jun 2021 19:20), Max: 36.7 (22 Jun 2021 19:20)  T(F): 98.1 (22 Jun 2021 19:20), Max: 98.1 (22 Jun 2021 19:20)  HR: 102 (22 Jun 2021 19:20) (102 - 102)  BP: 111/73 (22 Jun 2021 19:20) (111/73 - 111/73)  BP(mean): --  RR: 16 (22 Jun 2021 19:20) (16 - 16)  SpO2: 99% (22 Jun 2021 19:20) (99% - 99%)    PHYSICAL EXAM:  Gen: NAD, A/Ox3  Breathing comfortably on RA  No stridor or stertor  No increased work of breathing   Voice: normal  Head: Normocephalic, Atraumatic, no masses or lesions  Face: symmetric facial features, no edema, erythema, or swelling  Eyes: PERRL, EOMI, no nystagmus  Ears: Right - ear canal clear, TM intact without effusion or erythema, no middle ear effusion. No evidence of any fluid drainage.             Left - ear canal clear, TM intact without effusion or erythema, no middle ear effusion No evidence of any fluid drainage.   Nose: Normal external nasal features, nares bilaterally patent, no discharge, no bleeding  Mouth: Lips normal, tongue normal, FOM normal, no masses or lesions, OP clear normal   Neck: Flat, supple, no lymphadenopathy    Flexible Laryngoscopy Exam    IMAGING/ADDITIONAL STUDIES:   CT Maxillofacial w/ C 6/22  SOFT TISSUES, AERODIGESTIVE TRACT:    Severe bony irregularity left mandibular posterior body involving the left inferior alveolar foramina with dehiscent buccal and lingual cortical margins and bony permeative change, with absent teeth in the posterior left mandibular body, mandibular osteitis condensans and bony irregularity body, dental infection, osteoradionecrosis not excluded.    There is loss of the soft tissue planes adjacent to the dehiscent left posterior mandibular body permeative lesion extending into the left  space. Decreased attenuation concerning for fluid collection and phlegmon is noted in the region of the left medial and lateral pterygoid musculature measuring approximately 3.2 x 4 x 4.2 cm AP, TR, CC. The low-attenuation fluid collection contains punctate foci of air, and extends medially displacing the left pharyngeal mucosal soft tissues medially. There is extension of the infection/inflammatory change with loss of fat planes in the left parapharyngeal, carotid, parotid, masseteric and submandibular spaces. Soft tissue fullness, with findings concerning for extension of infectious inflammatory change involves left palatine and lingual tonsils, left glossotonsillar sulcus and left lateral pharyngeal wall. Findings highly concerning for spread of infectious/inflammatory change with, developing Will's angina with abnormal fullness left pharyngeal wall, and loss of the subcutaneous soft tissue planes and left supra and infraorbital neck not excluded. There is inflammatory change with loss of the fat planes adjacent to the left subinsular and left parotid glands, with involvement of the left parotid and carotid space with extension to the left deep lobe of the parotid with narrowing of the distal left internal carotid artery.    Redemonstration abnormal soft tissue and loss of fat planes in the region of prior seen left buccal space liposarcoma, again involving left buccinator muscle, left retromolar trigone, and left parotid duct Stensen's insertion, although this may may represent phlegmon with infection, residual/recurrent tumor is not excluded.    There is abnormal soft tissue fullness infectious/inflammatory change extends to the left soft palate (200:88), uvula, and left greater than right prevertebral soft tissues with loss of intervening fat planes and into the left carotid space, there is slight narrowing of the distal left internal carotid artery (4:29), CTA or MRA may better assess.    There is loss of fat planes and soft tissue irregularity throughout the left  space, with extension to the left foramen ovale, (200:120), although this may reflect superimposed infectious/inflammatory change, tumor with perineural involvement is also not excluded.    Phlegmonous change extends throughout the left  space and buccinator musculature with extension to the left pharyngeal mucosal space, left torus tubarius, effacing the left fossa of Rosenmuller, narrowing the left oropharyngeal airway (4:35) and nasopharyngeal airway (4:41).    Loss of fat planes left pterygopalatine fossa (4:53), left parotid, left subinsular spaces phlegmon inflammatory change along left parotid gland and submandibular glands, with left platysma soft tissue thickening, this may reflect extension of infectious/inflammatory change.    LYMPH NODES:    Partially seen enlarged lymph nodes including not limited to:  Left II a node measuring 1.2 x 0.8 cm, AP by TR (4:16),  Left IIa/b node measuring 2 x 1.1 cm, AP by TR (4:18),  Clustered prominent left III nodes (4:2) measuring 1.1 x 0.88, and 1.1 x 0.72 cm AP by TR      PAROTID GLANDS: Phlegmon, inflammatory change involves left buccinator muscle and left parotid duct Stensen's insertion with loss of intervening fat planes of the left parotid space and masseter muscle, infectious, inflammatory change, recurrent tumor not excluded.    SUBMANDIBULAR GLANDS: Diffuse inflammatory change with loss of the subcutaneous soft tissue planes surrounding the left submandibular gland    VISUALIZED PARANASAL SINUSES: Unerupted left maxillary wisdom tooth extends to left maxillary sinus, left maxillary retention cyst/polyp, ethmoid, sphenoid mucosal thickening.    TYMPANOMASTOID CAVITIES: Opacification partially seen left mastoid, effacement of the left fossa of Rosenmuller and inflammatory change involving the torus tubarius (4:40).    REMAINING BONES: Degenerative change with reversal of the cervical lordosis, with partially seen disc osteophyte ridges causing multilevel canal and foraminal narrowing, with C5 ossification posterior longitudinal ligament, AP canal narrowed to 7.1 mm.    MISCELLANEOUS: Intracranial, mild volume loss, microvascular disease, no hemorrhage or midline shift. Left lens replacement. Inflammatory changes in the left occipital muscle, and premaxillary soft tissues extends to left preseptal periorbital soft tissues (200: 54, 4:50).    A/P  60M with HTN, CAD, CRISTIAN, psoriases, right facial liposarcoma undergoing radiotherapy presenting with right facial, neck swelling.   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 Call    -call/page with questions  -will discuss with attending and update with any further recommendations    Prabhu Graham MD  Department of Otolaryngology - Head and Neck Surgery  Adult Page #[phone]   HPI:   60M with HTN, CAD, CRISTIAN, psoriases, right facial liposarcoma undergoing radiotherapy presenting with right facial, neck swelling  ORL consulted to evaluate right neck, facial swelling.    Wife at bedside. Per patient went to radiation oncologist office today. Oncology doctor concerned about increase in left face, neck swelling and recommended urgent imaging and presentation to the ER. Patient reports he started radiotherapy for left facial liposarcoma in the beginning of may. Thinks left facial swelling is decreasing since initial diagnoses. Stable over the last few weeks. Stable left neck swelling. Developed limited mouth opening about 3 weeks ago which has persisted. Has limited oral intake to liquids. Has been losing weight because of lower caloric intake from limited mouth opening. No recent changes in mouth opening. No recent voice changes. Tolerating secretions, sometimes has to spit because he cannot manipulate his mouth, but is able to swallow normally without any recent changes. No fevers, chills, nausea, vomiting. No dyspnea, able to walk 1+ blocks limited by calf pain.     ROS:   See HPI    Vital Signs Last 24 Hrs  T(C): 36.7 (22 Jun 2021 19:20), Max: 36.7 (22 Jun 2021 19:20)  T(F): 98.1 (22 Jun 2021 19:20), Max: 98.1 (22 Jun 2021 19:20)  HR: 102 (22 Jun 2021 19:20) (102 - 102)  BP: 111/73 (22 Jun 2021 19:20) (111/73 - 111/73)  BP(mean): --  RR: 16 (22 Jun 2021 19:20) (16 - 16)  SpO2: 99% (22 Jun 2021 19:20) (99% - 99%)    PHYSICAL EXAM:  Gen: NAD, A/Ox3  Breathing comfortably on RA  No stridor or stertor  No increased work of breathing   Voice: light muffled voice   Face: Left premaxillary soft tissue swelling moderate with overlying erythema skin changes. Non tender, not warm. No orbital swelling.   Eyes: PERRL, EOMI  Nose: inflamed mucosa bilaterally, edematous mucosa  Mouth: severe trismus, only a few mm of mouth opening. Trismus limiting exam. Left posterior manidble with thin mucosa no obvious bony exposure. Left posterior gingival, buccal mucosa with swelling, tenderness. No visible drainage. Visible FOM not elevated. Tongue appears mobile.   Neck: left neck moderate swelling with overlying exfoliating skin, erythema. Non tender, non fluctuant     LARYNGOSCOPY EXAM:   Verbal consent was obtained from patient prior to procedure.  Indication: to evaluate larynx    Flexible laryngoscopy was performed and revealed the following:    Nasopharynx normal  left soft palate mild swelling, non obstructing  Base of tongue normal  Vallecula was clear  easy view of glottis  Epiglottis, both aryepiglottic folds and both false vocal folds were normal, without edema  Arytenoids normal  True vocal folds were fully mobile  Post cricoid area was clear.  Patent larynx    The patient tolerated the procedure well.      IMAGING/ADDITIONAL STUDIES:   CT Maxillofacial w/ C 6/22  SOFT TISSUES, AERODIGESTIVE TRACT:    Severe bony irregularity left mandibular posterior body involving the left inferior alveolar foramina with dehiscent buccal and lingual cortical margins and bony permeative change, with absent teeth in the posterior left mandibular body, mandibular osteitis condensans and bony irregularity body, dental infection, osteoradionecrosis not excluded.    There is loss of the soft tissue planes adjacent to the dehiscent left posterior mandibular body permeative lesion extending into the left  space. Decreased attenuation concerning for fluid collection and phlegmon is noted in the region of the left medial and lateral pterygoid musculature measuring approximately 3.2 x 4 x 4.2 cm AP, TR, CC. The low-attenuation fluid collection contains punctate foci of air, and extends medially displacing the left pharyngeal mucosal soft tissues medially. There is extension of the infection/inflammatory change with loss of fat planes in the left parapharyngeal, carotid, parotid, masseteric and submandibular spaces. Soft tissue fullness, with findings concerning for extension of infectious inflammatory change involves left palatine and lingual tonsils, left glossotonsillar sulcus and left lateral pharyngeal wall. Findings highly concerning for spread of infectious/inflammatory change with, developing Will's angina with abnormal fullness left pharyngeal wall, and loss of the subcutaneous soft tissue planes and left supra and infraorbital neck not excluded. There is inflammatory change with loss of the fat planes adjacent to the left subinsular and left parotid glands, with involvement of the left parotid and carotid space with extension to the left deep lobe of the parotid with narrowing of the distal left internal carotid artery.    Redemonstration abnormal soft tissue and loss of fat planes in the region of prior seen left buccal space liposarcoma, again involving left buccinator muscle, left retromolar trigone, and left parotid duct Stensen's insertion, although this may may represent phlegmon with infection, residual/recurrent tumor is not excluded.    There is abnormal soft tissue fullness infectious/inflammatory change extends to the left soft palate (200:88), uvula, and left greater than right prevertebral soft tissues with loss of intervening fat planes and into the left carotid space, there is slight narrowing of the distal left internal carotid artery (4:29), CTA or MRA may better assess.    There is loss of fat planes and soft tissue irregularity throughout the left  space, with extension to the left foramen ovale, (200:120), although this may reflect superimposed infectious/inflammatory change, tumor with perineural involvement is also not excluded.    Phlegmonous change extends throughout the left  space and buccinator musculature with extension to the left pharyngeal mucosal space, left torus tubarius, effacing the left fossa of Rosenmuller, narrowing the left oropharyngeal airway (4:35) and nasopharyngeal airway (4:41).    Loss of fat planes left pterygopalatine fossa (4:53), left parotid, left subinsular spaces phlegmon inflammatory change along left parotid gland and submandibular glands, with left platysma soft tissue thickening, this may reflect extension of infectious/inflammatory change.    LYMPH NODES:    Partially seen enlarged lymph nodes including not limited to:  Left II a node measuring 1.2 x 0.8 cm, AP by TR (4:16),  Left IIa/b node measuring 2 x 1.1 cm, AP by TR (4:18),  Clustered prominent left III nodes (4:2) measuring 1.1 x 0.88, and 1.1 x 0.72 cm AP by TR      PAROTID GLANDS: Phlegmon, inflammatory change involves left buccinator muscle and left parotid duct Stensen's insertion with loss of intervening fat planes of the left parotid space and masseter muscle, infectious, inflammatory change, recurrent tumor not excluded.    SUBMANDIBULAR GLANDS: Diffuse inflammatory change with loss of the subcutaneous soft tissue planes surrounding the left submandibular gland    VISUALIZED PARANASAL SINUSES: Unerupted left maxillary wisdom tooth extends to left maxillary sinus, left maxillary retention cyst/polyp, ethmoid, sphenoid mucosal thickening.    TYMPANOMASTOID CAVITIES: Opacification partially seen left mastoid, effacement of the left fossa of Rosenmuller and inflammatory change involving the torus tubarius (4:40).    REMAINING BONES: Degenerative change with reversal of the cervical lordosis, with partially seen disc osteophyte ridges causing multilevel canal and foraminal narrowing, with C5 ossification posterior longitudinal ligament, AP canal narrowed to 7.1 mm.    MISCELLANEOUS: Intracranial, mild volume loss, microvascular disease, no hemorrhage or midline shift. Left lens replacement. Inflammatory changes in the left occipital muscle, and premaxillary soft tissues extends to left preseptal periorbital soft tissues (200: 54, 4:50).    A/P  60M with HTN, CAD, CRISTIAN, psoriases, right facial liposarcoma undergoing radiotherapy presenting with right facial, neck swelling.   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 If in respiratory distress would require nasotracheal intubation or surgical airway  -decadron 10mg IV q8  -HOB elevation  -continuos pulse ox   -ID consult for abx regimen given likely mandibular osteomyelitis/osteoradionecrosis  -please keep ORL aware of any changes in respiratory status  -if any respiratory distress STAT page ENT, critical care, anesthesia   -will follow  -call/page with questions  -will discuss with attending and update with any further recommendations    Prabhu Graham MD  Department of Otolaryngology - Head and Neck Surgery  Adult Page #[phone]   HPI:   60M with HTN, CAD, CRISTIAN, psoriases, right facial liposarcoma s/p resection, undergoing adjuvant radiotherapy presenting with right facial, neck swelling  ORL consulted to evaluate right neck, facial swelling.    Wife at bedside. Per patient went to radiation oncologist office today. Oncology doctor concerned about increase in left face, neck swelling and recommended urgent imaging and presentation to the ER. Patient reports he started radiotherapy for left facial liposarcoma in the beginning of may. Thinks left facial swelling is decreasing since initial diagnoses. Stable over the last few weeks. Stable left neck swelling. Developed limited mouth opening about 3 weeks ago which has persisted. Has limited oral intake to liquids. Has been losing weight because of lower caloric intake from limited mouth opening. No recent changes in mouth opening. No recent voice changes. Tolerating secretions, sometimes has to spit because he cannot manipulate his mouth, but is able to swallow normally without any recent changes. No fevers, chills, nausea, vomiting. No dyspnea, able to walk 1+ blocks limited by calf pain.     ROS:   See HPI    Vital Signs Last 24 Hrs  T(C): 36.7 (22 Jun 2021 19:20), Max: 36.7 (22 Jun 2021 19:20)  T(F): 98.1 (22 Jun 2021 19:20), Max: 98.1 (22 Jun 2021 19:20)  HR: 102 (22 Jun 2021 19:20) (102 - 102)  BP: 111/73 (22 Jun 2021 19:20) (111/73 - 111/73)  BP(mean): --  RR: 16 (22 Jun 2021 19:20) (16 - 16)  SpO2: 99% (22 Jun 2021 19:20) (99% - 99%)    PHYSICAL EXAM:  Gen: NAD, A/Ox3  Breathing comfortably on RA  No stridor or stertor  No increased work of breathing   Voice: light muffled voice   Face: Left premaxillary soft tissue swelling moderate with overlying erythema skin changes. Non tender, not warm. No orbital swelling.   Eyes: PERRL, EOMI  Nose: inflamed mucosa bilaterally, edematous mucosa  Mouth: severe trismus, only a few mm of mouth opening. Trismus limiting exam. Left posterior manidble with thin mucosa no obvious bony exposure. Left posterior gingival, buccal mucosa with swelling, tenderness. No visible drainage. Visible FOM not elevated. Tongue appears mobile.   Neck: left neck moderate swelling with overlying exfoliating skin, erythema. Non tender, non fluctuant     LARYNGOSCOPY EXAM:   Verbal consent was obtained from patient prior to procedure.  Indication: to evaluate larynx    Flexible laryngoscopy was performed and revealed the following:    Nasopharynx normal  left soft palate mild swelling, non obstructing  Base of tongue normal  Vallecula was clear  easy view of glottis  Epiglottis, both aryepiglottic folds and both false vocal folds were normal, without edema  Arytenoids normal  True vocal folds were fully mobile  Post cricoid area was clear.  Patent larynx    The patient tolerated the procedure well.      IMAGING/ADDITIONAL STUDIES:   CT Maxillofacial w/ C 6/22  SOFT TISSUES, AERODIGESTIVE TRACT:    Severe bony irregularity left mandibular posterior body involving the left inferior alveolar foramina with dehiscent buccal and lingual cortical margins and bony permeative change, with absent teeth in the posterior left mandibular body, mandibular osteitis condensans and bony irregularity body, dental infection, osteoradionecrosis not excluded.    There is loss of the soft tissue planes adjacent to the dehiscent left posterior mandibular body permeative lesion extending into the left  space. Decreased attenuation concerning for fluid collection and phlegmon is noted in the region of the left medial and lateral pterygoid musculature measuring approximately 3.2 x 4 x 4.2 cm AP, TR, CC. The low-attenuation fluid collection contains punctate foci of air, and extends medially displacing the left pharyngeal mucosal soft tissues medially. There is extension of the infection/inflammatory change with loss of fat planes in the left parapharyngeal, carotid, parotid, masseteric and submandibular spaces. Soft tissue fullness, with findings concerning for extension of infectious inflammatory change involves left palatine and lingual tonsils, left glossotonsillar sulcus and left lateral pharyngeal wall. Findings highly concerning for spread of infectious/inflammatory change with, developing Will's angina with abnormal fullness left pharyngeal wall, and loss of the subcutaneous soft tissue planes and left supra and infraorbital neck not excluded. There is inflammatory change with loss of the fat planes adjacent to the left subinsular and left parotid glands, with involvement of the left parotid and carotid space with extension to the left deep lobe of the parotid with narrowing of the distal left internal carotid artery.    Redemonstration abnormal soft tissue and loss of fat planes in the region of prior seen left buccal space liposarcoma, again involving left buccinator muscle, left retromolar trigone, and left parotid duct Stensen's insertion, although this may may represent phlegmon with infection, residual/recurrent tumor is not excluded.    There is abnormal soft tissue fullness infectious/inflammatory change extends to the left soft palate (200:88), uvula, and left greater than right prevertebral soft tissues with loss of intervening fat planes and into the left carotid space, there is slight narrowing of the distal left internal carotid artery (4:29), CTA or MRA may better assess.    There is loss of fat planes and soft tissue irregularity throughout the left  space, with extension to the left foramen ovale, (200:120), although this may reflect superimposed infectious/inflammatory change, tumor with perineural involvement is also not excluded.    Phlegmonous change extends throughout the left  space and buccinator musculature with extension to the left pharyngeal mucosal space, left torus tubarius, effacing the left fossa of Rosenmuller, narrowing the left oropharyngeal airway (4:35) and nasopharyngeal airway (4:41).    Loss of fat planes left pterygopalatine fossa (4:53), left parotid, left subinsular spaces phlegmon inflammatory change along left parotid gland and submandibular glands, with left platysma soft tissue thickening, this may reflect extension of infectious/inflammatory change.    LYMPH NODES:    Partially seen enlarged lymph nodes including not limited to:  Left II a node measuring 1.2 x 0.8 cm, AP by TR (4:16),  Left IIa/b node measuring 2 x 1.1 cm, AP by TR (4:18),  Clustered prominent left III nodes (4:2) measuring 1.1 x 0.88, and 1.1 x 0.72 cm AP by TR      PAROTID GLANDS: Phlegmon, inflammatory change involves left buccinator muscle and left parotid duct Stensen's insertion with loss of intervening fat planes of the left parotid space and masseter muscle, infectious, inflammatory change, recurrent tumor not excluded.    SUBMANDIBULAR GLANDS: Diffuse inflammatory change with loss of the subcutaneous soft tissue planes surrounding the left submandibular gland    VISUALIZED PARANASAL SINUSES: Unerupted left maxillary wisdom tooth extends to left maxillary sinus, left maxillary retention cyst/polyp, ethmoid, sphenoid mucosal thickening.    TYMPANOMASTOID CAVITIES: Opacification partially seen left mastoid, effacement of the left fossa of Rosenmuller and inflammatory change involving the torus tubarius (4:40).    REMAINING BONES: Degenerative change with reversal of the cervical lordosis, with partially seen disc osteophyte ridges causing multilevel canal and foraminal narrowing, with C5 ossification posterior longitudinal ligament, AP canal narrowed to 7.1 mm.    MISCELLANEOUS: Intracranial, mild volume loss, microvascular disease, no hemorrhage or midline shift. Left lens replacement. Inflammatory changes in the left occipital muscle, and premaxillary soft tissues extends to left preseptal periorbital soft tissues (200: 54, 4:50).    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 Possible recurrence/superinfection of liposarcoma    -no acute ORL intervention  -recommend admission for IV antibiotics  -flexible laryngoscopy with normal laryngeal findings, no edema  -would be difficult intubation because of trismus. 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 Per patient went to radiation oncologist office today. Oncology doctor concerned about increase in left face, neck swelling and recommended urgent imaging and presentation to the ER. Patient reports he started radiotherapy for left facial liposarcoma in the beginning of may. Thinks left facial swelling is decreasing since initial diagnoses. Stable over the last few weeks. Stable left neck swelling. Developed limited mouth opening about 3 weeks ago which has persisted. Has limited oral intake to liquids. Has been losing weight because of lower caloric intake from limited mouth opening. No recent changes in mouth opening. No recent voice changes. Tolerating secretions, sometimes has to spit because he cannot manipulate his mouth, but is able to swallow normally without any recent changes. No fevers, chills, nausea, vomiting. No dyspnea, able to walk 1+ blocks limited by calf pain.     ROS:   See HPI    Vital Signs Last 24 Hrs  T(C): 36.7 (22 Jun 2021 19:20), Max: 36.7 (22 Jun 2021 19:20)  T(F): 98.1 (22 Jun 2021 19:20), Max: 98.1 (22 Jun 2021 19:20)  HR: 102 (22 Jun 2021 19:20) (102 - 102)  BP: 111/73 (22 Jun 2021 19:20) (111/73 - 111/73)  BP(mean): --  RR: 16 (22 Jun 2021 19:20) (16 - 16)  SpO2: 99% (22 Jun 2021 19:20) (99% - 99%)    PHYSICAL EXAM:  Gen: NAD, A/Ox3  Breathing comfortably on RA  No stridor or stertor  No increased work of breathing   Voice: light muffled voice   Face: Left premaxillary soft tissue swelling moderate with overlying erythema skin changes. Non tender, not warm. No orbital swelling.   Eyes: PERRL, EOMI  Nose: inflamed mucosa bilaterally, edematous mucosa  Mouth: severe trismus, only a few mm of mouth opening. Trismus limiting exam. Left posterior manidble with thin mucosa no obvious bony exposure. Left posterior gingival, buccal mucosa with swelling, tenderness. No visible drainage. Visible FOM not elevated. Tongue appears mobile.   Neck: left neck moderate swelling with overlying exfoliating skin, erythema. Non tender, non fluctuant     LARYNGOSCOPY EXAM:   Verbal consent was obtained from patient prior to procedure.  Indication: to evaluate larynx    Flexible laryngoscopy was performed and revealed the following:    Nasopharynx normal  left soft palate mild swelling, non obstructing  Base of tongue normal  Vallecula was clear  easy view of glottis  Epiglottis, both aryepiglottic folds and both false vocal folds were normal, without edema  Arytenoids normal  True vocal folds were fully mobile  Post cricoid area was clear.  Patent larynx    The patient tolerated the procedure well.      IMAGING/ADDITIONAL STUDIES:   CT Maxillofacial w/ C 6/22  SOFT TISSUES, AERODIGESTIVE TRACT:    Severe bony irregularity left mandibular posterior body involving the left inferior alveolar foramina with dehiscent buccal and lingual cortical margins and bony permeative change, with absent teeth in the posterior left mandibular body, mandibular osteitis condensans and bony irregularity body, dental infection, osteoradionecrosis not excluded.    There is loss of the soft tissue planes adjacent to the dehiscent left posterior mandibular body permeative lesion extending into the left  space. Decreased attenuation concerning for fluid collection and phlegmon is noted in the region of the left medial and lateral pterygoid musculature measuring approximately 3.2 x 4 x 4.2 cm AP, TR, CC. The low-attenuation fluid collection contains punctate foci of air, and extends medially displacing the left pharyngeal mucosal soft tissues medially. There is extension of the infection/inflammatory change with loss of fat planes in the left parapharyngeal, carotid, parotid, masseteric and submandibular spaces. Soft tissue fullness, with findings concerning for extension of infectious inflammatory change involves left palatine and lingual tonsils, left glossotonsillar sulcus and left lateral pharyngeal wall. Findings highly concerning for spread of infectious/inflammatory change with, developing Will's angina with abnormal fullness left pharyngeal wall, and loss of the subcutaneous soft tissue planes and left supra and infraorbital neck not excluded. There is inflammatory change with loss of the fat planes adjacent to the left subinsular and left parotid glands, with involvement of the left parotid and carotid space with extension to the left deep lobe of the parotid with narrowing of the distal left internal carotid artery.    Redemonstration abnormal soft tissue and loss of fat planes in the region of prior seen left buccal space liposarcoma, again involving left buccinator muscle, left retromolar trigone, and left parotid duct Stensen's insertion, although this may may represent phlegmon with infection, residual/recurrent tumor is not excluded.    There is abnormal soft tissue fullness infectious/inflammatory change extends to the left soft palate (200:88), uvula, and left greater than right prevertebral soft tissues with loss of intervening fat planes and into the left carotid space, there is slight narrowing of the distal left internal carotid artery (4:29), CTA or MRA may better assess.    There is loss of fat planes and soft tissue irregularity throughout the left  space, with extension to the left foramen ovale, (200:120), although this may reflect superimposed infectious/inflammatory change, tumor with perineural involvement is also not excluded.    Phlegmonous change extends throughout the left  space and buccinator musculature with extension to the left pharyngeal mucosal space, left torus tubarius, effacing the left fossa of Rosenmuller, narrowing the left oropharyngeal airway (4:35) and nasopharyngeal airway (4:41).    Loss of fat planes left pterygopalatine fossa (4:53), left parotid, left subinsular spaces phlegmon inflammatory change along left parotid gland and submandibular glands, with left platysma soft tissue thickening, this may reflect extension of infectious/inflammatory change.    LYMPH NODES:    Partially seen enlarged lymph nodes including not limited to:  Left II a node measuring 1.2 x 0.8 cm, AP by TR (4:16),  Left IIa/b node measuring 2 x 1.1 cm, AP by TR (4:18),  Clustered prominent left III nodes (4:2) measuring 1.1 x 0.88, and 1.1 x 0.72 cm AP by TR      PAROTID GLANDS: Phlegmon, inflammatory change involves left buccinator muscle and left parotid duct Stensen's insertion with loss of intervening fat planes of the left parotid space and masseter muscle, infectious, inflammatory change, recurrent tumor not excluded.    SUBMANDIBULAR GLANDS: Diffuse inflammatory change with loss of the subcutaneous soft tissue planes surrounding the left submandibular gland    VISUALIZED PARANASAL SINUSES: Unerupted left maxillary wisdom tooth extends to left maxillary sinus, left maxillary retention cyst/polyp, ethmoid, sphenoid mucosal thickening.    TYMPANOMASTOID CAVITIES: Opacification partially seen left mastoid, effacement of the left fossa of Rosenmuller and inflammatory change involving the torus tubarius (4:40).    REMAINING BONES: Degenerative change with reversal of the cervical lordosis, with partially seen disc osteophyte ridges causing multilevel canal and foraminal narrowing, with C5 ossification posterior longitudinal ligament, AP canal narrowed to 7.1 mm.    MISCELLANEOUS: Intracranial, mild volume loss, microvascular disease, no hemorrhage or midline shift. Left lens replacement. Inflammatory changes in the left occipital muscle, and premaxillary soft tissues extends to left preseptal periorbital soft tissues (200: 54, 4:50).    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 Possible recurrence/superinfection of liposarcoma    -no acute ORL intervention  -recommend admission for IV antibiotics  -flexible laryngoscopy with normal laryngeal findings, no edema  -would be difficult intubation because of trismus. 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

## 2021-06-22 NOTE — ED PROVIDER NOTE - OBJECTIVE STATEMENT
59 Y/O M w/ PMH of CAD, HTN, HLD, Liposarcoma of LT max/face recently completed radiation on 6/3 presents to ER for LT sided facial swelling. Admits to swelling, redness and pain of LT sided of face for the past week. Admits to decreased oral intake for the past week. No use of OTC medications for relief. Seen by radiation oncologist Buck Irene prescribed amoxicillin for suspected infection and CT max/face completed this morning concerning for abscess vs jackie angina. Denies fever, nausea/vomiting, dizziness, headache, chest pain, shortness of breath, palpitations, syncope, abdominal pain or dysuria.

## 2021-06-22 NOTE — ED PROVIDER NOTE - ATTENDING CONTRIBUTION TO CARE
Attending Statement: I have reviewed and agree with all pertinent clinical information, including history and physical exam and agree with treatment plan of the PA, except as noted.  60M with HTN, CAD, CRISTIAN, psoriases, right facial liposarcoma undergoing radiotherapy presenting with right facial, neck swelling x one week. Taking po abx from PMD w no improvement, had a ct today, states "have an infection on CT" sent to ED. Endorse pain, swelling and inability to open mouth over last two days. Difficulty eating and talking due to pain. no neck stiffness. no vomit.  Last radiation tx on 6-3-21   Vital signs noted. sitting up AO3. left facial check swelling, +erythema, tender to touch. talking in full sentences. spitting up . no drooling. no retractions. no work of breathing.  plan labs, abx, pain med, ENT consult, tba

## 2021-06-22 NOTE — ED PROVIDER NOTE - CLINICAL SUMMARY MEDICAL DECISION MAKING FREE TEXT BOX
61 Y/O M w/ PMH of CAD, HTN, HLD, Liposarcoma of LT max/face recently completed radiation on 6/3 presents to ER for LT sided facial swelling.   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 Infection 2/2 to radiation. Will monitor patient on floor 59 Y/O M w/ PMH of CAD, HTN, HLD, Liposarcoma of LT max/face recently completed radiation on 6/3 presents to ER for LT sided facial swelling.   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 Infection 2/2 to radiation. Will monitor patient on floor  Endorsed patient to Dr Isidro. Accepted to service

## 2021-06-22 NOTE — ED PROVIDER NOTE - PHYSICAL EXAMINATION
Vital signs reviewed.   CONSTITUTIONAL: Well-appearing; well-nourished; in no apparent distress. Non-toxic appearing.   HEAD: Normocephalic, atraumatic.  EYES: PERRL, EOM intact, conjunctiva and no sclera injection noted  ENT: normal nose; no rhinorrhea  CARD: Normal S1, S2  RESP: Normal chest excursion with respiration; breath sounds clear and equal bilaterally  ABD/GI: soft, non-distended; non-tender  EXT/MS: moves all extremities; distal pulses are normal, no pedal edema.  SKIN: Normal for age and race; warm; dry; good turgor; no apparent lesions or exudate noted.  NEURO: Awake, alert, oriented x 3, no gross deficits, CN II-XII grossly intact, no motor or sensory deficit noted.  PSYCH: Normal mood; appropriate affect. Vital signs reviewed.   CONSTITUTIONAL: Well-appearing; well-nourished; in no apparent distress. Non-toxic appearing.   HEAD: Normocephalic, atraumatic.  EYES: Normal conjunctiva and no sclera injection noted  ENT: normal nose; no rhinorrhea. LT sided max/face erythema, swelling and tenderness. Trismus. Unable to visualize oropharynx. Speaking in full sentences. No work of breathing.   CARD: Normal S1, S2  RESP: Normal chest excursion with respiration; breath sounds clear and equal bilaterally  ABD/GI: soft, non-distended; non-tender  EXT/MS: moves all extremities; distal pulses are normal, no pedal edema.  SKIN: Normal for age and race; warm; dry; good turgor; no apparent lesions or exudate noted.  NEURO: Awake, alert, oriented x 3  PSYCH: Normal mood; appropriate affect.

## 2021-06-22 NOTE — ED ADULT NURSE NOTE - CHIEF COMPLAINT QUOTE
c/o pain and swelling to left side of face/mouth. reports had ct this afternoon which showed mass/infection. is receiving radiation for liposarcoma. last treatment 6/3. left side of face appears swollen with some swelling to neck, states has been this way x 7-10 days. dr Irene radiation oncologist [phone] requesting to be called by md upon evaluation to provide further details of case.

## 2021-06-22 NOTE — ED PROVIDER NOTE - FAMILY HISTORY
Family history of coronary artery disease, father when 59     Father  Still living? Unknown  Family history of MI (myocardial infarction), Age at diagnosis: Age Unknown

## 2021-06-22 NOTE — ED PROVIDER NOTE - NS ED ROS FT
Constitutional: (-) fever  Head: Normal cephalic, Atraumatic  Eyes/ENT: (-) LT sided facial swelling/redness  Cardiovascular: (-) chest pain, (-) wheezing  Respiratory: (-) cough, (-) shortness of breath  Gastrointestinal: (-) vomiting, (-) diarrhea, (-) abdominal pain  : (-) dysuria   Musculoskeletal: (-) back pain  Integumentary: (-) rash, (-) edema  Neurological: (-)loc  Allergic/Immunologic: (-) pruritus

## 2021-06-22 NOTE — ED ADULT TRIAGE NOTE - CHIEF COMPLAINT QUOTE
c/o pain and swelling to left side of face/mouth. reports had ct this afternoon which showed infection. receiving radiation for liposarcoma. last treatment 6/3. left side of face appears swollen with some swelling to neck, states has been this way x 7-10 days. c/o pain and swelling to left side of face/mouth. reports had ct this afternoon which showed mass/infection. is receiving radiation for liposarcoma. last treatment 6/3. left side of face appears swollen with some swelling to neck, states has been this way x 7-10 days. dr Irene radiation oncologist [phone] requesting to be called by md upon evaluation to provide further details of case.

## 2021-06-22 NOTE — ED ADULT NURSE NOTE - NSIMPLEMENTINTERV_GEN_ALL_ED
Implemented All Universal Safety Interventions:  [place] to call system. Call bell, personal items and telephone within reach. Instruct patient to call for assistance. Room bathroom lighting operational. Non-slip footwear when patient is off stretcher. Physically safe environment: no spills, clutter or unnecessary equipment. Stretcher in lowest position, wheels locked, appropriate side rails in place.

## 2021-06-23 ENCOUNTER — APPOINTMENT (OUTPATIENT)
Dept: ORTHOPEDIC SURGERY | Facility: CLINIC | Age: 61
End: 2021-06-23

## 2021-06-23 DIAGNOSIS — G47.30 SLEEP APNEA, UNSPECIFIED: ICD-10-CM

## 2021-06-23 DIAGNOSIS — I10 ESSENTIAL (PRIMARY) HYPERTENSION: ICD-10-CM

## 2021-06-23 DIAGNOSIS — Z29.9 ENCOUNTER FOR PROPHYLACTIC MEASURES, UNSPECIFIED: ICD-10-CM

## 2021-06-23 DIAGNOSIS — L40.9 PSORIASIS, UNSPECIFIED: ICD-10-CM

## 2021-06-23 DIAGNOSIS — L03.211 CELLULITIS OF FACE: ICD-10-CM

## 2021-06-23 DIAGNOSIS — R93.89 ABNORMAL FINDINGS ON DIAGNOSTIC IMAGING OF OTHER SPECIFIED BODY STRUCTURES: ICD-10-CM

## 2021-06-23 DIAGNOSIS — C49.9 MALIGNANT NEOPLASM OF CONNECTIVE AND SOFT TISSUE, UNSPECIFIED: ICD-10-CM

## 2021-06-23 DIAGNOSIS — I25.10 ATHEROSCLEROTIC HEART DISEASE OF NATIVE CORONARY ARTERY WITHOUT ANGINA PECTORIS: ICD-10-CM

## 2021-06-23 LAB
ANION GAP SERPL CALC-SCNC: 16 MMOL/L — HIGH (ref 7–14)
APPEARANCE UR: CLEAR — SIGNIFICANT CHANGE UP
BACTERIA # UR AUTO: NEGATIVE — SIGNIFICANT CHANGE UP
BASE EXCESS BLDV CALC-SCNC: -1 MMOL/L — SIGNIFICANT CHANGE UP (ref -3–2)
BASE EXCESS BLDV CALC-SCNC: -1.2 MMOL/L — SIGNIFICANT CHANGE UP (ref -3–2)
BILIRUB UR-MCNC: NEGATIVE — SIGNIFICANT CHANGE UP
BLOOD GAS VENOUS - CREATININE: 0.9 MG/DL — SIGNIFICANT CHANGE UP (ref 0.5–1.3)
BLOOD GAS VENOUS - CREATININE: 0.9 MG/DL — SIGNIFICANT CHANGE UP (ref 0.5–1.3)
BLOOD GAS VENOUS COMPREHENSIVE RESULT: SIGNIFICANT CHANGE UP
BLOOD GAS VENOUS COMPREHENSIVE RESULT: SIGNIFICANT CHANGE UP
BUN SERPL-MCNC: 20 MG/DL — SIGNIFICANT CHANGE UP (ref 7–23)
CALCIUM SERPL-MCNC: 9.3 MG/DL — SIGNIFICANT CHANGE UP (ref 8.4–10.5)
CHLORIDE BLDV-SCNC: 105 MMOL/L — SIGNIFICANT CHANGE UP (ref 96–108)
CHLORIDE BLDV-SCNC: 107 MMOL/L — SIGNIFICANT CHANGE UP (ref 96–108)
CHLORIDE SERPL-SCNC: 100 MMOL/L — SIGNIFICANT CHANGE UP (ref 98–107)
CO2 SERPL-SCNC: 19 MMOL/L — LOW (ref 22–31)
COLOR SPEC: YELLOW — SIGNIFICANT CHANGE UP
CREAT SERPL-MCNC: 0.81 MG/DL — SIGNIFICANT CHANGE UP (ref 0.5–1.3)
CRP SERPL-MCNC: 186.4 MG/L — HIGH
DIFF PNL FLD: NEGATIVE — SIGNIFICANT CHANGE UP
EPI CELLS # UR: 1 /HPF — SIGNIFICANT CHANGE UP (ref 0–5)
ERYTHROCYTE [SEDIMENTATION RATE] IN BLOOD: 85 MM/HR — HIGH (ref 1–15)
GAS PNL BLDV: 133 MMOL/L — LOW (ref 136–146)
GAS PNL BLDV: 134 MMOL/L — LOW (ref 136–146)
GLUCOSE BLDV-MCNC: 109 MG/DL — HIGH (ref 70–99)
GLUCOSE BLDV-MCNC: 139 MG/DL — HIGH (ref 70–99)
GLUCOSE SERPL-MCNC: 138 MG/DL — HIGH (ref 70–99)
GLUCOSE UR QL: NEGATIVE — SIGNIFICANT CHANGE UP
HCO3 BLDV-SCNC: 21 MMOL/L — SIGNIFICANT CHANGE UP (ref 20–27)
HCO3 BLDV-SCNC: 22 MMOL/L — SIGNIFICANT CHANGE UP (ref 20–27)
HCT VFR BLD CALC: 39.4 % — SIGNIFICANT CHANGE UP (ref 39–50)
HCT VFR BLDA CALC: 43.3 % — SIGNIFICANT CHANGE UP (ref 39–51)
HCT VFR BLDA CALC: 46.8 % — SIGNIFICANT CHANGE UP (ref 39–51)
HGB BLD CALC-MCNC: 14.1 G/DL — SIGNIFICANT CHANGE UP (ref 13–17)
HGB BLD CALC-MCNC: 15.3 G/DL — SIGNIFICANT CHANGE UP (ref 13–17)
HGB BLD-MCNC: 13 G/DL — SIGNIFICANT CHANGE UP (ref 13–17)
HYALINE CASTS # UR AUTO: 0 /LPF — SIGNIFICANT CHANGE UP (ref 0–7)
KETONES UR-MCNC: ABNORMAL
LACTATE BLDV-MCNC: 2.9 MMOL/L — HIGH (ref 0.5–2)
LACTATE BLDV-MCNC: 3.2 MMOL/L — HIGH (ref 0.5–2)
LEUKOCYTE ESTERASE UR-ACNC: NEGATIVE — SIGNIFICANT CHANGE UP
MAGNESIUM SERPL-MCNC: 2.2 MG/DL — SIGNIFICANT CHANGE UP (ref 1.6–2.6)
MCHC RBC-ENTMCNC: 27.4 PG — SIGNIFICANT CHANGE UP (ref 27–34)
MCHC RBC-ENTMCNC: 33 GM/DL — SIGNIFICANT CHANGE UP (ref 32–36)
MCV RBC AUTO: 83.1 FL — SIGNIFICANT CHANGE UP (ref 80–100)
NITRITE UR-MCNC: NEGATIVE — SIGNIFICANT CHANGE UP
NRBC # BLD: 0 /100 WBCS — SIGNIFICANT CHANGE UP
NRBC # FLD: 0 K/UL — SIGNIFICANT CHANGE UP
PCO2 BLDV: 44 MMHG — SIGNIFICANT CHANGE UP (ref 41–51)
PCO2 BLDV: 45 MMHG — SIGNIFICANT CHANGE UP (ref 41–51)
PH BLDV: 7.34 — SIGNIFICANT CHANGE UP (ref 7.32–7.43)
PH BLDV: 7.36 — SIGNIFICANT CHANGE UP (ref 7.32–7.43)
PH UR: 6 — SIGNIFICANT CHANGE UP (ref 5–8)
PHOSPHATE SERPL-MCNC: 3.2 MG/DL — SIGNIFICANT CHANGE UP (ref 2.5–4.5)
PLATELET # BLD AUTO: 255 K/UL — SIGNIFICANT CHANGE UP (ref 150–400)
PO2 BLDV: <24 MMHG — LOW (ref 35–40)
PO2 BLDV: <24 MMHG — LOW (ref 35–40)
POTASSIUM BLDV-SCNC: 4.5 MMOL/L — SIGNIFICANT CHANGE UP (ref 3.4–4.5)
POTASSIUM BLDV-SCNC: 4.5 MMOL/L — SIGNIFICANT CHANGE UP (ref 3.4–4.5)
POTASSIUM SERPL-MCNC: 4.8 MMOL/L — SIGNIFICANT CHANGE UP (ref 3.5–5.3)
POTASSIUM SERPL-SCNC: 4.8 MMOL/L — SIGNIFICANT CHANGE UP (ref 3.5–5.3)
PROT UR-MCNC: ABNORMAL
RBC # BLD: 4.74 M/UL — SIGNIFICANT CHANGE UP (ref 4.2–5.8)
RBC # FLD: 13.2 % — SIGNIFICANT CHANGE UP (ref 10.3–14.5)
RBC CASTS # UR COMP ASSIST: 1 /HPF — SIGNIFICANT CHANGE UP (ref 0–4)
SAO2 % BLDV: 24.8 % — LOW (ref 60–85)
SAO2 % BLDV: 26.4 % — LOW (ref 60–85)
SARS-COV-2 RNA SPEC QL NAA+PROBE: SIGNIFICANT CHANGE UP
SODIUM SERPL-SCNC: 135 MMOL/L — SIGNIFICANT CHANGE UP (ref 135–145)
SP GR SPEC: 1.04 — HIGH (ref 1.01–1.02)
UROBILINOGEN FLD QL: SIGNIFICANT CHANGE UP
WBC # BLD: 16.68 K/UL — HIGH (ref 3.8–10.5)
WBC # FLD AUTO: 16.68 K/UL — HIGH (ref 3.8–10.5)
WBC UR QL: 1 /HPF — SIGNIFICANT CHANGE UP (ref 0–5)

## 2021-06-23 PROCEDURE — 99222 1ST HOSP IP/OBS MODERATE 55: CPT

## 2021-06-23 PROCEDURE — 12345: CPT | Mod: NC

## 2021-06-23 PROCEDURE — 70498 CT ANGIOGRAPHY NECK: CPT | Mod: 26

## 2021-06-23 PROCEDURE — 99223 1ST HOSP IP/OBS HIGH 75: CPT

## 2021-06-23 PROCEDURE — 70496 CT ANGIOGRAPHY HEAD: CPT | Mod: 26

## 2021-06-23 RX ORDER — ERTAPENEM SODIUM 1 G/1
1000 INJECTION, POWDER, LYOPHILIZED, FOR SOLUTION INTRAMUSCULAR; INTRAVENOUS EVERY 24 HOURS
Refills: 0 | Status: DISCONTINUED | OUTPATIENT
Start: 2021-06-24 | End: 2021-06-28

## 2021-06-23 RX ORDER — VANCOMYCIN HCL 1 G
1000 VIAL (EA) INTRAVENOUS EVERY 12 HOURS
Refills: 0 | Status: DISCONTINUED | OUTPATIENT
Start: 2021-06-23 | End: 2021-06-23

## 2021-06-23 RX ORDER — RISANKIZUMAB-RZAA 150 MG/ML
0 INJECTION SUBCUTANEOUS
Qty: 0 | Refills: 0 | DISCHARGE

## 2021-06-23 RX ORDER — MORPHINE SULFATE 50 MG/1
4 CAPSULE, EXTENDED RELEASE ORAL ONCE
Refills: 0 | Status: DISCONTINUED | OUTPATIENT
Start: 2021-06-23 | End: 2021-06-23

## 2021-06-23 RX ORDER — SODIUM CHLORIDE 9 MG/ML
1000 INJECTION INTRAMUSCULAR; INTRAVENOUS; SUBCUTANEOUS
Refills: 0 | Status: DISCONTINUED | OUTPATIENT
Start: 2021-06-23 | End: 2021-06-24

## 2021-06-23 RX ORDER — ERTAPENEM SODIUM 1 G/1
INJECTION, POWDER, LYOPHILIZED, FOR SOLUTION INTRAMUSCULAR; INTRAVENOUS
Refills: 0 | Status: DISCONTINUED | OUTPATIENT
Start: 2021-06-23 | End: 2021-06-28

## 2021-06-23 RX ORDER — ATORVASTATIN CALCIUM 80 MG/1
80 TABLET, FILM COATED ORAL AT BEDTIME
Refills: 0 | Status: DISCONTINUED | OUTPATIENT
Start: 2021-06-23 | End: 2021-06-28

## 2021-06-23 RX ORDER — LOSARTAN POTASSIUM 100 MG/1
100 TABLET, FILM COATED ORAL DAILY
Refills: 0 | Status: DISCONTINUED | OUTPATIENT
Start: 2021-06-23 | End: 2021-06-28

## 2021-06-23 RX ORDER — CEFEPIME 1 G/1
2000 INJECTION, POWDER, FOR SOLUTION INTRAMUSCULAR; INTRAVENOUS EVERY 12 HOURS
Refills: 0 | Status: DISCONTINUED | OUTPATIENT
Start: 2021-06-23 | End: 2021-06-23

## 2021-06-23 RX ORDER — DEXAMETHASONE 0.5 MG/5ML
10 ELIXIR ORAL EVERY 8 HOURS
Refills: 0 | Status: DISCONTINUED | OUTPATIENT
Start: 2021-06-23 | End: 2021-06-25

## 2021-06-23 RX ORDER — MORPHINE SULFATE 50 MG/1
1 CAPSULE, EXTENDED RELEASE ORAL ONCE
Refills: 0 | Status: DISCONTINUED | OUTPATIENT
Start: 2021-06-23 | End: 2021-06-23

## 2021-06-23 RX ORDER — ACETAMINOPHEN 500 MG
650 TABLET ORAL EVERY 6 HOURS
Refills: 0 | Status: DISCONTINUED | OUTPATIENT
Start: 2021-06-23 | End: 2021-06-28

## 2021-06-23 RX ORDER — ENOXAPARIN SODIUM 100 MG/ML
40 INJECTION SUBCUTANEOUS DAILY
Refills: 0 | Status: DISCONTINUED | OUTPATIENT
Start: 2021-06-23 | End: 2021-06-28

## 2021-06-23 RX ORDER — ERTAPENEM SODIUM 1 G/1
1000 INJECTION, POWDER, LYOPHILIZED, FOR SOLUTION INTRAMUSCULAR; INTRAVENOUS ONCE
Refills: 0 | Status: COMPLETED | OUTPATIENT
Start: 2021-06-23 | End: 2021-06-23

## 2021-06-23 RX ORDER — ASPIRIN/CALCIUM CARB/MAGNESIUM 324 MG
81 TABLET ORAL DAILY
Refills: 0 | Status: DISCONTINUED | OUTPATIENT
Start: 2021-06-23 | End: 2021-06-28

## 2021-06-23 RX ADMIN — Medication 102 MILLIGRAM(S): at 07:28

## 2021-06-23 RX ADMIN — Medication 250 MILLIGRAM(S): at 00:06

## 2021-06-23 RX ADMIN — ATORVASTATIN CALCIUM 80 MILLIGRAM(S): 80 TABLET, FILM COATED ORAL at 22:30

## 2021-06-23 RX ADMIN — MORPHINE SULFATE 1 MILLIGRAM(S): 50 CAPSULE, EXTENDED RELEASE ORAL at 09:58

## 2021-06-23 RX ADMIN — Medication 102 MILLIGRAM(S): at 16:03

## 2021-06-23 RX ADMIN — LOSARTAN POTASSIUM 100 MILLIGRAM(S): 100 TABLET, FILM COATED ORAL at 09:42

## 2021-06-23 RX ADMIN — MORPHINE SULFATE 4 MILLIGRAM(S): 50 CAPSULE, EXTENDED RELEASE ORAL at 00:33

## 2021-06-23 RX ADMIN — ERTAPENEM SODIUM 120 MILLIGRAM(S): 1 INJECTION, POWDER, LYOPHILIZED, FOR SOLUTION INTRAMUSCULAR; INTRAVENOUS at 11:56

## 2021-06-23 RX ADMIN — ENOXAPARIN SODIUM 40 MILLIGRAM(S): 100 INJECTION SUBCUTANEOUS at 09:42

## 2021-06-23 RX ADMIN — Medication 81 MILLIGRAM(S): at 09:42

## 2021-06-23 RX ADMIN — SODIUM CHLORIDE 75 MILLILITER(S): 9 INJECTION INTRAMUSCULAR; INTRAVENOUS; SUBCUTANEOUS at 04:07

## 2021-06-23 RX ADMIN — MORPHINE SULFATE 1 MILLIGRAM(S): 50 CAPSULE, EXTENDED RELEASE ORAL at 09:43

## 2021-06-23 RX ADMIN — SODIUM CHLORIDE 1000 MILLILITER(S): 9 INJECTION, SOLUTION INTRAVENOUS at 01:23

## 2021-06-23 NOTE — H&P ADULT - PROBLEM SELECTOR PLAN 3
S/p resection in January followed by radiation in May. Possible recurrence given CT findings. F/u further ENT recs and rad onc outpatient

## 2021-06-23 NOTE — PROGRESS NOTE ADULT - SUBJECTIVE AND OBJECTIVE BOX
Patient is a 60y old  Male who presents with a chief complaint of L facial swelling/pain (2021 10:46)      SUBJECTIVE / OVERNIGHT EVENTS: Pt states his oral/facial pain is somewhat improved since yesterday. He is able to open his jaws slightly more. States he has been able to take a liquid diet (including soups) at home without coughing/choking, though +odynophagia.    MEDICATIONS  (STANDING):  aspirin enteric coated 81 milliGRAM(s) Oral daily  atorvastatin 80 milliGRAM(s) Oral at bedtime  dexAMETHasone  IVPB 10 milliGRAM(s) IV Intermittent every 8 hours  enoxaparin Injectable 40 milliGRAM(s) SubCutaneous daily  ertapenem  IVPB      ertapenem  IVPB 1000 milliGRAM(s) IV Intermittent once  losartan 100 milliGRAM(s) Oral daily  sodium chloride 0.9%. 1000 milliLiter(s) (75 mL/Hr) IV Continuous <Continuous>    MEDICATIONS  (PRN):  acetaminophen   Tablet .. 650 milliGRAM(s) Oral every 6 hours PRN Mild Pain (1 - 3)      CAPILLARY BLOOD GLUCOSE        I&O's Summary      PHYSICAL EXAM:  Vital Signs Last 24 Hrs  T(C): 36.8 (2021 09:15), Max: 37.5 (2021 01:24)  T(F): 98.2 (2021 09:15), Max: 99.5 (2021 01:24)  HR: 69 (2021 09:15) (69 - 102)  BP: 113/67 (2021 09:15) (103/64 - 125/60)  BP(mean): --  RR: 17 (2021 09:15) (16 - 18)  SpO2: 100% (2021 09:15) (97% - 100%)  CONSTITUTIONAL: NAD, well-developed, well-groomed  EYES: PERRLA; conjunctiva and sclera clear  ENMT: +left facial swelling; limited ability to open mouth  RESPIRATORY: Normal respiratory effort; lungs are clear to auscultation bilaterally  CARDIOVASCULAR: Regular rate and rhythm, normal S1 and S2, no murmur/rub/gallop; No lower extremity edema; Peripheral pulses are 2+ bilaterally  ABDOMEN: Nontender to palpation, normoactive bowel sounds, no rebound/guarding; No hepatosplenomegaly    LABS:                        13.0   16.68 )-----------( 255      ( 2021 06:14 )             39.4         135  |  100  |  20  ----------------------------<  138<H>  4.8   |  19<L>  |  0.81    Ca    9.3      2021 06:14  Phos  3.2       Mg     2.2         TPro  8.5<H>  /  Alb  3.1<L>  /  TBili  0.7  /  DBili  x   /  AST  70<H>  /  ALT  85<H>  /  AlkPhos  140<H>      PT/INR - ( 2021 22:38 )   PT: 15.6 sec;   INR: 1.38 ratio         PTT - ( 2021 22:38 )  PTT:32.1 sec      Urinalysis Basic - ( 2021 05:06 )    Color: Yellow / Appearance: Clear / S.036 / pH: x  Gluc: x / Ketone: Small  / Bili: Negative / Urobili: <2 mg/dL   Blood: x / Protein: 30 mg/dL / Nitrite: Negative   Leuk Esterase: Negative / RBC: 1 /HPF / WBC 1 /HPF   Sq Epi: x / Non Sq Epi: 1 /HPF / Bacteria: Negative          RADIOLOGY & ADDITIONAL TESTS:  Results Reviewed:   Imaging Personally Reviewed:  Electrocardiogram Personally Reviewed:    COORDINATION OF CARE:  Care Discussed with Consultants/Other Providers [Y/N]:  Prior or Outpatient Records Reviewed [Y/N]:

## 2021-06-23 NOTE — H&P ADULT - PROBLEM SELECTOR PROBLEM 4
Coronary artery disease involving native heart, unspecified vessel or lesion type, unspecified whether angina present

## 2021-06-23 NOTE — H&P ADULT - NSHPPHYSICALEXAM_GEN_ALL_CORE
PHYSICAL EXAM:  Vital Signs Last 24 Hrs  T(C): 37.5 (06-23-21 @ 01:24)  T(F): 99.5 (06-23-21 @ 01:24), Max: 99.5 (06-23-21 @ 01:24)  HR: 94 (06-23-21 @ 01:24) (89 - 102)  BP: 125/60 (06-23-21 @ 01:24)  BP(mean): --  RR: 16 (06-23-21 @ 01:24) (16 - 16)  SpO2: 100% (06-23-21 @ 01:24) (98% - 100%)  Wt(kg): --    Constitutional: NAD, awake and alert, well developed  EYES: EOMI, conjunctiva clear  ENT:  Normal Hearing, no tonsillar exudates, L facial TTP on left check/mandible/submandible, swelling, redness. +trismus, no drooling, no stridor. Able to open jaw with about 1cm gap between teeth.   Neck: Soft and supple , no thyromegaly   Respiratory: Breath sounds are clear bilaterally, No wheezing, rales or rhonchi, no tachypnea, no accessory muscle use  Cardiovascular: S1 and S2, regular rate and rhythm, no Murmurs, gallops or rubs, no JVD, no leg edema  Gastrointestinal: Bowel Sounds present, soft, nontender, nondistended, no guarding, no rebound  Extremities: No cyanosis or clubbing; warm to touch  Neurological: No focal deficits, CN II-XII intact bilaterally, sensation to light touch intact in all extremities. Pupils are equally reactive to light and symmetrical in size.   Musculoskeletal: 5/5 strength b/l upper and lower extremities; no joint swelling.  Skin: No rashes, no ulcerations, erythema and swelling to L mandible/submandibular space

## 2021-06-23 NOTE — H&P ADULT - PROBLEM SELECTOR PLAN 2
CT maxillofacial report: "Soft tissue fullness and inflammatory change extends with loss of fat planes extends to the left soft palate (200:88), uvula, and left greater than right prevertebral soft tissues with loss of intervening fat planes and left carotid space, with narrowing of distal left internal carotid artery (4:29), CTA or MRA may better assess."    -discussed with radiology and choice of study would be CTA H/N (ordered). Will time 3PM. Would want to space between last IV contrast study and place on [facility] until then CT maxillofacial report: "Soft tissue fullness and inflammatory change extends with loss of fat planes extends to the left soft palate (200:88), uvula, and left greater than right prevertebral soft tissues with loss of intervening fat planes and left carotid space, with narrowing of distal left internal carotid artery (4:29), CTA or MRA may better assess."    -discussed with radiology and choice of study would be CTA H/N (ordered). Will time 3PM. Would want to space between last IV contrast study and place on [facility] until then. Depending on findings consider vascular consult  -no neuro deficits

## 2021-06-23 NOTE — PHARMACOTHERAPY INTERVENTION NOTE - COMMENTS
Medication history is complete. Medication list updated in Outpatient Medication Record (OMR). Medication history obtained from patient's outpatient pharmacy and confirmed with patient's list on phone. Please call spectra [phone] if you have any questions.

## 2021-06-23 NOTE — H&P ADULT - NSICDXFAMILYHX_GEN_ALL_CORE_FT
FAMILY HISTORY:  Family history of coronary artery disease, father when 59    Father  Still living? Unknown  Family history of MI (myocardial infarction), Age at diagnosis: Age Unknown

## 2021-06-23 NOTE — SWALLOW BEDSIDE ASSESSMENT ADULT - SWALLOW EVAL: DIAGNOSIS
1- moderate-severe oral dysphagia given puree, nectar thick liquid and thin liquid textures marked by reduced oral aperture resulting in reduced stripping of bolus from utensil. trace-mild lingual stasis remained post swallow given puree textures which reduces with nectar thick and thin liquid washes. 2- mild pharyngeal dysphagia given puree, nectar thick and thin liquid textures marked by mildly delayed swallow trigger and mildly reduced hyolaryngeal excursion without any overt s/s of penetration/aspiration. pt endorses odynophagia with all PO textures. 3- given severity of reduced oral aperture, Yankauer suctioning was provided intraorally to ensure adequate oral clearance, which did not reveal evidence of oral stasis.

## 2021-06-23 NOTE — H&P ADULT - HISTORY OF PRESENT ILLNESS
60M with PMHx HTN, CAD s/p CABG, CRISTIAN on CPAP, psoriasis (skyrizi), left facial liposarcoma s/p resection, undergoing adjuvant radiotherapy starting in May presenting with worsening left facial pain and swelling. Patient has been following with rad onc for RT starting in May and has had progressively worsening L sided facial swelling more so over the last ten days. Pain is burning, 9/10 and located to left mandible/submandibular area. Pain is worse with opening mouth and limits his ability to eat. Worse with palpation as well. Has muffled his voice but able to speak in full sentences. He states he has lost 50 lbs in the last few months. Denies trouble tolerating secretions or liquids. Denies fevers, chills, abdominal pain, SOB, CP, hemoptysis, vomiting, diarrhea. Pt had CT done today with rad onc and was told to come into the hospital.    In ED leukocytosis, tachycardic, given IV cefepime and vanc for likely OM.

## 2021-06-23 NOTE — H&P ADULT - ASSESSMENT
60M with PMHx HTN, CAD s/p CABG, CRISTIAN on CPAP, psoriasis (skyrizi), left facial liposarcoma s/p resection, undergoing adjuvant radiotherapy starting in May presenting with worsening left facial pain and swelling.

## 2021-06-23 NOTE — H&P ADULT - PROBLEM SELECTOR PLAN 1
Worsening facial pain/swelling over 10 days. +trismus, tolerating secretions and liquids. Difficulty swallowing solids. Discussed case with ENT and no airway edema on laryngoscope but rec'd IV dex 10mg q8hrs for now with likely soon taper. Likely will need MRI eventually and possible intervention if recurrence of tumor is a possibility   CT maxillofacial reviewed: cannot r/o osteomyelitis, osteoradionecrosis or jackie angina. Phlegmon on CT  Likely recurrence or superinfection of prior liposarcoma vs radiation related inflammation/infection  -per ENT no intervention at this time but may need in future vs MRI  -IV cefepime 2g q12 and vanco 1g q12 and check trough before fourth dose  -IV dexamethasone 10mg q8hrs - f/u ENT recs regarding taper  -flexible laryngoscopy with normal laryngeal findings, no edema  -would be difficult intubation because of trismus. If in respiratory distress would require nasotracheal intubation or surgical airway  -HOB elevation  -please keep ORL aware of any changes in respiratory status  -if any respiratory distress STAT page ENT, critical care, anesthesia   -ID consult in AM  -ESR and CRP added on  -eventual MRI to r/o OM and recurrence of tumor. Would prioritize CTA to evaluate carotid first while spacing between prior CT scan as this would be second contrast load  -NPO/IVF/S&S eval Worsening facial pain/swelling over 10 days. +trismus, tolerating secretions and liquids. Difficulty swallowing solids. Discussed case with ENT and no airway edema on laryngoscope but rec'd IV dex 10mg q8hrs for now with likely soon taper. Likely will need MRI eventually and possible intervention if recurrence of tumor is a possibility   CT maxillofacial reviewed: cannot r/o osteomyelitis, osteoradionecrosis or jackie angina. Phlegmon on CT  Likely recurrence or superinfection of prior liposarcoma vs radiation related inflammation/infection  -per ENT no intervention at this time but may need in future vs MRI  -IV cefepime 2g q12 and vanco 1g q12 and check trough before fourth dose  -IV dexamethasone 10mg q8hrs - f/u ENT recs regarding taper  -flexible laryngoscopy with normal laryngeal findings, no edema  -would be difficult intubation because of trismus. If in respiratory distress would require nasotracheal intubation or surgical airway  -HOB elevation, aspiration precautions  -please keep ORL aware of any changes in respiratory status  -if any respiratory distress STAT page ENT, critical care, anesthesia   -ID consult in AM  -ESR and CRP added on  -eventual MRI to r/o OM and recurrence of tumor. Would prioritize CTA to evaluate carotid first while spacing between prior CT scan as this would be second contrast load  -NPO/IVF/S&S eval

## 2021-06-23 NOTE — H&P ADULT - NSHPREVIEWOFSYSTEMS_GEN_ALL_CORE
ROS:    Constitutional: [ ] fevers [ ] chills   HEENT: [ ] dry eyes [ ] eye irritation [x] left facial pain/swelling [x] trismus [x] decreased PO  CV: [ ] chest pain [ ] orthopnea [ ] palpitations [ ] murmur  Resp: [ ] cough [ ] shortness of breath [ ] dyspnea [ ] wheezing  GI: [ ] nausea [ ] vomiting [ ] diarrhea [ ] constipation [ ] abd pain [ ] dysphagia   : [ ] dysuria [ ] nocturia [ ] hematuria [ ] increased urinary frequency  Musculoskeletal: [ ] back pain [ ] myalgias [ ] arthralgias [ ] fracture  Skin: [ ] rash [ ] itch  Neurological: [ ] headache [ ] dizziness [ ] syncope [ ] weakness   Psychiatric: [ ] anxiety [ ] depression  Endocrine: [ ] diabetes [ ] thyroid problem  Hematologic/Lymphatic: [ ] anemia [ ] bleeding problem  Allergic/Immunologic: [ ] itchy eyes [ ] nasal discharge   [x ] All other systems negative

## 2021-06-23 NOTE — PROGRESS NOTE ADULT - SUBJECTIVE AND OBJECTIVE BOX
patient says pain and swelling slightly improved. still has trismus     Vital Signs Last 24 Hrs  T(C): 36.7 (23 Jun 2021 17:46), Max: 37.5 (23 Jun 2021 01:24)  T(F): 98 (23 Jun 2021 17:46), Max: 99.5 (23 Jun 2021 01:24)  HR: 63 (23 Jun 2021 17:46) (56 - 94)  BP: 100/61 (23 Jun 2021 17:46) (100/61 - 160/61)  BP(mean): --  RR: 17 (23 Jun 2021 17:46) (16 - 18)  SpO2: 100% (23 Jun 2021 17:46) (96% - 100%)    PHYSICAL EXAM:  Gen: NAD, A/Ox3  Breathing comfortably on RA  No stridor or stertor  No increased work of breathing   Voice: light muffled voice   Face: Left premaxillary soft tissue swelling moderate with overlying erythema skin changes. Non tender, not warm. No orbital swelling.   Eyes: PERRL, EOMI  Nose: inflamed mucosa bilaterally, edematous mucosa  Mouth: severe trismus, only a few mm of mouth opening. Trismus limiting exam. Left posterior manidble with thin mucosa no obvious bony exposure. Left posterior gingival, buccal mucosa with swelling, tenderness. No visible drainage. Visible FOM not elevated. Tongue appears mobile.   Neck: left neck moderate swelling with overlying exfoliating skin, erythema. 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 Possible recurrence/superinfection of liposarcoma  -no acute ORL intervention  - iv abx per ID recs. to get PICC line.   -flexible laryngoscopy with normal laryngeal findings, no edema  -would be difficult intubation because of trismus. If in respiratory distress would require nasotracheal intubation or surgical airway  -HOB elevation  -continuos pulse ox   -please keep ORL aware of any changes in respiratory status  -if any respiratory distress STAT page ENT, critical care, anesthesia   -will follow  -call/page with questions  -will discuss with attending and update with any further recommendations

## 2021-06-23 NOTE — SWALLOW BEDSIDE ASSESSMENT ADULT - COMMENTS
Pt was alert and cooperative for a clinical assessment of swallow function this AM. Per charting, pt is a '60M with PMHx HTN, CAD s/p CABG, CRISTIAN on CPAP, psoriasis (skyrizi), left facial liposarcoma s/p resection, undergoing adjuvant radiotherapy starting in May presenting with worsening left facial pain and swelling." Recent CTA of neck revealed "1. Mild long segment luminal narrowing of the left internal carotid artery without occlusion secondary to mass effect generated upon the left carotid sheath by the previously noted left-sided /parapharyngeal space phlegmon. 2. Redemonstration of lytic destructive changes involving the left posterior mandibular body for which osteomyelitis, osteonecrosis, or osteoradionecrosis are considerations." CXR revealed "Clear lungs."    At the time of today's assessment, the pt presents with left facial swelling, consistent with H&P. Pt reports minimal PO intake with subsequent 50lb weight loss over the course of six months secondary to difficulty opening/closing his jaw as well as significant odynophagia of the left tongue, cheek and pharynx upon swallowing.     Oral-facial examination revealed  the abovementioned left facial swelling. Significantly reduced oral aperture is noted with consideration of ?trismus. Assessment of labio-lingual strength and ROM were limited due to significantly reduced oral aperture.
Attempted to see pt for clinical assessment of swallow function this AM, at which time pt was off the unit at MRI. This dept to continue to f/u as schedule permits for clinical assessment of swallow function. [facility] RN made aware.

## 2021-06-23 NOTE — SWALLOW BEDSIDE ASSESSMENT ADULT - ASR SWALLOW REFERRAL
RD; to ensure adequate caloric intake given severity of reduced oral aperture as well as reported weight loss. ENT; formal assessment of trismus and to assess etiology of pt reported odynophagia/Registered Dietitian/ENT

## 2021-06-23 NOTE — H&P ADULT - NSICDXPASTMEDICALHX_GEN_ALL_CORE_FT
PAST MEDICAL HISTORY:  CAD (coronary artery disease)     Cataract left    Cervical radiculopathy     Cheek mass     HTN (hypertension)     Obesity     Other specified soft tissue disorders     Psoriasis     Sleep apnea uses CPAP

## 2021-06-23 NOTE — CHART NOTE - NSCHARTNOTEFT_GEN_A_CORE
ENT called to discuss finding on CT imaging of "mild long segment luminal narrowing of the left internal carotid artery without occlusion secondary to mass effect generated upon the left carotid sheath by the previously noted left-sided /parapharyngeal space phlegmon." ENT team to follow up today with further recommendations.

## 2021-06-23 NOTE — PROGRESS NOTE ADULT - PROBLEM SELECTOR PLAN 1
Given history of malignancy with RT, also concern for possible underlying OM.  -per ENT no intervention at this time but may need in future vs MRI  -IV cefepime 2g q12 and vanco 1g q12 and check trough before fourth dose; f/u ID consult  -IV dexamethasone 10mg q8hrs, will f/u ENT recs regarding taper  -HOB elevation, aspiration precautions  -please keep ORL aware of any changes in respiratory status  -if any respiratory distress STAT page ENT, critical care, anesthesia   -NPO/IVF/S&S eval

## 2021-06-23 NOTE — CONSULT NOTE ADULT - ASSESSMENT
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, s/p RT, last 6/3/21 p/w L facial swelling, pain, trismus and dysphagia for about 2 weeks  afebrile, WBC: 16, ESR: 85, CRP: 184  CT: Bony irregularity, dehiscence left mandibular posterior body involving left inferior alveolar foramina with permeative change, which could be osteitis condensans, osteomyelitis, osteoradionecrosis   R  phlegmon, L pterygoid fluid collection, narrowing nasopharyngeal and oropharyngeal airways, L palatine tonsil phlegmon, ?developing Jackie's angina, soft tissue fullness in prior liposarcoma region which could be infectious/inflammatory vs recurrent tumor    recent resection of facial liposarcoma 1/14 s/p RT now with multiple phlegmons and fluid collections also mandibular dehiscence and irregularity, ?osteomyelitis and jackie's angina vs inflammatory changes and recurrent tumor or osteonecrosis   leukocytosis, elevated ESR: 85, CRP: 184    * check MRSA/MSSA PCR  * f/u the blood and urine cx  * DC vanco  * switch cefepime to ertapenem 1 qd  * will need a PICC line and a 6 week course     The above assessment and plan was discussed with the primary team    Halima Erwin MD  Pager [phone]  After 5pm and on weekends call [phone]  * f/u with ENT

## 2021-06-23 NOTE — H&P ADULT - NSHPLABSRESULTS_GEN_ALL_CORE
I have personally reviewed this patient's labs below:                        14.3   16.41 )-----------( 297      ( 22 Jun 2021 22:38 )             43.8     06-22-21 @ 22:38    133<L>  |  98  |  27<H>             --------------------------< 101<H>     4.6  |  15<L>  | 0.83    eGFR AA: 110  eGFR N-AA: 95    Calcium: 9.3  Phosphorus: --  Magnesium: --    AST: 70<H>    ALT: 85<H>  AlkPhos: 140<H>  Protein: 8.5<H>  Albumin: 3.1<L>  TBili: 0.7  D-Bili: --    VBG - ( 23 Jun 2021 00:11 )  pH: 7.34  /  pCO2: 45    /  pO2: <24   / HCO3: 21    / Base Excess: -1.2  /  SvO2: 24.8  / Lactate: 3.2            EKG ordered and pending    I have personally reviewed this patient's CXR and my independent interpretation is clear lungs    Imaging reviewed below:  CT maxillofacial with IV contrast  IMPRESSION:    Bony irregularity, dehiscence left mandibular posterior body involving left inferior alveolar foramina with permeative change, absent left mandibular molar teeth, osteitis condensans, osteomyelitis, osteoradionecrosis not excluded.    Inflammatory phlegmon right , submandibular, parapharyngeal, carotid, parotid, spaces 3.2 x 4 x 4.2 cm AP, TR, CC low-attenuation fluid collection involving left medial and lateral pterygoid with narrowed  nasopharyngeal and oropharyngeal airways (4:32). Phlegmon with inflammatory change involves left palatine tonsils, glossotonsillar sulcus, left lateral lateral pharyngeal wall, and saphenous tissues left supra and infrahyoid neck developing Will's angina not excluded.    Soft tissue fullness and inflammatory change extends with loss of fat planes extends to the left soft palate (200:88), uvula, and left greater than right prevertebral soft tissues with loss of intervening fat planes and left carotid space, with narrowing of distal left internal carotid artery (4:29), CTA or MRA may better assess.    Soft tissue fullness in prior region of left buccal space liposarcoma, involving left buccinator muscle and left retromolar trigone,  left parotid duct Stensen's insertion, left  space extending to left foramen ovale, (200:120), with loss of fat planes in the left pterygopalatine fossa, although this may reflect infectious/inflammatory change, recurrent tumor with perineural involvement is also not excluded.

## 2021-06-24 LAB
ANION GAP SERPL CALC-SCNC: 10 MMOL/L — SIGNIFICANT CHANGE UP (ref 7–14)
BUN SERPL-MCNC: 21 MG/DL — SIGNIFICANT CHANGE UP (ref 7–23)
CALCIUM SERPL-MCNC: 7 MG/DL — LOW (ref 8.4–10.5)
CHLORIDE SERPL-SCNC: 113 MMOL/L — HIGH (ref 98–107)
CO2 SERPL-SCNC: 17 MMOL/L — LOW (ref 22–31)
COVID-19 SPIKE DOMAIN AB INTERP: POSITIVE
COVID-19 SPIKE DOMAIN ANTIBODY RESULT: >250 U/ML — HIGH
CREAT SERPL-MCNC: 0.59 MG/DL — SIGNIFICANT CHANGE UP (ref 0.5–1.3)
CULTURE RESULTS: NO GROWTH — SIGNIFICANT CHANGE UP
GLUCOSE SERPL-MCNC: 119 MG/DL — HIGH (ref 70–99)
HCT VFR BLD CALC: 29.7 % — LOW (ref 39–50)
HCV AB S/CO SERPL IA: 0.15 S/CO — SIGNIFICANT CHANGE UP (ref 0–0.99)
HCV AB SERPL-IMP: SIGNIFICANT CHANGE UP
HGB BLD-MCNC: 9.7 G/DL — LOW (ref 13–17)
MAGNESIUM SERPL-MCNC: 1.9 MG/DL — SIGNIFICANT CHANGE UP (ref 1.6–2.6)
MCHC RBC-ENTMCNC: 27.9 PG — SIGNIFICANT CHANGE UP (ref 27–34)
MCHC RBC-ENTMCNC: 32.7 GM/DL — SIGNIFICANT CHANGE UP (ref 32–36)
MCV RBC AUTO: 85.3 FL — SIGNIFICANT CHANGE UP (ref 80–100)
NRBC # BLD: 0 /100 WBCS — SIGNIFICANT CHANGE UP
NRBC # FLD: 0 K/UL — SIGNIFICANT CHANGE UP
PHOSPHATE SERPL-MCNC: 2.3 MG/DL — LOW (ref 2.5–4.5)
PLATELET # BLD AUTO: 259 K/UL — SIGNIFICANT CHANGE UP (ref 150–400)
POTASSIUM SERPL-MCNC: 3.4 MMOL/L — LOW (ref 3.5–5.3)
POTASSIUM SERPL-SCNC: 3.4 MMOL/L — LOW (ref 3.5–5.3)
RBC # BLD: 3.48 M/UL — LOW (ref 4.2–5.8)
RBC # FLD: 13.4 % — SIGNIFICANT CHANGE UP (ref 10.3–14.5)
SARS-COV-2 IGG+IGM SERPL QL IA: >250 U/ML — HIGH
SARS-COV-2 IGG+IGM SERPL QL IA: POSITIVE
SODIUM SERPL-SCNC: 140 MMOL/L — SIGNIFICANT CHANGE UP (ref 135–145)
SPECIMEN SOURCE: SIGNIFICANT CHANGE UP
WBC # BLD: 17.62 K/UL — HIGH (ref 3.8–10.5)
WBC # FLD AUTO: 17.62 K/UL — HIGH (ref 3.8–10.5)

## 2021-06-24 PROCEDURE — 99232 SBSQ HOSP IP/OBS MODERATE 35: CPT

## 2021-06-24 PROCEDURE — 99233 SBSQ HOSP IP/OBS HIGH 50: CPT

## 2021-06-24 RX ORDER — DIPHENHYDRAMINE HYDROCHLORIDE AND LIDOCAINE HYDROCHLORIDE AND ALUMINUM HYDROXIDE AND MAGNESIUM HYDRO
4 KIT
Refills: 0 | Status: DISCONTINUED | OUTPATIENT
Start: 2021-06-24 | End: 2021-06-28

## 2021-06-24 RX ORDER — MORPHINE SULFATE 50 MG/1
2 CAPSULE, EXTENDED RELEASE ORAL ONCE
Refills: 0 | Status: DISCONTINUED | OUTPATIENT
Start: 2021-06-24 | End: 2021-06-24

## 2021-06-24 RX ADMIN — MORPHINE SULFATE 2 MILLIGRAM(S): 50 CAPSULE, EXTENDED RELEASE ORAL at 22:10

## 2021-06-24 RX ADMIN — DIPHENHYDRAMINE HYDROCHLORIDE AND LIDOCAINE HYDROCHLORIDE AND ALUMINUM HYDROXIDE AND MAGNESIUM HYDRO 4 MILLILITER(S): KIT at 16:57

## 2021-06-24 RX ADMIN — ERTAPENEM SODIUM 120 MILLIGRAM(S): 1 INJECTION, POWDER, LYOPHILIZED, FOR SOLUTION INTRAMUSCULAR; INTRAVENOUS at 11:34

## 2021-06-24 RX ADMIN — Medication 102 MILLIGRAM(S): at 01:11

## 2021-06-24 RX ADMIN — Medication 81 MILLIGRAM(S): at 11:35

## 2021-06-24 RX ADMIN — Medication 102 MILLIGRAM(S): at 22:40

## 2021-06-24 RX ADMIN — ENOXAPARIN SODIUM 40 MILLIGRAM(S): 100 INJECTION SUBCUTANEOUS at 11:35

## 2021-06-24 RX ADMIN — Medication 102 MILLIGRAM(S): at 12:42

## 2021-06-24 RX ADMIN — MORPHINE SULFATE 2 MILLIGRAM(S): 50 CAPSULE, EXTENDED RELEASE ORAL at 21:54

## 2021-06-24 RX ADMIN — LOSARTAN POTASSIUM 100 MILLIGRAM(S): 100 TABLET, FILM COATED ORAL at 05:47

## 2021-06-24 RX ADMIN — DIPHENHYDRAMINE HYDROCHLORIDE AND LIDOCAINE HYDROCHLORIDE AND ALUMINUM HYDROXIDE AND MAGNESIUM HYDRO 4 MILLILITER(S): KIT at 14:14

## 2021-06-24 NOTE — PROGRESS NOTE ADULT - PROBLEM SELECTOR PLAN 8
Lovenox 40mg qd  Dispo: will need PICC prior to dc home     Discussed with patient, ACP Zev Lovenox 40mg qd  Dispo: will need PICC prior to dc home     Discussed with patient, ACP Zev  Spoke with patient's daughter Richelle [phone] and updated

## 2021-06-24 NOTE — PROGRESS NOTE ADULT - SUBJECTIVE AND OBJECTIVE BOX
Follow Up:  jackie's angina, mandibular osteo    Interval History: pt afebrile, , slightly better    ROS:      All other systems negative    Constitutional: no fever, no chills  ENT:  L face and neck swelling, difficulty opening mouth and swallowing  Cardiovascular:  no chest pain, no palpitation  Respiratory:  no SOB, no cough  GI:  no abd pain, no vomiting, no diarrhea  urinary: no dysuria, no hematuria, no flank pain  musculoskeletal:  no joint pain, no joint swelling  skin:  no rash  neurology:  no headache, no seizure      Allergies  No Known Allergies        ANTIMICROBIALS:  ertapenem  IVPB    ertapenem  IVPB 1000 every 24 hours      OTHER MEDS:  acetaminophen   Tablet .. 650 milliGRAM(s) Oral every 6 hours PRN  aspirin enteric coated 81 milliGRAM(s) Oral daily  atorvastatin 80 milliGRAM(s) Oral at bedtime  dexAMETHasone  IVPB 10 milliGRAM(s) IV Intermittent every 8 hours  enoxaparin Injectable 40 milliGRAM(s) SubCutaneous daily  losartan 100 milliGRAM(s) Oral daily      Vital Signs Last 24 Hrs  T(C): 36.4 (2021 05:25), Max: 36.7 (2021 17:46)  T(F): 97.5 (2021 05:25), Max: 98 (2021 17:46)  HR: 68 (2021 07:17) (56 - 68)  BP: 101/59 (2021 05:25) (100/61 - 160/61)  BP(mean): --  RR: 18 (2021 05:25) (17 - 18)  SpO2: 99% (2021 07:17) (96% - 100%)    Physical Exam:  General:    NAD, non toxic  ENT:   cannot open the mouth fully but improved, L face and neck edema, slight erythremia and tenderness  Cardio:    regular S1,S2, no murmur  Respiratory:   clear b/l, no wheezing  abd:   soft, BS +, not tender  :     no CVAT, no suprapubic tenderness, no miles  Musculoskeletal : no joint swelling, no edema  Skin:    no rash  vascular: no phlebitis                          9.7    17.62 )-----------( 259      ( 2021 07:25 )             29.7       06-24    140  |  113<H>  |  21  ----------------------------<  119<H>  3.4<L>   |  17<L>  |  0.59    Ca    7.0<L>      2021 07:25  Phos  2.3       Mg     1.9         TPro  8.5<H>  /  Alb  3.1<L>  /  TBili  0.7  /  DBili  x   /  AST  70<H>  /  ALT  85<H>  /  AlkPhos  140<H>        Urinalysis Basic - ( 2021 05:06 )    Color: Yellow / Appearance: Clear / S.036 / pH: x  Gluc: x / Ketone: Small  / Bili: Negative / Urobili: <2 mg/dL   Blood: x / Protein: 30 mg/dL / Nitrite: Negative   Leuk Esterase: Negative / RBC: 1 /HPF / WBC 1 /HPF   Sq Epi: x / Non Sq Epi: 1 /HPF / Bacteria: Negative        MICROBIOLOGY:  v  .Urine Clean Catch (Midstream)  21   No growth  --  --                RADIOLOGY:  Images independently visualized and reviewed personally, findings as below  < from: CT Angio Neck w/ IV Cont (21 @ 09:35) >  IMPRESSION:    Head CT:  1. No acute intracranial hemorrhage, mass effect, or shift of the midline structures.    CTA NECK:  1. Mild long segment luminal narrowingof the left internal carotid artery without occlusion secondary to mass effect generated upon the left carotid sheath by the previously noted left-sided /parapharyngeal space phlegmon.  2. Redemonstration of lytic destructive changes involving the left posterior mandibular body for which osteomyelitis, osteonecrosis, or osteoradionecrosis are considerations.    CTA HEAD:  1. No large vessel occlusion or major stenosis.    < end of copied text >     Follow Up:  jackie's angina, mandibular osteo    Interval History: pt afebrile, , slightly better but still dysphagia    ROS:      All other systems negative    Constitutional: no fever, no chills  ENT:  L face and neck swelling, difficulty opening mouth and swallowing, dysphagia to solid  Cardiovascular:  no chest pain, no palpitation  Respiratory:  no SOB, no cough  GI:  no abd pain, no vomiting, no diarrhea  urinary: no dysuria, no hematuria, no flank pain  musculoskeletal:  no joint pain, no joint swelling  skin:  no rash  neurology:  no headache, no seizure      Allergies  No Known Allergies        ANTIMICROBIALS:  ertapenem  IVPB    ertapenem  IVPB 1000 every 24 hours      OTHER MEDS:  acetaminophen   Tablet .. 650 milliGRAM(s) Oral every 6 hours PRN  aspirin enteric coated 81 milliGRAM(s) Oral daily  atorvastatin 80 milliGRAM(s) Oral at bedtime  dexAMETHasone  IVPB 10 milliGRAM(s) IV Intermittent every 8 hours  enoxaparin Injectable 40 milliGRAM(s) SubCutaneous daily  losartan 100 milliGRAM(s) Oral daily      Vital Signs Last 24 Hrs  T(C): 36.4 (2021 05:25), Max: 36.7 (2021 17:46)  T(F): 97.5 (2021 05:25), Max: 98 (2021 17:46)  HR: 68 (2021 07:17) (56 - 68)  BP: 101/59 (2021 05:25) (100/61 - 160/61)  BP(mean): --  RR: 18 (2021 05:25) (17 - 18)  SpO2: 99% (2021 07:17) (96% - 100%)    Physical Exam:  General:    NAD, non toxic  ENT:   cannot open the mouth fully but improved, L face and neck edema, slight erythremia and tenderness  Cardio:    regular S1,S2, no murmur  Respiratory:   clear b/l, no wheezing  abd:   soft, BS +, not tender  :     no CVAT, no suprapubic tenderness, no miles  Musculoskeletal : no joint swelling, no edema  Skin:    no rash  vascular: no phlebitis                          9.7    17.62 )-----------( 259      ( 2021 07:25 )             29.7       06-24    140  |  113<H>  |  21  ----------------------------<  119<H>  3.4<L>   |  17<L>  |  0.59    Ca    7.0<L>      2021 07:25  Phos  2.3     06-  Mg     1.9     06-    TPro  8.5<H>  /  Alb  3.1<L>  /  TBili  0.7  /  DBili  x   /  AST  70<H>  /  ALT  85<H>  /  AlkPhos  140<H>        Urinalysis Basic - ( 2021 05:06 )    Color: Yellow / Appearance: Clear / S.036 / pH: x  Gluc: x / Ketone: Small  / Bili: Negative / Urobili: <2 mg/dL   Blood: x / Protein: 30 mg/dL / Nitrite: Negative   Leuk Esterase: Negative / RBC: 1 /HPF / WBC 1 /HPF   Sq Epi: x / Non Sq Epi: 1 /HPF / Bacteria: Negative        MICROBIOLOGY:  v  .Urine Clean Catch (Midstream)  21   No growth  --  --                RADIOLOGY:  Images independently visualized and reviewed personally, findings as below  < from: CT Angio Neck w/ IV Cont (21 @ 09:35) >  IMPRESSION:    Head CT:  1. No acute intracranial hemorrhage, mass effect, or shift of the midline structures.    CTA NECK:  1. Mild long segment luminal narrowingof the left internal carotid artery without occlusion secondary to mass effect generated upon the left carotid sheath by the previously noted left-sided /parapharyngeal space phlegmon.  2. Redemonstration of lytic destructive changes involving the left posterior mandibular body for which osteomyelitis, osteonecrosis, or osteoradionecrosis are considerations.    CTA HEAD:  1. No large vessel occlusion or major stenosis.    < end of copied text >

## 2021-06-24 NOTE — PROGRESS NOTE ADULT - SUBJECTIVE AND OBJECTIVE BOX
patient says pain and swelling slightly improved. still has trismus       Vital Signs Last 24 Hrs  T(C): 36.4 (24 Jun 2021 05:25), Max: 36.8 (23 Jun 2021 09:15)  T(F): 97.5 (24 Jun 2021 05:25), Max: 98.2 (23 Jun 2021 09:15)  HR: 68 (24 Jun 2021 07:17) (56 - 69)  BP: 101/59 (24 Jun 2021 05:25) (100/61 - 160/61)  BP(mean): --  RR: 18 (24 Jun 2021 05:25) (17 - 18)  SpO2: 99% (24 Jun 2021 07:17) (96% - 100%)      PHYSICAL EXAM:  Gen: NAD, A/Ox3  Breathing comfortably on RA  No stridor or stertor  No increased work of breathing   Voice: light muffled voice   Face: Left premaxillary soft tissue swelling moderate with overlying erythema skin changes. Non tender, not warm. No orbital swelling.   Eyes: PERRL, EOMI  Nose: inflamed mucosa bilaterally, edematous mucosa  Mouth: severe trismus, only a few mm of mouth opening. Trismus limiting exam. Left posterior manidble with thin mucosa no obvious bony exposure. Left posterior gingival, buccal mucosa with swelling, tenderness. No visible drainage. Visible FOM not elevated. Tongue appears mobile.   Neck: left neck moderate swelling with overlying exfoliating skin, erythema. 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 Possible recurrence/superinfection of liposarcoma  -no acute ORL intervention  - iv abx per ID recs. to get PICC line.   -flexible laryngoscopy with normal laryngeal findings, no edema  -would be difficult intubation because of trismus. If in respiratory distress would require nasotracheal intubation or surgical airway  -HOB elevation  -continuos pulse ox   -please keep ORL aware of any changes in respiratory status  -if any respiratory distress STAT page ENT, critical care, anesthesia   -will follow  -call/page with questions  -will discuss with attending and update with any further recommendations

## 2021-06-24 NOTE — PROGRESS NOTE ADULT - PROBLEM SELECTOR PLAN 1
CT showing lytic destructive changes involving the left posterior mandibular body for which osteomyelitis, osteonecrosis, or osteoradionecrosis are considerations.  -per ENT no intervention at this time but may need in future vs MRI  -ID recs appreciated, on Ertapenem 1g/day. Will need 6 weeks of antibiotics total via PICC. Awaiting negative blood cultures prior to placing PICC  -IV dexamethasone 10mg q8hrs, will f/u ENT recs regarding taper  -HOB elevation, aspiration precautions  -please keep ORL aware of any changes in respiratory status  -if any respiratory distress STAT page ENT, critical care, anesthesia   -S/s recs pureed diet  -Magic mouth wash for pain

## 2021-06-24 NOTE — PROGRESS NOTE ADULT - SUBJECTIVE AND OBJECTIVE BOX
[facility] Division of Hospital Medicine  Annmarie Morrissey MD  Pager: [phone]      Patient is a 60y old  Male who presents with a chief complaint of L facial swelling/pain (2021 10:10)      SUBJECTIVE / OVERNIGHT EVENTS: patient states that he thinks his facial swelling is a little better. Stated that he still has pain w/ eating, requesting magic mouth wash. Denies pain or trouble w/ liquids. Denies fevers or SOB. Explained he will need 6 weeks IV antibiotics via PICC line.     MEDICATIONS  (STANDING):  aspirin enteric coated 81 milliGRAM(s) Oral daily  atorvastatin 80 milliGRAM(s) Oral at bedtime  dexAMETHasone  IVPB 10 milliGRAM(s) IV Intermittent every 8 hours  enoxaparin Injectable 40 milliGRAM(s) SubCutaneous daily  ertapenem  IVPB      ertapenem  IVPB 1000 milliGRAM(s) IV Intermittent every 24 hours  FIRST- Mouthwash  BLM 4 milliLiter(s) Swish and Spit four times a day  losartan 100 milliGRAM(s) Oral daily    MEDICATIONS  (PRN):  acetaminophen   Tablet .. 650 milliGRAM(s) Oral every 6 hours PRN Mild Pain (1 - 3)        PHYSICAL EXAM:  Vital Signs Last 24 Hrs  T(C): 36.6 (2021 14:00), Max: 36.7 (2021 17:46)  T(F): 97.8 (2021 14:00), Max: 98 (2021 17:46)  HR: 70 (2021 14:00) (63 - 70)  BP: 101/60 (2021 14:00) (100/61 - 117/60)  BP(mean): --  RR: 17 (2021 14:00) (17 - 18)  SpO2: 100% (2021 14:00) (98% - 100%)    CONSTITUTIONAL: NAD  ENMT: Left cheek soft tissue swelling moderate with overlying erythema skin changes. Only able to open mouth slightly, no obvious purulence   RESPIRATORY: Normal respiratory effort; lungs are clear to auscultation bilaterally  CARDIOVASCULAR:  S1/S2; No lower extremity edema  ABDOMEN: Nontender to palpation, normoactive bowel sounds, no rebound/guarding  MUSCULOSKELETAL:  no clubbing or cyanosis of digits; no joint swelling or tenderness to palpation  PSYCH: A+O to person, place, and time; affect appropriate  NEUROLOGY: no focal deficits  SKIN: No rashes; no palpable lesions    LABS:                        9.7    17.62 )-----------( 259      ( 2021 07:25 )             29.7     06-24    140  |  113<H>  |  21  ----------------------------<  119<H>  3.4<L>   |  17<L>  |  0.59    Ca    7.0<L>      2021 07:25  Phos  2.3     06-24  Mg     1.9     -24    TPro  8.5<H>  /  Alb  3.1<L>  /  TBili  0.7  /  DBili  x   /  AST  70<H>  /  ALT  85<H>  /  AlkPhos  140<H>      PT/INR - ( 2021 22:38 )   PT: 15.6 sec;   INR: 1.38 ratio         PTT - ( 2021 22:38 )  PTT:32.1 sec      Urinalysis Basic - ( 2021 05:06 )    Color: Yellow / Appearance: Clear / S.036 / pH: x  Gluc: x / Ketone: Small  / Bili: Negative / Urobili: <2 mg/dL   Blood: x / Protein: 30 mg/dL / Nitrite: Negative   Leuk Esterase: Negative / RBC: 1 /HPF / WBC 1 /HPF   Sq Epi: x / Non Sq Epi: 1 /HPF / Bacteria: Negative        Culture - Urine (collected 2021 04:12)  Source: .Urine Clean Catch (Midstream)  Final Report (2021 07:11):    No growth        RADIOLOGY & ADDITIONAL TESTS:  Results Reviewed:   Imaging Personally Reviewed:  Electrocardiogram Personally Reviewed:    COORDINATION OF CARE:  Care Discussed with Consultants/Other Providers [Y/N]:  Prior or Outpatient Records Reviewed [Y/N]:     [facility] Division of Hospital Medicine  Annmarie Morrissey MD  Pager: [phone]    Patient is a 60y old  Male who presents with a chief complaint of L facial swelling/pain (2021 10:10)      SUBJECTIVE / OVERNIGHT EVENTS: patient states that he thinks his facial swelling is a little better. Stated that he still has pain w/ eating, requesting magic mouth wash. Denies pain or trouble w/ liquids. Denies fevers or SOB. Explained he will need 6 weeks IV antibiotics via PICC line.     MEDICATIONS  (STANDING):  aspirin enteric coated 81 milliGRAM(s) Oral daily  atorvastatin 80 milliGRAM(s) Oral at bedtime  dexAMETHasone  IVPB 10 milliGRAM(s) IV Intermittent every 8 hours  enoxaparin Injectable 40 milliGRAM(s) SubCutaneous daily  ertapenem  IVPB      ertapenem  IVPB 1000 milliGRAM(s) IV Intermittent every 24 hours  FIRST- Mouthwash  BLM 4 milliLiter(s) Swish and Spit four times a day  losartan 100 milliGRAM(s) Oral daily    MEDICATIONS  (PRN):  acetaminophen   Tablet .. 650 milliGRAM(s) Oral every 6 hours PRN Mild Pain (1 - 3)        PHYSICAL EXAM:  Vital Signs Last 24 Hrs  T(C): 36.6 (2021 14:00), Max: 36.7 (2021 17:46)  T(F): 97.8 (2021 14:00), Max: 98 (2021 17:46)  HR: 70 (2021 14:00) (63 - 70)  BP: 101/60 (2021 14:00) (100/61 - 117/60)  BP(mean): --  RR: 17 (2021 14:00) (17 - 18)  SpO2: 100% (2021 14:00) (98% - 100%)    CONSTITUTIONAL: NAD  ENMT: Left cheek soft tissue swelling moderate with overlying erythema skin changes. Only able to open mouth slightly, no obvious purulence   RESPIRATORY: Normal respiratory effort; lungs are clear to auscultation bilaterally  CARDIOVASCULAR:  S1/S2; No lower extremity edema  ABDOMEN: Nontender to palpation, normoactive bowel sounds, no rebound/guarding  MUSCULOSKELETAL:  no clubbing or cyanosis of digits; no joint swelling or tenderness to palpation  PSYCH: A+O to person, place, and time; affect appropriate  NEUROLOGY: no focal deficits  SKIN: No rashes; no palpable lesions    LABS:                        9.7    17.62 )-----------( 259      ( 2021 07:25 )             29.7     06-24    140  |  113<H>  |  21  ----------------------------<  119<H>  3.4<L>   |  17<L>  |  0.59    Ca    7.0<L>      2021 07:25  Phos  2.3     06-24  Mg     1.9     -24    TPro  8.5<H>  /  Alb  3.1<L>  /  TBili  0.7  /  DBili  x   /  AST  70<H>  /  ALT  85<H>  /  AlkPhos  140<H>      PT/INR - ( 2021 22:38 )   PT: 15.6 sec;   INR: 1.38 ratio         PTT - ( 2021 22:38 )  PTT:32.1 sec      Urinalysis Basic - ( 2021 05:06 )    Color: Yellow / Appearance: Clear / S.036 / pH: x  Gluc: x / Ketone: Small  / Bili: Negative / Urobili: <2 mg/dL   Blood: x / Protein: 30 mg/dL / Nitrite: Negative   Leuk Esterase: Negative / RBC: 1 /HPF / WBC 1 /HPF   Sq Epi: x / Non Sq Epi: 1 /HPF / Bacteria: Negative        Culture - Urine (collected 2021 04:12)  Source: .Urine Clean Catch (Midstream)  Final Report (2021 07:11):    No growth        RADIOLOGY & ADDITIONAL TESTS:  Results Reviewed:   Imaging Personally Reviewed:  Electrocardiogram Personally Reviewed:    COORDINATION OF CARE:  Care Discussed with Consultants/Other Providers [Y/N]:  Prior or Outpatient Records Reviewed [Y/N]:

## 2021-06-24 NOTE — PROGRESS NOTE ADULT - ASSESSMENT
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, s/p RT, last 6/3/21 p/w L facial swelling, pain, trismus and dysphagia for about 2 weeks  afebrile, WBC: 16, ESR: 85, CRP: 184  CT: Bony irregularity, dehiscence left mandibular posterior body involving left inferior alveolar foramina with permeative change, which could be osteitis condensans, osteomyelitis, osteoradionecrosis   R  phlegmon, L pterygoid fluid collection, narrowing nasopharyngeal and oropharyngeal airways, L palatine tonsil phlegmon, ?developing Jackie's angina, soft tissue fullness in prior liposarcoma region which could be infectious/inflammatory vs recurrent tumor    recent resection of facial liposarcoma 1/14 s/p RT now with multiple phlegmons and fluid collections also mandibular dehiscence and irregularity, ?osteomyelitis and jackie's angina vs inflammatory changes and recurrent tumor or osteonecrosis   leukocytosis, worsening to 17 likely due to dexa  elevated ESR: 85, CRP: 184    * f/u the blood and urine cx  * c/w ertapenem 1 qd, started 6/23, now day 2  * will need a PICC line and a 6 week course   * weekly CBC, CMP, ESR, CRP while on antibiotics    The above assessment and plan was discussed with the primary team    Halima Erwin MD  Pager [phone]  After 5pm and on weekends call [phone]  * f/u with ENT       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, s/p RT, last 6/3/21 p/w L facial swelling, pain, trismus and dysphagia for about 2 weeks  afebrile, WBC: 16, ESR: 85, CRP: 184  CT: Bony irregularity, dehiscence left mandibular posterior body involving left inferior alveolar foramina with permeative change, which could be osteitis condensans, osteomyelitis, osteoradionecrosis   R  phlegmon, L pterygoid fluid collection, narrowing nasopharyngeal and oropharyngeal airways, L palatine tonsil phlegmon, ?developing Jackie's angina, soft tissue fullness in prior liposarcoma region which could be infectious/inflammatory vs recurrent tumor    recent resection of facial liposarcoma 1/14 s/p RT now with multiple phlegmons and fluid collections also mandibular dehiscence and irregularity, ?osteomyelitis and jackie's angina vs inflammatory changes and recurrent tumor or osteonecrosis   leukocytosis, worsening to 17 likely due to dexa  elevated ESR: 85, CRP: 184    * f/u the blood and urine cx  * f/u with ENT as pt still has dysphagia   * c/w ertapenem 1 qd, started 6/23, now day 2  * will need a PICC line and a 6 week course   * weekly CBC, CMP, ESR, CRP while on antibiotics    The above assessment and plan was discussed with the primary team    Halima Erwin MD  Pager [phone]  After 5pm and on weekends call [phone]  * f/u with ENT

## 2021-06-25 LAB
ANION GAP SERPL CALC-SCNC: 12 MMOL/L — SIGNIFICANT CHANGE UP (ref 7–14)
BUN SERPL-MCNC: 33 MG/DL — HIGH (ref 7–23)
CALCIUM SERPL-MCNC: 9.3 MG/DL — SIGNIFICANT CHANGE UP (ref 8.4–10.5)
CHLORIDE SERPL-SCNC: 105 MMOL/L — SIGNIFICANT CHANGE UP (ref 98–107)
CO2 SERPL-SCNC: 21 MMOL/L — LOW (ref 22–31)
CREAT SERPL-MCNC: 0.75 MG/DL — SIGNIFICANT CHANGE UP (ref 0.5–1.3)
GLUCOSE SERPL-MCNC: 134 MG/DL — HIGH (ref 70–99)
HCT VFR BLD CALC: 43.8 % — SIGNIFICANT CHANGE UP (ref 39–50)
HGB BLD-MCNC: 14.1 G/DL — SIGNIFICANT CHANGE UP (ref 13–17)
LACTATE SERPL-SCNC: 1.6 MMOL/L — SIGNIFICANT CHANGE UP (ref 0.5–2)
MAGNESIUM SERPL-MCNC: 2.6 MG/DL — SIGNIFICANT CHANGE UP (ref 1.6–2.6)
MCHC RBC-ENTMCNC: 27.8 PG — SIGNIFICANT CHANGE UP (ref 27–34)
MCHC RBC-ENTMCNC: 32.2 GM/DL — SIGNIFICANT CHANGE UP (ref 32–36)
MCV RBC AUTO: 86.4 FL — SIGNIFICANT CHANGE UP (ref 80–100)
NRBC # BLD: 0 /100 WBCS — SIGNIFICANT CHANGE UP
NRBC # FLD: 0 K/UL — SIGNIFICANT CHANGE UP
PHOSPHATE SERPL-MCNC: 3.9 MG/DL — SIGNIFICANT CHANGE UP (ref 2.5–4.5)
PLATELET # BLD AUTO: 321 K/UL — SIGNIFICANT CHANGE UP (ref 150–400)
POTASSIUM SERPL-MCNC: 4.7 MMOL/L — SIGNIFICANT CHANGE UP (ref 3.5–5.3)
POTASSIUM SERPL-SCNC: 4.7 MMOL/L — SIGNIFICANT CHANGE UP (ref 3.5–5.3)
RBC # BLD: 5.07 M/UL — SIGNIFICANT CHANGE UP (ref 4.2–5.8)
RBC # FLD: 13.5 % — SIGNIFICANT CHANGE UP (ref 10.3–14.5)
SODIUM SERPL-SCNC: 138 MMOL/L — SIGNIFICANT CHANGE UP (ref 135–145)
WBC # BLD: 20.6 K/UL — HIGH (ref 3.8–10.5)
WBC # FLD AUTO: 20.6 K/UL — HIGH (ref 3.8–10.5)

## 2021-06-25 PROCEDURE — 99232 SBSQ HOSP IP/OBS MODERATE 35: CPT

## 2021-06-25 PROCEDURE — 99233 SBSQ HOSP IP/OBS HIGH 50: CPT

## 2021-06-25 RX ORDER — DEXAMETHASONE 0.5 MG/5ML
2 ELIXIR ORAL DAILY
Refills: 0 | Status: CANCELLED | OUTPATIENT
Start: 2021-06-30 | End: 2021-06-28

## 2021-06-25 RX ORDER — DEXAMETHASONE 0.5 MG/5ML
2 ELIXIR ORAL EVERY 12 HOURS
Refills: 0 | Status: DISCONTINUED | OUTPATIENT
Start: 2021-06-29 | End: 2021-06-28

## 2021-06-25 RX ORDER — DEXAMETHASONE 0.5 MG/5ML
8 ELIXIR ORAL EVERY 8 HOURS
Refills: 0 | Status: COMPLETED | OUTPATIENT
Start: 2021-06-25 | End: 2021-06-26

## 2021-06-25 RX ORDER — DEXAMETHASONE 0.5 MG/5ML
2 ELIXIR ORAL EVERY 8 HOURS
Refills: 0 | Status: DISCONTINUED | OUTPATIENT
Start: 2021-06-28 | End: 2021-06-28

## 2021-06-25 RX ORDER — DEXAMETHASONE 0.5 MG/5ML
4 ELIXIR ORAL EVERY 8 HOURS
Refills: 0 | Status: COMPLETED | OUTPATIENT
Start: 2021-06-27 | End: 2021-06-27

## 2021-06-25 RX ORDER — LIDOCAINE 4 G/100G
4 CREAM TOPICAL
Refills: 0 | Status: DISCONTINUED | OUTPATIENT
Start: 2021-06-25 | End: 2021-06-28

## 2021-06-25 RX ORDER — DEXAMETHASONE 0.5 MG/5ML
6 ELIXIR ORAL EVERY 8 HOURS
Refills: 0 | Status: COMPLETED | OUTPATIENT
Start: 2021-06-26 | End: 2021-06-26

## 2021-06-25 RX ADMIN — LOSARTAN POTASSIUM 100 MILLIGRAM(S): 100 TABLET, FILM COATED ORAL at 07:18

## 2021-06-25 RX ADMIN — Medication 8 MILLIGRAM(S): at 21:05

## 2021-06-25 RX ADMIN — LIDOCAINE 4 MILLILITER(S): 4 CREAM TOPICAL at 11:59

## 2021-06-25 RX ADMIN — DIPHENHYDRAMINE HYDROCHLORIDE AND LIDOCAINE HYDROCHLORIDE AND ALUMINUM HYDROXIDE AND MAGNESIUM HYDRO 4 MILLILITER(S): KIT at 17:00

## 2021-06-25 RX ADMIN — Medication 650 MILLIGRAM(S): at 07:39

## 2021-06-25 RX ADMIN — Medication 81 MILLIGRAM(S): at 13:40

## 2021-06-25 RX ADMIN — DIPHENHYDRAMINE HYDROCHLORIDE AND LIDOCAINE HYDROCHLORIDE AND ALUMINUM HYDROXIDE AND MAGNESIUM HYDRO 4 MILLILITER(S): KIT at 13:40

## 2021-06-25 RX ADMIN — ERTAPENEM SODIUM 120 MILLIGRAM(S): 1 INJECTION, POWDER, LYOPHILIZED, FOR SOLUTION INTRAMUSCULAR; INTRAVENOUS at 11:59

## 2021-06-25 RX ADMIN — ENOXAPARIN SODIUM 40 MILLIGRAM(S): 100 INJECTION SUBCUTANEOUS at 13:40

## 2021-06-25 RX ADMIN — LIDOCAINE 4 MILLILITER(S): 4 CREAM TOPICAL at 17:01

## 2021-06-25 RX ADMIN — Medication 102 MILLIGRAM(S): at 07:17

## 2021-06-25 RX ADMIN — Medication 8 MILLIGRAM(S): at 13:41

## 2021-06-25 RX ADMIN — DIPHENHYDRAMINE HYDROCHLORIDE AND LIDOCAINE HYDROCHLORIDE AND ALUMINUM HYDROXIDE AND MAGNESIUM HYDRO 4 MILLILITER(S): KIT at 07:18

## 2021-06-25 NOTE — PROGRESS NOTE ADULT - PROBLEM SELECTOR PLAN 1
CT showing lytic destructive changes involving the left posterior mandibular body for which osteomyelitis, osteonecrosis, or osteoradionecrosis are considerations.  -per ENT no intervention at this time but may need in future vs MRI  -ID recs appreciated, on Ertapenem 1g/day. Will need 6 weeks of antibiotics total via PICC. Awaiting negative blood cultures prior to placing PICC  -Started on IV decadron --> discussed with ENT, will taper   -HOB elevation, aspiration precautions  -please keep ORL aware of any changes in respiratory status  -if any respiratory distress STAT page ENT, critical care, anesthesia   -S/s recs pureed diet  -Magic mouth wash + viscous lidocaine for pain

## 2021-06-25 NOTE — PROGRESS NOTE ADULT - SUBJECTIVE AND OBJECTIVE BOX
patient says pain and swelling slightly improved.   subjectively worsening trismus        T(C): 36.3 (06-25-21 @ 02:06), Max: 36.9 (06-24-21 @ 18:00)  HR: 62 (06-25-21 @ 03:44) (52 - 72)  BP: 105/68 (06-25-21 @ 02:06) (101/60 - 117/79)  RR: 18 (06-25-21 @ 02:06) (17 - 18)  SpO2: 97% (06-25-21 @ 03:44) (97% - 100%)  PHYSICAL EXAM:  Gen: NAD, A/Ox3  Breathing comfortably on RA  No stridor or stertor  No increased work of breathing   Voice: light muffled voice   Face: Left premaxillary soft tissue swelling moderate with overlying erythema skin changes. Non tender, not warm. No orbital swelling.   Eyes: PERRL, EOMI  Nose: inflamed mucosa bilaterally, edematous mucosa  Mouth: severe trismus, only a few mm of mouth opening. Trismus limiting exam. Left posterior manidble with thin mucosa no obvious bony exposure. Left posterior gingival, buccal mucosa with swelling, tenderness. No visible drainage. Visible FOM not elevated. Tongue appears mobile.   Neck: left neck moderate swelling with overlying exfoliating skin, erythema. 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 Possible recurrence/superinfection of liposarcoma  -no acute ORL intervention  - iv abx per ID recs. to get PICC line.   -flexible laryngoscopy with normal laryngeal findings, no edema  -would be difficult intubation because of trismus. If in respiratory distress would require nasotracheal intubation or surgical airway  -HOB elevation  -continuos pulse ox   -please keep ORL aware of any changes in respiratory status  -if any respiratory distress STAT page ENT, critical care, anesthesia   -will follow  -call/page with questions  -will discuss with attending and update with any further recommendations

## 2021-06-25 NOTE — PROGRESS NOTE ADULT - SUBJECTIVE AND OBJECTIVE BOX
Follow Up:  jackie's angina, mandibular osteo    Interval History: pt afebrile, , slightly better but still dysphagia    ROS:      All other systems negative    Constitutional: no fever, no chills  ENT:  L face and neck swelling, slight improvement in opening the mouth and dysphagia  Cardiovascular:  no chest pain, no palpitation  Respiratory:  no SOB, no cough  GI:  no abd pain, no vomiting, no diarrhea  urinary: no dysuria, no hematuria, no flank pain  musculoskeletal:  no joint pain, no joint swelling  skin:  no rash  neurology:  no headache, no seizure        Allergies  No Known Allergies        ANTIMICROBIALS:  ertapenem  IVPB    ertapenem  IVPB 1000 every 24 hours      OTHER MEDS:  acetaminophen   Tablet .. 650 milliGRAM(s) Oral every 6 hours PRN  aspirin enteric coated 81 milliGRAM(s) Oral daily  atorvastatin 80 milliGRAM(s) Oral at bedtime  dexAMETHasone  IVPB 10 milliGRAM(s) IV Intermittent every 8 hours  enoxaparin Injectable 40 milliGRAM(s) SubCutaneous daily  FIRST- Mouthwash  BLM 4 milliLiter(s) Swish and Spit four times a day  lidocaine 2% Viscous 4 milliLiter(s) Swish and Spit four times a day  losartan 100 milliGRAM(s) Oral daily      Vital Signs Last 24 Hrs  T(C): 36.4 (25 Jun 2021 11:00), Max: 36.9 (24 Jun 2021 18:00)  T(F): 97.6 (25 Jun 2021 11:00), Max: 98.4 (24 Jun 2021 18:00)  HR: 60 (25 Jun 2021 11:00) (52 - 72)  BP: 102/60 (25 Jun 2021 11:00) (101/60 - 117/79)  BP(mean): --  RR: 17 (25 Jun 2021 11:00) (17 - 18)  SpO2: 100% (25 Jun 2021 11:00) (97% - 100%)    Physical Exam:  General:    NAD, non toxic  ENT:   cannot open the mouth fully but improved, L face and neck edema, appears less tense, no tenderness now  Cardio:    regular S1,S2, no murmur  Respiratory:   clear b/l, no wheezing  abd:   soft, BS +, not tender  :     no CVAT, no suprapubic tenderness, no miles  Musculoskeletal : no joint swelling, no edema  Skin:    no rash  vascular: no phlebitis                          14.1   20.60 )-----------( 321      ( 25 Jun 2021 06:54 )             43.8       06-25    138  |  105  |  33<H>  ----------------------------<  134<H>  4.7   |  21<L>  |  0.75    Ca    9.3      25 Jun 2021 06:54  Phos  3.9     06-25  Mg     2.6     06-25            MICROBIOLOGY:  v  .Urine Clean Catch (Midstream)  06-23-21   No growth  --  --                RADIOLOGY:  Images independently visualized and reviewed personally, findings as below  < from: CT Angio Neck w/ IV Cont (06.23.21 @ 09:35) >  IMPRESSION:    Head CT:  1. No acute intracranial hemorrhage, mass effect, or shift of the midline structures.    CTA NECK:  1. Mild long segment luminal narrowingof the left internal carotid artery without occlusion secondary to mass effect generated upon the left carotid sheath by the previously noted left-sided /parapharyngeal space phlegmon.  2. Redemonstration of lytic destructive changes involving the left posterior mandibular body for which osteomyelitis, osteonecrosis, or osteoradionecrosis are considerations.    CTA HEAD:  1. No large vessel occlusion or major stenosis.      < end of copied text >

## 2021-06-25 NOTE — PROGRESS NOTE ADULT - ASSESSMENT
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, s/p RT, last 6/3/21 p/w L facial swelling, pain, trismus and dysphagia for about 2 weeks  afebrile, WBC: 16, ESR: 85, CRP: 184  CT: Bony irregularity, dehiscence left mandibular posterior body involving left inferior alveolar foramina with permeative change, which could be osteitis condensans, osteomyelitis, osteoradionecrosis   R  phlegmon, L pterygoid fluid collection, narrowing nasopharyngeal and oropharyngeal airways, L palatine tonsil phlegmon, ?developing Jackie's angina, soft tissue fullness in prior liposarcoma region which could be infectious/inflammatory vs recurrent tumor    recent resection of facial liposarcoma 1/14 s/p RT now with multiple phlegmons and fluid collections also mandibular dehiscence and irregularity, ?osteomyelitis and jackie's angina vs inflammatory changes and recurrent tumor or osteonecrosis   leukocytosis, worsening to 20 likely due to dexa, as clinically slightly better and still on dexa 10 q8  elevated ESR: 85, CRP: 184    * f/u the blood  cx  * f/u with ENT   * c/w ertapenem 1 qd, started 6/23, now day 3  * pt is slightly better, if no significant improvement will need to repeat imaging  * will need a PICC line and a 6 week course   * weekly CBC, CMP, ESR, CRP while on antibiotics    The above assessment and plan was discussed with the primary team    Halima Erwin MD  Pager [phone]  After 5pm and on weekends call [phone]  * f/u with ENT

## 2021-06-25 NOTE — PROGRESS NOTE ADULT - SUBJECTIVE AND OBJECTIVE BOX
[facility] Division of Hospital Medicine  Annmarie Morrissey MD  Pager: [phone]      Patient is a 60y old  Male who presents with a chief complaint of L facial swelling/pain (25 Jun 2021 11:53)      SUBJECTIVE / OVERNIGHT EVENTS: patient feels okay today, still with pain on eating, would like another numbing agent as magic mouthwash is not working. Thinks swelling and pain is improving. Denies fevers.     MEDICATIONS  (STANDING):  aspirin enteric coated 81 milliGRAM(s) Oral daily  atorvastatin 80 milliGRAM(s) Oral at bedtime  dexAMETHasone     Tablet 8 milliGRAM(s) Oral every 8 hours  enoxaparin Injectable 40 milliGRAM(s) SubCutaneous daily  ertapenem  IVPB      ertapenem  IVPB 1000 milliGRAM(s) IV Intermittent every 24 hours  FIRST- Mouthwash  BLM 4 milliLiter(s) Swish and Spit four times a day  lidocaine 2% Viscous 4 milliLiter(s) Swish and Spit four times a day  losartan 100 milliGRAM(s) Oral daily    MEDICATIONS  (PRN):  acetaminophen   Tablet .. 650 milliGRAM(s) Oral every 6 hours PRN Mild Pain (1 - 3)      CAPILLARY BLOOD GLUCOSE        I&O's Summary      PHYSICAL EXAM:  Vital Signs Last 24 Hrs  T(C): 36.4 (25 Jun 2021 11:00), Max: 36.9 (24 Jun 2021 18:00)  T(F): 97.6 (25 Jun 2021 11:00), Max: 98.4 (24 Jun 2021 18:00)  HR: 60 (25 Jun 2021 11:00) (52 - 72)  BP: 102/60 (25 Jun 2021 11:00) (101/60 - 117/79)  BP(mean): --  RR: 17 (25 Jun 2021 11:00) (17 - 18)  SpO2: 100% (25 Jun 2021 11:00) (97% - 100%)    CONSTITUTIONAL: NAD  ENMT: Left cheek soft tissue swelling moderate with overlying erythema skin changes. Only able to open mouth slightly, no obvious purulence   RESPIRATORY: Normal respiratory effort; lungs are clear to auscultation bilaterally  CARDIOVASCULAR:  S1/S2; No lower extremity edema  ABDOMEN: Nontender to palpation, normoactive bowel sounds, no rebound/guarding  PSYCH: A+O to person, place, and time; affect appropriate  NEUROLOGY: no focal deficits    LABS:                        14.1   20.60 )-----------( 321      ( 25 Jun 2021 06:54 )             43.8     06-25    138  |  105  |  33<H>  ----------------------------<  134<H>  4.7   |  21<L>  |  0.75    Ca    9.3      25 Jun 2021 06:54  Phos  3.9     06-25  Mg     2.6     06-25                Culture - Urine (collected 23 Jun 2021 04:12)  Source: .Urine Clean Catch (Midstream)  Final Report (24 Jun 2021 07:11):    No growth        RADIOLOGY & ADDITIONAL TESTS:  Results Reviewed:   Imaging Personally Reviewed:  Electrocardiogram Personally Reviewed:    COORDINATION OF CARE:  Care Discussed with Consultants/Other Providers [Y/N]:  Prior or Outpatient Records Reviewed [Y/N]:

## 2021-06-25 NOTE — PROGRESS NOTE ADULT - PROBLEM SELECTOR PLAN 8
Lovenox 40mg qd    Discussed with patient, ACP Mitzi   Spoke with patient's daughter Richelle [phone] and updated

## 2021-06-26 LAB
ANION GAP SERPL CALC-SCNC: 10 MMOL/L — SIGNIFICANT CHANGE UP (ref 7–14)
BUN SERPL-MCNC: 32 MG/DL — HIGH (ref 7–23)
CALCIUM SERPL-MCNC: 9.1 MG/DL — SIGNIFICANT CHANGE UP (ref 8.4–10.5)
CHLORIDE SERPL-SCNC: 104 MMOL/L — SIGNIFICANT CHANGE UP (ref 98–107)
CO2 SERPL-SCNC: 21 MMOL/L — LOW (ref 22–31)
CREAT SERPL-MCNC: 0.81 MG/DL — SIGNIFICANT CHANGE UP (ref 0.5–1.3)
GLUCOSE SERPL-MCNC: 118 MG/DL — HIGH (ref 70–99)
HCT VFR BLD CALC: 41.4 % — SIGNIFICANT CHANGE UP (ref 39–50)
HGB BLD-MCNC: 13.7 G/DL — SIGNIFICANT CHANGE UP (ref 13–17)
MAGNESIUM SERPL-MCNC: 2.6 MG/DL — SIGNIFICANT CHANGE UP (ref 1.6–2.6)
MCHC RBC-ENTMCNC: 28.1 PG — SIGNIFICANT CHANGE UP (ref 27–34)
MCHC RBC-ENTMCNC: 33.1 GM/DL — SIGNIFICANT CHANGE UP (ref 32–36)
MCV RBC AUTO: 84.8 FL — SIGNIFICANT CHANGE UP (ref 80–100)
NRBC # BLD: 0 /100 WBCS — SIGNIFICANT CHANGE UP
NRBC # FLD: 0 K/UL — SIGNIFICANT CHANGE UP
PHOSPHATE SERPL-MCNC: 3.2 MG/DL — SIGNIFICANT CHANGE UP (ref 2.5–4.5)
PLATELET # BLD AUTO: 340 K/UL — SIGNIFICANT CHANGE UP (ref 150–400)
POTASSIUM SERPL-MCNC: 4.5 MMOL/L — SIGNIFICANT CHANGE UP (ref 3.5–5.3)
POTASSIUM SERPL-SCNC: 4.5 MMOL/L — SIGNIFICANT CHANGE UP (ref 3.5–5.3)
RBC # BLD: 4.88 M/UL — SIGNIFICANT CHANGE UP (ref 4.2–5.8)
RBC # FLD: 13.4 % — SIGNIFICANT CHANGE UP (ref 10.3–14.5)
SODIUM SERPL-SCNC: 135 MMOL/L — SIGNIFICANT CHANGE UP (ref 135–145)
WBC # BLD: 15.01 K/UL — HIGH (ref 3.8–10.5)
WBC # FLD AUTO: 15.01 K/UL — HIGH (ref 3.8–10.5)

## 2021-06-26 PROCEDURE — 99233 SBSQ HOSP IP/OBS HIGH 50: CPT

## 2021-06-26 RX ORDER — OXYCODONE AND ACETAMINOPHEN 5; 325 MG/1; MG/1
1 TABLET ORAL ONCE
Refills: 0 | Status: DISCONTINUED | OUTPATIENT
Start: 2021-06-26 | End: 2021-06-26

## 2021-06-26 RX ADMIN — Medication 6 MILLIGRAM(S): at 22:22

## 2021-06-26 RX ADMIN — ERTAPENEM SODIUM 120 MILLIGRAM(S): 1 INJECTION, POWDER, LYOPHILIZED, FOR SOLUTION INTRAMUSCULAR; INTRAVENOUS at 11:54

## 2021-06-26 RX ADMIN — LOSARTAN POTASSIUM 100 MILLIGRAM(S): 100 TABLET, FILM COATED ORAL at 11:53

## 2021-06-26 RX ADMIN — DIPHENHYDRAMINE HYDROCHLORIDE AND LIDOCAINE HYDROCHLORIDE AND ALUMINUM HYDROXIDE AND MAGNESIUM HYDRO 4 MILLILITER(S): KIT at 07:07

## 2021-06-26 RX ADMIN — ATORVASTATIN CALCIUM 80 MILLIGRAM(S): 80 TABLET, FILM COATED ORAL at 22:22

## 2021-06-26 RX ADMIN — ENOXAPARIN SODIUM 40 MILLIGRAM(S): 100 INJECTION SUBCUTANEOUS at 11:54

## 2021-06-26 RX ADMIN — LIDOCAINE 4 MILLILITER(S): 4 CREAM TOPICAL at 11:53

## 2021-06-26 RX ADMIN — LIDOCAINE 4 MILLILITER(S): 4 CREAM TOPICAL at 07:07

## 2021-06-26 RX ADMIN — DIPHENHYDRAMINE HYDROCHLORIDE AND LIDOCAINE HYDROCHLORIDE AND ALUMINUM HYDROXIDE AND MAGNESIUM HYDRO 4 MILLILITER(S): KIT at 18:55

## 2021-06-26 RX ADMIN — Medication 81 MILLIGRAM(S): at 11:53

## 2021-06-26 RX ADMIN — DIPHENHYDRAMINE HYDROCHLORIDE AND LIDOCAINE HYDROCHLORIDE AND ALUMINUM HYDROXIDE AND MAGNESIUM HYDRO 4 MILLILITER(S): KIT at 11:54

## 2021-06-26 RX ADMIN — LIDOCAINE 4 MILLILITER(S): 4 CREAM TOPICAL at 18:54

## 2021-06-26 RX ADMIN — Medication 6 MILLIGRAM(S): at 15:16

## 2021-06-26 RX ADMIN — Medication 8 MILLIGRAM(S): at 07:07

## 2021-06-26 NOTE — PROGRESS NOTE ADULT - SUBJECTIVE AND OBJECTIVE BOX
Marnie Pierce MD  Hospitalist   Spectra: [phone]    Patient is a 60y old  Male who presents with a chief complaint of L facial swelling/pain (25 Jun 2021 13:40)      INTERVAL HPI/OVERNIGHT EVENTS: no acute overnight events noted   reports improvement in facial swelling     REVIEW OF SYSTEMS: negative  Vital Signs Last 24 Hrs  T(C): 36.4 (26 Jun 2021 06:56), Max: 36.4 (26 Jun 2021 06:56)  T(F): 97.5 (26 Jun 2021 06:56), Max: 97.5 (26 Jun 2021 06:56)  HR: 57 (26 Jun 2021 06:56) (50 - 70)  BP: 108/77 (26 Jun 2021 06:56) (106/72 - 126/78)  BP(mean): --  RR: 18 (26 Jun 2021 06:56) (18 - 18)  SpO2: 100% (26 Jun 2021 06:56) (97% - 100%)    PHYSICAL EXAM:   NAD; Normocephalic;   LUNGS - no wheezing  HEART: S1 S2+   ABDOMEN: Soft, Nontender, non distended  EXTREMITIES: no cyanosis; no edema  NERVOUS SYSTEM:  Awake and alert; no focal neuro deficits appreciated  left jaw swelling     LABS:                        13.7   15.01 )-----------( 340      ( 26 Jun 2021 06:29 )             41.4     06-26    135  |  104  |  32<H>  ----------------------------<  118<H>  4.5   |  21<L>  |  0.81    Ca    9.1      26 Jun 2021 06:29  Phos  3.2     06-26  Mg     2.6     06-26          CAPILLARY BLOOD GLUCOSE          Medications:  MEDICATIONS  (STANDING):  aspirin enteric coated 81 milliGRAM(s) Oral daily  atorvastatin 80 milliGRAM(s) Oral at bedtime  dexAMETHasone     Tablet 6 milliGRAM(s) Oral every 8 hours  enoxaparin Injectable 40 milliGRAM(s) SubCutaneous daily  ertapenem  IVPB 1000 milliGRAM(s) IV Intermittent every 24 hours  ertapenem  IVPB      FIRST- Mouthwash  BLM 4 milliLiter(s) Swish and Spit four times a day  lidocaine 2% Viscous 4 milliLiter(s) Swish and Spit four times a day  losartan 100 milliGRAM(s) Oral daily    MEDICATIONS  (PRN):  acetaminophen   Tablet .. 650 milliGRAM(s) Oral every 6 hours PRN Mild Pain (1 - 3)

## 2021-06-26 NOTE — PROGRESS NOTE ADULT - PROBLEM SELECTOR PLAN 1
CT showing lytic destructive changes involving the left posterior mandibular body for which osteomyelitis, osteonecrosis, or osteoradionecrosis are considerations.  -per ENT no intervention at this time but may need in future vs MRI  -ID recs appreciated, on Ertapenem 1g/day. Will need 6 weeks of antibiotics total via PICC. blood culture noted negative   - plan for picc placement - IR consulted - likely on monday   - on oral decadron taper  # Leukocytosis - likely setting of decadron - trending down  -HOB elevation, aspiration precautions  -please keep ORL aware of any changes in respiratory status  -if any respiratory distress STAT page ENT, critical care, anesthesia   -S/s recs pureed diet  -Magic mouth wash + viscous lidocaine for pain

## 2021-06-26 NOTE — PROGRESS NOTE ADULT - PROBLEM SELECTOR PLAN 8
Lovenox 40mg qd    Discussed with patient, ACP Mitzi   Spoke with patient's daughter Richelle [phone] and updated

## 2021-06-26 NOTE — PROGRESS NOTE ADULT - SUBJECTIVE AND OBJECTIVE BOX
interval  6/25:patient says pain and swelling slightly improved. subjectively worsening trismus  6/26: pt says maybe slightly improved sicne yday. still has trismus and pain. tolerating po puree    Vital Signs Last 24 Hrs  T(C): 36.4 (26 Jun 2021 06:56), Max: 36.4 (26 Jun 2021 06:56)  T(F): 97.5 (26 Jun 2021 06:56), Max: 97.5 (26 Jun 2021 06:56)  HR: 57 (26 Jun 2021 06:56) (50 - 70)  BP: 108/77 (26 Jun 2021 06:56) (106/72 - 126/78)  BP(mean): --  RR: 18 (26 Jun 2021 06:56) (18 - 18)  SpO2: 100% (26 Jun 2021 06:56) (97% - 100%)    PHYSICAL EXAM:  Gen: NAD, A/Ox3  Breathing comfortably on RA  No stridor or stertor  No increased work of breathing   Voice: light muffled voice   Face: Left premaxillary soft tissue swelling moderate with overlying erythema skin changes. Non tender, not warm. No orbital swelling.   Eyes: PERRL, EOMI  Nose: inflamed mucosa bilaterally, edematous mucosa  Mouth: severe trismus, only a few mm of mouth opening. Trismus limiting exam. Left posterior manidble with thin mucosa no obvious bony exposure. Left posterior gingival, buccal mucosa with swelling, tenderness. No visible drainage. Visible FOM not elevated. Tongue appears mobile.   Neck: left neck moderate swelling with overlying exfoliating skin, erythema. 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 Possible recurrence/superinfection of liposarcoma  -no acute ORL intervention  - iv abx per ID recs. to get PICC line.   -flexible laryngoscopy with normal laryngeal findings, no edema  -would be difficult intubation because of trismus. If in respiratory distress would require nasotracheal intubation or surgical airway  -HOB elevation  -continuos pulse ox   -please keep ORL aware of any changes in respiratory status  -if any respiratory distress STAT page ENT, critical care, anesthesia   -will follow  -call/page with questions  -will discuss with attending and update with any further recommendations

## 2021-06-27 LAB
ANION GAP SERPL CALC-SCNC: 13 MMOL/L — SIGNIFICANT CHANGE UP (ref 7–14)
BUN SERPL-MCNC: 32 MG/DL — HIGH (ref 7–23)
CALCIUM SERPL-MCNC: 9.4 MG/DL — SIGNIFICANT CHANGE UP (ref 8.4–10.5)
CHLORIDE SERPL-SCNC: 103 MMOL/L — SIGNIFICANT CHANGE UP (ref 98–107)
CO2 SERPL-SCNC: 19 MMOL/L — LOW (ref 22–31)
CREAT SERPL-MCNC: 0.73 MG/DL — SIGNIFICANT CHANGE UP (ref 0.5–1.3)
GLUCOSE SERPL-MCNC: 115 MG/DL — HIGH (ref 70–99)
HCT VFR BLD CALC: 49.3 % — SIGNIFICANT CHANGE UP (ref 39–50)
HGB BLD-MCNC: 15.7 G/DL — SIGNIFICANT CHANGE UP (ref 13–17)
MAGNESIUM SERPL-MCNC: 2.6 MG/DL — SIGNIFICANT CHANGE UP (ref 1.6–2.6)
MCHC RBC-ENTMCNC: 26.7 PG — LOW (ref 27–34)
MCHC RBC-ENTMCNC: 31.8 GM/DL — LOW (ref 32–36)
MCV RBC AUTO: 83.8 FL — SIGNIFICANT CHANGE UP (ref 80–100)
NRBC # BLD: 0 /100 WBCS — SIGNIFICANT CHANGE UP
NRBC # FLD: 0 K/UL — SIGNIFICANT CHANGE UP
PHOSPHATE SERPL-MCNC: 3.7 MG/DL — SIGNIFICANT CHANGE UP (ref 2.5–4.5)
PLATELET # BLD AUTO: 346 K/UL — SIGNIFICANT CHANGE UP (ref 150–400)
POTASSIUM SERPL-MCNC: 4.7 MMOL/L — SIGNIFICANT CHANGE UP (ref 3.5–5.3)
POTASSIUM SERPL-SCNC: 4.7 MMOL/L — SIGNIFICANT CHANGE UP (ref 3.5–5.3)
RBC # BLD: 5.88 M/UL — HIGH (ref 4.2–5.8)
RBC # FLD: 13.2 % — SIGNIFICANT CHANGE UP (ref 10.3–14.5)
SODIUM SERPL-SCNC: 135 MMOL/L — SIGNIFICANT CHANGE UP (ref 135–145)
WBC # BLD: 15.25 K/UL — HIGH (ref 3.8–10.5)
WBC # FLD AUTO: 15.25 K/UL — HIGH (ref 3.8–10.5)

## 2021-06-27 PROCEDURE — 99233 SBSQ HOSP IP/OBS HIGH 50: CPT

## 2021-06-27 RX ADMIN — LIDOCAINE 4 MILLILITER(S): 4 CREAM TOPICAL at 13:00

## 2021-06-27 RX ADMIN — DIPHENHYDRAMINE HYDROCHLORIDE AND LIDOCAINE HYDROCHLORIDE AND ALUMINUM HYDROXIDE AND MAGNESIUM HYDRO 4 MILLILITER(S): KIT at 22:19

## 2021-06-27 RX ADMIN — DIPHENHYDRAMINE HYDROCHLORIDE AND LIDOCAINE HYDROCHLORIDE AND ALUMINUM HYDROXIDE AND MAGNESIUM HYDRO 4 MILLILITER(S): KIT at 06:26

## 2021-06-27 RX ADMIN — ERTAPENEM SODIUM 120 MILLIGRAM(S): 1 INJECTION, POWDER, LYOPHILIZED, FOR SOLUTION INTRAMUSCULAR; INTRAVENOUS at 13:18

## 2021-06-27 RX ADMIN — DIPHENHYDRAMINE HYDROCHLORIDE AND LIDOCAINE HYDROCHLORIDE AND ALUMINUM HYDROXIDE AND MAGNESIUM HYDRO 4 MILLILITER(S): KIT at 17:51

## 2021-06-27 RX ADMIN — DIPHENHYDRAMINE HYDROCHLORIDE AND LIDOCAINE HYDROCHLORIDE AND ALUMINUM HYDROXIDE AND MAGNESIUM HYDRO 4 MILLILITER(S): KIT at 00:23

## 2021-06-27 RX ADMIN — ENOXAPARIN SODIUM 40 MILLIGRAM(S): 100 INJECTION SUBCUTANEOUS at 12:59

## 2021-06-27 RX ADMIN — LIDOCAINE 4 MILLILITER(S): 4 CREAM TOPICAL at 06:53

## 2021-06-27 RX ADMIN — LIDOCAINE 4 MILLILITER(S): 4 CREAM TOPICAL at 22:19

## 2021-06-27 RX ADMIN — Medication 4 MILLIGRAM(S): at 16:13

## 2021-06-27 RX ADMIN — DIPHENHYDRAMINE HYDROCHLORIDE AND LIDOCAINE HYDROCHLORIDE AND ALUMINUM HYDROXIDE AND MAGNESIUM HYDRO 4 MILLILITER(S): KIT at 13:00

## 2021-06-27 RX ADMIN — LOSARTAN POTASSIUM 100 MILLIGRAM(S): 100 TABLET, FILM COATED ORAL at 05:43

## 2021-06-27 RX ADMIN — Medication 4 MILLIGRAM(S): at 22:18

## 2021-06-27 RX ADMIN — LIDOCAINE 4 MILLILITER(S): 4 CREAM TOPICAL at 17:51

## 2021-06-27 RX ADMIN — LIDOCAINE 4 MILLILITER(S): 4 CREAM TOPICAL at 01:03

## 2021-06-27 RX ADMIN — Medication 81 MILLIGRAM(S): at 13:00

## 2021-06-27 RX ADMIN — Medication 4 MILLIGRAM(S): at 05:43

## 2021-06-27 RX ADMIN — ATORVASTATIN CALCIUM 80 MILLIGRAM(S): 80 TABLET, FILM COATED ORAL at 22:18

## 2021-06-27 NOTE — PROGRESS NOTE ADULT - SUBJECTIVE AND OBJECTIVE BOX
Marnie Pierce MD  Hospitalist     Patient is a 60y old  Male who presents with a chief complaint of L facial swelling/pain (27 Jun 2021 07:47)      INTERVAL HPI/OVERNIGHT EVENTS: no acute overnight events noted   reports slight improvement in jaw pain and opening     REVIEW OF SYSTEMS: negative  Vital Signs Last 24 Hrs  T(C): 36.3 (27 Jun 2021 08:48), Max: 36.4 (26 Jun 2021 13:43)  T(F): 97.4 (27 Jun 2021 08:48), Max: 97.6 (26 Jun 2021 13:43)  HR: 59 (27 Jun 2021 08:48) (51 - 66)  BP: 118/80 (27 Jun 2021 08:48) (107/68 - 122/77)  BP(mean): --  RR: 18 (27 Jun 2021 08:48) (17 - 18)  SpO2: 98% (27 Jun 2021 08:48) (96% - 100%)    PHYSICAL EXAM:   NAD; Normocephalic; swelling over left jaw, limited mouth opening   LUNGS - no wheezing  HEART: S1 S2+   ABDOMEN: Soft, Nontender, non distended  EXTREMITIES: no cyanosis; no edema  NERVOUS SYSTEM:  Awake and alert; no focal neuro deficits appreciated    LABS:                        15.7   15.25 )-----------( 346      ( 27 Jun 2021 06:29 )             49.3     06-27    135  |  103  |  32<H>  ----------------------------<  115<H>  4.7   |  19<L>  |  0.73    Ca    9.4      27 Jun 2021 06:29  Phos  3.7     06-27  Mg     2.6     06-27          CAPILLARY BLOOD GLUCOSE          Medications:  MEDICATIONS  (STANDING):  aspirin enteric coated 81 milliGRAM(s) Oral daily  atorvastatin 80 milliGRAM(s) Oral at bedtime  dexAMETHasone     Tablet 4 milliGRAM(s) Oral every 8 hours  enoxaparin Injectable 40 milliGRAM(s) SubCutaneous daily  ertapenem  IVPB      ertapenem  IVPB 1000 milliGRAM(s) IV Intermittent every 24 hours  FIRST- Mouthwash  BLM 4 milliLiter(s) Swish and Spit four times a day  lidocaine 2% Viscous 4 milliLiter(s) Swish and Spit four times a day  losartan 100 milliGRAM(s) Oral daily    MEDICATIONS  (PRN):  acetaminophen   Tablet .. 650 milliGRAM(s) Oral every 6 hours PRN Mild Pain (1 - 3)

## 2021-06-27 NOTE — HISTORY OF PRESENT ILLNESS
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 Completed 30/33 fractions to oral cavity. Continues to have pain and soreness to left mouth. States not able to eat much. Lost 3 lbs this week. Continues to use magic mixture. Gabapentin e-scribed.

## 2021-06-27 NOTE — DISEASE MANAGEMENT
[Pathological] : TNM Stage: p [IB] : IB [TTNM] : 3 [NTNM] : x [MTNM] : x [de-identified] : 0228 [de-identified] : 8172 [de-identified] : Oral cavity

## 2021-06-27 NOTE — PROGRESS NOTE ADULT - SUBJECTIVE AND OBJECTIVE BOX
interval  6/25:patient says pain and swelling slightly improved. subjectively worsening trismus  6/26: pt says maybe slightly improved sicne yday. still has trismus and pain. tolerating po puree but w pain  6/27: stable. naeon.     Vital Signs Last 24 Hrs  T(C): 36.3 (27 Jun 2021 06:04), Max: 36.4 (26 Jun 2021 13:43)  T(F): 97.4 (27 Jun 2021 06:04), Max: 97.6 (26 Jun 2021 13:43)  HR: 60 (27 Jun 2021 06:04) (51 - 66)  BP: 122/77 (27 Jun 2021 06:04) (107/68 - 122/77)  BP(mean): --  RR: 17 (27 Jun 2021 06:04) (17 - 18)  SpO2: 97% (27 Jun 2021 06:04) (96% - 100%)    PHYSICAL EXAM:  Gen: NAD, A/Ox3  Breathing comfortably on RA  No stridor or stertor  No increased work of breathing   Voice: light muffled voice   Face: Left premaxillary soft tissue swelling moderate with overlying erythema skin changes. Non tender, not warm. No orbital swelling.   Eyes: PERRL, EOMI  Nose: inflamed mucosa bilaterally, edematous mucosa  Mouth: severe trismus, only a few mm of mouth opening. Trismus limiting exam. Left posterior manidble with thin mucosa no obvious bony exposure. Left posterior gingival, buccal mucosa with swelling, tenderness. No visible drainage. Visible FOM not elevated. Tongue appears mobile.   Neck: left neck moderate swelling with overlying exfoliating skin, erythema. 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 Possible recurrence/superinfection of liposarcoma  -no acute ORL intervention  - iv abx per ID recs. to get PICC line.   -flexible laryngoscopy with normal laryngeal findings, no edema  -would be difficult intubation because of trismus. If in respiratory distress would require nasotracheal intubation or surgical airway  -HOB elevation  -continuos pulse ox   -please keep ORL aware of any changes in respiratory status  -if any respiratory distress STAT page ENT, critical care, anesthesia   -call/page with questions  -will discuss with attending and update with any further recommendations

## 2021-06-27 NOTE — VITALS
[Maximal Pain Intensity: 9/10] : 9/10 [Least Pain Intensity: 0/10] : 0/10 [Pain Description/Quality: ___] : Pain description/quality: [unfilled] [Pain Duration: ___] : Pain duration: [unfilled] [Pain Location: ___] : Pain Location: [unfilled] [OTC] : OTC [NSAID/Non-Opioid] : NSAID/Non-Opioid [80: Normal activity with effort; some signs or symptoms of disease.] : 80: Normal activity with effort; some signs or symptoms of disease.  [ECOG Performance Status: 1 - Restricted in physically strenuous activity but ambulatory and able to carry out work of a light or sedentary nature] : Performance Status: 1 - Restricted in physically strenuous activity but ambulatory and able to carry out work of a light or sedentary nature, e.g., light house work, office work [Pain Interferes with ADLs] : Pain does not interfere with activities of daily living

## 2021-06-27 NOTE — PROVIDER CONTACT NOTE (CHANGE IN STATUS NOTIFICATION) - ASSESSMENT
Pt observed sitting at bedside eating breakfast. Pt is A+0X4. Pt's 02 sat in mid 90's on RA. Patient showing no signs or symptoms of distress. Pt's vitals were obtained. Vital signs shows HR of 56-59.

## 2021-06-27 NOTE — PROGRESS NOTE ADULT - PROBLEM SELECTOR PLAN 8
Lovenox 40mg qd    Discussed with patient, ACP Mitzi   Spoke with patient's daughter Richelle [phone] - updates given

## 2021-06-27 NOTE — PROGRESS NOTE ADULT - PROBLEM SELECTOR PLAN 1
CT showing lytic destructive changes involving the left posterior mandibular body for which osteomyelitis, osteonecrosis, or osteoradionecrosis are considerations.  -per ENT no intervention at this time but may need in future vs MRI  -ID recs appreciated, on Ertapenem 1g/day. Will need 6 weeks of antibiotics total via PICC. blood culture noted negative   - on oral decadron taper  # Leukocytosis - likely setting of decadron - trending down  -HOB elevation, aspiration precautions  -please keep ORL aware of any changes in respiratory status  -if any respiratory distress STAT page ENT, critical care, anesthesia   -S/s recs pureed diet  -Magic mouth wash + viscous lidocaine for pain  - plan for picc placement - IR consulted - likely on monday

## 2021-06-27 NOTE — PROVIDER CONTACT NOTE (CHANGE IN STATUS NOTIFICATION) - ACTION/TREATMENT ORDERED:
Katt from [facility] was notified. Katt stated she will come on unit and assess patient. No orders received.

## 2021-06-28 ENCOUNTER — TRANSCRIPTION ENCOUNTER (OUTPATIENT)
Age: 61
End: 2021-06-28

## 2021-06-28 VITALS
DIASTOLIC BLOOD PRESSURE: 85 MMHG | TEMPERATURE: 97 F | SYSTOLIC BLOOD PRESSURE: 122 MMHG | RESPIRATION RATE: 18 BRPM | OXYGEN SATURATION: 99 % | HEART RATE: 57 BPM

## 2021-06-28 LAB
ANION GAP SERPL CALC-SCNC: 20 MMOL/L — HIGH (ref 7–14)
BUN SERPL-MCNC: 34 MG/DL — HIGH (ref 7–23)
CALCIUM SERPL-MCNC: 9 MG/DL — SIGNIFICANT CHANGE UP (ref 8.4–10.5)
CHLORIDE SERPL-SCNC: 100 MMOL/L — SIGNIFICANT CHANGE UP (ref 98–107)
CO2 SERPL-SCNC: 21 MMOL/L — LOW (ref 22–31)
CREAT SERPL-MCNC: 0.85 MG/DL — SIGNIFICANT CHANGE UP (ref 0.5–1.3)
GLUCOSE SERPL-MCNC: 93 MG/DL — SIGNIFICANT CHANGE UP (ref 70–99)
HCT VFR BLD CALC: 43.5 % — SIGNIFICANT CHANGE UP (ref 39–50)
HGB BLD-MCNC: 14.2 G/DL — SIGNIFICANT CHANGE UP (ref 13–17)
MAGNESIUM SERPL-MCNC: 2.4 MG/DL — SIGNIFICANT CHANGE UP (ref 1.6–2.6)
MCHC RBC-ENTMCNC: 27.3 PG — SIGNIFICANT CHANGE UP (ref 27–34)
MCHC RBC-ENTMCNC: 32.6 GM/DL — SIGNIFICANT CHANGE UP (ref 32–36)
MCV RBC AUTO: 83.7 FL — SIGNIFICANT CHANGE UP (ref 80–100)
NRBC # BLD: 0 /100 WBCS — SIGNIFICANT CHANGE UP
NRBC # FLD: 0 K/UL — SIGNIFICANT CHANGE UP
PHOSPHATE SERPL-MCNC: 4.1 MG/DL — SIGNIFICANT CHANGE UP (ref 2.5–4.5)
PLATELET # BLD AUTO: 355 K/UL — SIGNIFICANT CHANGE UP (ref 150–400)
POTASSIUM SERPL-MCNC: 4.6 MMOL/L — SIGNIFICANT CHANGE UP (ref 3.5–5.3)
POTASSIUM SERPL-SCNC: 4.6 MMOL/L — SIGNIFICANT CHANGE UP (ref 3.5–5.3)
RBC # BLD: 5.2 M/UL — SIGNIFICANT CHANGE UP (ref 4.2–5.8)
RBC # FLD: 13.3 % — SIGNIFICANT CHANGE UP (ref 10.3–14.5)
SODIUM SERPL-SCNC: 141 MMOL/L — SIGNIFICANT CHANGE UP (ref 135–145)
WBC # BLD: 14.7 K/UL — HIGH (ref 3.8–10.5)
WBC # FLD AUTO: 14.7 K/UL — HIGH (ref 3.8–10.5)

## 2021-06-28 PROCEDURE — 99239 HOSP IP/OBS DSCHRG MGMT >30: CPT

## 2021-06-28 PROCEDURE — 36573 INSJ PICC RS&I 5 YR+: CPT

## 2021-06-28 RX ORDER — GABAPENTIN 400 MG/1
1 CAPSULE ORAL
Qty: 90 | Refills: 0
Start: 2021-06-28 | End: 2021-07-27

## 2021-06-28 RX ORDER — ACETAMINOPHEN 500 MG
2 TABLET ORAL
Qty: 0 | Refills: 0 | DISCHARGE
Start: 2021-06-28

## 2021-06-28 RX ORDER — CHLORHEXIDINE GLUCONATE 213 G/1000ML
1 SOLUTION TOPICAL
Refills: 0 | Status: DISCONTINUED | OUTPATIENT
Start: 2021-06-28 | End: 2021-06-28

## 2021-06-28 RX ORDER — SODIUM CHLORIDE 9 MG/ML
10 INJECTION INTRAMUSCULAR; INTRAVENOUS; SUBCUTANEOUS
Refills: 0 | Status: DISCONTINUED | OUTPATIENT
Start: 2021-06-28 | End: 2021-06-28

## 2021-06-28 RX ORDER — ERTAPENEM SODIUM 1 G/1
1 INJECTION, POWDER, LYOPHILIZED, FOR SOLUTION INTRAMUSCULAR; INTRAVENOUS
Qty: 37 | Refills: 0
Start: 2021-06-28 | End: 2021-08-03

## 2021-06-28 RX ORDER — SODIUM CHLORIDE 9 MG/ML
10 INJECTION INTRAMUSCULAR; INTRAVENOUS; SUBCUTANEOUS
Qty: 740 | Refills: 0
Start: 2021-06-28 | End: 2021-08-03

## 2021-06-28 RX ADMIN — Medication 2 MILLIGRAM(S): at 15:22

## 2021-06-28 RX ADMIN — LIDOCAINE 4 MILLILITER(S): 4 CREAM TOPICAL at 11:44

## 2021-06-28 RX ADMIN — LOSARTAN POTASSIUM 100 MILLIGRAM(S): 100 TABLET, FILM COATED ORAL at 06:37

## 2021-06-28 RX ADMIN — LIDOCAINE 4 MILLILITER(S): 4 CREAM TOPICAL at 06:37

## 2021-06-28 RX ADMIN — ENOXAPARIN SODIUM 40 MILLIGRAM(S): 100 INJECTION SUBCUTANEOUS at 11:47

## 2021-06-28 RX ADMIN — Medication 81 MILLIGRAM(S): at 11:46

## 2021-06-28 RX ADMIN — ERTAPENEM SODIUM 120 MILLIGRAM(S): 1 INJECTION, POWDER, LYOPHILIZED, FOR SOLUTION INTRAMUSCULAR; INTRAVENOUS at 12:43

## 2021-06-28 RX ADMIN — DIPHENHYDRAMINE HYDROCHLORIDE AND LIDOCAINE HYDROCHLORIDE AND ALUMINUM HYDROXIDE AND MAGNESIUM HYDRO 4 MILLILITER(S): KIT at 06:37

## 2021-06-28 RX ADMIN — DIPHENHYDRAMINE HYDROCHLORIDE AND LIDOCAINE HYDROCHLORIDE AND ALUMINUM HYDROXIDE AND MAGNESIUM HYDRO 4 MILLILITER(S): KIT at 11:45

## 2021-06-28 RX ADMIN — Medication 2 MILLIGRAM(S): at 06:37

## 2021-06-28 NOTE — PROGRESS NOTE ADULT - ASSESSMENT
60 y.o. Male w/ hx CRISTIAN on CPAP, HTN, CAD s/p CABG, psoriasis (skyrizi), left facial liposarcoma s/p resection, undergoing adjuvant radiotherapy starting in May p/w worsening left facial pain and swelling.   60 y.o. Male w/ hx CRISTIAN on CPAP, HTN, CAD s/p CABG, psoriasis (skyrizi), left facial liposarcoma s/p resection, undergoing adjuvant radiotherapy starting in May p/w worsening left facial pain and swelling found to have left mandibular osteomyelitis to have 6 weeks of IV ertapenem via PICC line. D/C 40 minutes.

## 2021-06-28 NOTE — PROGRESS NOTE ADULT - SUBJECTIVE AND OBJECTIVE BOX
Patient is a 60y old  Male who presents with a chief complaint of L facial swelling/pain (28 Jun 2021 11:16)      SUBJECTIVE / OVERNIGHT EVENTS:    MEDICATIONS  (STANDING):  aspirin enteric coated 81 milliGRAM(s) Oral daily  atorvastatin 80 milliGRAM(s) Oral at bedtime  chlorhexidine 4% Liquid 1 Application(s) Topical <User Schedule>  dexAMETHasone     Tablet 2 milliGRAM(s) Oral every 8 hours  enoxaparin Injectable 40 milliGRAM(s) SubCutaneous daily  ertapenem  IVPB      ertapenem  IVPB 1000 milliGRAM(s) IV Intermittent every 24 hours  FIRST- Mouthwash  BLM 4 milliLiter(s) Swish and Spit four times a day  lidocaine 2% Viscous 4 milliLiter(s) Swish and Spit four times a day  losartan 100 milliGRAM(s) Oral daily    MEDICATIONS  (PRN):  acetaminophen   Tablet .. 650 milliGRAM(s) Oral every 6 hours PRN Mild Pain (1 - 3)  sodium chloride 0.9% lock flush 10 milliLiter(s) IV Push every 1 hour PRN Pre/post blood products, medications, blood draw, and to maintain line patency      Vital Signs Last 24 Hrs  T(C): 36.6 (28 Jun 2021 06:25), Max: 36.6 (27 Jun 2021 22:16)  T(F): 97.8 (28 Jun 2021 06:25), Max: 97.9 (27 Jun 2021 22:16)  HR: 77 (28 Jun 2021 06:25) (62 - 77)  BP: 114/78 (28 Jun 2021 06:25) (111/78 - 114/81)  BP(mean): --  RR: 18 (28 Jun 2021 06:25) (17 - 18)  SpO2: 99% (28 Jun 2021 06:25) (98% - 99%)  CAPILLARY BLOOD GLUCOSE        I&O's Summary      PHYSICAL EXAM:  GENERAL: NAD, well-developed  HEAD:  Atraumatic, Normocephalic  EYES: EOMI, PERRLA, conjunctiva and sclera clear  NECK: Supple, No JVD  CHEST/LUNG: Clear to auscultation bilaterally; No wheeze  HEART: Regular rate and rhythm; No murmurs, rubs, or gallops  ABDOMEN: Soft, Nontender, Nondistended; Bowel sounds present  EXTREMITIES:  2+ Peripheral Pulses, No clubbing, cyanosis, or edema  PSYCH: AAOx3  NEUROLOGY: non-focal  SKIN: No rashes or lesions    LABS:                        14.2   14.70 )-----------( 355      ( 28 Jun 2021 06:49 )             43.5     06-28    141  |  100  |  34<H>  ----------------------------<  93  4.6   |  21<L>  |  0.85    Ca    9.0      28 Jun 2021 06:49  Phos  4.1     06-28  Mg     2.4     06-28                RADIOLOGY & ADDITIONAL TESTS:    Imaging Personally Reviewed:    Consultant(s) Notes Reviewed:      Care Discussed with Consultants/Other Providers:   Patient is a 60y old  Male who presents with a chief complaint of L facial swelling/pain (28 Jun 2021 11:16)      SUBJECTIVE / OVERNIGHT EVENTS:  Patient has no new complaints. Denies cp, SOB, abdominal pain, N/V/D     MEDICATIONS  (STANDING):  aspirin enteric coated 81 milliGRAM(s) Oral daily  atorvastatin 80 milliGRAM(s) Oral at bedtime  chlorhexidine 4% Liquid 1 Application(s) Topical <User Schedule>  dexAMETHasone     Tablet 2 milliGRAM(s) Oral every 8 hours  enoxaparin Injectable 40 milliGRAM(s) SubCutaneous daily  ertapenem  IVPB      ertapenem  IVPB 1000 milliGRAM(s) IV Intermittent every 24 hours  FIRST- Mouthwash  BLM 4 milliLiter(s) Swish and Spit four times a day  lidocaine 2% Viscous 4 milliLiter(s) Swish and Spit four times a day  losartan 100 milliGRAM(s) Oral daily    MEDICATIONS  (PRN):  acetaminophen   Tablet .. 650 milliGRAM(s) Oral every 6 hours PRN Mild Pain (1 - 3)  sodium chloride 0.9% lock flush 10 milliLiter(s) IV Push every 1 hour PRN Pre/post blood products, medications, blood draw, and to maintain line patency      Vital Signs Last 24 Hrs  T(C): 36.6 (28 Jun 2021 06:25), Max: 36.6 (27 Jun 2021 22:16)  T(F): 97.8 (28 Jun 2021 06:25), Max: 97.9 (27 Jun 2021 22:16)  HR: 77 (28 Jun 2021 06:25) (62 - 77)  BP: 114/78 (28 Jun 2021 06:25) (111/78 - 114/81)  BP(mean): --  RR: 18 (28 Jun 2021 06:25) (17 - 18)  SpO2: 99% (28 Jun 2021 06:25) (98% - 99%)  CAPILLARY BLOOD GLUCOSE        I&O's Summary      PHYSICAL EXAM:  GENERAL: NAD, well-developed  HEAD:  Atraumatic, Normocephalic  EYES: EOMI, PERRLA, conjunctiva and sclera clear  NECK: Supple, No JVD  CHEST/LUNG: Clear to auscultation bilaterally; No wheeze  HEART: Regular rate and rhythm; No murmurs, rubs, or gallops  ABDOMEN: Soft, Nontender, Nondistended; Bowel sounds present  EXTREMITIES: +Left arm PICC line.  2+ Peripheral Pulses, No clubbing, cyanosis, or edema  PSYCH: AAOx3  NEUROLOGY: non-focal  SKIN: No rashes or lesions    LABS:                        14.2   14.70 )-----------( 355      ( 28 Jun 2021 06:49 )             43.5     06-28    141  |  100  |  34<H>  ----------------------------<  93  4.6   |  21<L>  |  0.85    Ca    9.0      28 Jun 2021 06:49  Phos  4.1     06-28  Mg     2.4     06-28                RADIOLOGY & ADDITIONAL TESTS:    Imaging Personally Reviewed:    Consultant(s) Notes Reviewed:      Care Discussed with Consultants/Other Providers:

## 2021-06-28 NOTE — DISCHARGE NOTE PROVIDER - NSDCMRMEDTOKEN_GEN_ALL_CORE_FT
aspirin 81 mg oral delayed release tablet: 1 tab(s) orally once a day  atorvastatin 80 mg oral tablet: 1 tab(s) orally once a day  ertapenem 1 g injection: 1 gram(s) intravenously once a day   ezetimibe 10 mg oral tablet: 1 tab(s) orally once a day  irbesartan 300 mg oral tablet: 1 tab(s) orally once a day  Normal Saline Flush 0.9% injectable solution: 10 milliliter(s) intravenous 2 times a day before and after medication administration  Skyrizi 75 mg/0.83 mL subcutaneous kit: Inject 150mg every 12 weeks (Last injection: 06/21/21)   acetaminophen 325 mg oral tablet: 2 tab(s) orally every 6 hours, As needed, Mild Pain (1 - 3)  aspirin 81 mg oral delayed release tablet: 1 tab(s) orally once a day  atorvastatin 80 mg oral tablet: 1 tab(s) orally once a day  dexamethasone 2 mg oral tablet: 1 tab(s) orally every 12 hours   dexamethasone 2 mg oral tablet: 2 tab(s) orally once a day   ertapenem 1 g injection: 1 gram(s) intravenously once a day   ezetimibe 10 mg oral tablet: 1 tab(s) orally once a day  gabapentin 100 mg oral capsule: 1 cap(s) orally 3 times a day   irbesartan 300 mg oral tablet: 1 tab(s) orally once a day  Skyrizi 75 mg/0.83 mL subcutaneous kit: Inject 150mg every 12 weeks (Last injection: 06/21/21)  Weekly Labs: Please Draw CBC , BMP, ESR, CRP once a week

## 2021-06-28 NOTE — PROGRESS NOTE ADULT - PROBLEM SELECTOR PLAN 4
C/w home ASA, statin. Hx CABG 2019
C/w home ASA, statin. Hx CABG 2019
Resume ASA, statin. Hx CABG 2019
C/w home ASA, statin. Hx CABG 2019
C/w home ASA, statin. Hx CABG 2019
Resume ASA, statin. Hx CABG 2019

## 2021-06-28 NOTE — PROGRESS NOTE ADULT - PROBLEM SELECTOR PROBLEM 2
Abnormal CT scan, neck

## 2021-06-28 NOTE — PROGRESS NOTE ADULT - PROBLEM SELECTOR PLAN 2
CTA head/neck shows "Mild long segment luminal narrowing of the left internal carotid artery WITHOUT occlusion secondary to mass effect generated upon the left carotid sheath by the previously noted left-sided /parapharyngeal space phlegmon."  - Discussed with ENT, no intervention
CTA head/neck shows "Mild long segment luminal narrowing of the left internal carotid artery WITHOUT occlusion secondary to mass effect generated upon the left carotid sheath by the previously noted left-sided /parapharyngeal space phlegmon."  - No occlusion, will continue management
CTA head/neck shows occlusion of left ICA due to mass effect. Will discuss management and possible intervention with ENT.
CTA head/neck shows "Mild long segment luminal narrowing of the left internal carotid artery WITHOUT occlusion secondary to mass effect generated upon the left carotid sheath by the previously noted left-sided /parapharyngeal space phlegmon."  - Discussed with ENT, no intervention

## 2021-06-28 NOTE — PROGRESS NOTE ADULT - PROBLEM SELECTOR PLAN 5
Irbesartan 300mg qd outpatient and will give losartan 100mg qd

## 2021-06-28 NOTE — DISCHARGE NOTE NURSING/CASE MANAGEMENT/SOCIAL WORK - NSDCVIVACCINE_GEN_ALL_CORE_FT
influenza, injectable, quadrivalent, preservative free; 15-Sep-2018 18:09; Elizabeth Lim (JANENE); [employer]; [id number] (Exp. Date: 30-Jun-2019); IntraMuscular; Deltoid Left.; 0.5 milliLiter(s); VIS (VIS Published: 07-Aug-2015, VIS Presented: 15-Sep-2018);

## 2021-06-28 NOTE — DISCHARGE NOTE PROVIDER - NSDCHHENCOUNTER_GEN_ALL_CORE
Judie Noriega is a 70 y.o. female evaluated via telephone on 6/11/2020. Consent:  She and/or health care decision maker is aware that that she may receive a bill for this telephone service, depending on her insurance coverage, and has provided verbal consent to proceed: Yes      Documentation:  I communicated with the patient and/or health care decision maker about COPD. Details of this discussion including any medical advice provided:    Continue current COPD regimen  F/u in 6 months for COPD and LDCT    I affirm this is a Patient Initiated Episode with a Patient who has not had a related appointment within my department in the past 7 days or scheduled within the next 24 hours.     Patient identification was verified at the start of the visit: Yes    Total Time: minutes: 11-20 minutes    Note: not billable if this call serves to triage the patient into an appointment for the relevant concern      DERRICK HAND
28-Jun-2021

## 2021-06-28 NOTE — PROGRESS NOTE ADULT - PROVIDER SPECIALTY LIST ADULT
ENT
Infectious Disease
Infectious Disease
ENT
Internal Medicine
Hospitalist
Internal Medicine

## 2021-06-28 NOTE — DISCHARGE NOTE PROVIDER - NSDCFUSCHEDAPPT_GEN_ALL_CORE_FT
NELDA QUISPE ; 07/14/2021 ; [facility] Rad Med [facility]  NELDA QUISPE ; 07/20/2021 ; [facility] Cardio [address]

## 2021-06-28 NOTE — PROGRESS NOTE ADULT - PROBLEM SELECTOR PROBLEM 1
Facial cellulitis

## 2021-06-28 NOTE — PROGRESS NOTE ADULT - NSICDXPILOT_GEN_ALL_CORE
[place]

## 2021-06-28 NOTE — DISCHARGE NOTE NURSING/CASE MANAGEMENT/SOCIAL WORK - PATIENT PORTAL LINK FT
You can access the FollowMyHealth Patient Portal offered by [facility] by registering at the following website: [email or web address]. By joining [employer]’s FollowMyHealth portal, you will also be able to view your health information using other applications (apps) compatible with our system.

## 2021-06-28 NOTE — PROGRESS NOTE ADULT - PROBLEM SELECTOR PLAN 1
CT showing lytic destructive changes involving the left posterior mandibular body for which osteomyelitis, osteonecrosis, or osteoradionecrosis are considerations.  -per ENT no intervention at this time but may need in future vs MRI  -ID recs appreciated, on Ertapenem 1g/day. Will need 6 weeks of antibiotics total via PICC. blood culture noted negative   - on oral decadron taper  # Leukocytosis - likely setting of decadron - trending down  -HOB elevation, aspiration precautions  -please keep ORL aware of any changes in respiratory status  -if any respiratory distress STAT page ENT, critical care, anesthesia   -S/s recs pureed diet  -Magic mouth wash + viscous lidocaine for pain  - plan for picc placement - IR consulted - likely on today 6/28 CT showing lytic destructive changes involving the left posterior mandibular body for which osteomyelitis, osteonecrosis, or osteoradionecrosis are considerations.  -per ENT no intervention at this time but may need in future vs MRI  -ID recs appreciated, on Ertapenem 1g/day. Will need 6 weeks of antibiotics total via PICC. blood culture noted negative   - on oral decadron taper  # Leukocytosis - likely setting of decadron - trending down  -HOB elevation, aspiration precautions  -please keep ORL aware of any changes in respiratory status  -if any respiratory distress STAT page ENT, critical care, anesthesia   -S/s recs pureed diet  -Magic mouth wash + viscous lidocaine for pain  - PICC line placed by IR on 6/28  -needs visiting nurse setup for home abx.

## 2021-06-28 NOTE — DISCHARGE NOTE PROVIDER - PROVIDER TOKENS
PROVIDER:[TOKEN:[[id number]],FOLLOWUP:[1 week]],PROVIDER:[TOKEN:[[id number]],FOLLOWUP:[2 weeks]]

## 2021-06-28 NOTE — PROGRESS NOTE ADULT - PROBLEM SELECTOR PLAN 3
S/p resection in January followed by radiation in May. Possible recurrence given CT findings. F/u further ENT recs and rad onc outpatient
S/p resection in January followed by radiation in May. Possible recurrence given CT findings.   - F/u further ENT recs and rad onc outpatient
S/p resection in January followed by radiation in May. Possible recurrence given CT findings. F/u further ENT recs and rad onc outpatient

## 2021-06-28 NOTE — DISCHARGE NOTE PROVIDER - CARE PROVIDER_API CALL
Tiago Kowalski)  Otolaryngology  [address] [place] [address]  Phone: [phone]  Fax: [phone]  Follow Up Time: 1 week    Halima Erwin)  Internal Medicine  [address] [place] [id number]  Phone: [phone]  Fax: [phone]  Follow Up Time: 2 weeks

## 2021-06-28 NOTE — DISCHARGE NOTE PROVIDER - CARE PROVIDERS DIRECT ADDRESSES
[email or web address]

## 2021-06-28 NOTE — PROGRESS NOTE ADULT - PROBLEM SELECTOR PROBLEM 7
Sleep apnea, unspecified type

## 2021-06-28 NOTE — CHART NOTE - NSCHARTNOTEFT_GEN_A_CORE
PRE-INTERVENTIONAL RADIOLOGY PROCEDURE NOTE      Patient Age: 60y    Patient Gender: Male    Procedure: PICC line     Diagnosis/Indication: Facial cellulitis /OM , IV antibiotics     Ordering Attending Physician:     Pertinent Medical History:60M with PMHx HTN, CAD s/p CABG, CRISTIAN on CPAP, psoriasis (skyrizi), left facial liposarcoma s/p resection, undergoing adjuvant radiotherapy starting in May presenting with worsening left facial pain and swelling/trismus . On IV ertapenem for facial cellulitits/OM.     Pertinent labs:                      14.2   14.70 )-----------( 355      ( 28 Jun 2021 06:49 )             43.5       06-28    141  |  100  |  34<H>  ----------------------------<  93  4.6   |  21<L>  |  0.85    Ca    9.0      28 Jun 2021 06:49  Phos  4.1     06-28  Mg     2.4     06-28                Patient and Family Aware ? Yes

## 2021-06-28 NOTE — PROGRESS NOTE ADULT - PROBLEM SELECTOR PLAN 7
----- Message from Kishore Zuniga MD sent at 7/14/2017  4:09 PM CDT -----  Please call to let him know kidney function improved back to his previous baseline, thank you.       Informed pt of results /verbalized understanding  
CPAP ordered
CPAP qHS
CPAP qHS
CPAP ordered - no contraindication per ortho
CPAP qHS
CPAP qHS

## 2021-06-28 NOTE — PROGRESS NOTE ADULT - REASON FOR ADMISSION
L facial swelling/pain

## 2021-06-28 NOTE — PROGRESS NOTE ADULT - PROBLEM SELECTOR PLAN 6
Yanely every 4 months

## 2021-06-28 NOTE — DISCHARGE NOTE PROVIDER - NSDCCPCAREPLAN_GEN_ALL_CORE_FT
PRINCIPAL DISCHARGE DIAGNOSIS  Diagnosis: Facial swelling  Assessment and Plan of Treatment: You came into the hospital with facial swelling . You were found to have an infection of the skin and bone. You weres seen by the Infectious Disease doctor and were started in IV antibiotics which you will continue.      SECONDARY DISCHARGE DIAGNOSES  Diagnosis: Sleep apnea, unspecified type  Assessment and Plan of Treatment: Continue using CPAP machine    Diagnosis: Hypertension, unspecified type  Assessment and Plan of Treatment: Continue blood pressure medication regimen as directed. Monitor for any visual changes, headaches or dizziness.  Monitor blood pressure regularly.  Follow up with your primary care provider for further management for high blood pressure.     PRINCIPAL DISCHARGE DIAGNOSIS  Diagnosis: Facial swelling  Assessment and Plan of Treatment: You came into the hospital with facial swelling . You were found to have an infection of the skin and bone. You weres seen by the Infectious Disease doctor and were started in IV antibiotics which you will continue. You will be getting lab work done weekly.      SECONDARY DISCHARGE DIAGNOSES  Diagnosis: Hypertension, unspecified type  Assessment and Plan of Treatment: Continue blood pressure medication regimen as directed. Monitor for any visual changes, headaches or dizziness.  Monitor blood pressure regularly.  Follow up with your primary care provider for further management for high blood pressure.    Diagnosis: Liposarcoma  Assessment and Plan of Treatment: Follow up with ENT as an outpatient    Diagnosis: Sleep apnea, unspecified type  Assessment and Plan of Treatment: Continue using CPAP machine

## 2021-06-28 NOTE — DISCHARGE NOTE PROVIDER - HOSPITAL COURSE
60M with PMHx HTN, CAD s/p CABG, CRISTIAN on CPAP, psoriasis (skyrizi), left facial liposarcoma s/p resection, undergoing adjuvant radiotherapy starting in May presenting with worsening left facial pain and swelling found to have facial cellulitis and OM, on IV Ertapenem.     #Facial cellulitis  - CT showing lytic destructive changes involving the left posterior mandibular body for which osteomyelitis, osteonecrosis, or osteoradionecrosis are considerations.  -per ENT no intervention at this time but may need in future vs MRI  -ID consulted on Ertapenem 1g/day. Will need 6 weeks of antibiotics total via PICC. blood culture noted negative   - on oral decadron taper    # Leukocytosis   - likely setting of decadron - trending down    #Abnormal CT scan, neck  -CTA head/neck shows "Mild long segment luminal narrowing of the left internal carotid artery WITHOUT occlusion secondary to mass effect generated upon the left carotid sheath by the previously noted left-sided /parapharyngeal space phlegmon."  - Discussed with ENT, no intervention.     #Liposarcoma  - S/p resection in January followed by radiation in May. Possible recurrence given CT findings.   - F/u further ENT recs and rad onc outpatient.     #Coronary artery disease involving native heart, unspecified vessel or lesion type, unspecified whether angina present  -C/w home ASA, statin. Hx CABG 2019.       #Hypertension, unspecified type  -Irbesartan 300mg qd outpatient and will give losartan 100mg qd    # Psoriasis  - Skyrizi every 4 months.    #Sleep apnea  -CPAP qHS.       Patient seen and evaluated. Reviewed discharge medications with patient and attending. All new medications requiring new prescriptions were sent to the pharmacy of patient's choice. Reviewed need for prescription for previous home medications and new prescriptions sent if requested. Medically cleared/stable for discharge as per   with appropriate follow up. Patient understands and agrees with plan of care.

## 2021-06-29 LAB
CULTURE RESULTS: SIGNIFICANT CHANGE UP
CULTURE RESULTS: SIGNIFICANT CHANGE UP
SPECIMEN SOURCE: SIGNIFICANT CHANGE UP
SPECIMEN SOURCE: SIGNIFICANT CHANGE UP

## 2021-06-29 RX ORDER — DEXAMETHASONE 0.5 MG/5ML
1 ELIXIR ORAL
Qty: 2 | Refills: 0
Start: 2021-06-29 | End: 2021-06-29

## 2021-06-30 RX ORDER — DEXAMETHASONE 0.5 MG/5ML
2 ELIXIR ORAL
Qty: 2 | Refills: 0
Start: 2021-06-30 | End: 2021-06-30

## 2021-07-13 ENCOUNTER — APPOINTMENT (OUTPATIENT)
Dept: OTOLARYNGOLOGY | Facility: CLINIC | Age: 61
End: 2021-07-13
Payer: MEDICAID

## 2021-07-13 ENCOUNTER — OUTPATIENT (OUTPATIENT)
Dept: OUTPATIENT SERVICES | Facility: HOSPITAL | Age: 61
LOS: 1 days | Discharge: HOME | End: 2021-07-13

## 2021-07-13 VITALS
HEART RATE: 77 BPM | DIASTOLIC BLOOD PRESSURE: 68 MMHG | SYSTOLIC BLOOD PRESSURE: 111 MMHG | HEIGHT: 67 IN | BODY MASS INDEX: 27.31 KG/M2 | WEIGHT: 174 LBS

## 2021-07-13 DIAGNOSIS — R22.0 LOCALIZED SWELLING, MASS AND LUMP, HEAD: ICD-10-CM

## 2021-07-13 DIAGNOSIS — Z98.890 OTHER SPECIFIED POSTPROCEDURAL STATES: Chronic | ICD-10-CM

## 2021-07-13 DIAGNOSIS — R25.2 CRAMP AND SPASM: ICD-10-CM

## 2021-07-13 DIAGNOSIS — Z95.1 PRESENCE OF AORTOCORONARY BYPASS GRAFT: Chronic | ICD-10-CM

## 2021-07-13 DIAGNOSIS — Z96.641 PRESENCE OF RIGHT ARTIFICIAL HIP JOINT: Chronic | ICD-10-CM

## 2021-07-13 DIAGNOSIS — R22.0 LOCALIZED SWELLING, MASS AND LUMP, HEAD: Chronic | ICD-10-CM

## 2021-07-13 PROCEDURE — 99204 OFFICE O/P NEW MOD 45 MIN: CPT

## 2021-07-13 RX ORDER — AMOXICILLIN 500 MG/1
500 CAPSULE ORAL
Qty: 21 | Refills: 0 | Status: DISCONTINUED | COMMUNITY
Start: 2021-03-05 | End: 2021-07-13

## 2021-07-13 RX ORDER — METHYLPREDNISOLONE 4 MG/1
4 TABLET ORAL
Qty: 21 | Refills: 0 | Status: DISCONTINUED | COMMUNITY
Start: 2021-01-27 | End: 2021-07-13

## 2021-07-13 RX ORDER — OXYCODONE AND ACETAMINOPHEN 7.5; 325 MG/1; MG/1
7.5-325 TABLET ORAL
Qty: 90 | Refills: 0 | Status: DISCONTINUED | COMMUNITY
Start: 2021-05-19 | End: 2021-07-13

## 2021-07-13 RX ORDER — AMOXICILLIN AND CLAVULANATE POTASSIUM 500; 125 MG/1; MG/1
500-125 TABLET, FILM COATED ORAL 3 TIMES DAILY
Qty: 30 | Refills: 0 | Status: DISCONTINUED | COMMUNITY
Start: 2021-06-16 | End: 2021-07-13

## 2021-07-13 RX ORDER — DEXAMETHASONE 2 MG/1
2 TABLET ORAL
Qty: 2 | Refills: 0 | Status: DISCONTINUED | COMMUNITY
Start: 2021-06-29

## 2021-07-13 RX ORDER — OSELTAMIVIR PHOSPHATE 75 MG/1
75 CAPSULE ORAL
Qty: 10 | Refills: 0 | Status: DISCONTINUED | COMMUNITY
Start: 2021-03-24 | End: 2021-07-13

## 2021-07-14 ENCOUNTER — NON-APPOINTMENT (OUTPATIENT)
Age: 61
End: 2021-07-14

## 2021-07-14 ENCOUNTER — APPOINTMENT (OUTPATIENT)
Dept: RADIATION ONCOLOGY | Facility: CLINIC | Age: 61
End: 2021-07-14
Payer: MEDICAID

## 2021-07-14 VITALS
OXYGEN SATURATION: 100 % | RESPIRATION RATE: 17 BRPM | TEMPERATURE: 97.8 F | WEIGHT: 182.76 LBS | HEART RATE: 89 BPM | SYSTOLIC BLOOD PRESSURE: 104 MMHG | DIASTOLIC BLOOD PRESSURE: 71 MMHG | BODY MASS INDEX: 28.62 KG/M2

## 2021-07-14 PROCEDURE — 99024 POSTOP FOLLOW-UP VISIT: CPT

## 2021-07-14 RX ORDER — DIPHENHYDRAMINE HYDROCHLORIDE AND LIDOCAINE HYDROCHLORIDE AND ALUMINUM HYDROXIDE AND MAGNESIUM HYDRO
KIT
Qty: 300 | Refills: 3 | Status: DISCONTINUED | COMMUNITY
Start: 2021-05-19 | End: 2021-07-14

## 2021-07-14 RX ORDER — DIPHENHYDRAMINE HYDROCHLORIDE AND LIDOCAINE HYDROCHLORIDE AND ALUMINUM HYDROXIDE AND MAGNESIUM HYDRO
KIT
Qty: 300 | Refills: 3 | Status: DISCONTINUED | COMMUNITY
Start: 2021-05-04 | End: 2021-07-14

## 2021-07-14 NOTE — REVIEW OF SYSTEMS
[Dysphagia: Grade 2 - Symptomatic and altered eating/swallowing] : Dysphagia: Grade 2 - Symptomatic and altered eating/swallowing [Fatigue: Grade 1 - Fatigue relieved by rest] : Fatigue: Grade 1 - Fatigue relieved by rest [Mucositis Oral: Grade 0] : Mucositis Oral: Grade 0  [Xerostomia: Grade 0] : Xerostomia: Grade 0 [Oral Pain: Grade 1 - Mild pain] : Oral Pain: Grade 1 - Mild pain [Dysgeusia: Grade 1- Altered taste but no change in diet] : Dysgeusia: Grade 1 - Altered taste but no change in diet [Skin Hyperpigmentation: Grade 1 - Hyperpigmentation covering <10% BSA; no psychosocial impact] : Skin Hyperpigmentation: Grade 1 - Hyperpigmentation covering <10% BSA; no psychosocial impact [Dermatitis Radiation: Grade 0] : Dermatitis Radiation: Grade 0

## 2021-07-15 PROBLEM — R22.0 FACIAL SWELLING: Status: ACTIVE | Noted: 2021-07-13

## 2021-07-15 PROBLEM — R25.2 TRISMUS: Status: ACTIVE | Noted: 2021-07-13

## 2021-07-15 NOTE — DATA REVIEWED
[de-identified] : Images reviewed:\par \par \par  CT Maxillofacial w/ IV Cont             Final\par \par No Documents Attached\par \par \par \par Changed By [facility] - Reason: RADIOLOGIST ORDERED \par \par \par \par   EXAM:  CT MAXILLOFACIAL  IC\par \par \par PROCEDURE DATE:  06/22/2021\par \par \par \par INTERPRETATION:  INDICATIONS:  Left cheek liposarcoma, status post resection,\par \par TECHNIQUE:   Axial images were obtained following the intravenous administration of contrast material. Sagittal and coronal reformatted images were obtained. 90 cc Omnipaque 350 were administered. 10 cc were discarded.\par \par COMPARISON EXAMINATION:  MRI orbit face and neck, 2/26/2021, MRI brain 4/24/2018\par \par FINDINGS:\par \par SOFT TISSUES, AERODIGESTIVE TRACT:\par \par Severe bony irregularity left mandibular posterior body involving the left inferior alveolar foramina with dehiscent buccal and lingual cortical margins and bony permeative change, with absent teeth in the posterior left mandibular body, mandibular osteitis condensans and bony irregularity body,  dental infection,  osteoradionecrosis not excluded.\par \par There is loss of the soft tissue planes adjacent to the dehiscent left posterior mandibular body permeative lesion extending into the left  space. Decreased attenuation concerning for fluid collection and phlegmon is noted in the region of the left medial and lateral pterygoid musculature measuring approximately 3.2 x 4 x 4.2 cm AP, TR, CC. The low-attenuation fluid collection contains punctate foci of air, and extends medially displacing the left pharyngeal mucosal soft tissues medially. There is extension of the infection/inflammatory change with loss of fat planes in the left parapharyngeal, carotid, parotid, masseteric and submandibular spaces. Soft tissue fullness, with findings concerning for extension of infectious inflammatory change involves left palatine and lingual tonsils, left glossotonsillar sulcus and left lateral pharyngeal wall. Findings highly concerning for spread of infectious/inflammatory change with, developing Will's angina with abnormal fullness left pharyngeal wall, and loss of the subcutaneous soft tissue planes and left supra and infraorbital neck not excluded. There is inflammatory change with loss of the fat planes adjacent to the left subinsular and left parotid glands, with involvement of the left parotid and carotid space with extension to the left deep lobe of the parotid with narrowing of the distal left internal carotid artery.\par \par Redemonstration abnormal soft tissue and loss of fat planes in the region of prior seen left buccal space liposarcoma, again involving left buccinator muscle, left retromolar trigone, and left parotid duct Stensen's insertion, although this may may represent phlegmon with infection, residual/recurrent tumor is not excluded.\par \par There is abnormal soft tissue fullness infectious/inflammatory change extends to the left soft palate (200:88), uvula, and left greater than right prevertebral soft tissues with loss of intervening fat planes and into the left carotid space, there is slight narrowing of the distal left internal carotid artery (4:29), CTA or MRA may better assess.\par \par There is loss of fat planes and soft tissue irregularity throughout the left  space, with extension to the left foramen ovale, (200:120), although this may reflect superimposed infectious/inflammatory change, tumor with perineural involvement is also not excluded.\par \par Phlegmonous change extends throughout the left  space and buccinator musculature with extension to the left pharyngeal mucosal space, left torus tubarius, effacing the left fossa of Rosenmuller, narrowing the left oropharyngeal airway (4:35) and nasopharyngeal airway (4:41).\par \par Loss of fat planes  left pterygopalatine fossa (4:53),  left parotid, left subinsular spaces phlegmon inflammatory change along left parotid gland and submandibular glands, with left platysma soft tissue thickening, this may reflect extension of infectious/inflammatory change.\par \par LYMPH NODES:\par \par Partially seen enlarged lymph nodes including not limited to:\par Left II a node measuring 1.2 x 0.8 cm, AP by TR (4:16),\par Left IIa/b node measuring 2 x 1.1 cm, AP by TR (4:18),\par Clustered prominent left III nodes (4:2) measuring 1.1 x 0.88, and 1.1 x 0.72 cm AP by TR\par \par \par PAROTID GLANDS: Phlegmon, inflammatory change involves left buccinator muscle and left parotid duct Stensen's insertion with loss of intervening fat planes of the left parotid space and masseter muscle, infectious, inflammatory change, recurrent tumor not excluded.\par \par SUBMANDIBULAR GLANDS: Diffuse inflammatory change with loss of the subcutaneous soft tissue planes surrounding the left submandibular gland\par \par VISUALIZED PARANASAL SINUSES: Unerupted left maxillary wisdom tooth extends to left maxillary sinus, left maxillary retention cyst/polyp, ethmoid, sphenoid mucosal thickening.\par \par TYMPANOMASTOID CAVITIES: Opacification partially seen left mastoid, effacement of the left fossa of Rosenmuller and inflammatory change involving the torus tubarius (4:40).\par \par REMAINING BONES: Degenerative change with reversal of the cervical lordosis, with partially seen disc osteophyte ridges causing multilevel canal and foraminal narrowing, with C5 ossification posterior longitudinal ligament, AP canal narrowed to 7.1 mm.\par \par MISCELLANEOUS: Intracranial, mild volume loss, microvascular disease, no hemorrhage or midline shift. Left lens replacement. Inflammatory changes in the left occipital muscle, and premaxillary soft tissues extends to left preseptal periorbital soft tissues (200: 54, 4:50).\par \par IMPRESSION:\par \par Bony irregularity, dehiscence left mandibular posterior body involving left inferior alveolar foramina with permeative change, absent left mandibular molar teeth, osteitis condensans, osteomyelitis, osteoradionecrosis not excluded.\par \par Inflammatory phlegmon right , submandibular, parapharyngeal, carotid, parotid, spaces 3.2 x 4 x 4.2 cm AP, TR, CC low-attenuation fluid collection involving left medial and lateral pterygoid with narrowed  nasopharyngeal and oropharyngeal airways (4:32). Phlegmon with inflammatory change involves left palatine tonsils, glossotonsillar sulcus, left lateral lateral pharyngeal wall, and saphenous tissues left supra and infrahyoid neck developing Will's angina not excluded.\par \par Soft tissue fullness and inflammatory change extends with loss of fat planes extends to the left soft palate (200:88), uvula, and left greater than right prevertebral soft tissues with loss of intervening fat planes and left carotid space, with narrowing of distal left internal carotid artery (4:29), CTA or MRA may better assess.\par \par Soft tissue fullness in prior region of left buccal space liposarcoma, involving left buccinator muscle and left retromolar trigone,  left parotid duct Stensen's insertion, left  space extending to left foramen ovale, (200:120), with loss of fat planes in the left pterygopalatine fossa, although this may reflect infectious/inflammatory change, recurrent tumor with perineural involvement is also not excluded.\par \par Findings discussed with Dr. Sharmin Irene at immediate time of review, 6/22/2021 at 4:35 PM.\par \par \par \par \par \par \par \par RUBENS YUEN MD; Attending Radiologist\par This document has been electronically signed. Jun 22 2021  5:17PM\par \par  \par \par  Ordered by: SHARMIN IRENE       Collected/Examined: 22Jun2021 03:35PM       \par Verification Required       Stage: Final       \par  Performed at: [facility] at the [facility]       Resulted: 22Jun2021 04:23PM       Last Updated: 22Jun2021 05:20PM       Accession: [id number]

## 2021-07-15 NOTE — HISTORY OF PRESENT ILLNESS
[de-identified] : 60 year old male initial visit for facial swelling secondary RT\par recent admission to [facility] about 2 weeks ago\par RT completed 6/30/21 secondary to Liposarcoma\par s/p Extraoral and intraoral approach to resect large left facial soft tissue  tumor (7 x 6 cm.), superficial parotidectomy, and facial nerve identification 1/14/21 by Dr. Osorio\par Reports continues to have difficulty opening mouth and chewing/eating food\par Reports currently receiving IV Ertapenem at home\par Currently on liquid diet, soft foods such as ice cream, no solids as of yet\par Denies dysphagia, dyspnea, recent fevers and weight loss\par No longer on steroids

## 2021-07-15 NOTE — HISTORY OF PRESENT ILLNESS
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  Completed RT to oral cavity 6/3/2021 total dose 66 Gy. \par \par Presents today for post treatment evaluation. Continues to have facial swelling to left side, recently admitted to [facility] for infection to left side of face. States on IV antibiotic of Ertapenem at home. Currently on liquid and soft diet due to difficulty opening mouth. Referred to oral surgeon Dr. Alegre by Dr. Kowalski.

## 2021-07-15 NOTE — CONSULT LETTER
[Dear  ___] : Dear  [unfilled], [Please see my note below.] : Please see my note below. [Consult Closing:] : Thank you very much for allowing me to participate in the care of this patient.  If you have any questions, please do not hesitate to contact me. [Sincerely,] : Sincerely, [Consult Letter:] : I had the pleasure of evaluating your patient, [unfilled]. [FreeTextEntry2] : Manan Thakkar MD [FreeTextEntry3] : Tiago Kowalski MD, FACS\par Chief of Otolaryngology at [facility]\par \par Dept. of Otolaryngology\par [facility]  [DrSylvester  ___] : Dr. GONSALES [DrSylvester ___] : Dr. GONSALES

## 2021-07-15 NOTE — REASON FOR VISIT
[Initial Evaluation] : an initial evaluation for [FreeTextEntry2] : initial visit for facial swelling

## 2021-07-15 NOTE — ASSESSMENT
[FreeTextEntry1] : Pt with severe trismus related to surgery and post-op RT with secondary ORN.\par \par Pt to see OMFS.  Once he is seen there we can plan intervention.  If a jaw reconstruction is needed he will need to see a member of the Recon team as well.

## 2021-07-15 NOTE — PHYSICAL EXAM
[Midline] : trachea located in midline position [de-identified] : Severe trismus with Iinterincisor distance <5 mm [Normal] : no rashes

## 2021-07-20 ENCOUNTER — APPOINTMENT (OUTPATIENT)
Dept: CARDIOLOGY | Facility: CLINIC | Age: 61
End: 2021-07-20

## 2021-07-26 ENCOUNTER — APPOINTMENT (OUTPATIENT)
Dept: PLASTIC SURGERY | Facility: CLINIC | Age: 61
End: 2021-07-26
Payer: MEDICAID

## 2021-07-26 PROCEDURE — 99212 OFFICE O/P EST SF 10 MIN: CPT

## 2021-07-28 DIAGNOSIS — R25.2 CRAMP AND SPASM: ICD-10-CM

## 2021-07-28 DIAGNOSIS — R22.0 LOCALIZED SWELLING, MASS AND LUMP, HEAD: ICD-10-CM

## 2021-07-28 DIAGNOSIS — M27.2 INFLAMMATORY CONDITIONS OF JAWS: ICD-10-CM

## 2021-07-28 NOTE — REASON FOR VISIT
[Follow-Up: _____] : a [unfilled] follow-up visit [FreeTextEntry1] : DOS - 01/14/21 S/P resection of left facial liposarcoma.

## 2021-07-28 NOTE — HISTORY OF PRESENT ILLNESS
[FreeTextEntry1] : Jax is a 60 year old male patient that presents to the office today for a post operative evaluation s/p resection of large cheek buccal mass on 1/14/2021. Patient states that 1 month ago, he was in the hospital for 2 weeks because of an infection after radiation treatments. Patient is currently taking Ertapenem through his IV at home. Patient reports having swelling of his left cheek. Patient denies having any other significant concerns or complaints.\par

## 2021-07-28 NOTE — PHYSICAL EXAM
[de-identified] : Alert, calm, cooperative. [de-identified] : Oral incisions healing appropriately. Moderate edema of left cheek. L facelift incision healing well. [de-identified] : Respirations even and unlabored.

## 2021-07-29 ENCOUNTER — APPOINTMENT (OUTPATIENT)
Age: 61
End: 2021-07-29
Payer: MEDICAID

## 2021-07-29 ENCOUNTER — OUTPATIENT (OUTPATIENT)
Dept: OUTPATIENT SERVICES | Facility: HOSPITAL | Age: 61
LOS: 1 days | End: 2021-07-29
Payer: MEDICAID

## 2021-07-29 VITALS
HEART RATE: 96 BPM | SYSTOLIC BLOOD PRESSURE: 106 MMHG | WEIGHT: 176 LBS | HEIGHT: 67 IN | DIASTOLIC BLOOD PRESSURE: 71 MMHG | BODY MASS INDEX: 27.62 KG/M2 | OXYGEN SATURATION: 97 % | TEMPERATURE: 97.5 F

## 2021-07-29 DIAGNOSIS — Z95.1 PRESENCE OF AORTOCORONARY BYPASS GRAFT: Chronic | ICD-10-CM

## 2021-07-29 DIAGNOSIS — B97.89 OTHER VIRAL AGENTS AS THE CAUSE OF DISEASES CLASSIFIED ELSEWHERE: ICD-10-CM

## 2021-07-29 DIAGNOSIS — M27.2 INFLAMMATORY CONDITIONS OF JAWS: ICD-10-CM

## 2021-07-29 DIAGNOSIS — M79.89 OTHER SPECIFIED SOFT TISSUE DISORDERS: ICD-10-CM

## 2021-07-29 DIAGNOSIS — K12.2 CELLULITIS AND ABSCESS OF MOUTH: ICD-10-CM

## 2021-07-29 DIAGNOSIS — C49.9 MALIGNANT NEOPLASM OF CONNECTIVE AND SOFT TISSUE, UNSPECIFIED: ICD-10-CM

## 2021-07-29 DIAGNOSIS — R22.0 LOCALIZED SWELLING, MASS AND LUMP, HEAD: Chronic | ICD-10-CM

## 2021-07-29 DIAGNOSIS — Z98.890 OTHER SPECIFIED POSTPROCEDURAL STATES: Chronic | ICD-10-CM

## 2021-07-29 DIAGNOSIS — Z96.641 PRESENCE OF RIGHT ARTIFICIAL HIP JOINT: Chronic | ICD-10-CM

## 2021-07-29 PROCEDURE — G0463: CPT

## 2021-07-29 PROCEDURE — 99215 OFFICE O/P EST HI 40 MIN: CPT

## 2021-07-29 NOTE — HISTORY OF PRESENT ILLNESS
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, s/p RT, last 6/3/21, admitted to [facility] 6/22/21 with L facial swelling, pain, trismus and dysphagia for about 2 weeks afebrile, WBC: 16, ESR: 85, CRP: 184 CT: Bony irregularity, dehiscence left mandibular posterior body involving left inferior alveolar foramina with permeative change, which could be osteitis condensans, osteomyelitis, osteoradionecrosis  R  phlegmon, L pterygoid fluid collection, narrowing nasopharyngeal and oropharyngeal airways, L palatine tonsil phlegmon, ?developing Will's angina, soft tissue fullness in prior liposarcoma region which could be infectious/inflammatory vs recurrent tumor no interventions were done by ENT so pt was discharged with 6 weeks of ertapenem now here for f/u, s/p 5 weeks, improved swelling but still trismus and facial edema

## 2021-07-29 NOTE — REVIEW OF SYSTEMS
[Fever] : no fever [Chills] : no chills [Body Aches] : no body aches [Eye Pain] : no eye pain [Chest Pain] : no chest pain [Palpitations] : no palpitations [Shortness Of Breath] : no shortness of breath [Wheezing] : no wheezing [Cough] : no cough [Abdominal Pain] : no abdominal pain [Vomiting] : no vomiting [Dysuria] : no dysuria [Joint Pain] : no joint pain [Skin Lesions] : no skin lesions [Confused] : no confusion [Convulsions] : no convulsions [Suicidal] : not suicidal [Easy Bleeding] : no tendency for easy bleeding [Easy Bruising] : no tendency for easy bruising [Negative] : Heme/Lymph [FreeTextEntry4] : L face swelling and inability to open the mouth

## 2021-07-29 NOTE — ASSESSMENT
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, s/p RT, last 6/3/21, admitted to [facility] 6/22/21 with L facial swelling, pain, trismus and dysphagia for about 2 weeks\par afebrile, WBC: 16, ESR: 85, CRP: 184\par CT: Bony irregularity, dehiscence left mandibular posterior body involving left inferior alveolar foramina with permeative change, which could be osteitis condensans, osteomyelitis, osteoradionecrosis \par R  phlegmon, L pterygoid fluid collection, narrowing nasopharyngeal and oropharyngeal airways, L palatine tonsil phlegmon, ?developing Jackie's angina, soft tissue fullness in prior liposarcoma region which could be infectious/inflammatory vs recurrent tumor\par no interventions were done by ENT so pt was discharged with 6 weeks of ertapenem\par now here for f/u, s/p 5 weeks, improved swelling but still trismus and facial edema\par \par recent resection of facial liposarcoma 1/14 s/p RT now with multiple phlegmons and fluid collections also mandibular dehiscence and irregularity, ?osteomyelitis and jackie's angina vs inflammatory changes and recurrent tumor or osteonecrosis \par \par * will complete the 6 week course of ertapenem\par * maxillofacial MRI\par * f/u with ENT \par \par \par \par

## 2021-07-29 NOTE — PHYSICAL EXAM
[General Appearance - Alert] : alert [General Appearance - In No Acute Distress] : in no acute distress [Sclera] : the sclera and conjunctiva were normal [FreeTextEntry1] : L facial edema but no tenderness, still can't open the mouth  [Neck Appearance] : the appearance of the neck was normal [Auscultation Breath Sounds / Voice Sounds] : lungs were clear to auscultation bilaterally [Heart Sounds] : normal S1 and S2 [Edema] : there was no peripheral edema [Bowel Sounds] : normal bowel sounds [Abdomen Soft] : soft [Abdomen Tenderness] : non-tender [Costovertebral Angle Tenderness] : no CVA tenderness [No Palpable Adenopathy] : no palpable adenopathy [Musculoskeletal - Swelling] : no joint swelling [] : no rash [No Focal Deficits] : no focal deficits [Affect] : the affect was normal

## 2021-08-18 NOTE — DISCHARGE NOTE NURSING/CASE MANAGEMENT/SOCIAL WORK - NSTRANSFERBELONGINGSRESP_GEN_A_NUR
yes Modified Advancement Flap Text: The defect edges were debeveled with a #15 scalpel blade.  Given the location of the defect, shape of the defect and the proximity to free margins a three point advancement flap was deemed most appropriate.  Using a sterile surgical marker, an appropriate advancement flap was drawn incorporating the defect and placing the expected incisions within the relaxed skin tension lines where possible.   The area thus outlined was incised deep to adipose tissue with a #15 scalpel blade.  Three standing tissue cones were excised.  The skin margins were undermined to an appropriate distance in all directions utilizing iris scissors.

## 2021-08-19 ENCOUNTER — APPOINTMENT (OUTPATIENT)
Dept: INFECTIOUS DISEASE | Facility: CLINIC | Age: 61
End: 2021-08-19
Payer: MEDICAID

## 2021-08-19 ENCOUNTER — APPOINTMENT (OUTPATIENT)
Dept: MRI IMAGING | Facility: CLINIC | Age: 61
End: 2021-08-19

## 2021-08-19 ENCOUNTER — OUTPATIENT (OUTPATIENT)
Dept: OUTPATIENT SERVICES | Facility: HOSPITAL | Age: 61
LOS: 1 days | End: 2021-08-19
Payer: MEDICAID

## 2021-08-19 VITALS
SYSTOLIC BLOOD PRESSURE: 100 MMHG | BODY MASS INDEX: 30.29 KG/M2 | HEART RATE: 89 BPM | TEMPERATURE: 96.9 F | OXYGEN SATURATION: 99 % | WEIGHT: 193 LBS | DIASTOLIC BLOOD PRESSURE: 71 MMHG | RESPIRATION RATE: 16 BRPM | HEIGHT: 67 IN

## 2021-08-19 DIAGNOSIS — Z96.641 PRESENCE OF RIGHT ARTIFICIAL HIP JOINT: Chronic | ICD-10-CM

## 2021-08-19 DIAGNOSIS — Z95.1 PRESENCE OF AORTOCORONARY BYPASS GRAFT: Chronic | ICD-10-CM

## 2021-08-19 DIAGNOSIS — R22.0 LOCALIZED SWELLING, MASS AND LUMP, HEAD: Chronic | ICD-10-CM

## 2021-08-19 DIAGNOSIS — Z98.890 OTHER SPECIFIED POSTPROCEDURAL STATES: Chronic | ICD-10-CM

## 2021-08-19 DIAGNOSIS — M60.9 MYOSITIS, UNSPECIFIED: ICD-10-CM

## 2021-08-19 DIAGNOSIS — B97.89 OTHER VIRAL AGENTS AS THE CAUSE OF DISEASES CLASSIFIED ELSEWHERE: ICD-10-CM

## 2021-08-19 LAB
ALBUMIN SERPL ELPH-MCNC: 3.9 G/DL
ALP BLD-CCNC: 91 U/L
ALT SERPL-CCNC: 17 U/L
ANION GAP SERPL CALC-SCNC: 9 MMOL/L
AST SERPL-CCNC: 19 U/L
BASOPHILS # BLD AUTO: 0.06 K/UL
BASOPHILS NFR BLD AUTO: 0.9 %
BILIRUB SERPL-MCNC: 0.4 MG/DL
BUN SERPL-MCNC: 16 MG/DL
CALCIUM SERPL-MCNC: 9.2 MG/DL
CHLORIDE SERPL-SCNC: 103 MMOL/L
CO2 SERPL-SCNC: 27 MMOL/L
CREAT SERPL-MCNC: 0.78 MG/DL
CRP SERPL-MCNC: <3 MG/L
EOSINOPHIL # BLD AUTO: 0.21 K/UL
EOSINOPHIL NFR BLD AUTO: 3.1 %
GLUCOSE SERPL-MCNC: 178 MG/DL
HCT VFR BLD CALC: 40.7 %
HGB BLD-MCNC: 12.5 G/DL
IMM GRANULOCYTES NFR BLD AUTO: 0.3 %
LYMPHOCYTES # BLD AUTO: 0.57 K/UL
LYMPHOCYTES NFR BLD AUTO: 8.3 %
MAN DIFF?: NORMAL
MCHC RBC-ENTMCNC: 27.5 PG
MCHC RBC-ENTMCNC: 30.7 GM/DL
MCV RBC AUTO: 89.6 FL
MONOCYTES # BLD AUTO: 0.59 K/UL
MONOCYTES NFR BLD AUTO: 8.6 %
NEUTROPHILS # BLD AUTO: 5.43 K/UL
NEUTROPHILS NFR BLD AUTO: 78.8 %
PLATELET # BLD AUTO: 269 K/UL
POTASSIUM SERPL-SCNC: 4.4 MMOL/L
PROT SERPL-MCNC: 6.5 G/DL
RBC # BLD: 4.54 M/UL
RBC # FLD: 16 %
SODIUM SERPL-SCNC: 139 MMOL/L
WBC # FLD AUTO: 6.88 K/UL

## 2021-08-19 PROCEDURE — 99215 OFFICE O/P EST HI 40 MIN: CPT

## 2021-08-19 PROCEDURE — G0463: CPT

## 2021-08-19 NOTE — REVIEW OF SYSTEMS
[Fever] : no fever [Chills] : no chills [Body Aches] : no body aches [Eye Pain] : no eye pain [Chest Pain] : no chest pain [Palpitations] : no palpitations [Shortness Of Breath] : no shortness of breath [Wheezing] : no wheezing [Cough] : no cough [Abdominal Pain] : no abdominal pain [Vomiting] : no vomiting [Dysuria] : no dysuria [Joint Pain] : no joint pain [Skin Lesions] : no skin lesions [Confused] : no confusion [Convulsions] : no convulsions [Suicidal] : not suicidal [Easy Bleeding] : no tendency for easy bleeding [Easy Bruising] : no tendency for easy bruising [Negative] : Heme/Lymph [FreeTextEntry4] : L face swelling and inability to open the mouth fully

## 2021-08-19 NOTE — HISTORY OF PRESENT ILLNESS
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, s/p RT, last 6/3/21, admitted to [facility] 6/22/21 with L facial swelling, pain, trismus and dysphagia for about 2 weeks afebrile, WBC: 16, ESR: 85, CRP: 184 CT: Bony irregularity, dehiscence left mandibular posterior body involving left inferior alveolar foramina with permeative change, which could be osteitis condensans, osteomyelitis, osteoradionecrosis  R  phlegmon, L pterygoid fluid collection, narrowing nasopharyngeal and oropharyngeal airways, L palatine tonsil phlegmon, ?developing Will's angina, soft tissue fullness in prior liposarcoma region which could be infectious/inflammatory vs recurrent tumor no interventions were done by ENT so pt was discharged with 6 weeks of ertapenem\par the MRI at the end of the course showed osteo and destruction of the L mandible with myositis\par pt still has L facial edema and pain but can open the mouth slightly more

## 2021-08-19 NOTE — ASSESSMENT
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, s/p RT, last 6/3/21, admitted to [facility] 6/22/21 with L facial swelling, pain, trismus and dysphagia for about 2 weeks\par afebrile, WBC: 16, ESR: 85, CRP: 184\par CT: Bony irregularity, dehiscence left mandibular posterior body involving left inferior alveolar foramina with permeative change, which could be osteitis condensans, osteomyelitis, osteoradionecrosis \par R  phlegmon, L pterygoid fluid collection, narrowing nasopharyngeal and oropharyngeal airways, L palatine tonsil phlegmon, ?developing Jackie's angina, soft tissue fullness in prior liposarcoma region which could be infectious/inflammatory vs recurrent tumor\par no interventions were done by ENT so pt was discharged with 6 weeks of ertapenem\par the MRI at the end of the course showed osteo and destruction of the L mandible with myositis\par pt still has L facial edema and pain but can open the mouth slightly more\par \par recent resection of facial liposarcoma 1/14 s/p RT now with multiple phlegmons and fluid collections also mandibular dehiscence and irregularity, ?osteomyelitis and jackie's angina vs inflammatory changes and recurrent tumor or osteonecrosis \par \par * completed the 6 week course of ertapenem and MRI at the end of the course showed osteo and destruction of the L mandible with myositis, inflammatory markers were still high as well\par * at this point I am not sure if this is osteo or only radiation changes, I explained everything to the patient in details\par * will try another short course of PO augmentin and doxy and reevaluate in 2 weeks, if no improvement then will stop and it is likely osteonecrosis and radiation changes\par * all medication side effects were explained\par * f/u with ENT\par \par \par \par

## 2021-08-19 NOTE — PHYSICAL EXAM
[General Appearance - Alert] : alert [General Appearance - In No Acute Distress] : in no acute distress [Sclera] : the sclera and conjunctiva were normal [FreeTextEntry1] : L facial edema but no tenderness, still can't open the mouth fully [Neck Appearance] : the appearance of the neck was normal [Auscultation Breath Sounds / Voice Sounds] : lungs were clear to auscultation bilaterally [Heart Sounds] : normal S1 and S2 [Edema] : there was no peripheral edema [Bowel Sounds] : normal bowel sounds [Abdomen Soft] : soft [Abdomen Tenderness] : non-tender [Costovertebral Angle Tenderness] : no CVA tenderness [No Palpable Adenopathy] : no palpable adenopathy [Musculoskeletal - Swelling] : no joint swelling [] : no rash [No Focal Deficits] : no focal deficits [Affect] : the affect was normal

## 2021-08-23 DIAGNOSIS — M60.9 MYOSITIS, UNSPECIFIED: ICD-10-CM

## 2021-08-23 DIAGNOSIS — C49.9 MALIGNANT NEOPLASM OF CONNECTIVE AND SOFT TISSUE, UNSPECIFIED: ICD-10-CM

## 2021-08-23 DIAGNOSIS — M27.2 INFLAMMATORY CONDITIONS OF JAWS: ICD-10-CM

## 2021-08-24 LAB — ERYTHROCYTE [SEDIMENTATION RATE] IN BLOOD BY WESTERGREN METHOD: 14 MM/HR

## 2021-08-25 ENCOUNTER — APPOINTMENT (OUTPATIENT)
Dept: RADIATION ONCOLOGY | Facility: CLINIC | Age: 61
End: 2021-08-25
Payer: MEDICAID

## 2021-08-25 VITALS
RESPIRATION RATE: 16 BRPM | HEART RATE: 86 BPM | DIASTOLIC BLOOD PRESSURE: 72 MMHG | TEMPERATURE: 95.72 F | BODY MASS INDEX: 30.45 KG/M2 | SYSTOLIC BLOOD PRESSURE: 111 MMHG | OXYGEN SATURATION: 99 % | WEIGHT: 194.45 LBS

## 2021-08-25 PROCEDURE — 99213 OFFICE O/P EST LOW 20 MIN: CPT | Mod: GC

## 2021-08-25 RX ORDER — IRBESARTAN 75 MG/1
75 TABLET ORAL DAILY
Refills: 0 | Status: ACTIVE | COMMUNITY

## 2021-08-25 NOTE — REVIEW OF SYSTEMS
[Dysphagia: Grade 0] : Dysphagia: Grade 0 [Oral Pain: Grade 0] : Oral Pain: Grade 0 [Dysgeusia: Grade 0] : Dysgeusia: Grade 0

## 2021-08-26 NOTE — HISTORY OF PRESENT ILLNESS
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 Completed RT to oral cavity 6/3/2021 total dose 66 Gy. \par \par Presents today for follow up.He  is doing well. Follows ID - Dr. Erwin and Oral Surgery - Dr. Alegre and also started on Ertapenam for 6 weeks for concern for osteomyelitis. Patient completed 6 week course and now on Augmentin PO for 2 weeks. Swelling to left side of face has improved. Patient denies any pain or any other medical complaints at this time. Tolerating solids and liquids. Continues jaw exercise therapy and improved mouth opening capacity noted. \par \par Interval MRI 8/05/2021 - Osteomyelitis of the left mandibular body and ramus w/ focal cortical breakthrough, the buccal cortex, associated myositis of the left masseter and pterygoid musculature. Soft tissue swelling and edema also seen. \par \par

## 2021-09-13 ENCOUNTER — NON-APPOINTMENT (OUTPATIENT)
Age: 61
End: 2021-09-13

## 2021-09-20 ENCOUNTER — OUTPATIENT (OUTPATIENT)
Dept: OUTPATIENT SERVICES | Facility: HOSPITAL | Age: 61
LOS: 1 days | End: 2021-09-20
Payer: MEDICAID

## 2021-09-20 ENCOUNTER — APPOINTMENT (OUTPATIENT)
Dept: INFECTIOUS DISEASE | Facility: CLINIC | Age: 61
End: 2021-09-20
Payer: MEDICAID

## 2021-09-20 VITALS
DIASTOLIC BLOOD PRESSURE: 70 MMHG | OXYGEN SATURATION: 100 % | BODY MASS INDEX: 30.65 KG/M2 | HEIGHT: 67 IN | TEMPERATURE: 97.6 F | WEIGHT: 195.31 LBS | SYSTOLIC BLOOD PRESSURE: 101 MMHG | HEART RATE: 98 BPM

## 2021-09-20 DIAGNOSIS — K12.2 CELLULITIS AND ABSCESS OF MOUTH: ICD-10-CM

## 2021-09-20 DIAGNOSIS — R22.0 LOCALIZED SWELLING, MASS AND LUMP, HEAD: Chronic | ICD-10-CM

## 2021-09-20 DIAGNOSIS — Z95.1 PRESENCE OF AORTOCORONARY BYPASS GRAFT: Chronic | ICD-10-CM

## 2021-09-20 DIAGNOSIS — Z96.641 PRESENCE OF RIGHT ARTIFICIAL HIP JOINT: Chronic | ICD-10-CM

## 2021-09-20 DIAGNOSIS — C49.9 MALIGNANT NEOPLASM OF CONNECTIVE AND SOFT TISSUE, UNSPECIFIED: ICD-10-CM

## 2021-09-20 DIAGNOSIS — Z98.890 OTHER SPECIFIED POSTPROCEDURAL STATES: Chronic | ICD-10-CM

## 2021-09-20 PROCEDURE — G0463: CPT

## 2021-09-20 PROCEDURE — 99214 OFFICE O/P EST MOD 30 MIN: CPT

## 2021-09-20 NOTE — REVIEW OF SYSTEMS
[Fever] : no fever [Chills] : no chills [Body Aches] : no body aches [Eye Pain] : no eye pain [Chest Pain] : no chest pain [Palpitations] : no palpitations [Shortness Of Breath] : no shortness of breath [Wheezing] : no wheezing [Cough] : no cough [Abdominal Pain] : no abdominal pain [Vomiting] : no vomiting [Dysuria] : no dysuria [Joint Pain] : no joint pain [Skin Lesions] : no skin lesions [Confused] : no confusion [Convulsions] : no convulsions [Suicidal] : not suicidal [Easy Bleeding] : no tendency for easy bleeding [Easy Bruising] : no tendency for easy bruising [Negative] : Heme/Lymph [FreeTextEntry4] : improved L face swelling and can open the mouth more

## 2021-09-20 NOTE — ASSESSMENT
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, s/p RT, last 6/3/21, admitted to [facility] 6/22/21 with L facial swelling, pain, trismus and dysphagia for about 2 weeks\par afebrile, WBC: 16, ESR: 85, CRP: 184\par CT: Bony irregularity, dehiscence left mandibular posterior body involving left inferior alveolar foramina with permeative change, which could be osteitis condensans, osteomyelitis, osteoradionecrosis \par R  phlegmon, L pterygoid fluid collection, narrowing nasopharyngeal and oropharyngeal airways, L palatine tonsil phlegmon, ?developing Jackie's angina, soft tissue fullness in prior liposarcoma region which could be infectious/inflammatory vs recurrent tumor\par no interventions were done by ENT so pt was discharged with 6 weeks of ertapenem\par the MRI at the end of the course showed osteo and destruction of the L mandible with myositis\par pt still had L facial edema and pain but could open the mouth slightly more\par a 2 week course of doxy and augmentin was given in view of MRI and now pt completed the course, feels better, again stated that he can open the mouth more and improved edema but still has pain on chewing\par \par s/p resection of facial liposarcoma 1/14/21 s/p RT developed multiple phlegmons and fluid collections also mandibular dehiscence and irregularity, ?osteomyelitis and jackie's angina vs inflammatory changes and recurrent tumor or osteonecrosis \par \par * completed the 6 week course of ertapenem and MRI at the end of the course showed osteo and destruction of the L mandible with myositis, \par * another 2 weeks of doxy and augmentin was given with some improvement in edema but still with pain on chewing and some edema\par * at this point Iit is likey radiation changes and osteonecrosis and pt had long courses of antibiotics, I explained everything to the patient in details\par * will monitor off antibiotics\par * f/u with ENT\par \par \par \par

## 2021-09-20 NOTE — HISTORY OF PRESENT ILLNESS
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 6/22/21 with L facial swelling, pain, trismus and dysphagia for about 2 weeks\par  afebrile, WBC: 16, ESR: 85, CRP: 184\par  CT: Bony irregularity, dehiscence left mandibular posterior body involving left inferior alveolar foramina with permeative change, which could be osteitis condensans, osteomyelitis, osteoradionecrosis  R  phlegmon, L pterygoid fluid collection, narrowing nasopharyngeal and oropharyngeal airways, L palatine tonsil phlegmon, ?developing Will's angina, soft tissue fullness in prior liposarcoma region which could be infectious/inflammatory vs recurrent tumor no interventions were done by ENT so pt was discharged with 6 weeks of ertapenem\par the MRI at the end of the course showed osteo and destruction of the L mandible with myositis\par pt still had L facial edema and pain but could open the mouth slightly more\par a 2 week course of doxy and augmentin was given in view of MRI and now pt completed the course, feels better, again stated that he can open the mouth more and improved edema but still has pain on chewing

## 2021-09-20 NOTE — PHYSICAL EXAM
[General Appearance - Alert] : alert [General Appearance - In No Acute Distress] : in no acute distress [Sclera] : the sclera and conjunctiva were normal [FreeTextEntry1] : improved L facial edema,  no tenderness, still can't open the mouth fully but more than before [Neck Appearance] : the appearance of the neck was normal [Auscultation Breath Sounds / Voice Sounds] : lungs were clear to auscultation bilaterally [Heart Sounds] : normal S1 and S2 [Edema] : there was no peripheral edema [Bowel Sounds] : normal bowel sounds [Abdomen Soft] : soft [Abdomen Tenderness] : non-tender [Costovertebral Angle Tenderness] : no CVA tenderness [Musculoskeletal - Swelling] : no joint swelling [No Palpable Adenopathy] : no palpable adenopathy [] : no rash [No Focal Deficits] : no focal deficits [Affect] : the affect was normal

## 2021-09-21 ENCOUNTER — APPOINTMENT (OUTPATIENT)
Dept: ORTHOPEDIC SURGERY | Facility: CLINIC | Age: 61
End: 2021-09-21
Payer: MEDICAID

## 2021-09-22 LAB
ALBUMIN SERPL ELPH-MCNC: 4.1 G/DL
ALP BLD-CCNC: 96 U/L
ALT SERPL-CCNC: 19 U/L
ANION GAP SERPL CALC-SCNC: 11 MMOL/L
AST SERPL-CCNC: 19 U/L
BASOPHILS # BLD AUTO: 0.04 K/UL
BASOPHILS NFR BLD AUTO: 0.7 %
BILIRUB SERPL-MCNC: 0.4 MG/DL
BUN SERPL-MCNC: 20 MG/DL
CALCIUM SERPL-MCNC: 9.2 MG/DL
CHLORIDE SERPL-SCNC: 105 MMOL/L
CO2 SERPL-SCNC: 25 MMOL/L
CREAT SERPL-MCNC: 0.91 MG/DL
CRP SERPL-MCNC: 3 MG/L
EOSINOPHIL # BLD AUTO: 0.14 K/UL
EOSINOPHIL NFR BLD AUTO: 2.3 %
ERYTHROCYTE [SEDIMENTATION RATE] IN BLOOD BY WESTERGREN METHOD: 20 MM/HR
GLUCOSE SERPL-MCNC: 117 MG/DL
HCT VFR BLD CALC: 42.9 %
HGB BLD-MCNC: 13.4 G/DL
IMM GRANULOCYTES NFR BLD AUTO: 0.3 %
LYMPHOCYTES # BLD AUTO: 0.5 K/UL
LYMPHOCYTES NFR BLD AUTO: 8.3 %
MAN DIFF?: NORMAL
MCHC RBC-ENTMCNC: 27.7 PG
MCHC RBC-ENTMCNC: 31.2 GM/DL
MCV RBC AUTO: 88.6 FL
MONOCYTES # BLD AUTO: 0.52 K/UL
MONOCYTES NFR BLD AUTO: 8.6 %
NEUTROPHILS # BLD AUTO: 4.82 K/UL
NEUTROPHILS NFR BLD AUTO: 79.8 %
PLATELET # BLD AUTO: 242 K/UL
POTASSIUM SERPL-SCNC: 4.5 MMOL/L
PROT SERPL-MCNC: 6.8 G/DL
RBC # BLD: 4.84 M/UL
RBC # FLD: 15.2 %
SODIUM SERPL-SCNC: 141 MMOL/L
WBC # FLD AUTO: 6.04 K/UL

## 2021-09-27 ENCOUNTER — APPOINTMENT (OUTPATIENT)
Dept: PLASTIC SURGERY | Facility: CLINIC | Age: 61
End: 2021-09-27
Payer: MEDICAID

## 2021-09-27 DIAGNOSIS — K12.2 CELLULITIS AND ABSCESS OF MOUTH: ICD-10-CM

## 2021-09-27 DIAGNOSIS — M27.2 INFLAMMATORY CONDITIONS OF JAWS: ICD-10-CM

## 2021-09-27 DIAGNOSIS — Z09 ENCOUNTER FOR FOLLOW-UP EXAMINATION AFTER COMPLETED TREATMENT FOR CONDITIONS OTHER THAN MALIGNANT NEOPLASM: ICD-10-CM

## 2021-09-27 PROCEDURE — 99212 OFFICE O/P EST SF 10 MIN: CPT

## 2021-09-28 ENCOUNTER — APPOINTMENT (OUTPATIENT)
Dept: ORTHOPEDIC SURGERY | Facility: CLINIC | Age: 61
End: 2021-09-28
Payer: MEDICAID

## 2021-09-28 PROBLEM — Z09 POSTOP CHECK: Status: ACTIVE | Noted: 2019-01-24

## 2021-09-28 NOTE — PHYSICAL EXAM
[de-identified] : Alert, calm, cooperative. [de-identified] : Mild edema of left cheek. L facelift incision healing appropriately. [de-identified] : Respirations even and unlabored.

## 2021-09-28 NOTE — HISTORY OF PRESENT ILLNESS
[FreeTextEntry1] : Jax is a 61 year old male patient that presents to the office today for a post operative evaluation s/p resection of large cheek buccal mass on 1/14/2021. Patient reports having some thickness of intraoral scar and residual swelling of his left cheek but has noticed a lot of improvement. Patient denies having any other significant concerns or complaints. \par

## 2021-10-18 ENCOUNTER — APPOINTMENT (OUTPATIENT)
Dept: ORTHOPEDIC SURGERY | Facility: CLINIC | Age: 61
End: 2021-10-18
Payer: MEDICAID

## 2021-10-18 VITALS — BODY MASS INDEX: 30.61 KG/M2 | HEIGHT: 67 IN | WEIGHT: 195 LBS

## 2021-10-18 PROCEDURE — 99214 OFFICE O/P EST MOD 30 MIN: CPT

## 2021-11-15 ENCOUNTER — RESULT REVIEW (OUTPATIENT)
Age: 61
End: 2021-11-15

## 2021-11-28 ENCOUNTER — OUTPATIENT (OUTPATIENT)
Dept: OUTPATIENT SERVICES | Facility: HOSPITAL | Age: 61
LOS: 1 days | End: 2021-11-28
Payer: MEDICAID

## 2021-11-28 ENCOUNTER — APPOINTMENT (OUTPATIENT)
Dept: MRI IMAGING | Facility: IMAGING CENTER | Age: 61
End: 2021-11-28
Payer: MEDICAID

## 2021-11-28 DIAGNOSIS — Z00.8 ENCOUNTER FOR OTHER GENERAL EXAMINATION: ICD-10-CM

## 2021-11-28 DIAGNOSIS — Z96.641 PRESENCE OF RIGHT ARTIFICIAL HIP JOINT: Chronic | ICD-10-CM

## 2021-11-28 DIAGNOSIS — Z95.1 PRESENCE OF AORTOCORONARY BYPASS GRAFT: Chronic | ICD-10-CM

## 2021-11-28 DIAGNOSIS — C49.9 MALIGNANT NEOPLASM OF CONNECTIVE AND SOFT TISSUE, UNSPECIFIED: ICD-10-CM

## 2021-11-28 DIAGNOSIS — M27.2 INFLAMMATORY CONDITIONS OF JAWS: ICD-10-CM

## 2021-11-28 DIAGNOSIS — R22.0 LOCALIZED SWELLING, MASS AND LUMP, HEAD: Chronic | ICD-10-CM

## 2021-11-28 DIAGNOSIS — Z98.890 OTHER SPECIFIED POSTPROCEDURAL STATES: Chronic | ICD-10-CM

## 2021-11-28 PROCEDURE — 70543 MRI ORBT/FAC/NCK W/O &W/DYE: CPT | Mod: 26

## 2021-11-28 PROCEDURE — A9585: CPT

## 2021-11-28 PROCEDURE — 70543 MRI ORBT/FAC/NCK W/O &W/DYE: CPT

## 2021-12-02 ENCOUNTER — APPOINTMENT (OUTPATIENT)
Dept: RADIATION ONCOLOGY | Facility: CLINIC | Age: 61
End: 2021-12-02
Payer: MEDICAID

## 2021-12-02 VITALS
DIASTOLIC BLOOD PRESSURE: 70 MMHG | SYSTOLIC BLOOD PRESSURE: 104 MMHG | WEIGHT: 197.75 LBS | RESPIRATION RATE: 16 BRPM | BODY MASS INDEX: 31.04 KG/M2 | HEIGHT: 67 IN | HEART RATE: 75 BPM | OXYGEN SATURATION: 100 %

## 2021-12-02 PROCEDURE — 99213 OFFICE O/P EST LOW 20 MIN: CPT | Mod: GC

## 2021-12-02 RX ORDER — EZETIMIBE 10 MG/1
10 TABLET ORAL
Refills: 0 | Status: ACTIVE | COMMUNITY

## 2021-12-02 RX ORDER — AMOXICILLIN AND CLAVULANATE POTASSIUM 875; 125 MG/1; MG/1
875-125 TABLET, COATED ORAL TWICE DAILY
Qty: 28 | Refills: 0 | Status: COMPLETED | COMMUNITY
Start: 2021-08-19 | End: 2021-12-02

## 2021-12-02 RX ORDER — DOXYCYCLINE HYCLATE 100 MG/1
100 TABLET ORAL
Qty: 28 | Refills: 0 | Status: COMPLETED | COMMUNITY
Start: 2021-08-19 | End: 2021-12-02

## 2021-12-06 NOTE — HISTORY OF PRESENT ILLNESS
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 Completed RT to oral cavity 6/3/2021 total dose 66 Gy. \par \par 11/28/2021 MRI Orbit, Face and Neck- no final results\par \par Presents today for follow up. Overall he is doing well. He reports trismus and has been doing PT. Denies dysphagia or odynophagia. Notes intermittent facial swelling. Denies oral pain.\par

## 2021-12-06 NOTE — REVIEW OF SYSTEMS
[Dysphagia: Grade 0] : Dysphagia: Grade 0 [Mucositis Oral: Grade 0] : Mucositis Oral: Grade 0  [Xerostomia: Grade 0] : Xerostomia: Grade 0 [Oral Pain: Grade 1 - Mild pain] : Oral Pain: Grade 1 - Mild pain [Dysgeusia: Grade 1- Altered taste but no change in diet] : Dysgeusia: Grade 1 - Altered taste but no change in diet [Hoarseness: Grade 0] : Hoarseness: Grade 0 [Skin Hyperpigmentation: Grade 1 - Hyperpigmentation covering <10% BSA; no psychosocial impact] : Skin Hyperpigmentation: Grade 1 - Hyperpigmentation covering <10% BSA; no psychosocial impact [Dermatitis Radiation: Grade 0] : Dermatitis Radiation: Grade 0 [de-identified] : Occasional

## 2021-12-06 NOTE — VITALS
[Maximal Pain Intensity: 6/10] : 6/10 [Least Pain Intensity: 0/10] : 0/10 [Pain Description/Quality: ___] : Pain description/quality: [unfilled] [Pain Duration: ___] : Pain duration: [unfilled] [Pain Location: ___] : Pain Location: [unfilled] [NoTreatment Scheduled] : no treatment scheduled [90: Able to carry normal activity; minor signs or symptoms of disease.] : 90: Able to carry normal activity; minor signs or symptoms of disease.  [ECOG Performance Status: 1 - Restricted in physically strenuous activity but ambulatory and able to carry out work of a light or sedentary nature] : Performance Status: 1 - Restricted in physically strenuous activity but ambulatory and able to carry out work of a light or sedentary nature, e.g., light house work, office work [Pain Interferes with ADLs] : Pain does not interfere with activities of daily living

## 2021-12-06 NOTE — PHYSICAL EXAM
[Sclera] : the sclera and conjunctiva were normal [Normal Oral Cavity] : oral cavity was normal [Oropharynx] : The oropharynx was normal [] : no respiratory distress [Heart Rate And Rhythm] : heart rate and rhythm were normal [Abdomen Soft] : soft [Nondistended] : nondistended [Normal] : oriented to person, place and time, the affect was normal, the mood was normal and not anxious [de-identified] : trismus. L facial swelling

## 2021-12-23 ENCOUNTER — OUTPATIENT (OUTPATIENT)
Dept: OUTPATIENT SERVICES | Facility: HOSPITAL | Age: 61
LOS: 1 days | End: 2021-12-23
Payer: MEDICAID

## 2021-12-23 ENCOUNTER — APPOINTMENT (OUTPATIENT)
Dept: INFECTIOUS DISEASE | Facility: CLINIC | Age: 61
End: 2021-12-23
Payer: MEDICAID

## 2021-12-23 VITALS
WEIGHT: 192 LBS | SYSTOLIC BLOOD PRESSURE: 112 MMHG | TEMPERATURE: 97.6 F | BODY MASS INDEX: 30.13 KG/M2 | OXYGEN SATURATION: 98 % | DIASTOLIC BLOOD PRESSURE: 80 MMHG | RESPIRATION RATE: 16 BRPM | HEART RATE: 93 BPM | HEIGHT: 67 IN

## 2021-12-23 DIAGNOSIS — M27.2 INFLAMMATORY CONDITIONS OF JAWS: ICD-10-CM

## 2021-12-23 DIAGNOSIS — B97.89 OTHER VIRAL AGENTS AS THE CAUSE OF DISEASES CLASSIFIED ELSEWHERE: ICD-10-CM

## 2021-12-23 DIAGNOSIS — C49.9 MALIGNANT NEOPLASM OF CONNECTIVE AND SOFT TISSUE, UNSPECIFIED: ICD-10-CM

## 2021-12-23 DIAGNOSIS — R22.0 LOCALIZED SWELLING, MASS AND LUMP, HEAD: Chronic | ICD-10-CM

## 2021-12-23 DIAGNOSIS — Z96.641 PRESENCE OF RIGHT ARTIFICIAL HIP JOINT: Chronic | ICD-10-CM

## 2021-12-23 DIAGNOSIS — Z98.890 OTHER SPECIFIED POSTPROCEDURAL STATES: Chronic | ICD-10-CM
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
## 2021-12-23 DIAGNOSIS — Z95.1 PRESENCE OF AORTOCORONARY BYPASS GRAFT: Chronic | ICD-10-CM

## 2021-12-23 PROCEDURE — G0463: CPT

## 2021-12-23 PROCEDURE — 99214 OFFICE O/P EST MOD 30 MIN: CPT

## 2021-12-23 NOTE — ASSESSMENT
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, s/p RT, last 6/3/21, admitted to [facility] 6/22/21 with L facial swelling, pain, trismus and dysphagia for about 2 weeks\par afebrile, WBC: 16, ESR: 85, CRP: 184\par CT: Bony irregularity, dehiscence left mandibular posterior body involving left inferior alveolar foramina with permeative change, which could be osteitis condensans, osteomyelitis, osteoradionecrosis \par R  phlegmon, L pterygoid fluid collection, narrowing nasopharyngeal and oropharyngeal airways, L palatine tonsil phlegmon, ?developing Jackie's angina, soft tissue fullness in prior liposarcoma region which could be infectious/inflammatory vs recurrent tumor\par no interventions were done by ENT so pt was discharged with 6 weeks of ertapenem\par the MRI at the end of the course showed osteo and destruction of the L mandible with myositis\par pt still had L facial edema and pain but could open the mouth slightly more\par a 2 week course of doxy and augmentin was given in view of MRI \par now pt here for f/u, followed with rad/onc and OMFS, no fever, chills, can open the mouth more than before but still some edema at the L face, recent MRI 11/28/21 showed no tumor recurrence just soft tissue edema and osteoradionecrosis\par \par s/p resection of facial liposarcoma 1/14/21 s/p RT developed multiple phlegmons and fluid collections also mandibular dehiscence and irregularity, ?osteomyelitis and jackie's angina vs inflammatory changes and osteonecrosis \par \par * completed the 6 week course of ertapenem and MRI at the end of the course showed osteo and destruction of the L mandible with myositis, \par * another 2 weeks of doxy and augmentin was given with some improvement in edema \par * has been off antibiotics and doing well, MRI 11/28 with no tumor recurrence, just soft tissue edema and osteoradionecrosis\par * at this point it is likely radiation changes and osteonecrosis and pt had long courses of antibiotics, I explained everything to the patient in details\par * CBC, CMP, ESR, CRP\par * will monitor off antibiotics\par * f/u with ENT\par \par \par \par

## 2021-12-23 NOTE — HISTORY OF PRESENT ILLNESS
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, s/p RT, last 6/3/21, admitted to [facility] 6/22/21 with L facial swelling, pain, trismus and dysphagia for about 2 weeks\par  afebrile, WBC: 16, ESR: 85, CRP: 184\par  CT: Bony irregularity, dehiscence left mandibular posterior body involving left inferior alveolar foramina with permeative change, which could be osteitis condensans, osteomyelitis, osteoradionecrosis  R  phlegmon, L pterygoid fluid collection, narrowing nasopharyngeal and oropharyngeal airways, L palatine tonsil phlegmon, ?developing Will's angina, soft tissue fullness in prior liposarcoma region which could be infectious/inflammatory vs recurrent tumor no interventions were done by ENT so pt was discharged with 6 weeks of ertapenem\par the MRI at the end of the course showed osteo and destruction of the L mandible with myositis\par pt still had L facial edema and pain but could open the mouth slightly more\par a 2 week course of doxy and augmentin was given in view of MRI\par now pt here for f/u, followed with rad/onc and OMFS, no fever, chills, can open the mouth more than before but still some edema at the L face, recent MRI 11/28/21 showed no tumor recurrence just soft tissue edema and osteoradionecrosis\par pt has received COVID booster and pneumonia vaccine

## 2021-12-23 NOTE — PHYSICAL EXAM
[General Appearance - Alert] : alert [General Appearance - In No Acute Distress] : in no acute distress [Sclera] : the sclera and conjunctiva were normal [Neck Appearance] : the appearance of the neck was normal [Auscultation Breath Sounds / Voice Sounds] : lungs were clear to auscultation bilaterally [Heart Sounds] : normal S1 and S2 [Edema] : there was no peripheral edema [Bowel Sounds] : normal bowel sounds [Abdomen Soft] : soft [Abdomen Tenderness] : non-tender [Costovertebral Angle Tenderness] : no CVA tenderness [No Palpable Adenopathy] : no palpable adenopathy [Musculoskeletal - Swelling] : no joint swelling [] : no rash [No Focal Deficits] : no focal deficits [Affect] : the affect was normal [FreeTextEntry1] : still some L facial edema,  no tenderness, can open the mouth more

## 2021-12-23 NOTE — REVIEW OF SYSTEMS
[Negative] : Heme/Lymph [Fever] : no fever [Chills] : no chills [Body Aches] : no body aches [Eye Pain] : no eye pain [Chest Pain] : no chest pain [Palpitations] : no palpitations [Shortness Of Breath] : no shortness of breath [Wheezing] : no wheezing [Cough] : no cough [Abdominal Pain] : no abdominal pain [Vomiting] : no vomiting [Dysuria] : no dysuria [Joint Pain] : no joint pain [Skin Lesions] : no skin lesions [Confused] : no confusion [Convulsions] : no convulsions [Suicidal] : not suicidal [Easy Bleeding] : no tendency for easy bleeding [Easy Bruising] : no tendency for easy bruising [FreeTextEntry4] : still some L face edema but no pain, can open the mouth more

## 2021-12-27 LAB
ALBUMIN SERPL ELPH-MCNC: 4.3 G/DL
ALP BLD-CCNC: 97 U/L
ALT SERPL-CCNC: 21 U/L
ANION GAP SERPL CALC-SCNC: 14 MMOL/L
AST SERPL-CCNC: 21 U/L
BASOPHILS # BLD AUTO: 0.04 K/UL
BASOPHILS NFR BLD AUTO: 0.7 %
BILIRUB SERPL-MCNC: 0.5 MG/DL
BUN SERPL-MCNC: 17 MG/DL
CALCIUM SERPL-MCNC: 9.4 MG/DL
CHLORIDE SERPL-SCNC: 102 MMOL/L
CO2 SERPL-SCNC: 22 MMOL/L
CREAT SERPL-MCNC: 1.03 MG/DL
CRP SERPL-MCNC: <3 MG/L
EOSINOPHIL # BLD AUTO: 0.2 K/UL
EOSINOPHIL NFR BLD AUTO: 3.7 %
ERYTHROCYTE [SEDIMENTATION RATE] IN BLOOD BY WESTERGREN METHOD: 6 MM/HR
GLUCOSE SERPL-MCNC: 118 MG/DL
HCT VFR BLD CALC: 46.6 %
HGB BLD-MCNC: 14.6 G/DL
IMM GRANULOCYTES NFR BLD AUTO: 0.2 %
LYMPHOCYTES # BLD AUTO: 0.61 K/UL
LYMPHOCYTES NFR BLD AUTO: 11.3 %
MAN DIFF?: NORMAL
MCHC RBC-ENTMCNC: 26.6 PG
MCHC RBC-ENTMCNC: 31.3 GM/DL
MCV RBC AUTO: 85 FL
MONOCYTES # BLD AUTO: 0.61 K/UL
MONOCYTES NFR BLD AUTO: 11.3 %
NEUTROPHILS # BLD AUTO: 3.92 K/UL
NEUTROPHILS NFR BLD AUTO: 72.8 %
PLATELET # BLD AUTO: 273 K/UL
POTASSIUM SERPL-SCNC: 4.2 MMOL/L
PROT SERPL-MCNC: 6.7 G/DL
RBC # BLD: 5.48 M/UL
RBC # FLD: 14.6 %
SODIUM SERPL-SCNC: 138 MMOL/L
WBC # FLD AUTO: 5.39 K/UL

## 2022-01-05 ENCOUNTER — OUTPATIENT (OUTPATIENT)
Dept: OUTPATIENT SERVICES | Facility: HOSPITAL | Age: 62
LOS: 1 days | End: 2022-01-05
Payer: MEDICAID

## 2022-01-05 VITALS
HEIGHT: 66 IN | OXYGEN SATURATION: 98 % | SYSTOLIC BLOOD PRESSURE: 120 MMHG | HEART RATE: 88 BPM | DIASTOLIC BLOOD PRESSURE: 76 MMHG | WEIGHT: 199.08 LBS | RESPIRATION RATE: 16 BRPM | TEMPERATURE: 97 F

## 2022-01-05 DIAGNOSIS — S86.011S STRAIN OF RIGHT ACHILLES TENDON, SEQUELA: ICD-10-CM

## 2022-01-05 DIAGNOSIS — Z98.890 OTHER SPECIFIED POSTPROCEDURAL STATES: Chronic | ICD-10-CM

## 2022-01-05 DIAGNOSIS — Z95.1 PRESENCE OF AORTOCORONARY BYPASS GRAFT: Chronic | ICD-10-CM

## 2022-01-05 DIAGNOSIS — I10 ESSENTIAL (PRIMARY) HYPERTENSION: ICD-10-CM

## 2022-01-05 DIAGNOSIS — R22.0 LOCALIZED SWELLING, MASS AND LUMP, HEAD: Chronic | ICD-10-CM

## 2022-01-05 DIAGNOSIS — S86.009A UNSPECIFIED INJURY OF UNSPECIFIED ACHILLES TENDON, INITIAL ENCOUNTER: ICD-10-CM

## 2022-01-05 DIAGNOSIS — Z96.641 PRESENCE OF RIGHT ARTIFICIAL HIP JOINT: Chronic | ICD-10-CM

## 2022-01-05 LAB
ANION GAP SERPL CALC-SCNC: 12 MMOL/L — SIGNIFICANT CHANGE UP (ref 7–14)
BUN SERPL-MCNC: 22 MG/DL — SIGNIFICANT CHANGE UP (ref 7–23)
CALCIUM SERPL-MCNC: 9.2 MG/DL — SIGNIFICANT CHANGE UP (ref 8.4–10.5)
CHLORIDE SERPL-SCNC: 104 MMOL/L — SIGNIFICANT CHANGE UP (ref 98–107)
CO2 SERPL-SCNC: 24 MMOL/L — SIGNIFICANT CHANGE UP (ref 22–31)
CREAT SERPL-MCNC: 0.88 MG/DL — SIGNIFICANT CHANGE UP (ref 0.5–1.3)
GLUCOSE SERPL-MCNC: 118 MG/DL — HIGH (ref 70–99)
HCT VFR BLD CALC: 45 % — SIGNIFICANT CHANGE UP (ref 39–50)
HGB BLD-MCNC: 14.2 G/DL — SIGNIFICANT CHANGE UP (ref 13–17)
MCHC RBC-ENTMCNC: 26.4 PG — LOW (ref 27–34)
MCHC RBC-ENTMCNC: 31.6 GM/DL — LOW (ref 32–36)
MCV RBC AUTO: 83.8 FL — SIGNIFICANT CHANGE UP (ref 80–100)
NRBC # BLD: 0 /100 WBCS — SIGNIFICANT CHANGE UP
NRBC # FLD: 0 K/UL — SIGNIFICANT CHANGE UP
PLATELET # BLD AUTO: 230 K/UL — SIGNIFICANT CHANGE UP (ref 150–400)
POTASSIUM SERPL-MCNC: 4 MMOL/L — SIGNIFICANT CHANGE UP (ref 3.5–5.3)
POTASSIUM SERPL-SCNC: 4 MMOL/L — SIGNIFICANT CHANGE UP (ref 3.5–5.3)
RBC # BLD: 5.37 M/UL — SIGNIFICANT CHANGE UP (ref 4.2–5.8)
RBC # FLD: 15.2 % — HIGH (ref 10.3–14.5)
SODIUM SERPL-SCNC: 140 MMOL/L — SIGNIFICANT CHANGE UP (ref 135–145)
WBC # BLD: 6.68 K/UL — SIGNIFICANT CHANGE UP (ref 3.8–10.5)
WBC # FLD AUTO: 6.68 K/UL — SIGNIFICANT CHANGE UP (ref 3.8–10.5)

## 2022-01-05 PROCEDURE — 93010 ELECTROCARDIOGRAM REPORT: CPT

## 2022-01-05 RX ORDER — RISANKIZUMAB-RZAA 150 MG/ML
0 INJECTION SUBCUTANEOUS
Qty: 0 | Refills: 0 | DISCHARGE

## 2022-01-05 RX ORDER — SODIUM CHLORIDE 9 MG/ML
1000 INJECTION, SOLUTION INTRAVENOUS
Refills: 0 | Status: DISCONTINUED | OUTPATIENT
Start: 2022-01-20 | End: 2022-02-03

## 2022-01-05 RX ORDER — IRBESARTAN 75 MG/1
1 TABLET ORAL
Qty: 0 | Refills: 0 | DISCHARGE

## 2022-01-05 NOTE — H&P PST ADULT - EXTREMITIES COMMENTS
Extremity: residual defect R Achilles tendon region, Felder testing abnormal R LE, marked weakness with PF R ankle

## 2022-01-05 NOTE — H&P PST ADULT - HISTORY OF PRESENT ILLNESS
Pt is a 61 yr old male scheduled for Right Achilles Reconstruction with Flexor Hallucis Lingus Tendon Transfer with Dr Salmon tentatively 1/20/22 - pt hx of increased pain with walking right ankle for past year - pt now for surgery to repair tendon injury - hx CAD with CABG 2019 and HTN, HLD,   Patient instructed to contact surgeon's office concerning COVID test prior to surgery  Pt is a 61 yr old male scheduled for Right Achilles Reconstruction with Flexor Hallucis Lingus Tendon Transfer with Dr Salmon tentatively 1/20/22 - pt hx of increased pain with walking right ankle for past year - pt now for surgery to repair tendon injury - hx CAD with CABG 2019 and HTN, HLD, Sleep apnea   Patient instructed to contact surgeon's office concerning COVID test prior to surgery

## 2022-01-05 NOTE — H&P PST ADULT - MUSCULOSKELETAL COMMENTS
Hx right hip replacement with good results Unable to walk without pain right ankle - cannot wt bear fully - -no swelling noted Unable to walk without pain right ankle - cannot wt bear fully - -no swelling noted.  Extremity: palpable defect R Achilles tendon region, Felder testing abnormal R LE

## 2022-01-05 NOTE — H&P PST ADULT - DATE OF LAST VACCINATION
Discussed with house staff and nursing staff.  Pain management, PT, incentive spirometer, DVT prophylaxis, repeat labs and discharge planning.
16-May-2021

## 2022-01-05 NOTE — H&P PST ADULT - SKIN/BREAST COMMENTS
Left cheek sarcoma removed 2020 with hx postop infection - pt had RT and now healed - pt c/o of restricted opening of mouth

## 2022-01-05 NOTE — H&P PST ADULT - RESPIRATORY RATE (BREATHS/MIN)
Called and left a message with patient and his sister to schedule an appointment with the wound care clinic
16

## 2022-01-05 NOTE — H&P PST ADULT - NSICDXPASTMEDICALHX_GEN_ALL_CORE_FT
PAST MEDICAL HISTORY:  CAD (coronary artery disease)     Cataract left    Cervical radiculopathy     Cheek mass Liposarcoma - left - RT treatments    HTN (hypertension)     Obesity     Other specified soft tissue disorders     Psoriasis     Sleep apnea uses CPAP     PAST MEDICAL HISTORY:  CAD (coronary artery disease)     Cataract left    Cervical radiculopathy     Cheek mass Liposarcoma - left - RT treatments - restriction to opening of mouth    HTN (hypertension)     Obesity     Other specified soft tissue disorders     Psoriasis     Sleep apnea uses CPAP

## 2022-01-05 NOTE — H&P PST ADULT - AIRWAY
small oral opening r/t RT and left cheek mass / infection small oral opening r/t RT and left cheek mass / infection- 1.5 cm opening

## 2022-01-05 NOTE — H&P PST ADULT - MALLAMPATI CLASS
Cannot visualize due to limited opening of mouth/Class IV (difficult) - the soft palate is not visible at all

## 2022-01-05 NOTE — H&P PST ADULT - PROBLEM SELECTOR PLAN 1
Pt scheduled for surgery Right Achilles Reconstruction with Flexor Hallucis Lingus Tendon Transfer with Dr Salmon tentatively 1/20/22 and preop instructions including instructions for taking Famotidine and for Chlorhexidine use in showering on the day of surgery, given verbally and with use of  written materials, and patient confirming understanding of such instructions using  teach back method.  CC requested for hx CAD/CABG - pt to stay on ASA 81 mg   Spoke with Dr Gee concerning pt restricted oral opening of 1.5 cm - will record in chart and on front chart sheet.   Request copy of sleep study Pt scheduled for surgery Right Achilles Reconstruction with Flexor Hallucis Longus Tendon Transfer with Dr Salmon tentatively 1/20/22 and preop instructions including instructions for taking Famotidine and for Chlorhexidine use in showering on the day of surgery, given verbally and with use of  written materials, and patient confirming understanding of such instructions using  teach back method.  CC requested for hx CAD/CABG - pt to stay on ASA 81 mg   Spoke with Dr Gee concerning pt restricted oral opening of 1.5 cm - will record in chart and on front chart sheet.   Request copy of sleep study

## 2022-01-11 ENCOUNTER — NON-APPOINTMENT (OUTPATIENT)
Age: 62
End: 2022-01-11

## 2022-01-11 ENCOUNTER — APPOINTMENT (OUTPATIENT)
Dept: CARDIOLOGY | Facility: CLINIC | Age: 62
End: 2022-01-11
Payer: MEDICAID

## 2022-01-11 VITALS
HEART RATE: 93 BPM | WEIGHT: 195 LBS | SYSTOLIC BLOOD PRESSURE: 129 MMHG | DIASTOLIC BLOOD PRESSURE: 81 MMHG | OXYGEN SATURATION: 98 % | BODY MASS INDEX: 30.54 KG/M2

## 2022-01-11 PROCEDURE — 93000 ELECTROCARDIOGRAM COMPLETE: CPT

## 2022-01-11 PROCEDURE — 99214 OFFICE O/P EST MOD 30 MIN: CPT

## 2022-01-11 NOTE — HISTORY OF PRESENT ILLNESS
[FreeTextEntry1] : Mr. Kennedy is a 61 year-old man with known MV CAD s/p CABG with Dr. Turpin in 2/2019. He has been doing well without issues. He has not been able to lose weight however has been walking over 2 miles a day. He recently underwent a lipoma removal from his face/cheek and radiation therapy. Needs achilles surgery for tear.\par \par Active Issues:\par 1. Actively treated CAD\par 2. Actively treated cough\par 3. Actively treated HLD\par 4. Actively treated HTN

## 2022-01-11 NOTE — DISCUSSION/SUMMARY
[FreeTextEntry1] : Mr. Kennedy is a 60 yo man with known CAD s/p CABG.\par \par Plan:\par 1. Given the CAD with CABG - c/w medical management of severe CAD. Patient may proceed with achilles surgery without any further CV workup. Must keep aspirin on board throughout. \par 2. Old records requested and reviewed with performing physician. \par 3. Primary and secondary prevention of cardiovascular and related conditions discussed at length, including but not limited to diet and lifestyle modification.\par 4. Patient to return to the office in 2-3 months.\par \par Thank you for allowing me to participate in the care of your patient. If you have any questions, please feel free to contact me at [phone] or via email at [email or web address].\par \par Sincerely,\par \par Michelle Bower MD FACC

## 2022-01-19 ENCOUNTER — TRANSCRIPTION ENCOUNTER (OUTPATIENT)
Age: 62
End: 2022-01-19

## 2022-01-19 NOTE — ASU PATIENT PROFILE, ADULT - FALL HARM RISK - UNIVERSAL INTERVENTIONS
Bed in lowest position, wheels locked, appropriate side rails in place/Call bell, personal items and telephone in reach/Instruct patient to call for assistance before getting out of bed or chair/Non-slip footwear when patient is out of bed/[place] to call system/Physically safe environment - no spills, clutter or unnecessary equipment/Purposeful Proactive Rounding/Room/bathroom lighting operational, light cord in reach

## 2022-01-20 ENCOUNTER — OUTPATIENT (OUTPATIENT)
Dept: OUTPATIENT SERVICES | Facility: HOSPITAL | Age: 62
LOS: 1 days | Discharge: HOME | End: 2022-01-20
Payer: MEDICAID

## 2022-01-20 ENCOUNTER — APPOINTMENT (OUTPATIENT)
Dept: ORTHOPEDIC SURGERY | Facility: HOSPITAL | Age: 62
End: 2022-01-20

## 2022-01-20 VITALS
WEIGHT: 199.08 LBS | SYSTOLIC BLOOD PRESSURE: 118 MMHG | RESPIRATION RATE: 16 BRPM | TEMPERATURE: 98 F | HEART RATE: 93 BPM | HEIGHT: 66 IN | OXYGEN SATURATION: 96 % | DIASTOLIC BLOOD PRESSURE: 67 MMHG

## 2022-01-20 VITALS
SYSTOLIC BLOOD PRESSURE: 121 MMHG | OXYGEN SATURATION: 98 % | DIASTOLIC BLOOD PRESSURE: 73 MMHG | RESPIRATION RATE: 18 BRPM | HEART RATE: 89 BPM | TEMPERATURE: 97 F

## 2022-01-20 DIAGNOSIS — Z96.641 PRESENCE OF RIGHT ARTIFICIAL HIP JOINT: Chronic | ICD-10-CM

## 2022-01-20 DIAGNOSIS — Z98.890 OTHER SPECIFIED POSTPROCEDURAL STATES: Chronic | ICD-10-CM

## 2022-01-20 DIAGNOSIS — S86.011S STRAIN OF RIGHT ACHILLES TENDON, SEQUELA: ICD-10-CM

## 2022-01-20 DIAGNOSIS — Z95.1 PRESENCE OF AORTOCORONARY BYPASS GRAFT: Chronic | ICD-10-CM

## 2022-01-20 DIAGNOSIS — R22.0 LOCALIZED SWELLING, MASS AND LUMP, HEAD: Chronic | ICD-10-CM

## 2022-01-20 PROCEDURE — 27691 REVISE LOWER LEG TENDON: CPT | Mod: RT

## 2022-01-20 DEVICE — IMPLANTABLE DEVICE: Type: IMPLANTABLE DEVICE | Site: RIGHT | Status: FUNCTIONAL

## 2022-01-20 RX ORDER — OXYCODONE HYDROCHLORIDE 5 MG/1
1 TABLET ORAL
Qty: 30 | Refills: 0
Start: 2022-01-20 | End: 2022-01-24

## 2022-01-20 RX ORDER — FENTANYL CITRATE 50 UG/ML
25 INJECTION INTRAVENOUS
Refills: 0 | Status: DISCONTINUED | OUTPATIENT
Start: 2022-01-20 | End: 2022-01-20

## 2022-01-20 NOTE — BRIEF OPERATIVE NOTE - OPERATION/FINDINGS
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

## 2022-01-20 NOTE — ASU DISCHARGE PLAN (ADULT/PEDIATRIC) - CALL YOUR DOCTOR IF YOU HAVE ANY OF THE FOLLOWING:
Swelling that gets worse/Pain not relieved by Medications/Fever greater than (need to indicate Fahrenheit or Celsius)/Numbness, tingling, color or temperature change to extremity

## 2022-01-20 NOTE — BRIEF OPERATIVE NOTE - NSICDXBRIEFPROCEDURE_GEN_ALL_CORE_FT
PROCEDURES:  Transfer of flexor hallucis longus tendon to Achilles tendon 20-Jan-2022 11:06:28  Yoan Brandon

## 2022-01-20 NOTE — ASU DISCHARGE PLAN (ADULT/PEDIATRIC) - NURSING INSTRUCTIONS
You received IV Tylenol for pain management at 10:30 AM. Please DO NOT take any Tylenol (Acetaminophen) containing products, such as Vicodin, Percocet, Excedrin, and cold medications for the next 6 hours (until 4:30 PM). DO NOT TAKE MORE THAN 3000 MG OF TYLENOL in a 24 hour period.

## 2022-01-20 NOTE — ASU DISCHARGE PLAN (ADULT/PEDIATRIC) - CARE PROVIDER_API CALL
Giacomo Salmon  Orthopedic Surgery  [address]  [place] [address]  Phone: [phone]  Fax: [phone]  Follow Up Time: 2 weeks

## 2022-01-20 NOTE — ASU DISCHARGE PLAN (ADULT/PEDIATRIC) - NS MD DC FALL RISK RISK
For information on Fall & Injury Prevention, visit: [email or web address] OR  [email or web address] OR  https://www.cdc.gov/steadi/patient.html

## 2022-02-01 ENCOUNTER — APPOINTMENT (OUTPATIENT)
Dept: ORTHOPEDIC SURGERY | Facility: CLINIC | Age: 62
End: 2022-02-01
Payer: MEDICAID

## 2022-02-01 ENCOUNTER — NON-APPOINTMENT (OUTPATIENT)
Age: 62
End: 2022-02-01

## 2022-02-01 PROBLEM — R22.0 LOCALIZED SWELLING, MASS AND LUMP, HEAD: Chronic | Status: ACTIVE | Noted: 2020-11-27

## 2022-02-01 PROCEDURE — 99024 POSTOP FOLLOW-UP VISIT: CPT

## 2022-02-15 ENCOUNTER — APPOINTMENT (OUTPATIENT)
Dept: ORTHOPEDIC SURGERY | Facility: CLINIC | Age: 62
End: 2022-02-15
Payer: MEDICAID

## 2022-02-15 VITALS — WEIGHT: 195 LBS | BODY MASS INDEX: 30.61 KG/M2 | HEIGHT: 67 IN

## 2022-02-15 PROCEDURE — 99024 POSTOP FOLLOW-UP VISIT: CPT

## 2022-02-26 NOTE — PHYSICAL EXAM
[General Appearance - Well Developed] : well developed [Normal Appearance] : normal appearance [Well Groomed] : well groomed [General Appearance - Well Nourished] : well nourished [No Deformities] : no deformities [General Appearance - In No Acute Distress] : no acute distress [Normal Conjunctiva] : the conjunctiva exhibited no abnormalities [Eyelids - No Xanthelasma] : the eyelids demonstrated no xanthelasmas [Normal Oral Mucosa] : normal oral mucosa [No Oral Pallor] : no oral pallor [No Oral Cyanosis] : no oral cyanosis [Normal Jugular Venous A Waves Present] : normal jugular venous A waves present [Normal Jugular Venous V Waves Present] : normal jugular venous V waves present [No Jugular Venous Maldonado A Waves] : no jugular venous maldonado A waves [Respiration, Rhythm And Depth] : normal respiratory rhythm and effort [Exaggerated Use Of Accessory Muscles For Inspiration] : no accessory muscle use [Auscultation Breath Sounds / Voice Sounds] : lungs were clear to auscultation bilaterally [Heart Rate And Rhythm] : heart rate and rhythm were normal [Heart Sounds] : normal S1 and S2 [Murmurs] : no murmurs present [Abdomen Soft] : soft [Abdomen Tenderness] : non-tender [Abdomen Mass (___ Cm)] : no abdominal mass palpated [Abnormal Walk] : normal gait [Gait - Sufficient For Exercise Testing] : the gait was sufficient for exercise testing [Nail Clubbing] : no clubbing of the fingernails [Cyanosis, Localized] : no localized cyanosis [Petechial Hemorrhages (___cm)] : no petechial hemorrhages [Skin Color & Pigmentation] : normal skin color and pigmentation [] : no rash [No Venous Stasis] : no venous stasis [Skin Lesions] : no skin lesions [No Skin Ulcers] : no skin ulcer [No Xanthoma] : no  xanthoma was observed [Oriented To Time, Place, And Person] : oriented to person, place, and time [Affect] : the affect was normal [Mood] : the mood was normal [No Anxiety] : not feeling anxious negative...

## 2022-03-08 ENCOUNTER — APPOINTMENT (OUTPATIENT)
Dept: ORTHOPEDIC SURGERY | Facility: CLINIC | Age: 62
End: 2022-03-08
Payer: MEDICAID

## 2022-03-08 VITALS — HEIGHT: 67 IN | WEIGHT: 195 LBS | BODY MASS INDEX: 30.61 KG/M2

## 2022-03-08 PROCEDURE — 99024 POSTOP FOLLOW-UP VISIT: CPT

## 2022-03-21 ENCOUNTER — APPOINTMENT (OUTPATIENT)
Dept: ORTHOPEDIC SURGERY | Facility: CLINIC | Age: 62
End: 2022-03-21
Payer: MEDICAID

## 2022-03-21 VITALS — HEIGHT: 67 IN | BODY MASS INDEX: 30.61 KG/M2 | WEIGHT: 195 LBS

## 2022-03-21 PROCEDURE — 99024 POSTOP FOLLOW-UP VISIT: CPT

## 2022-03-21 NOTE — HISTORY OF PRESENT ILLNESS
[A CAM Boot] : a CAM boot [FreeTextEntry1] : 62 y/o male returns for f/u s/p R Achilles reconstruction with flexor hallucis longus tendon transfer x 1/20//22. Pt reports progressing even more since last visit. Pt currently have no pain today. Pt denies any numbness/tingling/fever/chills. Pt has tall cam boot on R LE and using walker for ambulation. Pt denies any new symptoms/injuries to R foot/ankle. Denies additional musculoskeletal complaints referable to foot/ankle. Attends PT sessions. States trace serous drainage following PT scar massage / peeling eschar incisional approximately 4 days ago.  Denies fever / chills

## 2022-03-21 NOTE — PHYSICAL EXAM
[de-identified] : Extremity: incision healing well R distal leg with focal wound distal incisional / dry eschar, serous spotting on Band-Aid, interval decreased soft tissue swelling R distal leg, R Achilles reconstruction intact, good active plantarflexion strength R ankle, calves soft and nontender, sensorimotor unchanged R LE.  AOx3, mood / affect normal.

## 2022-03-21 NOTE — DISCUSSION/SUMMARY
[Surgical risks reviewed] : Surgical risks reviewed [de-identified] : Options reviewed, hydrogen peroxide cleanse, bacitracin / Xeroform, dry sterile gauze dressing applied, Keflex oral antibiotic prophylaxis.  Return to office in 1 week / PRN, incision wound check.  All questions answered.

## 2022-03-28 ENCOUNTER — APPOINTMENT (OUTPATIENT)
Dept: ORTHOPEDIC SURGERY | Facility: CLINIC | Age: 62
End: 2022-03-28
Payer: MEDICAID

## 2022-03-28 DIAGNOSIS — S91.309A UNSPECIFIED OPEN WOUND, UNSPECIFIED FOOT, INITIAL ENCOUNTER: ICD-10-CM

## 2022-03-28 PROCEDURE — 99024 POSTOP FOLLOW-UP VISIT: CPT

## 2022-03-29 ENCOUNTER — APPOINTMENT (OUTPATIENT)
Dept: CT IMAGING | Facility: IMAGING CENTER | Age: 62
End: 2022-03-29
Payer: MEDICAID

## 2022-03-29 ENCOUNTER — OUTPATIENT (OUTPATIENT)
Dept: OUTPATIENT SERVICES | Facility: HOSPITAL | Age: 62
LOS: 1 days | End: 2022-03-29
Payer: MEDICAID

## 2022-03-29 DIAGNOSIS — R22.0 LOCALIZED SWELLING, MASS AND LUMP, HEAD: Chronic | ICD-10-CM

## 2022-03-29 DIAGNOSIS — C49.9 MALIGNANT NEOPLASM OF CONNECTIVE AND SOFT TISSUE, UNSPECIFIED: ICD-10-CM

## 2022-03-29 DIAGNOSIS — Z96.641 PRESENCE OF RIGHT ARTIFICIAL HIP JOINT: Chronic | ICD-10-CM

## 2022-03-29 DIAGNOSIS — Z95.1 PRESENCE OF AORTOCORONARY BYPASS GRAFT: Chronic | ICD-10-CM

## 2022-03-29 DIAGNOSIS — Z98.890 OTHER SPECIFIED POSTPROCEDURAL STATES: Chronic | ICD-10-CM

## 2022-03-29 DIAGNOSIS — Z00.8 ENCOUNTER FOR OTHER GENERAL EXAMINATION: ICD-10-CM

## 2022-03-29 PROBLEM — S91.309A WOUND OF FOOT: Status: ACTIVE | Noted: 2022-03-21

## 2022-03-29 PROCEDURE — 70487 CT MAXILLOFACIAL W/DYE: CPT

## 2022-03-29 PROCEDURE — 71260 CT THORAX DX C+: CPT | Mod: 26

## 2022-03-29 PROCEDURE — 70487 CT MAXILLOFACIAL W/DYE: CPT | Mod: 26

## 2022-03-29 PROCEDURE — 71260 CT THORAX DX C+: CPT

## 2022-04-04 ENCOUNTER — APPOINTMENT (OUTPATIENT)
Dept: RADIATION ONCOLOGY | Facility: CLINIC | Age: 62
End: 2022-04-04
Payer: MEDICAID

## 2022-04-04 VITALS
SYSTOLIC BLOOD PRESSURE: 116 MMHG | RESPIRATION RATE: 16 BRPM | WEIGHT: 208.76 LBS | OXYGEN SATURATION: 99 % | HEART RATE: 82 BPM | DIASTOLIC BLOOD PRESSURE: 75 MMHG | TEMPERATURE: 97.16 F | BODY MASS INDEX: 32.7 KG/M2

## 2022-04-04 PROCEDURE — 99213 OFFICE O/P EST LOW 20 MIN: CPT

## 2022-04-05 ENCOUNTER — APPOINTMENT (OUTPATIENT)
Dept: ORTHOPEDIC SURGERY | Facility: CLINIC | Age: 62
End: 2022-04-05
Payer: MEDICAID

## 2022-04-05 ENCOUNTER — NON-APPOINTMENT (OUTPATIENT)
Age: 62
End: 2022-04-05

## 2022-04-05 VITALS — BODY MASS INDEX: 32.65 KG/M2 | WEIGHT: 208 LBS | HEIGHT: 67 IN

## 2022-04-05 PROCEDURE — 99024 POSTOP FOLLOW-UP VISIT: CPT

## 2022-04-05 NOTE — HISTORY OF PRESENT ILLNESS
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 Completed RT to oral cavity 6/3/2021 receiving  total dose of 66 Gy. \par \par 3/29/2022- CT Maxillofacial IC- Fat stranding/swelling again noted along the left lower face compatible with posttreatment changes. No new mass or enhancement. Lytic changes of the left mandible compatible with osteoradionecrosis or osteomyelitis. There is pathologic fracture through the inferior ramus/angle, new since comparison CT but possibly present since comparison MRI from 11/28/2021.\par \par 4/4/2022- Presents today for follow up. Reports feeling well generally, except that continues to feeling pain on the right side upon wide mouth opening. also reports intermittent tingling and prickling sensation, and sensitive to spicy food.  Also noted abrasions, boils in the left lateral aspect of the tongue for about 2 months. Patient used glycerin over it with no effect.  \par \par

## 2022-04-05 NOTE — PHYSICAL EXAM
[Normal] : oriented to person, place and time, the affect was normal, the mood was normal and not anxious [de-identified] : pain upon wide mouth opening, left lateral tongue abrasion secondary to rubbing inside, also sensitivity to spicy food and tingling sensation. [de-identified] : right foot with braces, s/p surgery due to torn tendons

## 2022-04-22 ENCOUNTER — NON-APPOINTMENT (OUTPATIENT)
Age: 62
End: 2022-04-22

## 2022-04-25 ENCOUNTER — APPOINTMENT (OUTPATIENT)
Dept: ORTHOPEDIC SURGERY | Facility: CLINIC | Age: 62
End: 2022-04-25
Payer: MEDICAID

## 2022-04-25 VITALS
HEART RATE: 66 BPM | DIASTOLIC BLOOD PRESSURE: 79 MMHG | BODY MASS INDEX: 46.62 KG/M2 | SYSTOLIC BLOOD PRESSURE: 137 MMHG | HEIGHT: 67 IN | WEIGHT: 297 LBS

## 2022-04-25 PROCEDURE — 99213 OFFICE O/P EST LOW 20 MIN: CPT

## 2022-05-04 NOTE — BRIEF OPERATIVE NOTE - NSICDXBRIEFPREOP_GEN_ALL_CORE_FT
Called patient and notified that her medication was filled for one time only. Offered to assist in scheduling appointment for further refills. Pt denied and stated she will call tomorrow to schedule.    If pt calls back, please assist in scheduling annual physical.    Eliza Stevenson MA 3:50 PM 5/4/2022       PRE-OP DIAGNOSIS:  Lipoma, face 14-Jan-2021 18:50:37  Claudio Peck

## 2022-06-06 ENCOUNTER — APPOINTMENT (OUTPATIENT)
Dept: PLASTIC SURGERY | Facility: CLINIC | Age: 62
End: 2022-06-06
Payer: MEDICAID

## 2022-06-06 ENCOUNTER — NON-APPOINTMENT (OUTPATIENT)
Age: 62
End: 2022-06-06

## 2022-06-06 ENCOUNTER — APPOINTMENT (OUTPATIENT)
Dept: DERMATOLOGY | Facility: CLINIC | Age: 62
End: 2022-06-06
Payer: MEDICAID

## 2022-06-06 VITALS — HEIGHT: 67 IN | WEIGHT: 200 LBS | BODY MASS INDEX: 31.39 KG/M2

## 2022-06-06 DIAGNOSIS — C49.9 MALIGNANT NEOPLASM OF CONNECTIVE AND SOFT TISSUE, UNSPECIFIED: ICD-10-CM

## 2022-06-06 PROCEDURE — 99204 OFFICE O/P NEW MOD 45 MIN: CPT

## 2022-06-06 PROCEDURE — 99213 OFFICE O/P EST LOW 20 MIN: CPT

## 2022-06-06 RX ORDER — TRIAMCINOLONE ACETONIDE 1 MG/G
0.1 OINTMENT TOPICAL
Qty: 80 | Refills: 0 | Status: ACTIVE | COMMUNITY
Start: 2022-06-06 | End: 1900-01-01

## 2022-06-07 ENCOUNTER — NON-APPOINTMENT (OUTPATIENT)
Age: 62
End: 2022-06-07

## 2022-06-07 ENCOUNTER — APPOINTMENT (OUTPATIENT)
Dept: ORTHOPEDIC SURGERY | Facility: CLINIC | Age: 62
End: 2022-06-07
Payer: MEDICAID

## 2022-06-07 VITALS
BODY MASS INDEX: 31.23 KG/M2 | DIASTOLIC BLOOD PRESSURE: 69 MMHG | HEART RATE: 91 BPM | SYSTOLIC BLOOD PRESSURE: 123 MMHG | HEIGHT: 67 IN | WEIGHT: 199 LBS

## 2022-06-07 PROBLEM — C49.9 LIPOSARCOMA: Status: ACTIVE | Noted: 2021-02-10

## 2022-06-07 PROCEDURE — 99213 OFFICE O/P EST LOW 20 MIN: CPT

## 2022-06-07 NOTE — ED PROVIDER NOTE - EKG #1 DATE/TIME
Assessment & Plan       ICD-10-CM    1. Type 2 diabetes mellitus with diabetic nephropathy, without long-term current use of insulin (H)  E11.21 Basic metabolic panel     Hemoglobin A1c     metFORMIN (GLUCOPHAGE) 500 MG tablet     Basic metabolic panel     Hemoglobin A1c   2. Hypertension goal BP (blood pressure) < 140/90  I10    3. Hyperlipidemia with target LDL less than 100  E78.5    4. Paroxysmal atrial fibrillation (H)  I48.0    5. Anxiety  F41.9    6. Mild recurrent major depression (H)  F33.0    7. Gastroesophageal reflux disease, unspecified whether esophagitis present  K21.9      Her blood sugars have been running a bit high lately, but she has been off of her metformin  We will check a fasting BMP and A1c today, but I advised her to resume taking metformin at 500 mg twice a day  Discussed that we may eventually want her to take 1000 mg twice a day  Continue other same baseline meds  She will follow-up with Dr. Altamirano in the next few weeks regarding her paroxetine and pantoprazole  I recommend another follow-up on her diabetes in about 3 months    Return in about 3 weeks (around 6/28/2022) for follow up on depression and GERD with Dr. Altamirano.    Gurinder Ho MD  [facility] MARIANA Rabago is a 75 year old who presents for the following health issues  accompanied by her daughter and .    HPI     Diabetes Follow-up    How often are you checking your blood sugar? One time daily  What time of day are you checking your blood sugars (select all that apply)?  Before meals  Have you had any blood sugars above 200?  No  Have you had any blood sugars below 70?  No    What symptoms do you notice when your blood sugar is low?  Dizzy    What concerns do you have today about your diabetes? Other: her numbers above 120     Do you have any of these symptoms? (Select all that apply)  No numbness or tingling in feet.  No redness, sores or blisters on feet.  No complaints of  20-Mar-2019 18:20 excessive thirst.  No reports of blurry vision.  No significant changes to weight. but swelling and tightness    Have you had a diabetic eye exam in the last 12 months? Yes- Date of last eye exam: [facility],  Location: 11/21      Hyperlipidemia Follow-Up      Are you regularly taking any medication or supplement to lower your cholesterol?   Yes- Simvastatin    Are you having muscle aches or other side effects that you think could be caused by your cholesterol lowering medication?  No    Hypertension Follow-up      Do you check your blood pressure regularly outside of the clinic? Yes     Are you following a low salt diet? Yes    Are your blood pressures ever more than 140 on the top number (systolic) OR more   than 90 on the bottom number (diastolic), for example 140/90? No    BP Readings from Last 2 Encounters:   06/07/22 120/78   05/23/22 132/84     Hemoglobin A1C POCT (%)   Date Value   11/18/2020 7.0 (H)   09/20/2019 6.5 (H)     Hemoglobin A1C (%)   Date Value   12/23/2021 6.7 (H)     LDL Cholesterol Calculated (mg/dL)   Date Value   12/23/2021 35   11/18/2020 23   09/20/2019 49         How many servings of fruits and vegetables do you eat daily?  2-3    On average, how many sweetened beverages do you drink each day (Examples: soda, juice, sweet tea, etc.  Do NOT count diet or artificially sweetened beverages)?   0    How many days per week do you exercise enough to make your heart beat faster?     How many minutes a day do you exercise enough to make your heart beat faster?     How many days per week do you miss taking your medication? 0    The patient is here with her daughter, who helps provide interpretation as the patient's English is not very strong.  The patient was apparently told to stop her metformin about 6 months ago or so by a provider.  It is not clear why that recommendation was made.  Since then, the patient's blood sugars went from the low 100s up to the mid 100s.  She has been getting  readings in the 150-170 range in recent weeks.  She was seen by Dr. Altamirano a couple of weeks ago and her paroxetine was increased and her PPI was changed to pantoprazole.  The blood sugar changes have been occurring before then, however.  She remains on warfarin for PAF.  She gets regular INR checks done because of that.  She will likely be headed to [place] later this summer or fall.    Patient Active Problem List   Diagnosis     OA (OSTEOARTHRITIS) OF KNEE - right     Status Post Total Knee Replacement - bilateral     TACHO (obstructive sleep apnea)     Hyperlipidemia with target LDL less than 100     Hypertension goal BP (blood pressure) < 140/90     Morbid obesity (H)     Achalasia     Adrenal adenoma     Atrophy of left kidney     Calculus of kidney     Chronic low back pain     Gout     Paroxysmal atrial fibrillation (H)     Long term (current) use of anticoagulants     Anxiety     Spinal stenosis of lumbar region with neurogenic claudication     Varicose veins with pain     Venous stasis dermatitis of right lower extremity     Shoulder pain, left     Glenohumeral arthritis, left     Type 2 diabetes mellitus with diabetic nephropathy, without long-term current use of insulin (H)     Stage 3a chronic kidney disease (H)     Iron deficiency anemia, unspecified iron deficiency anemia type     Current Outpatient Medications   Medication     carvedilol (COREG) 6.25 MG tablet     isosorbide mononitrate (IMDUR) 30 MG 24 hr tablet     metFORMIN (GLUCOPHAGE) 500 MG tablet     pantoprazole (PROTONIX) 40 MG EC tablet     PARoxetine (PAXIL) 30 MG tablet     simvastatin (ZOCOR) 10 MG tablet     clotrimazole (LOTRIMIN) 1 % external cream     montelukast (SINGULAIR) 10 MG tablet     order for DME     order for DME     PARoxetine (PAXIL) 20 MG tablet     warfarin ANTICOAGULANT (COUMADIN) 2 MG tablet     Current Facility-Administered Medications   Medication     lidocaine (PF) (XYLOCAINE) 1 % injection 3 mL     lidocaine (PF)  (XYLOCAINE) 1 % injection 4 mL     lidocaine 1 % 10 mL, sodium bicarbonate 1 mEq 11 mL injection     lidocaine 1% with EPINEPHrine 1:100,000 60 mL, sodium bicarbonate 12 mEq, sodium chloride 0.9% 500 mL 572 mL     methylPREDNISolone (DEPO-MEDROL) injection 80 mg         Review of Systems   Noncontributory except as above.      Objective    /78 (BP Location: Right arm, Patient Position: Sitting, Cuff Size: Adult Regular)   Pulse 70   Temp 99  F (37.2  C) (Oral)   Wt 89.8 kg (198 lb)   SpO2 97%   BMI 41.00 kg/m    Body mass index is 41 kg/m .  Physical Exam   GENERAL: alert, no distress and obese  PSYCH: mentation appears normal, affect normal    Previous lab results were reviewed.

## 2022-07-01 ENCOUNTER — RESULT REVIEW (OUTPATIENT)
Age: 62
End: 2022-07-01

## 2022-07-07 NOTE — REASON FOR VISIT
multifactorial--asthma, obesity, CAD, prior PE, likely aspiration PNA--clearing--clinically better [Routine Follow-Up] : routine follow-up visit for [Head and Neck Cancer] : head and neck cancer

## 2022-07-08 ENCOUNTER — APPOINTMENT (OUTPATIENT)
Dept: MRI IMAGING | Facility: CLINIC | Age: 62
End: 2022-07-08

## 2022-07-08 ENCOUNTER — OUTPATIENT (OUTPATIENT)
Dept: OUTPATIENT SERVICES | Facility: HOSPITAL | Age: 62
LOS: 1 days | End: 2022-07-08
Payer: MEDICAID

## 2022-07-08 ENCOUNTER — APPOINTMENT (OUTPATIENT)
Dept: CT IMAGING | Facility: CLINIC | Age: 62
End: 2022-07-08

## 2022-07-08 DIAGNOSIS — Z00.8 ENCOUNTER FOR OTHER GENERAL EXAMINATION: ICD-10-CM

## 2022-07-08 DIAGNOSIS — C49.9 MALIGNANT NEOPLASM OF CONNECTIVE AND SOFT TISSUE, UNSPECIFIED: ICD-10-CM

## 2022-07-08 DIAGNOSIS — Z98.890 OTHER SPECIFIED POSTPROCEDURAL STATES: Chronic | ICD-10-CM

## 2022-07-08 DIAGNOSIS — Z96.641 PRESENCE OF RIGHT ARTIFICIAL HIP JOINT: Chronic | ICD-10-CM

## 2022-07-08 DIAGNOSIS — R22.0 LOCALIZED SWELLING, MASS AND LUMP, HEAD: Chronic | ICD-10-CM

## 2022-07-08 DIAGNOSIS — Z95.1 PRESENCE OF AORTOCORONARY BYPASS GRAFT: Chronic | ICD-10-CM

## 2022-07-08 PROCEDURE — 70543 MRI ORBT/FAC/NCK W/O &W/DYE: CPT | Mod: 26

## 2022-07-08 PROCEDURE — 71260 CT THORAX DX C+: CPT | Mod: 26

## 2022-07-08 PROCEDURE — A9585: CPT

## 2022-07-08 PROCEDURE — 71260 CT THORAX DX C+: CPT

## 2022-07-08 PROCEDURE — 70543 MRI ORBT/FAC/NCK W/O &W/DYE: CPT

## 2022-07-11 ENCOUNTER — APPOINTMENT (OUTPATIENT)
Dept: RADIATION ONCOLOGY | Facility: CLINIC | Age: 62
End: 2022-07-11

## 2022-07-11 VITALS
WEIGHT: 213.4 LBS | DIASTOLIC BLOOD PRESSURE: 81 MMHG | OXYGEN SATURATION: 97 % | HEART RATE: 66 BPM | SYSTOLIC BLOOD PRESSURE: 122 MMHG | BODY MASS INDEX: 33.49 KG/M2 | HEIGHT: 67 IN | RESPIRATION RATE: 17 BRPM | TEMPERATURE: 97.52 F

## 2022-07-11 PROCEDURE — 99213 OFFICE O/P EST LOW 20 MIN: CPT

## 2022-07-11 RX ORDER — CEPHALEXIN 500 MG/1
500 TABLET ORAL EVERY 8 HOURS
Qty: 30 | Refills: 0 | Status: COMPLETED | COMMUNITY
Start: 2022-03-21 | End: 2022-07-11

## 2022-07-11 RX ORDER — CHLORHEXIDINE GLUCONATE, 0.12% ORAL RINSE 1.2 MG/ML
0.12 SOLUTION DENTAL
Qty: 473 | Refills: 0 | Status: ACTIVE | COMMUNITY
Start: 2022-07-05

## 2022-07-11 RX ORDER — CEPHALEXIN 500 MG/1
500 CAPSULE ORAL
Qty: 30 | Refills: 0 | Status: COMPLETED | COMMUNITY
Start: 2022-03-21

## 2022-07-11 NOTE — REVIEW OF SYSTEMS
[Dysphagia: Grade 0] : Dysphagia: Grade 0 [Fatigue: Grade 0] : Fatigue: Grade 0 [Mucositis Oral: Grade 0] : Mucositis Oral: Grade 0  [Xerostomia: Grade 1 - Symptomatic (e.g., dry or thick saliva) without significant dietary alteration; unstimulated saliva flow >0.2 ml/min] : Xerostomia: Grade 1 - Symptomatic (e.g., dry or thick saliva) without significant dietary alteration; unstimulated saliva flow >0.2 ml/min [Oral Pain: Grade 1 - Mild pain] : Oral Pain: Grade 1 - Mild pain [Dysgeusia: Grade 1- Altered taste but no change in diet] : Dysgeusia: Grade 1 - Altered taste but no change in diet [Skin Hyperpigmentation: Grade 1 - Hyperpigmentation covering <10% BSA; no psychosocial impact] : Skin Hyperpigmentation: Grade 1 - Hyperpigmentation covering <10% BSA; no psychosocial impact [Dermatitis Radiation: Grade 0] : Dermatitis Radiation: Grade 0

## 2022-07-12 NOTE — PHYSICAL EXAM
[Normal] : well developed, well nourished, in no acute distress [de-identified] : minimal trismus

## 2022-07-12 NOTE — HISTORY OF PRESENT ILLNESS
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 Completed RT to oral cavity 6/3/2021 receiving  total dose of 66 Gy. \par \par 7/8/2022 MRI Orbit, Face, and/or Neck:\par \par 7/8/2022 CT Chest: \par \par Presents today for follow up. \par \par

## 2022-07-12 NOTE — VITALS
[Maximal Pain Intensity: 6/10] : 6/10 [Least Pain Intensity: 0/10] : 0/10 [Pain Description/Quality: ___] : Pain description/quality: [unfilled] [Pain Duration: ___] : Pain duration: [unfilled] [Pain Location: ___] : Pain Location: [unfilled] [NoTreatment Scheduled] : no treatment scheduled [90: Able to carry normal activity; minor signs or symptoms of disease.] : 90: Able to carry normal activity; minor signs or symptoms of disease.  [ECOG Performance Status: 0 - Fully active, able to carry on all pre-disease performance without restriction] : Performance Status: 0 - Fully active, able to carry on all pre-disease performance without restriction [Pain Interferes with ADLs] : Pain does not interfere with activities of daily living

## 2022-08-31 ENCOUNTER — APPOINTMENT (OUTPATIENT)
Dept: DERMATOLOGY | Facility: CLINIC | Age: 62
End: 2022-08-31

## 2022-08-31 PROCEDURE — 96910 PHOTCHMTX TAR&UVB/PTRLTM&UVB: CPT

## 2022-08-31 PROCEDURE — 99214 OFFICE O/P EST MOD 30 MIN: CPT | Mod: 25

## 2022-09-05 ENCOUNTER — NON-APPOINTMENT (OUTPATIENT)
Age: 62
End: 2022-09-05

## 2022-09-06 ENCOUNTER — NON-APPOINTMENT (OUTPATIENT)
Age: 62
End: 2022-09-06

## 2022-09-06 ENCOUNTER — APPOINTMENT (OUTPATIENT)
Dept: ORTHOPEDIC SURGERY | Facility: CLINIC | Age: 62
End: 2022-09-06

## 2022-09-07 ENCOUNTER — APPOINTMENT (OUTPATIENT)
Dept: DERMATOLOGY | Facility: CLINIC | Age: 62
End: 2022-09-07

## 2022-09-09 ENCOUNTER — APPOINTMENT (OUTPATIENT)
Dept: DERMATOLOGY | Facility: CLINIC | Age: 62
End: 2022-09-09

## 2022-09-12 ENCOUNTER — APPOINTMENT (OUTPATIENT)
Dept: DERMATOLOGY | Facility: CLINIC | Age: 62
End: 2022-09-12

## 2022-09-12 PROCEDURE — 96910 PHOTCHMTX TAR&UVB/PTRLTM&UVB: CPT

## 2022-09-14 ENCOUNTER — APPOINTMENT (OUTPATIENT)
Dept: DERMATOLOGY | Facility: CLINIC | Age: 62
End: 2022-09-14

## 2022-09-14 PROCEDURE — 96910 PHOTCHMTX TAR&UVB/PTRLTM&UVB: CPT

## 2022-09-16 ENCOUNTER — APPOINTMENT (OUTPATIENT)
Dept: DERMATOLOGY | Facility: CLINIC | Age: 62
End: 2022-09-16

## 2022-09-16 PROCEDURE — 96910 PHOTCHMTX TAR&UVB/PTRLTM&UVB: CPT

## 2022-09-19 ENCOUNTER — APPOINTMENT (OUTPATIENT)
Dept: DERMATOLOGY | Facility: CLINIC | Age: 62
End: 2022-09-19

## 2022-09-19 PROCEDURE — 96910 PHOTCHMTX TAR&UVB/PTRLTM&UVB: CPT

## 2022-09-21 ENCOUNTER — APPOINTMENT (OUTPATIENT)
Dept: DERMATOLOGY | Facility: CLINIC | Age: 62
End: 2022-09-21

## 2022-09-21 PROCEDURE — 96910 PHOTCHMTX TAR&UVB/PTRLTM&UVB: CPT

## 2022-09-23 ENCOUNTER — APPOINTMENT (OUTPATIENT)
Dept: DERMATOLOGY | Facility: CLINIC | Age: 62
End: 2022-09-23

## 2022-09-23 ENCOUNTER — NON-APPOINTMENT (OUTPATIENT)
Age: 62
End: 2022-09-23

## 2022-09-23 LAB
ALBUMIN SERPL ELPH-MCNC: 4.3 G/DL
ALP BLD-CCNC: 92 U/L
ALT SERPL-CCNC: 21 U/L
ANION GAP SERPL CALC-SCNC: 13 MMOL/L
AST SERPL-CCNC: 22 U/L
BASOPHILS # BLD AUTO: 0.03 K/UL
BASOPHILS NFR BLD AUTO: 0.5 %
BILIRUB SERPL-MCNC: 0.5 MG/DL
BUN SERPL-MCNC: 20 MG/DL
CALCIUM SERPL-MCNC: 9.8 MG/DL
CHLORIDE SERPL-SCNC: 106 MMOL/L
CO2 SERPL-SCNC: 24 MMOL/L
CREAT SERPL-MCNC: 1.05 MG/DL
EGFR: 80 ML/MIN/1.73M2
EOSINOPHIL # BLD AUTO: 0.11 K/UL
EOSINOPHIL NFR BLD AUTO: 1.8 %
GLUCOSE SERPL-MCNC: 143 MG/DL
HBV CORE IGG+IGM SER QL: REACTIVE
HBV SURFACE AB SER QL: NONREACTIVE
HBV SURFACE AG SER QL: NONREACTIVE
HCT VFR BLD CALC: 45.4 %
HCV AB SER QL: NONREACTIVE
HCV S/CO RATIO: 0.09 S/CO
HGB BLD-MCNC: 14.8 G/DL
HIV1+2 AB SPEC QL IA.RAPID: NONREACTIVE
IMM GRANULOCYTES NFR BLD AUTO: 0.3 %
LYMPHOCYTES # BLD AUTO: 0.68 K/UL
LYMPHOCYTES NFR BLD AUTO: 11.4 %
M TB IFN-G BLD-IMP: NEGATIVE
MAN DIFF?: NORMAL
MCHC RBC-ENTMCNC: 27.3 PG
MCHC RBC-ENTMCNC: 32.6 GM/DL
MCV RBC AUTO: 83.6 FL
MONOCYTES # BLD AUTO: 0.61 K/UL
MONOCYTES NFR BLD AUTO: 10.3 %
NEUTROPHILS # BLD AUTO: 4.5 K/UL
NEUTROPHILS NFR BLD AUTO: 75.7 %
PLATELET # BLD AUTO: 244 K/UL
POTASSIUM SERPL-SCNC: 4.5 MMOL/L
PROT SERPL-MCNC: 6.6 G/DL
QUANTIFERON TB PLUS MITOGEN MINUS NIL: 8.3 IU/ML
QUANTIFERON TB PLUS NIL: 0.02 IU/ML
QUANTIFERON TB PLUS TB1 MINUS NIL: 0 IU/ML
QUANTIFERON TB PLUS TB2 MINUS NIL: 0 IU/ML
RBC # BLD: 5.43 M/UL
RBC # FLD: 14.9 %
SODIUM SERPL-SCNC: 144 MMOL/L
WBC # FLD AUTO: 5.95 K/UL

## 2022-09-23 PROCEDURE — 96910 PHOTCHMTX TAR&UVB/PTRLTM&UVB: CPT

## 2022-09-27 ENCOUNTER — APPOINTMENT (OUTPATIENT)
Dept: ORTHOPEDIC SURGERY | Facility: CLINIC | Age: 62
End: 2022-09-27

## 2022-09-27 VITALS
BODY MASS INDEX: 32.02 KG/M2 | WEIGHT: 204 LBS | HEIGHT: 67 IN | DIASTOLIC BLOOD PRESSURE: 72 MMHG | SYSTOLIC BLOOD PRESSURE: 116 MMHG

## 2022-09-27 PROCEDURE — 99213 OFFICE O/P EST LOW 20 MIN: CPT

## 2022-10-04 ENCOUNTER — APPOINTMENT (OUTPATIENT)
Dept: DERMATOLOGY | Facility: CLINIC | Age: 62
End: 2022-10-04

## 2022-10-04 DIAGNOSIS — L03.019 CELLULITIS OF UNSPECIFIED FINGER: ICD-10-CM

## 2022-10-04 PROCEDURE — 99214 OFFICE O/P EST MOD 30 MIN: CPT

## 2022-10-04 RX ORDER — KETOCONAZOLE 20 MG/G
2 CREAM TOPICAL
Qty: 1 | Refills: 3 | Status: ACTIVE | COMMUNITY
Start: 2022-10-04 | End: 1900-01-01

## 2022-10-04 RX ORDER — CALCIPOTRIENE 50 UG/G
0.01 OINTMENT TOPICAL
Qty: 1 | Refills: 3 | Status: ACTIVE | COMMUNITY
Start: 2022-10-04 | End: 1900-01-01

## 2022-10-27 ENCOUNTER — LABORATORY RESULT (OUTPATIENT)
Age: 62
End: 2022-10-27

## 2022-10-27 ENCOUNTER — OUTPATIENT (OUTPATIENT)
Dept: OUTPATIENT SERVICES | Facility: HOSPITAL | Age: 62
LOS: 1 days | End: 2022-10-27
Payer: MEDICAID

## 2022-10-27 ENCOUNTER — APPOINTMENT (OUTPATIENT)
Dept: INFECTIOUS DISEASE | Facility: CLINIC | Age: 62
End: 2022-10-27

## 2022-10-27 VITALS
HEIGHT: 67 IN | SYSTOLIC BLOOD PRESSURE: 122 MMHG | DIASTOLIC BLOOD PRESSURE: 82 MMHG | TEMPERATURE: 207.68 F | BODY MASS INDEX: 33.59 KG/M2 | HEART RATE: 89 BPM | WEIGHT: 214 LBS | OXYGEN SATURATION: 98 %

## 2022-10-27 DIAGNOSIS — B18.1 CHRONIC VIRAL HEPATITIS B WITHOUT DELTA-AGENT: ICD-10-CM

## 2022-10-27 DIAGNOSIS — L40.9 PSORIASIS, UNSPECIFIED: ICD-10-CM

## 2022-10-27 DIAGNOSIS — B97.89 OTHER VIRAL AGENTS AS THE CAUSE OF DISEASES CLASSIFIED ELSEWHERE: ICD-10-CM

## 2022-10-27 DIAGNOSIS — Z96.641 PRESENCE OF RIGHT ARTIFICIAL HIP JOINT: Chronic | ICD-10-CM

## 2022-10-27 DIAGNOSIS — Z95.1 PRESENCE OF AORTOCORONARY BYPASS GRAFT: Chronic | ICD-10-CM

## 2022-10-27 DIAGNOSIS — Z98.890 OTHER SPECIFIED POSTPROCEDURAL STATES: Chronic | ICD-10-CM

## 2022-10-27 DIAGNOSIS — Z23 ENCOUNTER FOR IMMUNIZATION: ICD-10-CM

## 2022-10-27 DIAGNOSIS — R22.0 LOCALIZED SWELLING, MASS AND LUMP, HEAD: Chronic | ICD-10-CM

## 2022-10-27 LAB
HBV CORE IGM SER-ACNC: SIGNIFICANT CHANGE UP
HBV DNA # SERPL NAA+PROBE: SIGNIFICANT CHANGE UP
HBV DNA SERPL NAA+PROBE-LOG#: SIGNIFICANT CHANGE UP LOGIU/ML
HBV E AB SER-ACNC: SIGNIFICANT CHANGE UP
HBV E AG SER-ACNC: SIGNIFICANT CHANGE UP

## 2022-10-27 PROCEDURE — 87350 HEPATITIS BE AG IA: CPT

## 2022-10-27 PROCEDURE — G0463: CPT | Mod: 25

## 2022-10-27 PROCEDURE — 99214 OFFICE O/P EST MOD 30 MIN: CPT

## 2022-10-27 PROCEDURE — 36415 COLL VENOUS BLD VENIPUNCTURE: CPT

## 2022-10-27 PROCEDURE — 87517 HEPATITIS B DNA QUANT: CPT

## 2022-10-27 PROCEDURE — 90739 HEPB VACC 2/4 DOSE ADULT IM: CPT

## 2022-10-27 PROCEDURE — 86707 HEPATITIS BE ANTIBODY: CPT

## 2022-10-27 PROCEDURE — 86705 HEP B CORE ANTIBODY IGM: CPT

## 2022-10-27 PROCEDURE — G0010: CPT

## 2022-10-27 NOTE — ASSESSMENT
[FreeTextEntry1] : 62 m with HTN, CAD s/p CABG, CRISTIAN on CPAP, psoriasis was on skyrizi but off since surgery, 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, s/p RT, last 6/3/21, admitted to [facility] 6/22/21 for ?osteomyelitis vs osteonecrosis  and phlegmon so was treated with a 6 week course of ertapenem and 2 weeks of PO doxy, augmentin after as MRI still showed myositis, symptoms resolved and no recurrence now sent by derm as pt was supposed to be started on biologics most likely skyziri for psoriasis and hep B core total came back positive\par LFTs normal but HBS Ag and Ab both negative\par pt was on skyziri before but no previous hepatitis testing that I found\par \par s/p resection of facial liposarcoma 1/14/21 s/p RT developed multiple phlegmons and fluid collections also mandibular dehiscence and irregularity, ?osteomyelitis and jackie's angina vs inflammatory changes and osteonecrosis  s/p treatment\par psoriasis was on skyziri before and off since the surgery now plan for restart but HBC total Ab was positive\par \par * unclear if there was previous hepatitis testing but LFTs normal and pt received this medication before\par * will check Hep B core IgM, E Ag and Ab with PCR\par * will likely start treatment if there is plan for starting biologic, will discuss with derm to see which medication will be started\par * HBS Ab was negative so will give heplisav anyway

## 2022-10-27 NOTE — REVIEW OF SYSTEMS
[Fever] : no fever [Chills] : no chills [Body Aches] : no body aches [Eye Pain] : no eye pain [Chest Pain] : no chest pain [Palpitations] : no palpitations [Shortness Of Breath] : no shortness of breath [Wheezing] : no wheezing [Cough] : no cough [Abdominal Pain] : no abdominal pain [Vomiting] : no vomiting [Dysuria] : no dysuria [Joint Pain] : no joint pain [Skin Lesions] : no skin lesions [Confused] : no confusion [Convulsions] : no convulsions [Suicidal] : not suicidal [Easy Bleeding] : no tendency for easy bleeding [Easy Bruising] : no tendency for easy bruising [Negative] : Heme/Lymph [FreeTextEntry4] : no jaw pain or swelling

## 2022-10-27 NOTE — HISTORY OF PRESENT ILLNESS
[FreeTextEntry1] : 62 m with HTN, CAD s/p CABG, CRISTIAN on CPAP, psoriasis was on skyrizi but off since surgery, 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, s/p RT, last 6/3/21, admitted to [facility] 6/22/21 for ?osteomyelitis vs osteonecrosis  and phlegmon so was treated with a 6 week course of ertapenem and 2 weeks of PO doxy, augmentin after as MRI still showed myositis, symptoms resolved and no recurrence now sent by derm as pt was supposed to be started on biologics most likely skyziri for psoriasis and hep B core total came back positive\par LFTs normal but HBS Ag and Ab both negative\par pt was on skyziri before but no previous hepatitis testing that I found

## 2022-10-27 NOTE — PHYSICAL EXAM
[General Appearance - Alert] : alert [General Appearance - In No Acute Distress] : in no acute distress [Sclera] : the sclera and conjunctiva were normal [Outer Ear] : the ears and nose were normal in appearance [FreeTextEntry1] : no mandibular edema or tenderness  [Neck Appearance] : the appearance of the neck was normal [Auscultation Breath Sounds / Voice Sounds] : lungs were clear to auscultation bilaterally [Heart Sounds] : normal S1 and S2 [Edema] : there was no peripheral edema [Bowel Sounds] : normal bowel sounds [Abdomen Soft] : soft [Abdomen Tenderness] : non-tender [Costovertebral Angle Tenderness] : no CVA tenderness [No Palpable Adenopathy] : no palpable adenopathy [Musculoskeletal - Swelling] : no joint swelling [] : no rash [No Focal Deficits] : no focal deficits [Affect] : the affect was normal

## 2022-11-04 ENCOUNTER — OFFICE (OUTPATIENT)
Dept: URBAN - METROPOLITAN AREA CLINIC 27 | Facility: CLINIC | Age: 62
Setting detail: OPHTHALMOLOGY
End: 2022-11-04
Payer: COMMERCIAL

## 2022-11-04 DIAGNOSIS — Z96.1: ICD-10-CM

## 2022-11-04 PROCEDURE — 99024 POSTOP FOLLOW-UP VISIT: CPT | Performed by: OPHTHALMOLOGY

## 2022-11-04 ASSESSMENT — REFRACTION_MANIFEST
OS_CYLINDER: +1.00
OD_SPHERE: PLANO
OS_VA1: 20/40
OS_AXIS: 015
OD_VA1: 20/20
OS_SPHERE: -4.00
OD_AXIS: 015
OD_CYLINDER: +0.75

## 2022-11-04 ASSESSMENT — REFRACTION_AUTOREFRACTION
OD_AXIS: 45
OD_SPHERE: -0.25
OD_CYLINDER: +0.25
OS_AXIS: 174
OS_SPHERE: -0.50
OS_CYLINDER: +0.50

## 2022-11-04 ASSESSMENT — KERATOMETRY
OD_K1POWER_DIOPTERS: 40.00
METHOD_AUTO_MANUAL: AUTO
OS_K1POWER_DIOPTERS: 40.50
OS_AXISANGLE_DEGREES: 165
OS_K2POWER_DIOPTERS: 41.00
OD_K2POWER_DIOPTERS: 40.25
OD_AXISANGLE_DEGREES: 13

## 2022-11-04 ASSESSMENT — REFRACTION_CURRENTRX
OD_SPHERE: -0.25
OS_CYLINDER: SPH
OD_OVR_VA: 20/
OD_SPHERE: +1.50
OD_ADD: +2.25
OS_CYLINDER: +0.75
OD_CYLINDER: +0.75
OS_SPHERE: -0.50
OS_OVR_VA: 20/
OS_OVR_VA: 20/
OD_OVR_VA: 20/
OD_AXIS: 017
OS_ADD: +2.25
OD_AXIS: 180
OS_SPHERE: PLANO
OD_CYLINDER: +0.50
OS_VPRISM_DIRECTION: PROGS
OS_AXIS: 172
OD_VPRISM_DIRECTION: PROGS

## 2022-11-04 ASSESSMENT — TONOMETRY
OD_IOP_MMHG: 18
OD_IOP_MMHG: 18
OS_IOP_MMHG: 19
OS_IOP_MMHG: 18

## 2022-11-04 ASSESSMENT — SPHEQUIV_DERIVED
OS_SPHEQUIV: -0.25
OD_SPHEQUIV: -0.125
OS_SPHEQUIV: -3.5

## 2022-11-04 ASSESSMENT — VISUAL ACUITY
OD_BCVA: 20/20
OS_BCVA: 20/20

## 2022-11-04 ASSESSMENT — AXIALLENGTH_DERIVED
OS_AL: 24.754
OD_AL: 24.9547
OS_AL: 26.2312

## 2022-11-10 ENCOUNTER — NON-APPOINTMENT (OUTPATIENT)
Age: 62
End: 2022-11-10

## 2022-11-21 RX ORDER — RISANKIZUMAB-RZAA 150 MG/ML
150 INJECTION SUBCUTANEOUS
Qty: 2 | Refills: 0 | Status: ACTIVE | COMMUNITY
Start: 2022-11-18

## 2022-11-22 ENCOUNTER — OUTPATIENT (OUTPATIENT)
Dept: OUTPATIENT SERVICES | Facility: HOSPITAL | Age: 62
LOS: 1 days | End: 2022-11-22

## 2022-11-22 ENCOUNTER — APPOINTMENT (OUTPATIENT)
Dept: INTERNAL MEDICINE | Facility: CLINIC | Age: 62
End: 2022-11-22

## 2022-11-22 DIAGNOSIS — Z96.641 PRESENCE OF RIGHT ARTIFICIAL HIP JOINT: Chronic | ICD-10-CM

## 2022-11-22 DIAGNOSIS — R22.0 LOCALIZED SWELLING, MASS AND LUMP, HEAD: Chronic | ICD-10-CM

## 2022-11-22 DIAGNOSIS — Z98.890 OTHER SPECIFIED POSTPROCEDURAL STATES: Chronic | ICD-10-CM

## 2022-11-22 DIAGNOSIS — Z95.1 PRESENCE OF AORTOCORONARY BYPASS GRAFT: Chronic | ICD-10-CM

## 2022-11-28 ENCOUNTER — OUTPATIENT (OUTPATIENT)
Dept: OUTPATIENT SERVICES | Facility: HOSPITAL | Age: 62
LOS: 1 days | End: 2022-11-28
Payer: MEDICAID

## 2022-11-28 ENCOUNTER — APPOINTMENT (OUTPATIENT)
Dept: INFECTIOUS DISEASE | Facility: CLINIC | Age: 62
End: 2022-11-28

## 2022-11-28 VITALS
TEMPERATURE: 208.58 F | OXYGEN SATURATION: 97 % | RESPIRATION RATE: 16 BRPM | HEART RATE: 92 BPM | WEIGHT: 212 LBS | HEIGHT: 67 IN | SYSTOLIC BLOOD PRESSURE: 119 MMHG | BODY MASS INDEX: 33.27 KG/M2 | DIASTOLIC BLOOD PRESSURE: 76 MMHG

## 2022-11-28 DIAGNOSIS — B97.89 OTHER VIRAL AGENTS AS THE CAUSE OF DISEASES CLASSIFIED ELSEWHERE: ICD-10-CM

## 2022-11-28 DIAGNOSIS — Z98.890 OTHER SPECIFIED POSTPROCEDURAL STATES: Chronic | ICD-10-CM

## 2022-11-28 DIAGNOSIS — Z96.641 PRESENCE OF RIGHT ARTIFICIAL HIP JOINT: Chronic | ICD-10-CM

## 2022-11-28 DIAGNOSIS — Z95.1 PRESENCE OF AORTOCORONARY BYPASS GRAFT: Chronic | ICD-10-CM

## 2022-11-28 DIAGNOSIS — R22.0 LOCALIZED SWELLING, MASS AND LUMP, HEAD: Chronic | ICD-10-CM

## 2022-11-28 PROCEDURE — G0010: CPT

## 2022-11-28 PROCEDURE — 99214 OFFICE O/P EST MOD 30 MIN: CPT

## 2022-11-28 PROCEDURE — G0463: CPT | Mod: 25

## 2022-11-28 PROCEDURE — 90739 HEPB VACC 2/4 DOSE ADULT IM: CPT

## 2022-11-28 NOTE — ASSESSMENT
[FreeTextEntry1] : 62 m with HTN, CAD s/p CABG, CRISTIAN on CPAP, psoriasis was on skyrizi but off since surgery, 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, s/p RT, last 6/3/21, admitted to [facility] 6/22/21 for ?osteomyelitis vs osteonecrosis  and phlegmon so was treated with a 6 week course of ertapenem and 2 weeks of PO doxy, augmentin after as MRI still showed myositis, symptoms resolved and no recurrence now sent by derm as pt was supposed to be started on biologics most likely skyziri for psoriasis and hep B core total came back positive\par LFTs normal but HBS Ag and Ab both negative\par pt was on skyziri before but no previous hepatitis testing that I found\par Heb Be Ag and Ab negative DNA negative but as pt will be started on immunosuppression vemlidy was started\par today pt is here for f/u doing well but stated that vemlidy is not covered and he has not started\par \par s/p resection of facial liposarcoma 1/14/21 s/p RT developed multiple phlegmons and fluid collections also mandibular dehiscence and irregularity, ?osteomyelitis and jackie's angina vs inflammatory changes and osteonecrosis  s/p treatment\par psoriasis was on skyziri before and off since the surgery now plan for restart but HBC total Ab was positive\par Heb Be Ag and Ab negative DNA negative but as pt will be started on immunosuppression vemlidy was started\par but not covered and pt has not s tarted\par \par * will try to get prior auth and if not possible will switch to tenofovir \par * HBS Ab was negative so s/p  heplisav 10/2022 and will give the second today

## 2022-11-28 NOTE — HISTORY OF PRESENT ILLNESS
[FreeTextEntry1] : 62 m with HTN, CAD s/p CABG, CRISTIAN on CPAP, psoriasis was on skyrizi but off since surgery, 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, s/p RT, last 6/3/21, admitted to [facility] 6/22/21 for ?osteomyelitis vs osteonecrosis  and phlegmon so was treated with a 6 week course of ertapenem and 2 weeks of PO doxy, augmentin after as MRI still showed myositis, symptoms resolved and no recurrence now sent by derm as pt was supposed to be started on biologics most likely skyziri for psoriasis and hep B core total came back positive\par LFTs normal but HBS Ag and Ab both negative\par pt was on skyziri before but no previous hepatitis testing that I found\par Heb Be Ag and Ab negative DNA negative but as pt will be started on immunosuppression vemlidy was started\par today pt is here for f/u doing well but stated that vemlidy is not covered and he has not started

## 2022-11-29 DIAGNOSIS — Z23 ENCOUNTER FOR IMMUNIZATION: ICD-10-CM

## 2022-11-29 DIAGNOSIS — B18.1 CHRONIC VIRAL HEPATITIS B WITHOUT DELTA-AGENT: ICD-10-CM

## 2022-11-29 DIAGNOSIS — L40.9 PSORIASIS, UNSPECIFIED: ICD-10-CM

## 2022-12-21 ENCOUNTER — NON-APPOINTMENT (OUTPATIENT)
Age: 62
End: 2022-12-21

## 2022-12-22 ENCOUNTER — APPOINTMENT (OUTPATIENT)
Dept: DERMATOLOGY | Facility: CLINIC | Age: 62
End: 2022-12-22
Payer: MEDICAID

## 2022-12-22 DIAGNOSIS — M67.449 GANGLION, UNSPECIFIED HAND: ICD-10-CM

## 2022-12-22 PROCEDURE — 96401 CHEMO ANTI-NEOPL SQ/IM: CPT

## 2022-12-22 PROCEDURE — 99214 OFFICE O/P EST MOD 30 MIN: CPT | Mod: 25

## 2022-12-22 PROCEDURE — 96372 THER/PROPH/DIAG INJ SC/IM: CPT

## 2022-12-23 ENCOUNTER — INPATIENT (INPATIENT)
Facility: HOSPITAL | Age: 62
LOS: 0 days | Discharge: HOME | DRG: 149 | End: 2022-12-24
Attending: INTERNAL MEDICINE | Admitting: INTERNAL MEDICINE
Payer: MEDICAID

## 2022-12-23 VITALS
SYSTOLIC BLOOD PRESSURE: 127 MMHG | OXYGEN SATURATION: 99 % | DIASTOLIC BLOOD PRESSURE: 80 MMHG | WEIGHT: 205.03 LBS | TEMPERATURE: 98 F | RESPIRATION RATE: 19 BRPM | HEART RATE: 92 BPM | HEIGHT: 68 IN

## 2022-12-23 DIAGNOSIS — R42 DIZZINESS AND GIDDINESS: ICD-10-CM

## 2022-12-23 DIAGNOSIS — Z96.641 PRESENCE OF RIGHT ARTIFICIAL HIP JOINT: Chronic | ICD-10-CM

## 2022-12-23 DIAGNOSIS — I25.10 ATHEROSCLEROTIC HEART DISEASE OF NATIVE CORONARY ARTERY WITHOUT ANGINA PECTORIS: ICD-10-CM

## 2022-12-23 DIAGNOSIS — Z98.890 OTHER SPECIFIED POSTPROCEDURAL STATES: Chronic | ICD-10-CM

## 2022-12-23 DIAGNOSIS — R22.0 LOCALIZED SWELLING, MASS AND LUMP, HEAD: Chronic | ICD-10-CM

## 2022-12-23 DIAGNOSIS — Z95.1 PRESENCE OF AORTOCORONARY BYPASS GRAFT: Chronic | ICD-10-CM

## 2022-12-23 LAB
ALBUMIN SERPL ELPH-MCNC: 4.2 G/DL — SIGNIFICANT CHANGE UP (ref 3.3–5)
ALP SERPL-CCNC: 117 U/L — SIGNIFICANT CHANGE UP (ref 40–120)
ALT FLD-CCNC: 28 U/L — SIGNIFICANT CHANGE UP (ref 10–45)
ANION GAP SERPL CALC-SCNC: 11 MMOL/L — SIGNIFICANT CHANGE UP (ref 5–17)
APTT BLD: 33.6 SEC — SIGNIFICANT CHANGE UP (ref 27.5–35.5)
AST SERPL-CCNC: 27 U/L — SIGNIFICANT CHANGE UP (ref 10–40)
BASOPHILS # BLD AUTO: 0.03 K/UL — SIGNIFICANT CHANGE UP (ref 0–0.2)
BASOPHILS NFR BLD AUTO: 0.6 % — SIGNIFICANT CHANGE UP (ref 0–2)
BILIRUB SERPL-MCNC: 0.6 MG/DL — SIGNIFICANT CHANGE UP (ref 0.2–1.2)
BUN SERPL-MCNC: 18 MG/DL — SIGNIFICANT CHANGE UP (ref 7–23)
CALCIUM SERPL-MCNC: 9.5 MG/DL — SIGNIFICANT CHANGE UP (ref 8.4–10.5)
CHLORIDE SERPL-SCNC: 102 MMOL/L — SIGNIFICANT CHANGE UP (ref 96–108)
CK MB BLD-MCNC: 3.3 % — SIGNIFICANT CHANGE UP (ref 0–3.5)
CK MB CFR SERPL CALC: 7.1 NG/ML — HIGH (ref 0–6.7)
CK MB CFR SERPL CALC: 7.4 NG/ML — HIGH (ref 0–6.7)
CK SERPL-CCNC: 215 U/L — HIGH (ref 30–200)
CO2 SERPL-SCNC: 24 MMOL/L — SIGNIFICANT CHANGE UP (ref 22–31)
CREAT SERPL-MCNC: 1.1 MG/DL — SIGNIFICANT CHANGE UP (ref 0.5–1.3)
EGFR: 75 ML/MIN/1.73M2 — SIGNIFICANT CHANGE UP
EOSINOPHIL # BLD AUTO: 0.19 K/UL — SIGNIFICANT CHANGE UP (ref 0–0.5)
EOSINOPHIL NFR BLD AUTO: 3.5 % — SIGNIFICANT CHANGE UP (ref 0–6)
GLUCOSE SERPL-MCNC: 111 MG/DL — HIGH (ref 70–99)
HCT VFR BLD CALC: 47.5 % — SIGNIFICANT CHANGE UP (ref 39–50)
HGB BLD-MCNC: 15.2 G/DL — SIGNIFICANT CHANGE UP (ref 13–17)
IMM GRANULOCYTES NFR BLD AUTO: 0.6 % — SIGNIFICANT CHANGE UP (ref 0–0.9)
INR BLD: 1.17 RATIO — HIGH (ref 0.88–1.16)
LYMPHOCYTES # BLD AUTO: 0.72 K/UL — LOW (ref 1–3.3)
LYMPHOCYTES # BLD AUTO: 13.4 % — SIGNIFICANT CHANGE UP (ref 13–44)
MCHC RBC-ENTMCNC: 27 PG — SIGNIFICANT CHANGE UP (ref 27–34)
MCHC RBC-ENTMCNC: 32 GM/DL — SIGNIFICANT CHANGE UP (ref 32–36)
MCV RBC AUTO: 84.2 FL — SIGNIFICANT CHANGE UP (ref 80–100)
MONOCYTES # BLD AUTO: 0.56 K/UL — SIGNIFICANT CHANGE UP (ref 0–0.9)
MONOCYTES NFR BLD AUTO: 10.4 % — SIGNIFICANT CHANGE UP (ref 2–14)
NEUTROPHILS # BLD AUTO: 3.85 K/UL — SIGNIFICANT CHANGE UP (ref 1.8–7.4)
NEUTROPHILS NFR BLD AUTO: 71.5 % — SIGNIFICANT CHANGE UP (ref 43–77)
NRBC # BLD: 0 /100 WBCS — SIGNIFICANT CHANGE UP (ref 0–0)
PLATELET # BLD AUTO: 235 K/UL — SIGNIFICANT CHANGE UP (ref 150–400)
POTASSIUM SERPL-MCNC: 4 MMOL/L — SIGNIFICANT CHANGE UP (ref 3.5–5.3)
POTASSIUM SERPL-SCNC: 4 MMOL/L — SIGNIFICANT CHANGE UP (ref 3.5–5.3)
PROT SERPL-MCNC: 7.1 G/DL — SIGNIFICANT CHANGE UP (ref 6–8.3)
PROTHROM AB SERPL-ACNC: 13.6 SEC — HIGH (ref 10.5–13.4)
RBC # BLD: 5.64 M/UL — SIGNIFICANT CHANGE UP (ref 4.2–5.8)
RBC # FLD: 14.8 % — HIGH (ref 10.3–14.5)
SARS-COV-2 RNA SPEC QL NAA+PROBE: SIGNIFICANT CHANGE UP
SODIUM SERPL-SCNC: 137 MMOL/L — SIGNIFICANT CHANGE UP (ref 135–145)
TROPONIN T, HIGH SENSITIVITY RESULT: 29 NG/L — SIGNIFICANT CHANGE UP (ref 0–51)
TROPONIN T, HIGH SENSITIVITY RESULT: 56 NG/L — HIGH (ref 0–51)
TROPONIN T, HIGH SENSITIVITY RESULT: 61 NG/L — HIGH (ref 0–51)
WBC # BLD: 5.38 K/UL — SIGNIFICANT CHANGE UP (ref 3.8–10.5)
WBC # FLD AUTO: 5.38 K/UL — SIGNIFICANT CHANGE UP (ref 3.8–10.5)

## 2022-12-23 PROCEDURE — 71045 X-RAY EXAM CHEST 1 VIEW: CPT | Mod: 26

## 2022-12-23 PROCEDURE — 99285 EMERGENCY DEPT VISIT HI MDM: CPT

## 2022-12-23 PROCEDURE — 93010 ELECTROCARDIOGRAM REPORT: CPT | Mod: 76

## 2022-12-23 PROCEDURE — 99223 1ST HOSP IP/OBS HIGH 75: CPT

## 2022-12-23 RX ORDER — ASPIRIN/CALCIUM CARB/MAGNESIUM 324 MG
81 TABLET ORAL DAILY
Refills: 0 | Status: DISCONTINUED | OUTPATIENT
Start: 2022-12-24 | End: 2022-12-24

## 2022-12-23 RX ORDER — ASPIRIN/CALCIUM CARB/MAGNESIUM 324 MG
243 TABLET ORAL ONCE
Refills: 0 | Status: COMPLETED | OUTPATIENT
Start: 2022-12-23 | End: 2022-12-23

## 2022-12-23 RX ORDER — LOSARTAN POTASSIUM 100 MG/1
25 TABLET, FILM COATED ORAL DAILY
Refills: 0 | Status: DISCONTINUED | OUTPATIENT
Start: 2022-12-23 | End: 2022-12-24

## 2022-12-23 RX ORDER — ATORVASTATIN CALCIUM 80 MG/1
80 TABLET, FILM COATED ORAL AT BEDTIME
Refills: 0 | Status: DISCONTINUED | OUTPATIENT
Start: 2022-12-23 | End: 2022-12-24

## 2022-12-23 RX ADMIN — ATORVASTATIN CALCIUM 80 MILLIGRAM(S): 80 TABLET, FILM COATED ORAL at 21:20

## 2022-12-23 RX ADMIN — Medication 243 MILLIGRAM(S): at 09:52

## 2022-12-23 NOTE — H&P ADULT - PROBLEM SELECTOR PLAN 2
- s/p CABG  - hsTrop = 61 --> 56 after 1 hour  - per stress lab, need to have downtrending enzymes over 4 hours -- send STAT cardiac enzymes at 2pm  - will order nuclear stress testing if downtrending enzymes and low concern for ACS persist  - f/u CXR  - monitor on telemetry and monitor hemodynamics closely  - continue home ASA, high intensity statin, ACE-I

## 2022-12-23 NOTE — ED ADULT NURSE NOTE - ALCOHOL PRE SCREEN (AUDIT - C)
Pt resting in bed, resp   labored with exacerbation, O2 @3liters, left neck dsg intact no c/o pain or discomfort noted   Statement Selected

## 2022-12-23 NOTE — ED PROVIDER NOTE - ATTENDING CONTRIBUTION TO CARE
Paco Hannah (DO)  Internal Medicine  [address] [place] [id number]  Phone: [phone]  Fax: [phone]  Follow Up Time: attending Gely: 62yM h/o HTN, HLD, CAD s/p CABG presents after episode of diaphoresis and dizziness yesterday. Lasted minutes, occurred after standing up after bending over to have his hair washed at the ponce. Symptoms similar to symptoms with prior CABG. Denies chest pain. Reports no additional episodes since. Last stress test approx 1 year ago. Exam as above. Concern for ACS. Will obtain ekg, place on tele, labs including trop, ASA, discuss with cardiology and admit for further cardiac workup

## 2022-12-23 NOTE — ED ADULT NURSE NOTE - NSICDXPASTMEDICALHX_GEN_ALL_CORE_FT
PAST MEDICAL HISTORY:  CAD (coronary artery disease)     Cataract left    Cervical radiculopathy     Cheek mass Liposarcoma - left - RT treatments - restriction to opening of mouth    HTN (hypertension)     Obesity     Other specified soft tissue disorders     Psoriasis     Sleep apnea uses CPAP

## 2022-12-23 NOTE — ED PROVIDER NOTE - PHYSICAL EXAMINATION
GENERAL: Awake, alert, NAD  HEENT: NC/AT, moist mucous membranes, PERRL, EOMI  LUNGS: CTAB, no wheezes or crackles   CARDIAC: RRR, no m/r/g  ABDOMEN: Soft, non tender, non distended, no rebound, no guarding  BACK: No midline spinal tenderness, no CVA tenderness  EXT: No edema, no calf tenderness, 2+ DP pulses bilaterally, no deformities.  NEURO: A&Ox3. Moving all extremities.  SKIN: Warm and dry. No rash.  PSYCH: Normal affect.

## 2022-12-23 NOTE — PROGRESS NOTE ADULT - SUBJECTIVE AND OBJECTIVE BOX
PATIENT SEEN AND EXAMINED ON :- 12/23/22  DATE OF SERVICE:     12/23/22        Interim events noted,Labs ,Radiological studies and Cardiology tests reviewed .     HOSPITAL COURSE: HPI:  62yoM w/ PMHx significant for CAD s/p CABG who presents after driving himself to the hospital with complaints, yesterday, of dizziness and diaphoresis when he was getting his [place] the [place] and went from a position where he was standing forward, bent over, to standing upright; his symptoms lasted about 15minutes when they occurred, and reminded him of the symptoms that preceded his requiring a CABG and thus he called his PMD who directed him to urgent care, who then sent him into the hospital today. Patient has been symptom free since yesterday, and currently continues to deny CP, palpitations, SOMMER/SOB, LE edema, orthopnea, fever/chills, vertigo, dizziness/lightheadedness, focal neurologic deficits, vision changes, seizure-like activity, abd pain, n/v/d/c, or have urinary complaints. He states his last stress test about a year ago was negative.     ED Presenting Vitals: 97.9F, 92bpm, 127/80, 19br/m, 99% on RA  ED Course: s/p ASA 243mg PO x1, placed on telemetry monitoring (23 Dec 2022 12:55)      INTERIM EVENTS:Patient seen at bedside ,interim events noted.      PMH -reviewed admission note, no change since admission  HEART FAILURE: Acute[ ]Chronic[ ] Systolic[ ] Diastolic[ ] Combined Systolic and Diastolic[ ]  CAD[ ] CABG[ ] PCI[ ]  DEVICES[ ] PPM[ ] ICD[ ] ILR[ ]  ATRIAL FIBRILLATION[ ] Paroxysmal[ ] Permanent[ ] CHADS2-[  ]  JUNE[ ] CKD1[ ] CKD2[ ] CKD3[ ] CKD4[ ] ESRD[ ]  COPD[ ] HTN[ ]   DM[ ] Type1[ ] Type 2[ ]   CVA[ ] Paresis[ ]    AMBULATION: Assisted[ ] Cane/walker[ ] Independent[ ]    MEDICATIONS  (STANDING):  atorvastatin 80 milliGRAM(s) Oral at bedtime  losartan 25 milliGRAM(s) Oral daily    MEDICATIONS  (PRN):            REVIEW OF SYSTEMS:  Constitutional: [ ] fever, [ ]weight loss,  [ ]fatigue [ ]weight gain  Eyes: [ ] visual changes  Respiratory: [ ]shortness of breath;  [ ] cough, [ ]wheezing, [ ]chills, [ ]hemoptysis  Cardiovascular: [ ] chest pain, [ ]palpitations, [ ]dizziness,  [ ]leg swelling[ ]orthopnea[ ]PND  Gastrointestinal: [ ] abdominal pain, [ ]nausea, [ ]vomiting,  [ ]diarrhea [ ]Constipation [ ]Melena  Genitourinary: [ ] dysuria, [ ] hematuria [ ][place]  Neurologic: [ ] headaches [ ] tremors[ ]weakness [ ]Paralysis Right[ ] Left[ ]  Skin: [ ] itching, [ ]burning, [ ] rashes  Endocrine: [ ] heat or cold intolerance  Musculoskeletal: [ ] joint pain or swelling; [ ] muscle, back, or extremity pain  Psychiatric: [ ] depression, [ ]anxiety, [ ]mood swings, or [ ]difficulty sleeping  Hematologic: [ ] easy bruising, [ ] bleeding gums    [ ] All remaining systems negative except as per above.   [ ]Unable to obtain.  [x] No change in ROS since admission      Vital Signs Last 24 Hrs  T(C): 36.3 (23 Dec 2022 13:24), Max: 36.6 (23 Dec 2022 09:01)  T(F): 97.4 (23 Dec 2022 13:24), Max: 97.9 (23 Dec 2022 09:01)  HR: 68 (23 Dec 2022 13:24) (68 - 92)  BP: 137/94 (23 Dec 2022 13:24) (117/71 - 137/94)  BP(mean): --  RR: 17 (23 Dec 2022 13:24) (17 - 19)  SpO2: 98% (23 Dec 2022 13:24) (97% - 100%)    Parameters below as of 23 Dec 2022 13:24  Patient On (Oxygen Delivery Method): room air      I&O's Summary      PHYSICAL EXAM:  General: No acute distress BMI-  HEENT: EOMI, PERRL  Neck: Supple, [ ] JVD  Lungs: Equal air entry bilaterally; [ ] rales [ ] wheezing [ ] rhonchi  Heart: Regular rate and rhythm; [x ] murmur   2/6 [ x] systolic [ ] diastolic [ ] radiation[ ] rubs [ ]  gallops  Abdomen: Nontender, bowel sounds present  Extremities: No clubbing, cyanosis, [ ] edema [ ]Pulses  equal and intact  Nervous system:  Alert & Oriented X3, no focal deficits  Psychiatric: Normal affect  Skin: No rashes or lesions    LABS:  12-23    137  |  102  |  18  ----------------------------<  111<H>  4.0   |  24  |  1.10    Ca    9.5      23 Dec 2022 09:59    TPro  7.1  /  Alb  4.2  /  TBili  0.6  /  DBili  x   /  AST  27  /  ALT  28  /  AlkPhos  117  12-23    Creatinine Trend: 1.10<--                        15.2   5.38  )-----------( 235      ( 23 Dec 2022 09:59 )             47.5     PT/INR - ( 23 Dec 2022 09:59 )   PT: 13.6 sec;   INR: 1.17 ratio         PTT - ( 23 Dec 2022 09:59 )  PTT:33.6 sec

## 2022-12-23 NOTE — ED PROVIDER NOTE - OBJECTIVE STATEMENT
62-year-old male past medical history of hypertension, hyperlipidemia and coronary artery disease status post CABG presenting with a chief complaint of dizziness.  Patient states that yesterday after getting his hair washed in a different position (standing forward with head bent over) [facility] minutes of dizziness and diaphoresis.  States that symptoms went away but was concerned so he called his PMD.  PMD sent him to urgent care and  from was referred to hospital to be admitted for further evaluation.  Patient states that he did not have any chest pain or shortness of breath during this episode and dizziness has not returned.  Patient denies nausea vomiting fevers, chills, chest pain, shortness of breath, headache, visual changes.  ROS positive for dizziness.

## 2022-12-23 NOTE — H&P ADULT - PROBLEM SELECTOR PLAN 1
- asymptomatic currently  - etiology unclear: could be related to underlying CAD given elevated trops x1 (though downtrending) as below vs. positional related to vertigo/viral infection, less likely cerebrovascular or d/t seizure  - continue close monitoring  - obtain orthostatic vitals  - r/o ACS/CAD w/ angina as below  - fall precautions  - f/u COVID 19 PCR

## 2022-12-23 NOTE — PROGRESS NOTE ADULT - TIME BILLING
- Review of records, telemetry, vital signs and daily labs.   - General and cardiovascular physical examination.  - Generation of cardiovascular treatment plan.  - Coordination of care.      Patient was seen and examined by me on 12/23/22,interim events noted,labs and radiology studies reviewed.  Bobo Saenz MD,FACC.  [address] [place], [place] [id number].  [phone]

## 2022-12-23 NOTE — H&P ADULT - NSHPPHYSICALEXAM_GEN_ALL_CORE
Vital Signs Last 24 Hrs  T(F): 97.3 (23 Dec 2022 11:55), Max: 97.9 (23 Dec 2022 09:01)  HR: 72 (23 Dec 2022 11:55) (72 - 92)  BP: 126/84 (23 Dec 2022 11:55) (117/71 - 127/80)  RR: 17 (23 Dec 2022 11:55) (17 - 19)  SpO2: 97% (23 Dec 2022 11:55) (97% - 100%)  Parameters below as of 23 Dec 2022 11:55  Patient On (Oxygen Delivery Method): room air Vital Signs Last 24 Hrs  T(F): 97.3 (23 Dec 2022 11:55), Max: 97.9 (23 Dec 2022 09:01)  HR: 72 (23 Dec 2022 11:55) (72 - 92)  BP: 126/84 (23 Dec 2022 11:55) (117/71 - 127/80)  RR: 17 (23 Dec 2022 11:55) (17 - 19)  SpO2: 97% (23 Dec 2022 11:55) (97% - 100%)  Parameters below as of 23 Dec 2022 11:55  Patient On (Oxygen Delivery Method): room air    GEN: middle aged man, laying in stretcher in NAD  PSYCH: A&Ox3, mood and affect appear appropriate   SKIN: intact, no e/o rash  NEURO: no focal neurologic deficits appreciated; CN II-XII intact, sensation intact B/L, motor 5/5 in all mm groups  EYES: PERRL, anicteric, EOMI, no vertical/horizontal nystagmus  HEAD: NC, AT  NECK: supple, no e/o elevated JVP  RESPI: no accessory muscle use, B/L air entry, CTAB   CARDIO: regular rate/rhythm, no LE edema B/L  ABD: soft, NT, ND  EXT: patient able to move all extremities spontaneously  VASC: peripheral pulses palpated

## 2022-12-23 NOTE — PATIENT PROFILE ADULT - FALL HARM RISK - UNIVERSAL INTERVENTIONS
Bed in lowest position, wheels locked, appropriate side rails in place/Call bell, personal items and telephone in reach/Instruct patient to call for assistance before getting out of bed or chair/Non-slip footwear when patient is out of bed/[place] to call system/Physically safe environment - no spills, clutter or unnecessary equipment/Purposeful Proactive Rounding/Room/bathroom lighting operational, light cord in reach

## 2022-12-23 NOTE — H&P ADULT - NSHPLABSRESULTS_GEN_ALL_CORE
Labs and imaging obtained personally reviewed.                         15.2   5.38  )-----------( 235      ( 23 Dec 2022 09:59 )             47.5   12-23  137  |  102  |  18  ----------------------------<  111<H>  4.0   |  24  |  1.10    Ca    9.5      23 Dec 2022 09:59    TPro  7.1  /  Alb  4.2  /  TBili  0.6  /  DBili  x   /  AST  27  /  ALT  28  /  AlkPhos  117  12-23    PT/INR - ( 23 Dec 2022 09:59 )   PT: 13.6 sec;   INR: 1.17 ratio    PTT - ( 23 Dec 2022 09:59 )  PTT:33.6 sec    hsTrop = 61 --> 56    COVID 19 PCR not yet resulted
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
## 2022-12-23 NOTE — ED ADULT NURSE NOTE - NSIMPLEMENTINTERV_GEN_ALL_ED
Implemented All Universal Safety Interventions:  [place] to call system. Call bell, personal items and telephone within reach. Instruct patient to call for assistance. Room bathroom lighting operational. Non-slip footwear when patient is off stretcher. Physically safe environment: no spills, clutter or unnecessary equipment. Stretcher in lowest position, wheels locked, appropriate side rails in place.

## 2022-12-23 NOTE — H&P ADULT - ASSESSMENT
62yoM w/ PMHx significant for CAD s/p CABG who presents after driving himself to the hospital with complaints, yesterday, of dizziness and diaphoresis when he was getting his [place] the [place] and went from a position where he was standing forward, bent over, to standing upright; his symptoms lasted about 15minutes when they occurred, and reminded him of the symptoms that preceded his requiring a CABG and thus he called his PMD who directed him to urgent care, who then sent him into the hospital today.

## 2022-12-23 NOTE — ED ADULT NURSE NOTE - OBJECTIVE STATEMENT
Pt presented to ed with c/o dizziness that started yesterday. pt was seen at local urgent care and PMD yesterday and was told to come to ED for further evaluation. pt denies nausea, vomiting, fever, chills, sob, chest pain, headache. Will continue to monitor.

## 2022-12-23 NOTE — H&P ADULT - HISTORY OF PRESENT ILLNESS
62yoM w/ PMHx significant for CAD s/p CABG who presents after driving himself to the hospital with complaints, yesterday, of dizziness and diaphoresis when he was getting his [place] and went from a position where he was standing forward, bent over, to standing upright; his symptoms lasted about 15minutes when they occurred, and reminded him of the symptoms that preceded his requiring a CABG and thus he called his PMD who directed him to urgent care, who then sent him into the hospital today. Patient has been symptom free since yesterday, and currently continues to deny CP, palpitations, SOMMER/SOB, LE edema, orthopnea, fever/chills, vertigo, dizziness/lightheadedness, focal neurologic deficits, vision changes, seizure-like activity, abd pain, n/v/d/c, or have urinary complaints. He states his last stress test about a year ago was negative.     ED Presenting Vitals: 97.9F, 92bpm, 127/80, 19br/m, 99% on RA  ED Course: s/p ASA 243mg PO x1, placed on telemetry monitoring

## 2022-12-23 NOTE — ED ADULT TRIAGE NOTE - CHIEF COMPLAINT QUOTE
dizzy since yesterday, denies chest pain/SOB, seen by urgent care/PMD yesterday, hx OHS in 2019 by dr menezes, +mild relief in dizziness, +steady gait

## 2022-12-23 NOTE — H&P ADULT - NEGATIVE NEUROLOGICAL SYMPTOMS
no transient paralysis/no paresthesias/no focal seizures/no syncope/no vertigo/no loss of consciousness/no hemiparesis/no confusion

## 2022-12-23 NOTE — ED PROVIDER NOTE - CLINICAL SUMMARY MEDICAL DECISION MAKING FREE TEXT BOX
62-year-old male past medical history of hypertension, hyperlipidemia and coronary artery disease status post CABG presenting with a chief complaint of dizziness. Patient denies -ausea vomiting fevers, chills, chest pain, shortness of breath, headache, visual changes. Vitals - WNL. Physical exam - unremarkable.     Ddx: not limited to ACS vs PNA vs pre-syncope vs cardiac arrythmia vs electrolyte derangement    Plan to get basic labs and cardiac labs, chest xray and ekg.   Dispo - pending labs and imaging. Patient will likely be admitted for further cardiac evaluation.

## 2022-12-23 NOTE — ED PROVIDER NOTE - PROGRESS NOTE DETAILS
Ana Leone, PGY-1 DO:  Spoke with STEVE Collins Patient will be admitted to service. Patient also has troponin of 61. Patient to be seen by cardiology currently holding Plavix.

## 2022-12-24 ENCOUNTER — TRANSCRIPTION ENCOUNTER (OUTPATIENT)
Age: 62
End: 2022-12-24

## 2022-12-24 VITALS
RESPIRATION RATE: 18 BRPM | DIASTOLIC BLOOD PRESSURE: 88 MMHG | SYSTOLIC BLOOD PRESSURE: 132 MMHG | TEMPERATURE: 98 F | HEART RATE: 72 BPM | OXYGEN SATURATION: 98 %

## 2022-12-24 PROCEDURE — 80053 COMPREHEN METABOLIC PANEL: CPT

## 2022-12-24 PROCEDURE — A9500: CPT

## 2022-12-24 PROCEDURE — 85730 THROMBOPLASTIN TIME PARTIAL: CPT

## 2022-12-24 PROCEDURE — 93018 CV STRESS TEST I&R ONLY: CPT

## 2022-12-24 PROCEDURE — 82553 CREATINE MB FRACTION: CPT

## 2022-12-24 PROCEDURE — 93017 CV STRESS TEST TRACING ONLY: CPT

## 2022-12-24 PROCEDURE — 85610 PROTHROMBIN TIME: CPT

## 2022-12-24 PROCEDURE — 78452 HT MUSCLE IMAGE SPECT MULT: CPT

## 2022-12-24 PROCEDURE — 71045 X-RAY EXAM CHEST 1 VIEW: CPT

## 2022-12-24 PROCEDURE — U0005: CPT

## 2022-12-24 PROCEDURE — 84484 ASSAY OF TROPONIN QUANT: CPT

## 2022-12-24 PROCEDURE — 93005 ELECTROCARDIOGRAM TRACING: CPT

## 2022-12-24 PROCEDURE — 85025 COMPLETE CBC W/AUTO DIFF WBC: CPT

## 2022-12-24 PROCEDURE — 93016 CV STRESS TEST SUPVJ ONLY: CPT

## 2022-12-24 PROCEDURE — G0378: CPT

## 2022-12-24 PROCEDURE — U0003: CPT

## 2022-12-24 PROCEDURE — 82550 ASSAY OF CK (CPK): CPT

## 2022-12-24 PROCEDURE — 99285 EMERGENCY DEPT VISIT HI MDM: CPT | Mod: 25

## 2022-12-24 PROCEDURE — 78452 HT MUSCLE IMAGE SPECT MULT: CPT | Mod: 26

## 2022-12-24 PROCEDURE — 36415 COLL VENOUS BLD VENIPUNCTURE: CPT

## 2022-12-24 RX ORDER — IRBESARTAN 75 MG/1
1 TABLET ORAL
Qty: 30 | Refills: 0
Start: 2022-12-24 | End: 2023-01-22

## 2022-12-24 RX ORDER — IRBESARTAN 75 MG/1
1 TABLET ORAL
Qty: 0 | Refills: 0 | DISCHARGE

## 2022-12-24 RX ADMIN — LOSARTAN POTASSIUM 25 MILLIGRAM(S): 100 TABLET, FILM COATED ORAL at 06:09

## 2022-12-24 RX ADMIN — Medication 81 MILLIGRAM(S): at 12:26

## 2022-12-24 NOTE — DISCHARGE NOTE PROVIDER - NSCORESITESY/N_GEN_A_CORE_RD
Called [facility], spoke to [place] re: follow up on appointment for patient. She states that she does not have a referral for this patient. She states that referral will need  to be faxed to their office, per their policy.  Patient information faxed to the number [place] provided [phone]
No

## 2022-12-24 NOTE — DISCHARGE NOTE NURSING/CASE MANAGEMENT/SOCIAL WORK - NSDCPEFALRISK_GEN_ALL_CORE
For information on Fall & Injury Prevention, visit: [email or web address] OR  [email or web address] OR  https://www.cdc.gov/steadi/patient.html

## 2022-12-24 NOTE — DISCHARGE NOTE PROVIDER - NSDCCPCAREPLAN_GEN_ALL_CORE_FT
PRINCIPAL DISCHARGE DIAGNOSIS  Diagnosis: Dizziness  Assessment and Plan of Treatment: Continue present medication regimen.   Follow up with PMD in 1 week.   s/p Stress Test.  Follow up with Dr. Sathish Castro.

## 2022-12-24 NOTE — DISCHARGE NOTE PROVIDER - HOSPITAL COURSE
To be done.   62yoM w/ PMHx significant for CAD s/p CABG who presents after driving himself to the hospital with complaints, yesterday, of dizziness and diaphoresis when he was [place] and went from a position where he was standing forward, bent over, to standing upright; his symptoms lasted about 15minutes when they occurred, and reminded him of the symptoms that preceded his requiring a CABG and thus he called his PMD who directed him to urgent care, who then sent him into the hospital today.     Problem/Plan - 1:  ·  Problem: Dizziness.   ·  Plan: - asymptomatic currently  - etiology unclear: could be related to underlying CAD given elevated trops x1 (though downtrending) as below vs. positional related to vertigo/viral infection, less likely cerebrovascular or d/t seizure  - continue close monitoring  - obtain orthostatic vitals  - r/o ACS/CAD w/ angina as below  - fall precautions  - f/u COVID 19 PCR.     Problem/Plan - 2:  ·  Problem: CAD (coronary artery disease).   ·  Plan: - s/p CABG  - hsTrop = 61 --> 56 after 1 hour  - per stress lab, need to have downtrending enzymes over 4 hours -- send STAT cardiac enzymes at 2pm  - s/p nuclear stress test. Fixed findings.   - monitor on telemetry and monitor hemodynamics closely  - continue home ASA, high intensity statin, ACE-I.  Pt is hemodynamically stable for discharge to home today.   He will follow up with Felisha Schumacher in 1 week. PATIENT SEEN AND EXAMINED ON :-12/24/2022  DATE OF SERVICE:     12/24/2022        Interim events noted,Labs ,Radiological studies and Cardiology tests reviewed ..     62yoM w/ PMHx significant for CAD s/p CABG who presents after driving himself to the hospital with complaints, yesterday, of dizziness and diaphoresis when he was getting his [place] and went from a position where he was standing forward, bent over, to standing upright; his symptoms lasted about 15minutes when they occurred, and reminded him of the symptoms that preceded his requiring a CABG and thus he called his PMD who directed him to urgent care, who then sent him into the hospital today.     Problem/Plan - 1:  ·  Problem: Dizziness.   ·  Plan: - asymptomatic currently  - etiology unclear: could be related to underlying CAD given elevated trops x1 (though downtrending) as below vs. positional related to vertigo/viral infection, less likely cerebrovascular or d/t seizure  - continue close monitoring  - obtain orthostatic vitals  - r/o ACS/CAD w/ angina as below  - fall precautions  - f/u COVID 19 PCR.     Problem/Plan - 2:  ·  Problem: CAD (coronary artery disease).   ·  Plan: - s/p CABG  - hsTrop = 61 --> 56 after 1 hour  - per stress lab, need to have downtrending enzymes over 4 hours -- send STAT cardiac enzymes at 2pm  - s/p nuclear stress test. Fixed findings.   - monitor on telemetry and monitor hemodynamics closely  - continue home ASA, high intensity statin, ACE-I.  Pt is hemodynamically stable for discharge to home today.   He will follow up with Felisha Schumacher in 1 week.

## 2022-12-24 NOTE — DISCHARGE NOTE NURSING/CASE MANAGEMENT/SOCIAL WORK - PATIENT PORTAL LINK FT
You can access the FollowMyHealth Patient Portal offered by [facility] by registering at the following website: [email or web address]. By joining [facility]’s FollowMyHealth portal, you will also be able to view your health information using other applications (apps) compatible with our system.

## 2022-12-24 NOTE — DISCHARGE NOTE PROVIDER - CARE PROVIDER_API CALL
Michelle Bower)  Cardiology; Internal Medicine; Interventional Cardiology  [facility]- Dept of Cardiology, [address] [place] [address]  Phone: [phone]  Fax: [phone]  Established Patient  Follow Up Time: 1 week

## 2022-12-24 NOTE — DISCHARGE NOTE NURSING/CASE MANAGEMENT/SOCIAL WORK - NSDCVIVACCINE_GEN_ALL_CORE_FT
influenza, injectable, quadrivalent, preservative free; 15-Sep-2018 18:09; Elizabeth Lim (JANENE); [employer]; [id number] (Exp. Date: 30-Jun-2019); IntraMuscular; Deltoid Left.; 0.5 milliLiter(s); VIS (VIS Published: 07-Aug-2015, VIS Presented: 15-Sep-2018);

## 2022-12-24 NOTE — DISCHARGE NOTE PROVIDER - ATTENDING DISCHARGE PHYSICAL EXAMINATION:
- Review of records, telemetry, vital signs and daily labs.   - General and cardiovascular physical examination.  - Generation of cardiovascular treatment plan.  - Coordination of care.      Patient was seen and examined by me on 12/24/2022,interim events noted,labs and radiology studies reviewed.  Bobo Saenz MD,FACC.  [address] [place], [place] [id number].  [phone]

## 2022-12-24 NOTE — DISCHARGE NOTE PROVIDER - NSDCFUSCHEDAPPT_GEN_ALL_CORE_FT
Buck Irene  [facility]  RADMED [address] [facility]  Scheduled Appointment: 01/12/2023    Giacomo Salmon  [facility]  ORTHOSURG [address] [facility]  Scheduled Appointment: 01/17/2023

## 2022-12-24 NOTE — DISCHARGE NOTE PROVIDER - NSDCMRMEDTOKEN_GEN_ALL_CORE_FT
aspirin 81 mg oral delayed release tablet: 1 tab(s) orally once a day  atorvastatin 80 mg oral tablet: 1 tab(s) orally once a day  ezetimibe 10 mg oral tablet: 1 tab(s) orally once a day  irbesartan 75 mg oral tablet: 1 tab(s) orally once a day  tenofovir disoproxil fumarate 300 mg oral tablet: 1 tab(s) orally once a day   aspirin 81 mg oral delayed release tablet: 1 tab(s) orally once a day  atorvastatin 80 mg oral tablet: 1 tab(s) orally once a day  ezetimibe 10 mg oral tablet: 1 tab(s) orally once a day  irbesartan 75 mg oral tablet: 1 tab(s) orally once a day MDD:1  tenofovir disoproxil fumarate 300 mg oral tablet: 1 tab(s) orally once a day

## 2023-01-06 ENCOUNTER — APPOINTMENT (OUTPATIENT)
Dept: MRI IMAGING | Facility: CLINIC | Age: 63
End: 2023-01-06
Payer: MEDICAID

## 2023-01-06 ENCOUNTER — OUTPATIENT (OUTPATIENT)
Dept: OUTPATIENT SERVICES | Facility: HOSPITAL | Age: 63
LOS: 1 days | End: 2023-01-06
Payer: MEDICAID

## 2023-01-06 ENCOUNTER — APPOINTMENT (OUTPATIENT)
Dept: CT IMAGING | Facility: CLINIC | Age: 63
End: 2023-01-06
Payer: MEDICAID

## 2023-01-06 DIAGNOSIS — Z00.8 ENCOUNTER FOR OTHER GENERAL EXAMINATION: ICD-10-CM

## 2023-01-06 DIAGNOSIS — R22.0 LOCALIZED SWELLING, MASS AND LUMP, HEAD: Chronic | ICD-10-CM

## 2023-01-06 DIAGNOSIS — Z98.890 OTHER SPECIFIED POSTPROCEDURAL STATES: Chronic | ICD-10-CM

## 2023-01-06 DIAGNOSIS — M27.2 INFLAMMATORY CONDITIONS OF JAWS: ICD-10-CM

## 2023-01-06 DIAGNOSIS — Z95.1 PRESENCE OF AORTOCORONARY BYPASS GRAFT: Chronic | ICD-10-CM

## 2023-01-06 DIAGNOSIS — Z96.641 PRESENCE OF RIGHT ARTIFICIAL HIP JOINT: Chronic | ICD-10-CM

## 2023-01-06 PROCEDURE — 71250 CT THORAX DX C-: CPT

## 2023-01-06 PROCEDURE — 70542 MRI ORBIT/FACE/NECK W/DYE: CPT | Mod: 26

## 2023-01-06 PROCEDURE — 70542 MRI ORBIT/FACE/NECK W/DYE: CPT

## 2023-01-06 PROCEDURE — A9585: CPT

## 2023-01-06 PROCEDURE — 71250 CT THORAX DX C-: CPT | Mod: 26

## 2023-01-12 ENCOUNTER — APPOINTMENT (OUTPATIENT)
Dept: RADIATION ONCOLOGY | Facility: CLINIC | Age: 63
End: 2023-01-12
Payer: MEDICAID

## 2023-01-12 VITALS
SYSTOLIC BLOOD PRESSURE: 116 MMHG | WEIGHT: 213.84 LBS | RESPIRATION RATE: 17 BRPM | DIASTOLIC BLOOD PRESSURE: 74 MMHG | BODY MASS INDEX: 33.56 KG/M2 | OXYGEN SATURATION: 98 % | TEMPERATURE: 97.7 F | HEART RATE: 91 BPM | HEIGHT: 67 IN

## 2023-01-12 PROCEDURE — 99213 OFFICE O/P EST LOW 20 MIN: CPT

## 2023-01-12 RX ORDER — TENOFOVIR ALAFENAMIDE 25 MG/1
25 TABLET ORAL
Qty: 30 | Refills: 6 | Status: DISCONTINUED | COMMUNITY
Start: 2022-11-04 | End: 2023-01-12

## 2023-01-12 NOTE — REVIEW OF SYSTEMS
[Dysphagia: Grade 0] : Dysphagia: Grade 0 [Fatigue: Grade 0] : Fatigue: Grade 0 [Mucositis Oral: Grade 0] : Mucositis Oral: Grade 0  [Xerostomia: Grade 1 - Symptomatic (e.g., dry or thick saliva) without significant dietary alteration; unstimulated saliva flow >0.2 ml/min] : Xerostomia: Grade 1 - Symptomatic (e.g., dry or thick saliva) without significant dietary alteration; unstimulated saliva flow >0.2 ml/min [Oral Pain: Grade 0] : Oral Pain: Grade 0 [Dysgeusia: Grade 1- Altered taste but no change in diet] : Dysgeusia: Grade 1 - Altered taste but no change in diet [Hoarseness: Grade 0] : Hoarseness: Grade 0 [Skin Hyperpigmentation: Grade 0] : Skin Hyperpigmentation: Grade 0 [Dermatitis Radiation: Grade 0] : Dermatitis Radiation: Grade 0

## 2023-01-12 NOTE — HISTORY OF PRESENT ILLNESS
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 Completed RT to oral cavity 6/3/2021 receiving  total dose of 66 Gy. \par \par 1/6/2023 MRI Orbit, Face, and/or Neck: IMPRESSION:\par 1. 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 Posttreatment changes within the left lower face which appears less pronounced when compared to the prior study. No evidence of residual or recurrent soft tissue tumor.\par 3. No cervical lymphadenopathy.\par \par Presents today for follow up. Doing well. Denies pain, states has occasional brief tingling to left lower lip. \par \par

## 2023-01-17 ENCOUNTER — NON-APPOINTMENT (OUTPATIENT)
Age: 63
End: 2023-01-17

## 2023-01-17 ENCOUNTER — APPOINTMENT (OUTPATIENT)
Dept: ORTHOPEDIC SURGERY | Facility: CLINIC | Age: 63
End: 2023-01-17
Payer: MEDICAID

## 2023-01-17 VITALS — SYSTOLIC BLOOD PRESSURE: 121 MMHG | HEART RATE: 85 BPM | DIASTOLIC BLOOD PRESSURE: 77 MMHG

## 2023-01-17 DIAGNOSIS — S86.011S STRAIN OF RIGHT ACHILLES TENDON, SEQUELA: ICD-10-CM

## 2023-01-17 DIAGNOSIS — Z98.890 OTHER SPECIFIED POSTPROCEDURAL STATES: ICD-10-CM

## 2023-01-17 PROCEDURE — 99213 OFFICE O/P EST LOW 20 MIN: CPT

## 2023-01-18 ENCOUNTER — APPOINTMENT (OUTPATIENT)
Dept: CARDIOLOGY | Facility: CLINIC | Age: 63
End: 2023-01-18
Payer: MEDICAID

## 2023-01-18 VITALS — DIASTOLIC BLOOD PRESSURE: 75 MMHG | HEART RATE: 82 BPM | SYSTOLIC BLOOD PRESSURE: 110 MMHG | OXYGEN SATURATION: 97 %

## 2023-01-18 PROCEDURE — 99215 OFFICE O/P EST HI 40 MIN: CPT | Mod: 25

## 2023-01-18 PROCEDURE — 93000 ELECTROCARDIOGRAM COMPLETE: CPT

## 2023-01-19 ENCOUNTER — OUTPATIENT (OUTPATIENT)
Dept: OUTPATIENT SERVICES | Facility: HOSPITAL | Age: 63
LOS: 1 days | End: 2023-01-19
Payer: MEDICAID

## 2023-01-19 DIAGNOSIS — Z96.641 PRESENCE OF RIGHT ARTIFICIAL HIP JOINT: Chronic | ICD-10-CM

## 2023-01-19 DIAGNOSIS — Z95.1 PRESENCE OF AORTOCORONARY BYPASS GRAFT: Chronic | ICD-10-CM

## 2023-01-19 DIAGNOSIS — Z11.52 ENCOUNTER FOR SCREENING FOR COVID-19: ICD-10-CM

## 2023-01-19 DIAGNOSIS — Z98.890 OTHER SPECIFIED POSTPROCEDURAL STATES: Chronic | ICD-10-CM

## 2023-01-19 DIAGNOSIS — R22.0 LOCALIZED SWELLING, MASS AND LUMP, HEAD: Chronic | ICD-10-CM

## 2023-01-19 LAB — SARS-COV-2 RNA SPEC QL NAA+PROBE: SIGNIFICANT CHANGE UP

## 2023-01-19 PROCEDURE — U0005: CPT

## 2023-01-19 PROCEDURE — U0003: CPT

## 2023-01-19 PROCEDURE — C9803: CPT

## 2023-01-22 NOTE — HISTORY OF PRESENT ILLNESS
[FreeTextEntry1] : Mr. Kennedy is a 62 year-old man with known MV CAD s/p CABG with Dr. Turpin in 2/2019. He has been doing well without issues. He has not been able to lose weight however has been walking over 2 miles a day. He recently underwent a lipoma removal from his face/cheek and radiation therapy. Needs achilles surgery for tear.\par \par Follow-up 1/18/23 - Reports intermittent dizziness since last month. Presented to [facility] and underwent nuclear stress test which was abnormal. Continues to report this symptom. Notes compliance with cardiac medications.\par \par Active Issues:\par 1. Actively treated CAD\par 2. Actively treated cough\par 3. Actively treated HLD\par 4. Actively treated HTN

## 2023-01-22 NOTE — DISCUSSION/SUMMARY
[FreeTextEntry1] : 1. Given ongoing symptoms and abnormal stress test, recommended to proceed with coronary/bypass angiography +/- PCI. Patient agrees to proceed. Will schedule this for tomorrow or early next week.\par 2. C/w current cardiac medications.\par 3. Old records requested and reviewed with performing physician. \par 4. Primary and secondary prevention of cardiovascular and related conditions discussed at length, including but not limited to diet and lifestyle modification.\par 5. Patient to follow up 2-4 weeks after cardiac cath procedure. [EKG obtained to assist in diagnosis and management of assessed problem(s)] : EKG obtained to assist in diagnosis and management of assessed problem(s)

## 2023-01-23 ENCOUNTER — OUTPATIENT (OUTPATIENT)
Dept: OUTPATIENT SERVICES | Facility: HOSPITAL | Age: 63
LOS: 1 days | End: 2023-01-23
Payer: MEDICAID

## 2023-01-23 ENCOUNTER — TRANSCRIPTION ENCOUNTER (OUTPATIENT)
Age: 63
End: 2023-01-23

## 2023-01-23 VITALS
RESPIRATION RATE: 14 BRPM | SYSTOLIC BLOOD PRESSURE: 117 MMHG | WEIGHT: 212.97 LBS | HEIGHT: 67 IN | DIASTOLIC BLOOD PRESSURE: 70 MMHG | HEART RATE: 96 BPM | TEMPERATURE: 99 F | OXYGEN SATURATION: 96 %

## 2023-01-23 VITALS
DIASTOLIC BLOOD PRESSURE: 83 MMHG | OXYGEN SATURATION: 97 % | HEART RATE: 76 BPM | RESPIRATION RATE: 14 BRPM | SYSTOLIC BLOOD PRESSURE: 132 MMHG

## 2023-01-23 DIAGNOSIS — Z95.1 PRESENCE OF AORTOCORONARY BYPASS GRAFT: Chronic | ICD-10-CM

## 2023-01-23 DIAGNOSIS — Z96.641 PRESENCE OF RIGHT ARTIFICIAL HIP JOINT: Chronic | ICD-10-CM

## 2023-01-23 DIAGNOSIS — R94.39 ABNORMAL RESULT OF OTHER CARDIOVASCULAR FUNCTION STUDY: ICD-10-CM

## 2023-01-23 DIAGNOSIS — Z98.890 OTHER SPECIFIED POSTPROCEDURAL STATES: Chronic | ICD-10-CM

## 2023-01-23 DIAGNOSIS — R22.0 LOCALIZED SWELLING, MASS AND LUMP, HEAD: Chronic | ICD-10-CM

## 2023-01-23 LAB
ANION GAP SERPL CALC-SCNC: 11 MMOL/L — SIGNIFICANT CHANGE UP (ref 5–17)
BUN SERPL-MCNC: 18 MG/DL — SIGNIFICANT CHANGE UP (ref 7–23)
CALCIUM SERPL-MCNC: 9.3 MG/DL — SIGNIFICANT CHANGE UP (ref 8.4–10.5)
CHLORIDE SERPL-SCNC: 104 MMOL/L — SIGNIFICANT CHANGE UP (ref 96–108)
CO2 SERPL-SCNC: 25 MMOL/L — SIGNIFICANT CHANGE UP (ref 22–31)
CREAT SERPL-MCNC: 1.02 MG/DL — SIGNIFICANT CHANGE UP (ref 0.5–1.3)
EGFR: 83 ML/MIN/1.73M2 — SIGNIFICANT CHANGE UP
GLUCOSE SERPL-MCNC: 173 MG/DL — HIGH (ref 70–99)
HCT VFR BLD CALC: 47.2 % — SIGNIFICANT CHANGE UP (ref 39–50)
HGB BLD-MCNC: 15.3 G/DL — SIGNIFICANT CHANGE UP (ref 13–17)
MAGNESIUM SERPL-MCNC: 2.1 MG/DL — SIGNIFICANT CHANGE UP (ref 1.6–2.6)
MCHC RBC-ENTMCNC: 26.9 PG — LOW (ref 27–34)
MCHC RBC-ENTMCNC: 32.4 GM/DL — SIGNIFICANT CHANGE UP (ref 32–36)
MCV RBC AUTO: 83.1 FL — SIGNIFICANT CHANGE UP (ref 80–100)
NRBC # BLD: 0 /100 WBCS — SIGNIFICANT CHANGE UP (ref 0–0)
PLATELET # BLD AUTO: 279 K/UL — SIGNIFICANT CHANGE UP (ref 150–400)
POTASSIUM SERPL-MCNC: 4.1 MMOL/L — SIGNIFICANT CHANGE UP (ref 3.5–5.3)
POTASSIUM SERPL-SCNC: 4.1 MMOL/L — SIGNIFICANT CHANGE UP (ref 3.5–5.3)
RBC # BLD: 5.68 M/UL — SIGNIFICANT CHANGE UP (ref 4.2–5.8)
RBC # FLD: 14.6 % — HIGH (ref 10.3–14.5)
SODIUM SERPL-SCNC: 140 MMOL/L — SIGNIFICANT CHANGE UP (ref 135–145)
WBC # BLD: 6.6 K/UL — SIGNIFICANT CHANGE UP (ref 3.8–10.5)
WBC # FLD AUTO: 6.6 K/UL — SIGNIFICANT CHANGE UP (ref 3.8–10.5)

## 2023-01-23 PROCEDURE — 93455 CORONARY ART/GRFT ANGIO S&I: CPT

## 2023-01-23 PROCEDURE — 36415 COLL VENOUS BLD VENIPUNCTURE: CPT

## 2023-01-23 PROCEDURE — 85027 COMPLETE CBC AUTOMATED: CPT

## 2023-01-23 PROCEDURE — C1887: CPT

## 2023-01-23 PROCEDURE — 93010 ELECTROCARDIOGRAM REPORT: CPT

## 2023-01-23 PROCEDURE — C1894: CPT

## 2023-01-23 PROCEDURE — 83735 ASSAY OF MAGNESIUM: CPT

## 2023-01-23 PROCEDURE — 80048 BASIC METABOLIC PNL TOTAL CA: CPT

## 2023-01-23 PROCEDURE — 99152 MOD SED SAME PHYS/QHP 5/>YRS: CPT

## 2023-01-23 PROCEDURE — 93455 CORONARY ART/GRFT ANGIO S&I: CPT | Mod: 26,59

## 2023-01-23 PROCEDURE — 93005 ELECTROCARDIOGRAM TRACING: CPT

## 2023-01-23 PROCEDURE — C1769: CPT

## 2023-01-23 RX ORDER — SODIUM CHLORIDE 9 MG/ML
250 INJECTION INTRAMUSCULAR; INTRAVENOUS; SUBCUTANEOUS ONCE
Refills: 0 | Status: DISCONTINUED | OUTPATIENT
Start: 2023-01-23 | End: 2023-02-06

## 2023-01-23 RX ORDER — SODIUM CHLORIDE 9 MG/ML
1000 INJECTION INTRAMUSCULAR; INTRAVENOUS; SUBCUTANEOUS
Refills: 0 | Status: DISCONTINUED | OUTPATIENT
Start: 2023-01-23 | End: 2023-02-06

## 2023-01-23 NOTE — ASU DISCHARGE PLAN (ADULT/PEDIATRIC) - CARE PROVIDER_API CALL
Michelle Bower)  Cardiology; Internal Medicine; Interventional Cardiology  [facility]- Dept of Cardiology, [address] [place] [address]  Phone: [phone]  Fax: [phone]  Follow Up Time:

## 2023-01-23 NOTE — H&P CARDIOLOGY - HISTORY OF PRESENT ILLNESS
62 year old M with PMHx of CAD, s/p CABG with Dr. Turpin in 2/2019, HTN, HLD, presents for [place]. Pt. had been seen in the ED at [facility] recently for dizziness and had underwent a nuclear stress test which was abnormal.     Cardiologist: Sathish    TTE 2020  Conclusions:  1. Endocardial visualization enhanced with intravenous  injection of Ultrasonic Enhancing Agent (Definity). Overall  preserved left ventricular ejection fraction. The basal  inferolateral wall is hypokinetic.  2. Normal right ventricular size and function.  *** Compared with echocardiogram of 1/14/2019, EF has  improved.  ------------------------------------------------------------------------  Confirmed on  11/19/2020 - 14:21:05 by BASSAM Henson  ------------------------------------------------------------------------    Stress Test Nuclear 2022  NUCLEAR FINDINGS:  Large, severe defects in the mid to distal lateral wall  that are predominantly reversible consistent with  ischemia.  Fixed defects in the basal lateral wall, consistent with  infarct.  ------------------------------------------------------------------------  IMPRESSIONS:Abnormal Study  * Chest Pain: No chest pain with administration of  Regadenoson.  * Symptom: Shortness of breath.  * HR Response: Appropriate.  * BP Response: Appropriate.  * Heart Rhythm: Sinus Rhythm - 72 BPM.  * Q Waves: II , III, aVF.  * Baseline ECG: T wave abnormality in I, aVL. No specific  ST abnoramlity in I, aV, V5, V6, Poor R wave progression .  * ECG Changes: Non speicif ST abormalities. .  * Arrhythmia: None.  * Large, severe defects in the mid to distal lateral wall  that are predominantly reversible consistent with  ischemia.  * Fixed defects in the basal lateral wall, consistent with  infarct.  Conclusion:  Fixed defects in the basal lateral wall, consistent with  infarct.  Large, severe defects in the mid to distal lateral wall  that are predominantly reversible consistent with  ischemia.  Ischemic changes noted.  Discussed with Dr. Bobo Saenz  ------------------------------------------------------------------------  Confirmed on  12/24/2022 - 13:12:46 by Luis Stallworth D.O.  ------------------------------------------------------------------------   62 year old M with PMHx of CAD, s/p CABG with Dr. Turpin in 2/2019, HTN, HLD, presents for [facility]. Pt. had been seen in the ED at [facility] recently for dizziness and had underwent a nuclear stress test which was abnormal. Pt. states during this episode of dizziness, he felt nauseous and diaphoretic. He also states that the left side of his chest wall felt warm. He also described it as painful. He was at work when it happened and described his day as busy. Pt. states this has only happened one other time. He denies any current cp, diaphoresis, palpitations, lightheadedness, n/v/d. No implanted devices.     Cardiologist: Sathish    TTE 2020  Conclusions:  1. Endocardial visualization enhanced with intravenous  injection of Ultrasonic Enhancing Agent (Definity). Overall  preserved left ventricular ejection fraction. The basal  inferolateral wall is hypokinetic.  2. Normal right ventricular size and function.  *** Compared with echocardiogram of 1/14/2019, EF has  improved.  ------------------------------------------------------------------------  Confirmed on  11/19/2020 - 14:21:05 by BASSAM Henson  ------------------------------------------------------------------------    Stress Test Nuclear 2022  NUCLEAR FINDINGS:  Large, severe defects in the mid to distal lateral wall  that are predominantly reversible consistent with  ischemia.  Fixed defects in the basal lateral wall, consistent with  infarct.  ------------------------------------------------------------------------  IMPRESSIONS:Abnormal Study  * Chest Pain: No chest pain with administration of  Regadenoson.  * Symptom: Shortness of breath.  * HR Response: Appropriate.  * BP Response: Appropriate.  * Heart Rhythm: Sinus Rhythm - 72 BPM.  * Q Waves: II , III, aVF.  * Baseline ECG: T wave abnormality in I, aVL. No specific  ST abnoramlity in I, aV, V5, V6, Poor R wave progression .  * ECG Changes: Non speicif ST abormalities. .  * Arrhythmia: None.  * Large, severe defects in the mid to distal lateral wall  that are predominantly reversible consistent with  ischemia.  * Fixed defects in the basal lateral wall, consistent with  infarct.  Conclusion:  Fixed defects in the basal lateral wall, consistent with  infarct.  Large, severe defects in the mid to distal lateral wall  that are predominantly reversible consistent with  ischemia.  Ischemic changes noted.  Discussed with Dr. Bobo Saenz  ------------------------------------------------------------------------  Confirmed on  12/24/2022 - 13:12:46 by Luis Stallworth D.O.  ------------------------------------------------------------------------

## 2023-01-23 NOTE — ASU PREOP CHECKLIST - HEART RATE (BEATS/MIN)
96 Clindamycin Counseling: I counseled the patient regarding use of clindamycin as an antibiotic for prophylactic and/or therapeutic purposes. Clindamycin is active against numerous classes of bacteria, including skin bacteria. Side effects may include nausea, diarrhea, gastrointestinal upset, rash, hives, yeast infections, and in rare cases, colitis.

## 2023-01-23 NOTE — ASU DISCHARGE PLAN (ADULT/PEDIATRIC) - ASU DC SPECIAL INSTRUCTIONSFT
Wound Care:   the day AFTER your procedure remove bandage GENTLY, and clean using  mild soap and gentle warm, water stream, pat dry. Leave OPEN to air. YOU MAY SHOWER.  DO NOT apply lotions, creams, ointments, powder, perfumes to your incision site  DO NOT SOAK your site for 1 week ( no baths, no pools, no tubs)  Check  your groin and/or wrist daily. A small amount of bruising and soreness are normal.    ACTIVITY: For 24 hours   - DO NOT DRIVE  - DO NOT make any important decisions or sign legal documents   - DO NOT operate heavy machinery   - you may resume sexual activity in 48 hours, unless otherwise instructed by your cardiologist     If your procedure was done through the WRIST: For the next 3 days  - avoid pushing, pulling, with that affected wrist   - avoid repeated movement of that hand and wrist ( eg: typing, hammering)  - DO NOT LIFT anything more than 5 lbs     If your procedure was done through the GROIN: For the next 5 days  - Limit climbing stairs, DO NOT soak in bathtub or pool  - no strenuous activities, pushing, pulling, straining  - Do not lift anything 10lbs or heavier     MEDICATION:   Take your medications as explained (see discharge paperwork).  If you received a STENT, you will be taking antiplatelet medications to KEEP YOUR STENT OPEN ( eg: Aspirin, Plavix, Brilinta, Effient, etc).  Take as prescribed DO NOT STOP taking them without consulting with your cardiologist first.     Follow heart healthy diet recommended by your doctor. If you smoke STOP SMOKING ( you may call [phone] for center of tobacco control if you need assistance)     CALL your doctor to make appointment in 2 WEEKS     ***CALL YOUR DOCTOR***  if you experience: fever, chills, body aches, or severe pain, swelling, redness, heat or yellow discharge at incision site  If you experience Bleeding or excruciating pain at the procedural site, swelling ( golf ball size) at your procedural site  If you experience CHEST PAIN  If you experience extremity numbness, tingling, temperature change (of your procedural site)   If you are unable to reach your doctor, you may contact:   -Cardiology Office at [facility] at [phone] or   - [facility] [phone]  - [facility] [phone]

## 2023-02-06 ENCOUNTER — NON-APPOINTMENT (OUTPATIENT)
Age: 63
End: 2023-02-06

## 2023-02-15 ENCOUNTER — APPOINTMENT (OUTPATIENT)
Dept: CARDIOLOGY | Facility: CLINIC | Age: 63
End: 2023-02-15
Payer: MEDICAID

## 2023-02-15 VITALS
OXYGEN SATURATION: 98 % | DIASTOLIC BLOOD PRESSURE: 85 MMHG | HEART RATE: 81 BPM | HEIGHT: 67 IN | SYSTOLIC BLOOD PRESSURE: 134 MMHG | BODY MASS INDEX: 32.33 KG/M2 | WEIGHT: 206 LBS

## 2023-02-15 PROCEDURE — 93000 ELECTROCARDIOGRAM COMPLETE: CPT

## 2023-02-15 PROCEDURE — 99215 OFFICE O/P EST HI 40 MIN: CPT | Mod: 25

## 2023-03-01 ENCOUNTER — NON-APPOINTMENT (OUTPATIENT)
Age: 63
End: 2023-03-01

## 2023-03-06 NOTE — HISTORY OF PRESENT ILLNESS
[FreeTextEntry1] : Mr. Kennedy is a 62 year-old man with known MV CAD s/p CABG with Dr. Turpin in 2/2019. He has been doing well without issues. He has not been able to lose weight however has been walking over 2 miles a day. He recently underwent a lipoma removal from his face/cheek and radiation therapy. Needs achilles surgery for tear.\par \par Follow-up 1/18/23 - Reports intermittent dizziness since last month. Presented to [facility] and underwent nuclear stress test which was abnormal. Continues to report this symptom. Notes compliance with cardiac medications.\par \par Follow-up 2/15/23 - Underwent diagnostic coronary/bypass angiogram on 1/23/23 which showed occluded SVG to LCx with other grafts patent. No anginal symptoms noted. No new issues.\par \par Active Issues:\par 1. Actively treated CAD\par 2. Actively treated cough\par 3. Actively treated HLD\par 4. Actively treated HTN \par \par

## 2023-03-06 NOTE — DISCUSSION/SUMMARY
[FreeTextEntry1] : 1. Discussed with patient possible LCx  PCI given occluded SVG-LCx. Will continue to monitor his symptoms over the next 1 month or so and plan to make decision whether to proceed on follow up next month.\par 2. C/w current management.\par 3. Old records requested and reviewed with performing physician. \par 4. Primary and secondary prevention of cardiovascular and related conditions discussed at length, including but not limited to diet and lifestyle modification.\par 5. Follow up in 1 month. [EKG obtained to assist in diagnosis and management of assessed problem(s)] : EKG obtained to assist in diagnosis and management of assessed problem(s)

## 2023-03-06 NOTE — ASSESSMENT
[FreeTextEntry1] : Benign essential HTN (401.1) (I10)\par CAD, multiple vessel (414.00) (I25.10)\par HLD (hyperlipidemia) (272.4) (E78.5)\par Preop examination (V72.84) (Z01.818)\par S/P CABG x 4 (V45.81) (Z95.1).

## 2023-03-20 ENCOUNTER — APPOINTMENT (OUTPATIENT)
Dept: DERMATOLOGY | Facility: CLINIC | Age: 63
End: 2023-03-20

## 2023-03-27 ENCOUNTER — APPOINTMENT (OUTPATIENT)
Dept: DERMATOLOGY | Facility: CLINIC | Age: 63
End: 2023-03-27
Payer: MEDICAID

## 2023-03-27 PROCEDURE — 96921 EXCIMER LSR PSRIASIS 250-500: CPT

## 2023-03-29 ENCOUNTER — APPOINTMENT (OUTPATIENT)
Dept: DERMATOLOGY | Facility: CLINIC | Age: 63
End: 2023-03-29
Payer: MEDICAID

## 2023-03-29 PROCEDURE — 96921 EXCIMER LSR PSRIASIS 250-500: CPT

## 2023-04-03 ENCOUNTER — APPOINTMENT (OUTPATIENT)
Dept: DERMATOLOGY | Facility: CLINIC | Age: 63
End: 2023-04-03
Payer: MEDICAID

## 2023-04-03 PROCEDURE — 96921 EXCIMER LSR PSRIASIS 250-500: CPT

## 2023-04-04 NOTE — DISCUSSION/SUMMARY
[FreeTextEntry1] : Mr. Kennedy  is a 62 year old Male presenting today for excimer laser treatment for Vitiligo as prescribed by Dr. Chi Hatfield. Ordered protocol is outlined as below.\par \par Starting dose:\par Periocular: 100 mJ/cm2\par Scalp, Face, Ear, Neck, Axilla, Bikini: 150 mJ/cm2\par Trunk, Arms, Legs: 200 mJ/cm2\par Hands & Feet: 300 mJ/cm2\par Maintenance dose:\par No Erythema or Erythema lasting <24 hours increase by 50mj\par Erythema lasting 24-48 hours keep same fluence\par Erythema lasting 48-60 hours decrease by 50mj\par Erythema lasting 60-72 hours hold treatment and notify MD\par CONTINUE treatment until area re-pigments\par STOP treatment when area hyper-pigments\par \par Use goggles/all safety parameters.\par \par Patient tolerated treatment \par \par Today's treatment: 3/27/2023  Treatment # 1  150 mJ  251 cm2 \par \par History:\par \par

## 2023-04-05 ENCOUNTER — APPOINTMENT (OUTPATIENT)
Dept: CARDIOLOGY | Facility: CLINIC | Age: 63
End: 2023-04-05
Payer: MEDICAID

## 2023-04-05 ENCOUNTER — NON-APPOINTMENT (OUTPATIENT)
Age: 63
End: 2023-04-05

## 2023-04-05 ENCOUNTER — APPOINTMENT (OUTPATIENT)
Dept: DERMATOLOGY | Facility: CLINIC | Age: 63
End: 2023-04-05
Payer: MEDICAID

## 2023-04-05 VITALS
DIASTOLIC BLOOD PRESSURE: 78 MMHG | OXYGEN SATURATION: 98 % | WEIGHT: 205 LBS | SYSTOLIC BLOOD PRESSURE: 119 MMHG | HEIGHT: 67 IN | HEART RATE: 78 BPM | BODY MASS INDEX: 32.18 KG/M2

## 2023-04-05 DIAGNOSIS — R05.9 COUGH, UNSPECIFIED: ICD-10-CM

## 2023-04-05 PROCEDURE — 93000 ELECTROCARDIOGRAM COMPLETE: CPT

## 2023-04-05 PROCEDURE — 99214 OFFICE O/P EST MOD 30 MIN: CPT | Mod: 25

## 2023-04-05 PROCEDURE — 96921 EXCIMER LSR PSRIASIS 250-500: CPT

## 2023-04-05 NOTE — DISCUSSION/SUMMARY
[FreeTextEntry1] : Mr. Kennedy  is a 62 year old Male presenting today for excimer laser treatment for Vitiligo as prescribed by Dr. Chi Hatfield. Ordered protocol is outlined as below.\par \par Starting dose:\par Periocular: 100 mJ/cm2\par Scalp, Face, Ear, Neck, Axilla, Bikini: 150 mJ/cm2\par Trunk, Arms, Legs: 200 mJ/cm2\par Hands & Feet: 300 mJ/cm2\par Maintenance dose:\par No Erythema or Erythema lasting <24 hours increase by 50mj\par Erythema lasting 24-48 hours keep same fluence\par Erythema lasting 48-60 hours decrease by 50mj\par Erythema lasting 60-72 hours hold treatment and notify MD\par CONTINUE treatment until area re-pigments\par STOP treatment when area hyper-pigments\par \par Use goggles/all safety parameters.\par \par Patient tolerated treatment \par \par Today's treatment: 4/3/2023  Treatment # 3  250 mJ  260 cm2 \par \par History:\par 3/29/2023  Treatment # 2  200 mJ  257 cm2 \par 3/27/2023  Treatment # 1  150 mJ  251 cm2 \par

## 2023-04-05 NOTE — DISCUSSION/SUMMARY
[FreeTextEntry1] : Mr. Kennedy  is a 62 year old Male presenting today for excimer laser treatment for Vitiligo as prescribed by Dr. Chi Hatfield. Ordered protocol is outlined as below.\par \par Starting dose:\par Periocular: 100 mJ/cm2\par Scalp, Face, Ear, Neck, Axilla, Bikini: 150 mJ/cm2\par Trunk, Arms, Legs: 200 mJ/cm2\par Hands & Feet: 300 mJ/cm2\par Maintenance dose:\par No Erythema or Erythema lasting <24 hours increase by 50mj\par Erythema lasting 24-48 hours keep same fluence\par Erythema lasting 48-60 hours decrease by 50mj\par Erythema lasting 60-72 hours hold treatment and notify MD\par CONTINUE treatment until area re-pigments\par STOP treatment when area hyper-pigments\par \par Use goggles/all safety parameters.\par \par Patient tolerated treatment \par \par Today's treatment: 4/5/2023  Treatment # 4  300 mJ  260 cm2 \par \par History:\par 4/3/2023  Treatment # 3  250 mJ  260 cm2 \par 3/29/2023  Treatment # 2  200 mJ  257 cm2 \par 3/27/2023  Treatment # 1  150 mJ  251 cm2 \par

## 2023-04-05 NOTE — DISCUSSION/SUMMARY
[FreeTextEntry1] : Mr. Kennedy  is a 62 year old Male presenting today for excimer laser treatment for Vitiligo as prescribed by Dr. Chi Hatfield. Ordered protocol is outlined as below.\par \par Starting dose:\par Periocular: 100 mJ/cm2\par Scalp, Face, Ear, Neck, Axilla, Bikini: 150 mJ/cm2\par Trunk, Arms, Legs: 200 mJ/cm2\par Hands & Feet: 300 mJ/cm2\par Maintenance dose:\par No Erythema or Erythema lasting <24 hours increase by 50mj\par Erythema lasting 24-48 hours keep same fluence\par Erythema lasting 48-60 hours decrease by 50mj\par Erythema lasting 60-72 hours hold treatment and notify MD\par CONTINUE treatment until area re-pigments\par STOP treatment when area hyper-pigments\par \par Use goggles/all safety parameters.\par \par Patient tolerated treatment \par \par Today's treatment: 3/29/2023  Treatment # 2  200 mJ  257 cm2 \par \par History:\par 3/27/2023  Treatment # 1  150 mJ  251 cm2 \par

## 2023-04-07 ENCOUNTER — APPOINTMENT (OUTPATIENT)
Dept: DERMATOLOGY | Facility: CLINIC | Age: 63
End: 2023-04-07
Payer: MEDICAID

## 2023-04-07 PROCEDURE — 96921 EXCIMER LSR PSRIASIS 250-500: CPT

## 2023-04-07 NOTE — DISCUSSION/SUMMARY
[FreeTextEntry1] : Mr. Kennedy  is a 62 year old Male presenting today for excimer laser treatment for Vitiligo as prescribed by Dr. Chi Hatfield. Ordered protocol is outlined as below.\par \par Starting dose:\par Periocular: 100 mJ/cm2\par Scalp, Face, Ear, Neck, Axilla, Bikini: 150 mJ/cm2\par Trunk, Arms, Legs: 200 mJ/cm2\par Hands & Feet: 300 mJ/cm2\par Maintenance dose:\par No Erythema or Erythema lasting <24 hours increase by 50mj\par Erythema lasting 24-48 hours keep same fluence\par Erythema lasting 48-60 hours decrease by 50mj\par Erythema lasting 60-72 hours hold treatment and notify MD\par CONTINUE treatment until area re-pigments\par STOP treatment when area hyper-pigments\par \par Use goggles/all safety parameters.\par \par Patient tolerated treatment \par \par Today's treatment: 4/7/2023  Treatment # 5  350 mJ  267 cm2 \par \par History:\par 4/5/2023  Treatment # 4  300 mJ  260 cm2 \par \par 4/3/2023  Treatment # 3  250 mJ  260 cm2 \par 3/29/2023  Treatment # 2  200 mJ  257 cm2 \par 3/27/2023  Treatment # 1  150 mJ  251 cm2 \par

## 2023-04-10 ENCOUNTER — APPOINTMENT (OUTPATIENT)
Dept: DERMATOLOGY | Facility: CLINIC | Age: 63
End: 2023-04-10
Payer: MEDICAID

## 2023-04-10 PROCEDURE — 96921 EXCIMER LSR PSRIASIS 250-500: CPT

## 2023-04-10 NOTE — DISCUSSION/SUMMARY
[FreeTextEntry1] : Mr. Kennedy  is a 62 year old Male presenting today for excimer laser treatment for Vitiligo as prescribed by Dr. Chi Hatfield. Ordered protocol is outlined as below.\par \par Starting dose:\par Periocular: 100 mJ/cm2\par Scalp, Face, Ear, Neck, Axilla, Bikini: 150 mJ/cm2\par Trunk, Arms, Legs: 200 mJ/cm2\par Hands & Feet: 300 mJ/cm2\par Maintenance dose:\par No Erythema or Erythema lasting <24 hours increase by 50mj\par Erythema lasting 24-48 hours keep same fluence\par Erythema lasting 48-60 hours decrease by 50mj\par Erythema lasting 60-72 hours hold treatment and notify MD\par CONTINUE treatment until area re-pigments\par STOP treatment when area hyper-pigments\par \par Use goggles/all safety parameters.\par \par Patient tolerated treatment \par \par Today's treatment: 4/10/2023  Treatment # 6  400 mJ  264 cm2 \par \par History:\par  4/7/2023  Treatment # 5  350 mJ  267 cm2 \par 4/5/2023  Treatment # 4  300 mJ  260 cm2 \par \par 4/3/2023  Treatment # 3  250 mJ  260 cm2 \par 3/29/2023  Treatment # 2  200 mJ  257 cm2 \par 3/27/2023  Treatment # 1  150 mJ  251 cm2 \par

## 2023-04-11 NOTE — ED ADULT NURSE NOTE - GLASGOW COMA SCALE: BEST VERBAL RESPONSE
[facility]  CARDIOLOGY OFFICE VISIT        PRIMARY CARE PROVIDER  SOPHIA Winters  Last seen: 04/04/2023    SUBJECTIVE  CHIEF COMPLAINT / REASON FOR VISIT: ***      Pertinent ED Visits/Hospitalizations:  N/A      HISTORY OF PRESENT ILLNESS  Pleasant 67 year old female seen in consult for Lightheadedness, and Syncope and collapse at the request of SOPHIA Winters on 04/04/2023.     Patient has history of mild chronic diastolic heart failure, history of hypertension and hyperlipidemia.  Patient has history of bigeminal rhythm seen back on EKG in 2020.      Sister with history of atrial fibrillation.    Patient has never smoked.  Patient reports occasional alcohol use.    Today patient reports ***       ALLERGIES  ALLERGIES:   Allergen Reactions   • Sathya-Z ANXIETY   • Levaquin Other (See Comments)     Restlessness, nervousness, lightheadedness, depression, and trouble sleeping   • Tecfidera Other (See Comments)     Lymphopenia (400)       MEDICATIONS  Current Outpatient Medications   Medication Sig Dispense Refill   • hydrOXYzine (ATARAX) 25 MG tablet Take 1 tablet by mouth every 4 hours as needed for Anxiety. 270 tablet 3   • cefdinir (OMNICEF) 300 MG capsule Take 1 capsule by mouth in the morning and 1 capsule in the evening. Do all this for 7 days. 14 capsule 0   • nitrofurantoin, macrocrystal-monohydrate, (MACROBID) 100 MG capsule Take 1 capsule by mouth in the morning and 1 capsule in the evening. Do all this for 10 days. 20 capsule 0   • estradiol (VAGIFEM) 10 MCG vaginal tablet Place 1 tablet vaginally 2 days a week. 14 tablet 3   • ALPRAZolam (XANAX) 0.5 MG tablet TAKE 1 TABLET BY MOUTH 3 TIMES DAILY AS NEEDED FOR SLEEP OR ANXIETY. 90 tablet 0   • tolterodine (DETROL LA) 2 MG 24 hr capsule Take 1 capsule by mouth daily. 90 capsule 1   • amitriptyline (ELAVIL) 25 MG tablet Take 1 tablet by mouth nightly. 90 tablet 3   • losartan-hydrochlorothiazide (HYZAAR) 50-12.5 MG per tablet TAKE 1  TABLET BY MOUTH 1 time DAILY 90 tablet 3   • mirtazapine (REMERON) 15 MG tablet TAKE 1 TABLET BY MOUTH NIGHTLY. 90 tablet 3   • atorvastatin (LIPITOR) 40 MG tablet TAKE 1 TABLET BY MOUTH DAILY 90 tablet 3   • famotidine (PEPCID) 20 MG tablet Take 1 tablet by mouth 2 times daily as needed (GERD). 180 tablet 1   • Teriflunomide (Aubagio) 14 MG Tab Take 14 mg by mouth daily. Indications: Multiple Sclerosis 90 tablet 3   • meloxicam (MOBIC) 15 MG tablet Take 1 tablet by mouth daily as needed for Pain. 90 tablet 1   • metoPROLOL succinate (TOPROL-XL) 25 MG 24 hr tablet TAKE 1 TABLET BY MOUTH DAILY. 90 tablet 1   • fexofenadine (ALLEGRA) 60 MG tablet Take 60 mg by mouth daily.     • sertraline (ZOLOFT) 100 MG tablet TAKE 1 & 1/2 TABLETS BY MOUTH DAILY 135 tablet 3   • fluticasone-salmeterol (Advair Diskus) 250-50 MCG/ACT inhaler Inhale 1 puff into the lungs in the morning and 1 puff before bedtime. 1 each 11   • fluticasone (FLONASE) 50 MCG/ACT nasal spray Spray 2 sprays in each nostril daily. 16 g 12   • pantoprazole (PROTONIX) 40 MG tablet TAKE 1 TABLET BY MOUTH DAILY 90 tablet 3   • HYDROcodone-acetaminophen (NORCO) 5-325 MG per tablet TAKE 1 TABLET BY MOUTH EVERY 6 HOURS AS NEEDED FOR PAIN. 12 tablet 0   • CRANBERRY PO Take 1 tablet by mouth daily.     • Misc. Devices Kit Lift chair 1 kit 0   • meclizine (ANTIVERT) 25 MG tablet Take 1 tablet by mouth 3 times daily as needed for Dizziness. 30 tablet 0   • Ascorbic Acid (vitamin C) 500 MG tablet Take 500 mg by mouth daily.     • chlorthalidone (THALITONE) 25 MG tablet Take 1 tablet by mouth daily as needed (edema). 90 tablet 1   • Cholecalciferol (VITAMIN D3) 125 mcg (5,000 units) capsule Take 1 capsule by mouth daily. 100 capsule 11   • Thiamine HCl (VITAMIN B-1) 100 MG tablet Take 100 mg by mouth daily. Summer months     • CALCIUM CARBONATE-VITAMIN D PO Take 1 tablet by mouth daily.     • cyanocobalamin (VITAMIN B-12) 100 MCG tablet Take 100 mcg by mouth daily.     •  ZINC SULFATE PO Take 1 tablet by mouth daily.      • Diphenhydramine-APAP, sleep, (TYLENOL PM EXTRA STRENGTH PO) Take 2 tablets by mouth at bedtime. As needed     • Omega-3 Fatty Acids (FISH OIL) 1000 MG capsule 1000mg bid with meals. 1 capsule 0   • Multivitamins OR TABS Take 1 tablet by mouth daily. 30 0   • Glucosamine Chondr 500 Complex OR CAPS Take 2 tablets by mouth daily. 30 0   • ASPIRIN 81 MG PO TABS 1 TABLET DAILY 30 0     No current facility-administered medications for this visit.       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/14/2020 5/13/2020 Chest Pain  ER visit - diagnosed with Bigeminal rhythm - service to cardiology was given    • Bilateral leg edema 09/13/2014   • Choroidal nevus of left eye 08/28/2009   • Chronic diastolic heart failure (CMD) 2/28/2023    Stage 1 (mild)   • Closed fracture of greater tuberosity of left humerus 07/06/2017    ER 7/6/2017   • Combined forms of age-related cataract of both eyes 07/09/2018    s/p cataract removal and IOL implants bilaterally 2022   • Constipation 08/23/2012   • Depression 08/23/2012   • Dislocation of left shoulder joint 07/12/2017   • Essential hypertension 08/12/2021   • 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   • GERD (gastroesophageal reflux disease) 08/23/2012   • Headaches (chronic)    • Heat sensitivity 10/03/2020   • Hiatal hernia 09/07/2022    Seen on CT 9/2022   • Hyperlipidemia LDL goal <100 11/14/2015   • Insomnia 08/23/2012   • Insulin resistance 12/15/2021   • Knee joint replacement - L, 9/17/12 09/17/2012   • Lymphopenia 02/2016    from Tecfidera   • Migraine headache 08/23/2012   • Mild anemia 06/26/2015     resolved 2019   • Morbid obesity (CMD) 09/17/2012   • Multiple sclerosis (CMD)     started Aubagio 05/2016   • Neurogenic bladder 08/23/2012    Dr Sanderson, started Botox 2022   • Neutropenia (CMD) 03/30/2021   • Optic neuritis, unspecified 02/29/2008    left eye   • Osteoarthritis of right knee 01/31/2022    Kenalog injection 1/26/2022   • OSTEOARTHROS NOS-L/LEG-L knee, severe PF 09/17/2008    s/p left knee replacement 2012   • Sinusitis (recurrent)    • Urinary incontinence    • Urinary tract infection 09/16/2022    taking omnicef antibiotic for this.        SURGICAL HISTORY  Past Surgical History:   Procedure Laterality Date   • --------------eye-------------      millicent eye surgery with laser to reduce eye pressure   • Cardiac stress test complete  12/2017   • Cystoscopy  01/18/2023    Botox 100 units   • Cystoscopy  05/31/2022    Botox 100 units   • Echo heart resting  11/2017   • Extracapsular cataract removal w insert io lens prosth wo ecp Right 09/2022   • Extracapsular cataract removal w insert io lens prosth wo ecp Left 10/2022   • Iridotomy/iridectomy by laser Bilateral 2017   • Orif humerus fracture Left 2017   • Remove tonsils/adenoids,<12 y/o      T & A, age 12   • Total knee replacement Left 09/17/2012        SOCIAL HISTORY  Social History     Tobacco Use   • Smoking status: Never   • Smokeless tobacco: Never   Vaping Use   • Vaping Use: never used   Substance Use Topics   • Alcohol use: Not Currently     Alcohol/week: 0.0 - 1.0 standard drinks     Comment: occasional-not weekly   • Drug use: Never     Comment: denies        FAMILY HISTORY  Family History   Problem Relation Age of Onset   • Cancer Mother    • Diabetes Mother    • Other Father         CHF   • Cancer Sister         breast cancer   • Hypertension Sister         CHF and Afib    • * Sister         Fibromyalgia   • COPD Brother    • Heart Sister         AFIB and CHF   • COPD Sister         Review of Systems:   GENERAL: Negative for: fever and  chills  NEURO: Negative for: {ROS - NEURO:767037::\"lightheadedness\",\"dizziness \"}  HEENT: Negative for: {ROS HEENT:206637::\"blurred vision\",\"epistaxis\"}  PULMONARY: Negative for: {ROS PULM:801011::\"shortness of breath\",\"wheezing\",\"cough\",\"sputum production\",\"hemoptysis \"}  CV: Negative for: {ROS - CV:290346::\"syncope\",\"angina\",\"palpitations\",\"claudication\"}  GI: Negative for: {ROS - GI:129918::\"hematemesis \",\"melena\"}  : Negative for: {ROS - :012204::\"dysuria\",\"frequency\",\"hematuria\"}  MS: Negative for: {ROS - MUSCULOSKELETAL:222189::\"muscle weakness\",\"joint stiffness\",\"swelling \",\"myalgias\"}  DERMATOLOGY: Negative for:  {ROS - SKIN:770871::\"rashes\",\"sores\",\"lumps\",\"lesions\",\"color changes\"}  ENDOCRINE: Negative for:  {ROS - ENDO:907332::\"heat-cold intolerance \",\"excessive sweating \",\"polyuria \",\"polydipsia\",\"polyphagia\"}  HEME/LYMPH: Negative for: {ROS - HEME:735760::\"anemia\",\"easy bruising\",\"bleeding \",\"transfusions\",\"lymphadenopathy \"}    VITALS  There were no vitals taken for this visit.     Physical Exam:  General: {GENERAL:206620::\"comfortable\",\"in no acute distress\"}  HEENT:   {PE HEENT:206622::\"no pallor, jaundice\"}  Neck:  {PE NECK:206623::\"supple\",\"no carotid bruits\",\"no JVD\"}  Lungs:  {PE LUNGS:139046::\"clear to auscultation\",\"good air entry\",\"no rales or wheezes\",\"no rhonchi\"}  Cardiovascular:   {PE CARDIO:206626::\"regular rate and rhythm\",\"no S1 and S2 normal\",\"no S3 or S4\"}  Abdomen:   {PE ABD:206627::\"soft, non-tender, nondistended\"}  Extremities:   {EXTREMITIES NEGATIVES LIST:206632::\"no edema\",\"no clubbing\"}  Neuro:  {PE NEURO:198749::\"awake, alert, oriented X3\"}  Skin:   {SKIN EXAM:206634::\"color, texture, turgor are normal\",\"no bruises or rashes\"}     Lab Results   Component Value Date    SODIUM 139 03/27/2023    POTASSIUM 3.9 03/27/2023    BUN 29 (H) 03/27/2023    CREATININE 0.90 03/27/2023    WBC 4.3 03/27/2023    HCT 29.4 (L) 03/27/2023    HGB 9.5 (L) 03/27/2023     03/27/2023    INR  0.9 07/31/2017    RAPDTR <0.10 03/27/2023    PTT 33 (H) 08/20/2012    GLUCOSE 162 (H) 03/27/2023    TSH 1.534 02/28/2023    NTPROB 151 (H) 06/25/2020    CHOLESTEROL 234 (H) 02/28/2023    HDL 79 02/28/2023    CALCLDL 90 02/28/2023    TRIGLYCERIDE 327 (H) 02/28/2023    MG 2.3 02/28/2023    GFRA 90 09/05/2019    GFRNA 78 09/05/2019    AST 20 03/27/2023    GPT 21 03/27/2023    ALKPT 84 03/27/2023    BILIRUBIN 0.5 03/27/2023       Lab Results   Component Value Date    NTPROB 151 (H) 06/25/2020        DIAGNOSTICS:  04/13/2023 Holter Monitor  ***    04/13/2023 Echocardiogram  ***    03/27/2023 12-Lead EKG  Normal sinus rhythm   HR 86   No ST elevation or ectopy noted   Brit Conner PA-C   Working under the direct supervision of Dr. Valentín Turner    09/06/2022 CT Cardiac Calcium  FINDINGS:  Agatston Coronary Calcium Score  LMA: 0  LAD: 14  LCX: 0  RCA: 0  Total: 14  Percentile: 57%  Aortic valve score: 156  Great Vessels  Ascending Aorta: 38 mm  Descending Aorta: 25 mm  Main Pulmonary Artery: 27 mm  Extra-coronary Calcification: Mild thoracic aortic calcification.  Heart/Pericardium: Normal.  Other Findings: Moderate-sized title hernia.  : No additional finding.    06/01/2020 NM Stress Test  IMPRESSION   Nonischemic response electrocardiographically   Regadenoson related symptoms that resolve promptly in recovery   Frequent PVCs   Myocardial perfusion scan pending.   KEILA Glynn   NM findings:  1. Normal myocardial perfusion scans following a regadenoson injection and at rest.  2. Normal post-regadenoson LV systolic function.  3. Cardiac Risk Assessment: Low.    05/19/2020 Holter Monitor  Interpretation   1. The predominant rhythm during recording is sinus with average heart rate 82 beats per minute.   Minimum heart rate recorded is 57 beats per minute, sinus bradycardia at 7:49 a.m.   Maximum heart rate is 118 beats per minute, sinus tachycardia at 7:32 p.m.   Frequent isolated premature ventricular  complexes are recorded during the study, total number:  38,335  (16.2%)     There are frequent premature atrial complexes seen, total number:  321  (0.1%)   A 7 beat run of narrow complex tachycardia-atrial tachycardia occurred at 8:04 p.m. with heart rate 115 beats per minute   Normal atrioventricular and intraventricular conduction is present   There is no significant sinus pause   Symptoms of chest pain and palpitation are reported during sinus rhythm with PVCs.   No significant ST segment changes are noted   The overall quality of study is fair      Assessment:  Lightheadedness  Syncope and collapse  Chronic Diastolic Heart Failure  • Echo (04/13/2023) - EF ***%, ***  Hyperlipidemia  • Last Lipid Panel (02/28/2023) - LDL 90  • On Atorvastatin (Lipitor)   Hypertension    The 10-year ASCVD risk score (James BAILEY, et al., 2019) is: 10.3%    Values used to calculate the score:      Age: 67 years      Sex: Female      Is Non- : No      Diabetic: No      Tobacco smoker: No      Systolic Blood Pressure: 131 mmHg      Is BP treated: Yes      HDL Cholesterol: 79 mg/dL      Total Cholesterol: 234 mg/dL       Plan:  ***  Follow up in cardiology clinic in *** or sooner if symptomatic      ***    {Card Provider Signature:024880}  04/11/23    We would like to thank SOPHIA Winters for involving us in the care of Norma Beavers.     (V5) oriented

## 2023-04-12 ENCOUNTER — APPOINTMENT (OUTPATIENT)
Dept: DERMATOLOGY | Facility: CLINIC | Age: 63
End: 2023-04-12
Payer: MEDICAID

## 2023-04-12 PROCEDURE — 96921 EXCIMER LSR PSRIASIS 250-500: CPT

## 2023-04-12 NOTE — DISCUSSION/SUMMARY
[FreeTextEntry1] : Mr. Kennedy  is a 62 year old Male presenting today for excimer laser treatment for Vitiligo as prescribed by Dr. Chi Hatfield. Ordered protocol is outlined as below.\par \par Starting dose:\par Periocular: 100 mJ/cm2\par Scalp, Face, Ear, Neck, Axilla, Bikini: 150 mJ/cm2\par Trunk, Arms, Legs: 200 mJ/cm2\par Hands & Feet: 300 mJ/cm2\par Maintenance dose:\par No Erythema or Erythema lasting <24 hours increase by 50mj\par Erythema lasting 24-48 hours keep same fluence\par Erythema lasting 48-60 hours decrease by 50mj\par Erythema lasting 60-72 hours hold treatment and notify MD\par CONTINUE treatment until area re-pigments\par STOP treatment when area hyper-pigments\par \par Use goggles/all safety parameters.\par \par Patient tolerated treatment \par \par Today's treatment: 4/12/2023  Treatment # 7  450 mJ  267 cm2 \par \par History:\par 4/10/2023  Treatment # 6  400 mJ  264 cm2\par  4/7/2023  Treatment # 5  350 mJ  267 cm2 \par 4/5/2023  Treatment # 4  300 mJ  260 cm2 \par \par 4/3/2023  Treatment # 3  250 mJ  260 cm2 \par 3/29/2023  Treatment # 2  200 mJ  257 cm2 \par 3/27/2023  Treatment # 1  150 mJ  251 cm2 \par

## 2023-04-13 ENCOUNTER — TRANSCRIPTION ENCOUNTER (OUTPATIENT)
Age: 63
End: 2023-04-13

## 2023-04-13 ENCOUNTER — OUTPATIENT (OUTPATIENT)
Dept: OUTPATIENT SERVICES | Facility: HOSPITAL | Age: 63
LOS: 1 days | End: 2023-04-13
Payer: MEDICAID

## 2023-04-13 DIAGNOSIS — R22.0 LOCALIZED SWELLING, MASS AND LUMP, HEAD: Chronic | ICD-10-CM

## 2023-04-13 DIAGNOSIS — Z95.1 PRESENCE OF AORTOCORONARY BYPASS GRAFT: Chronic | ICD-10-CM

## 2023-04-13 DIAGNOSIS — Z98.890 OTHER SPECIFIED POSTPROCEDURAL STATES: Chronic | ICD-10-CM

## 2023-04-13 DIAGNOSIS — Z96.641 PRESENCE OF RIGHT ARTIFICIAL HIP JOINT: Chronic | ICD-10-CM

## 2023-04-13 PROCEDURE — 93005 ELECTROCARDIOGRAM TRACING: CPT

## 2023-04-13 PROCEDURE — 86901 BLOOD TYPING SEROLOGIC RH(D): CPT

## 2023-04-13 PROCEDURE — 86850 RBC ANTIBODY SCREEN: CPT

## 2023-04-13 PROCEDURE — 86900 BLOOD TYPING SEROLOGIC ABO: CPT

## 2023-04-13 PROCEDURE — 36415 COLL VENOUS BLD VENIPUNCTURE: CPT

## 2023-04-13 PROCEDURE — 85027 COMPLETE CBC AUTOMATED: CPT

## 2023-04-13 PROCEDURE — 80048 BASIC METABOLIC PNL TOTAL CA: CPT

## 2023-04-14 ENCOUNTER — APPOINTMENT (OUTPATIENT)
Dept: DERMATOLOGY | Facility: CLINIC | Age: 63
End: 2023-04-14
Payer: MEDICAID

## 2023-04-14 PROCEDURE — 96921 EXCIMER LSR PSRIASIS 250-500: CPT

## 2023-04-14 NOTE — DISCUSSION/SUMMARY
[FreeTextEntry1] : Mr. Kennedy  is a 62 year old Male presenting today for excimer laser treatment for Vitiligo as prescribed by Dr. Chi Hatfield. Ordered protocol is outlined as below.\par \par Starting dose:\par Periocular: 100 mJ/cm2\par Scalp, Face, Ear, Neck, Axilla, Bikini: 150 mJ/cm2\par Trunk, Arms, Legs: 200 mJ/cm2\par Hands & Feet: 300 mJ/cm2\par Maintenance dose:\par No Erythema or Erythema lasting <24 hours increase by 50mj\par Erythema lasting 24-48 hours keep same fluence\par Erythema lasting 48-60 hours decrease by 50mj\par Erythema lasting 60-72 hours hold treatment and notify MD\par CONTINUE treatment until area re-pigments\par STOP treatment when area hyper-pigments\par \par Use goggles/all safety parameters.\par \par Patient tolerated treatment \par \par Today's treatment: 4/14/2023  Treatment # 8  500 mJ  264 cm2 \par \par History:\par 4/12/2023  Treatment # 7  450 mJ  267 cm2 \par 4/10/2023  Treatment # 6  400 mJ  264 cm2\par  4/7/2023  Treatment # 5  350 mJ  267 cm2 \par 4/5/2023  Treatment # 4  300 mJ  260 cm2 \par \par 4/3/2023  Treatment # 3  250 mJ  260 cm2 \par 3/29/2023  Treatment # 2  200 mJ  257 cm2 \par 3/27/2023  Treatment # 1  150 mJ  251 cm2 \par

## 2023-04-16 PROBLEM — R05.9 COUGH: Status: ACTIVE | Noted: 2019-01-31

## 2023-04-16 NOTE — HISTORY OF PRESENT ILLNESS
[FreeTextEntry1] : Mr. Kennedy is a 62 year-old man with known MV CAD s/p CABG with Dr. Turpin in 2/2019. He has been doing well without issues. He has not been able to lose weight however has been walking over 2 miles a day. He recently underwent a lipoma removal from his face/cheek and radiation therapy. Needs achilles surgery for tear.\par \par Follow-up 1/18/23 - Reports intermittent dizziness since last month. Presented to [facility] and underwent nuclear stress test which was abnormal. Continues to report this symptom. Notes compliance with cardiac medications.\par \par Follow-up 2/15/23 - Underwent diagnostic coronary/bypass angiogram on 1/23/23 which showed occluded SVG to LCx with other grafts patent. No anginal symptoms noted. No new issues.\par \par Follow-up 4/5/23 - notes some chest discomfort on exertion/activity. Reports compliance with cardiac meds.\par \par Active Issues:\par 1. Actively treated CAD\par 2. Actively treated cough\par 3. Actively treated HLD\par 4. Actively treated HTN

## 2023-04-16 NOTE — DISCUSSION/SUMMARY
[FreeTextEntry1] : 1. Plan to proceed with LCx  PCI given symptoms. Discussed with patient and he is agreeable to proceed. Will schedule within next few weeks.\par 2. C/w current management.\par 3. Old records requested and reviewed with performing physician. \par 4. Primary and secondary prevention of cardiovascular and related conditions discussed at length, including but not limited to diet and lifestyle modification.\par 5. Follow up 2-4 weeks after procedure. [EKG obtained to assist in diagnosis and management of assessed problem(s)] : EKG obtained to assist in diagnosis and management of assessed problem(s)

## 2023-04-17 ENCOUNTER — APPOINTMENT (OUTPATIENT)
Dept: DERMATOLOGY | Facility: CLINIC | Age: 63
End: 2023-04-17
Payer: MEDICAID

## 2023-04-17 PROCEDURE — 96921 EXCIMER LSR PSRIASIS 250-500: CPT

## 2023-04-17 NOTE — DISCUSSION/SUMMARY
[FreeTextEntry1] : Mr. Kennedy  is a 62 year old Male presenting today for excimer laser treatment for Vitiligo as prescribed by Dr. Chi Hatfield. Ordered protocol is outlined as below.\par \par Starting dose:\par Periocular: 100 mJ/cm2\par Scalp, Face, Ear, Neck, Axilla, Bikini: 150 mJ/cm2\par Trunk, Arms, Legs: 200 mJ/cm2\par Hands & Feet: 300 mJ/cm2\par Maintenance dose:\par No Erythema or Erythema lasting <24 hours increase by 50mj\par Erythema lasting 24-48 hours keep same fluence\par Erythema lasting 48-60 hours decrease by 50mj\par Erythema lasting 60-72 hours hold treatment and notify MD\par CONTINUE treatment until area re-pigments\par STOP treatment when area hyper-pigments\par \par Use goggles/all safety parameters.\par \par Patient tolerated treatment \par \par Today's treatment: 4/14/2023  Treatment # 8  500 mJ  264 cm2 \par \par History:\par  4/14/2023  Treatment # 8  500 mJ  264 cm2 \par 4/12/2023  Treatment # 7  450 mJ  267 cm2 \par 4/10/2023  Treatment # 6  400 mJ  264 cm2\par  4/7/2023  Treatment # 5  350 mJ  267 cm2 \par 4/5/2023  Treatment # 4  300 mJ  260 cm2 \par \par 4/3/2023  Treatment # 3  250 mJ  260 cm2 \par 3/29/2023  Treatment # 2  200 mJ  257 cm2 \par 3/27/2023  Treatment # 1  150 mJ  251 cm2 \par

## 2023-04-19 ENCOUNTER — APPOINTMENT (OUTPATIENT)
Dept: DERMATOLOGY | Facility: CLINIC | Age: 63
End: 2023-04-19
Payer: MEDICAID

## 2023-04-19 PROCEDURE — 96921 EXCIMER LSR PSRIASIS 250-500: CPT

## 2023-04-19 NOTE — DISCUSSION/SUMMARY
[FreeTextEntry1] : Mr. Kennedy  is a 62 year old Male presenting today for excimer laser treatment for Vitiligo as prescribed by Dr. Chi Hatfield. Ordered protocol is outlined as below.\par \par Starting dose:\par Periocular: 100 mJ/cm2\par Scalp, Face, Ear, Neck, Axilla, Bikini: 150 mJ/cm2\par Trunk, Arms, Legs: 200 mJ/cm2\par Hands & Feet: 300 mJ/cm2\par Maintenance dose:\par No Erythema or Erythema lasting <24 hours increase by 50mj\par Erythema lasting 24-48 hours keep same fluence\par Erythema lasting 48-60 hours decrease by 50mj\par Erythema lasting 60-72 hours hold treatment and notify MD\par CONTINUE treatment until area re-pigments\par STOP treatment when area hyper-pigments\par \par Use goggles/all safety parameters.\par \par Patient tolerated treatment \par \par Today's treatment: 4/19/2023  Treatment # 8  600 mJ  260 cm2 \par \par History:\par  4/17/2023  Treatment # 8  550 mJ  264 cm2 \par  4/14/2023  Treatment # 8  500 mJ  264 cm2 \par 4/12/2023  Treatment # 7  450 mJ  267 cm2 \par 4/10/2023  Treatment # 6  400 mJ  264 cm2\par  4/7/2023  Treatment # 5  350 mJ  267 cm2 \par 4/5/2023  Treatment # 4  300 mJ  260 cm2 \par \par 4/3/2023  Treatment # 3  250 mJ  260 cm2 \par 3/29/2023  Treatment # 2  200 mJ  257 cm2 \par 3/27/2023  Treatment # 1  150 mJ  251 cm2 \par

## 2023-04-21 ENCOUNTER — APPOINTMENT (OUTPATIENT)
Dept: DERMATOLOGY | Facility: CLINIC | Age: 63
End: 2023-04-21
Payer: MEDICAID

## 2023-04-21 DIAGNOSIS — I25.10 ATHEROSCLEROTIC HEART DISEASE OF NATIVE CORONARY ARTERY WITHOUT ANGINA PECTORIS: ICD-10-CM

## 2023-04-21 PROCEDURE — 96921 EXCIMER LSR PSRIASIS 250-500: CPT

## 2023-04-21 NOTE — DISCUSSION/SUMMARY
[FreeTextEntry1] : Mr. Kennedy  is a 62 year old Male presenting today for excimer laser treatment for Vitiligo as prescribed by Dr. Chi Hatfield. Ordered protocol is outlined as below.\par \par Starting dose:\par Periocular: 100 mJ/cm2\par Scalp, Face, Ear, Neck, Axilla, Bikini: 150 mJ/cm2\par Trunk, Arms, Legs: 200 mJ/cm2\par Hands & Feet: 300 mJ/cm2\par Maintenance dose:\par No Erythema or Erythema lasting <24 hours increase by 50mj\par Erythema lasting 24-48 hours keep same fluence\par Erythema lasting 48-60 hours decrease by 50mj\par Erythema lasting 60-72 hours hold treatment and notify MD\par CONTINUE treatment until area re-pigments\par STOP treatment when area hyper-pigments\par \par Use goggles/all safety parameters.\par \par Patient tolerated treatment \par \par Today's treatment: 4/21/2023  Treatment # 9  650 mJ  253 cm2 \par \par History:\par 4/19/2023  Treatment # 8  600 mJ  260 cm2 \par \par  4/17/2023  Treatment # 8  550 mJ  264 cm2 \par  4/14/2023  Treatment # 8  500 mJ  264 cm2 \par 4/12/2023  Treatment # 7  450 mJ  267 cm2 \par 4/10/2023  Treatment # 6  400 mJ  264 cm2\par  4/7/2023  Treatment # 5  350 mJ  267 cm2 \par 4/5/2023  Treatment # 4  300 mJ  260 cm2 \par \par 4/3/2023  Treatment # 3  250 mJ  260 cm2 \par 3/29/2023  Treatment # 2  200 mJ  257 cm2 \par 3/27/2023  Treatment # 1  150 mJ  251 cm2 \par

## 2023-04-24 ENCOUNTER — APPOINTMENT (OUTPATIENT)
Dept: DERMATOLOGY | Facility: CLINIC | Age: 63
End: 2023-04-24
Payer: MEDICAID

## 2023-04-24 PROCEDURE — 96921 EXCIMER LSR PSRIASIS 250-500: CPT

## 2023-04-24 NOTE — DISCUSSION/SUMMARY
[FreeTextEntry1] : Mr. Kennedy  is a 62 year old Male presenting today for excimer laser treatment for Vitiligo as prescribed by Dr. Chi Hatfield. Ordered protocol is outlined as below.\par \par Starting dose:\par Periocular: 100 mJ/cm2\par Scalp, Face, Ear, Neck, Axilla, Bikini: 150 mJ/cm2\par Trunk, Arms, Legs: 200 mJ/cm2\par Hands & Feet: 300 mJ/cm2\par Maintenance dose:\par No Erythema or Erythema lasting <24 hours increase by 50mj\par Erythema lasting 24-48 hours keep same fluence\par Erythema lasting 48-60 hours decrease by 50mj\par Erythema lasting 60-72 hours hold treatment and notify MD\par CONTINUE treatment until area re-pigments\par STOP treatment when area hyper-pigments\par \par Use goggles/all safety parameters.\par \par Patient tolerated treatment \par \par Today's treatment: 4/24/2023  Treatment # 10  700 mJ  253 cm2 \par \par History:\par  4/21/2023  Treatment # 9  650 mJ  253 cm2 \par 4/19/2023  Treatment # 8  600 mJ  260 cm2 \par \par  4/17/2023  Treatment # 8  550 mJ  264 cm2 \par  4/14/2023  Treatment # 8  500 mJ  264 cm2 \par 4/12/2023  Treatment # 7  450 mJ  267 cm2 \par 4/10/2023  Treatment # 6  400 mJ  264 cm2\par  4/7/2023  Treatment # 5  350 mJ  267 cm2 \par 4/5/2023  Treatment # 4  300 mJ  260 cm2 \par \par 4/3/2023  Treatment # 3  250 mJ  260 cm2 \par 3/29/2023  Treatment # 2  200 mJ  257 cm2 \par 3/27/2023  Treatment # 1  150 mJ  251 cm2 \par

## 2023-04-26 ENCOUNTER — APPOINTMENT (OUTPATIENT)
Dept: DERMATOLOGY | Facility: CLINIC | Age: 63
End: 2023-04-26
Payer: MEDICAID

## 2023-04-26 PROCEDURE — 96920 EXCIMER LSR PSRIASIS<250SQCM: CPT

## 2023-04-26 NOTE — DISCUSSION/SUMMARY
[FreeTextEntry1] : Mr. Kennedy  is a 62 year old Male presenting today for excimer laser treatment for Vitiligo as prescribed by Dr. Chi Hatfield. Ordered protocol is outlined as below.\par \par Starting dose:\par Periocular: 100 mJ/cm2\par Scalp, Face, Ear, Neck, Axilla, Bikini: 150 mJ/cm2\par Trunk, Arms, Legs: 200 mJ/cm2\par Hands & Feet: 300 mJ/cm2\par Maintenance dose:\par No Erythema or Erythema lasting <24 hours increase by 50mj\par Erythema lasting 24-48 hours keep same fluence\par Erythema lasting 48-60 hours decrease by 50mj\par Erythema lasting 60-72 hours hold treatment and notify MD\par CONTINUE treatment until area re-pigments\par STOP treatment when area hyper-pigments\par \par Use goggles/all safety parameters.\par \par Patient tolerated treatment \par \par Today's treatment: 4/26/2023  Treatment # 11  750 mJ  249 cm2 \par \par History:\par 4/24/2023  Treatment # 10  700 mJ  253 cm2 \par  4/21/2023  Treatment # 9  650 mJ  253 cm2 \par 4/19/2023  Treatment # 8  600 mJ  260 cm2 \par \par  4/17/2023  Treatment # 8  550 mJ  264 cm2 \par  4/14/2023  Treatment # 8  500 mJ  264 cm2 \par 4/12/2023  Treatment # 7  450 mJ  267 cm2 \par 4/10/2023  Treatment # 6  400 mJ  264 cm2\par  4/7/2023  Treatment # 5  350 mJ  267 cm2 \par 4/5/2023  Treatment # 4  300 mJ  260 cm2 \par \par 4/3/2023  Treatment # 3  250 mJ  260 cm2 \par 3/29/2023  Treatment # 2  200 mJ  257 cm2 \par 3/27/2023  Treatment # 1  150 mJ  251 cm2 \par

## 2023-04-28 ENCOUNTER — APPOINTMENT (OUTPATIENT)
Dept: DERMATOLOGY | Facility: CLINIC | Age: 63
End: 2023-04-28
Payer: MEDICAID

## 2023-04-28 PROCEDURE — 96920 EXCIMER LSR PSRIASIS<250SQCM: CPT

## 2023-04-28 PROCEDURE — 99214 OFFICE O/P EST MOD 30 MIN: CPT | Mod: 25

## 2023-04-28 NOTE — DISCUSSION/SUMMARY
[FreeTextEntry1] : Mr. Kennedy  is a 62 year old Male presenting today for excimer laser treatment for Vitiligo as prescribed by Dr. Chi Hatfield. Ordered protocol is outlined as below.\par \par Starting dose:\par Periocular: 100 mJ/cm2\par Scalp, Face, Ear, Neck, Axilla, Bikini: 150 mJ/cm2\par Trunk, Arms, Legs: 200 mJ/cm2\par Hands & Feet: 300 mJ/cm2\par Maintenance dose:\par No Erythema or Erythema lasting <24 hours increase by 50mj\par Erythema lasting 24-48 hours keep same fluence\par Erythema lasting 48-60 hours decrease by 50mj\par Erythema lasting 60-72 hours hold treatment and notify MD\par CONTINUE treatment until area re-pigments\par STOP treatment when area hyper-pigments\par \par Use goggles/all safety parameters.\par \par Patient tolerated treatment \par \par Today's treatment: 4/28/2023  Treatment # 12  800 mJ  240 cm2 \par \par History:\par 4/26/2023  Treatment # 11  750 mJ  249 cm2 \par \par 4/24/2023  Treatment # 10  700 mJ  253 cm2 \par  4/21/2023  Treatment # 9  650 mJ  253 cm2 \par 4/19/2023  Treatment # 8  600 mJ  260 cm2 \par \par  4/17/2023  Treatment # 8  550 mJ  264 cm2 \par  4/14/2023  Treatment # 8  500 mJ  264 cm2 \par 4/12/2023  Treatment # 7  450 mJ  267 cm2 \par 4/10/2023  Treatment # 6  400 mJ  264 cm2\par  4/7/2023  Treatment # 5  350 mJ  267 cm2 \par 4/5/2023  Treatment # 4  300 mJ  260 cm2 \par \par 4/3/2023  Treatment # 3  250 mJ  260 cm2 \par 3/29/2023  Treatment # 2  200 mJ  257 cm2 \par 3/27/2023  Treatment # 1  150 mJ  251 cm2 \par

## 2023-05-01 ENCOUNTER — NON-APPOINTMENT (OUTPATIENT)
Age: 63
End: 2023-05-01

## 2023-05-02 ENCOUNTER — OFFICE (OUTPATIENT)
Dept: URBAN - METROPOLITAN AREA CLINIC 27 | Facility: CLINIC | Age: 63
Setting detail: OPHTHALMOLOGY
End: 2023-05-02
Payer: COMMERCIAL

## 2023-05-02 DIAGNOSIS — H43.393: ICD-10-CM

## 2023-05-02 DIAGNOSIS — H43.813: ICD-10-CM

## 2023-05-02 DIAGNOSIS — H01.135: ICD-10-CM

## 2023-05-02 DIAGNOSIS — H01.132: ICD-10-CM

## 2023-05-02 PROCEDURE — 92014 COMPRE OPH EXAM EST PT 1/>: CPT | Performed by: OPHTHALMOLOGY

## 2023-05-02 ASSESSMENT — REFRACTION_MANIFEST
OD_AXIS: 015
OD_CYLINDER: +0.75
OS_VA1: 20/40
OS_CYLINDER: +1.00
OS_AXIS: 015
OD_SPHERE: PLANO
OS_SPHERE: -4.00
OD_VA1: 20/20

## 2023-05-02 ASSESSMENT — REFRACTION_AUTOREFRACTION
OD_SPHERE: -0.25
OS_CYLINDER: +0.75
OD_AXIS: 010
OS_AXIS: 170
OD_CYLINDER: +0.75
OS_SPHERE: -0.50

## 2023-05-02 ASSESSMENT — REFRACTION_CURRENTRX
OD_CYLINDER: +0.50
OD_AXIS: 180
OD_CYLINDER: +0.75
OD_VPRISM_DIRECTION: PROGS
OS_ADD: +2.25
OD_AXIS: 017
OS_OVR_VA: 20/
OD_OVR_VA: 20/
OS_SPHERE: -0.50
OS_CYLINDER: +0.75
OS_AXIS: 172
OS_SPHERE: PLANO
OD_SPHERE: +1.50
OS_OVR_VA: 20/
OD_ADD: +2.25
OD_SPHERE: -0.25
OS_VPRISM_DIRECTION: PROGS
OS_CYLINDER: SPH
OD_OVR_VA: 20/

## 2023-05-02 ASSESSMENT — SPHEQUIV_DERIVED
OD_SPHEQUIV: 0.125
OS_SPHEQUIV: -0.125
OS_SPHEQUIV: -3.5

## 2023-05-02 ASSESSMENT — KERATOMETRY
OS_K2POWER_DIOPTERS: 40.75
OD_K1POWER_DIOPTERS: 40.50
OS_K1POWER_DIOPTERS: 40.75
OD_K2POWER_DIOPTERS: 41.25
OS_AXISANGLE_DEGREES: 090
METHOD_AUTO_MANUAL: AUTO
OD_AXISANGLE_DEGREES: 073

## 2023-05-02 ASSESSMENT — LID EXAM ASSESSMENTS
OS_EDEMA: LLL 1+
OD_EDEMA: RLL 1+

## 2023-05-02 ASSESSMENT — TONOMETRY
OD_IOP_MMHG: 18
OS_IOP_MMHG: 18

## 2023-05-02 ASSESSMENT — AXIALLENGTH_DERIVED
OS_AL: 26.2312
OS_AL: 24.7005
OD_AL: 24.5443

## 2023-05-02 ASSESSMENT — VISUAL ACUITY
OD_BCVA: 20/25-1
OS_BCVA: 20/20

## 2023-05-02 ASSESSMENT — CONFRONTATIONAL VISUAL FIELD TEST (CVF)
OD_FINDINGS: FULL
OS_FINDINGS: FULL

## 2023-05-03 ENCOUNTER — APPOINTMENT (OUTPATIENT)
Dept: DERMATOLOGY | Facility: CLINIC | Age: 63
End: 2023-05-03
Payer: MEDICAID

## 2023-05-03 PROCEDURE — 96920 EXCIMER LSR PSRIASIS<250SQCM: CPT

## 2023-05-04 NOTE — DISCUSSION/SUMMARY
[FreeTextEntry1] : Mr. Kennedy  is a 62 year old Male presenting today for excimer laser treatment for Vitiligo as prescribed by Dr. Chi Hatfield. Ordered protocol is outlined as below.\par \par Starting dose:\par Periocular: 100 mJ/cm2\par Scalp, Face, Ear, Neck, Axilla, Bikini: 150 mJ/cm2\par Trunk, Arms, Legs: 200 mJ/cm2\par Hands & Feet: 300 mJ/cm2\par Maintenance dose:\par No Erythema or Erythema lasting <24 hours increase by 50mj\par Erythema lasting 24-48 hours keep same fluence\par Erythema lasting 48-60 hours decrease by 50mj\par Erythema lasting 60-72 hours hold treatment and notify MD\par CONTINUE treatment until area re-pigments\par STOP treatment when area hyper-pigments\par \par Use goggles/all safety parameters.\par \par Patient tolerated treatment \par \par Today's treatment: 5/3/2023  Treatment # 13  800 mJ  239 cm2 \par \par History:\par  4/28/2023  Treatment # 12  800 mJ  240 cm2 \par 4/26/2023  Treatment # 11  750 mJ  249 cm2 \par \par 4/24/2023  Treatment # 10  700 mJ  253 cm2 \par  4/21/2023  Treatment # 9  650 mJ  253 cm2 \par 4/19/2023  Treatment # 8  600 mJ  260 cm2 \par \par  4/17/2023  Treatment # 8  550 mJ  264 cm2 \par  4/14/2023  Treatment # 8  500 mJ  264 cm2 \par 4/12/2023  Treatment # 7  450 mJ  267 cm2 \par 4/10/2023  Treatment # 6  400 mJ  264 cm2\par  4/7/2023  Treatment # 5  350 mJ  267 cm2 \par 4/5/2023  Treatment # 4  300 mJ  260 cm2 \par \par 4/3/2023  Treatment # 3  250 mJ  260 cm2 \par 3/29/2023  Treatment # 2  200 mJ  257 cm2 \par 3/27/2023  Treatment # 1  150 mJ  251 cm2 \par

## 2023-05-08 ENCOUNTER — APPOINTMENT (OUTPATIENT)
Dept: DERMATOLOGY | Facility: CLINIC | Age: 63
End: 2023-05-08
Payer: MEDICAID

## 2023-05-08 PROCEDURE — 96920 EXCIMER LSR PSRIASIS<250SQCM: CPT

## 2023-05-08 NOTE — DISCUSSION/SUMMARY
[FreeTextEntry1] : Mr. Kennedy  is a 62 year old Male presenting today for excimer laser treatment for Vitiligo as prescribed by Dr. Chi Hatfield. Ordered protocol is outlined as below.\par \par Starting dose:\par Periocular: 100 mJ/cm2\par Scalp, Face, Ear, Neck, Axilla, Bikini: 150 mJ/cm2\par Trunk, Arms, Legs: 200 mJ/cm2\par Hands & Feet: 300 mJ/cm2\par Maintenance dose:\par No Erythema or Erythema lasting <24 hours increase by 50mj\par Erythema lasting 24-48 hours keep same fluence\par Erythema lasting 48-60 hours decrease by 50mj\par Erythema lasting 60-72 hours hold treatment and notify MD\par CONTINUE treatment until area re-pigments\par STOP treatment when area hyper-pigments\par \par Use goggles/all safety parameters.\par \par Patient tolerated treatment \par \par Today's treatment: 5/8/2023  Treatment # 14  900 mJ  239 cm2 \par \par History:\par  5/3/2023  Treatment # 13  800 mJ  239 cm2 \par  4/28/2023  Treatment # 12  800 mJ  240 cm2 \par 4/26/2023  Treatment # 11  750 mJ  249 cm2 \par \par 4/24/2023  Treatment # 10  700 mJ  253 cm2 \par  4/21/2023  Treatment # 9  650 mJ  253 cm2 \par 4/19/2023  Treatment # 8  600 mJ  260 cm2 \par \par  4/17/2023  Treatment # 8  550 mJ  264 cm2 \par  4/14/2023  Treatment # 8  500 mJ  264 cm2 \par 4/12/2023  Treatment # 7  450 mJ  267 cm2 \par 4/10/2023  Treatment # 6  400 mJ  264 cm2\par  4/7/2023  Treatment # 5  350 mJ  267 cm2 \par 4/5/2023  Treatment # 4  300 mJ  260 cm2 \par \par 4/3/2023  Treatment # 3  250 mJ  260 cm2 \par 3/29/2023  Treatment # 2  200 mJ  257 cm2 \par 3/27/2023  Treatment # 1  150 mJ  251 cm2 \par

## 2023-05-10 ENCOUNTER — NON-APPOINTMENT (OUTPATIENT)
Age: 63
End: 2023-05-10

## 2023-05-10 ENCOUNTER — APPOINTMENT (OUTPATIENT)
Dept: DERMATOLOGY | Facility: CLINIC | Age: 63
End: 2023-05-10
Payer: MEDICAID

## 2023-05-10 ENCOUNTER — APPOINTMENT (OUTPATIENT)
Dept: CARDIOLOGY | Facility: CLINIC | Age: 63
End: 2023-05-10
Payer: MEDICAID

## 2023-05-10 VITALS — SYSTOLIC BLOOD PRESSURE: 135 MMHG | HEART RATE: 83 BPM | DIASTOLIC BLOOD PRESSURE: 79 MMHG | OXYGEN SATURATION: 98 %

## 2023-05-10 PROCEDURE — 93000 ELECTROCARDIOGRAM COMPLETE: CPT

## 2023-05-10 PROCEDURE — 96920 EXCIMER LSR PSRIASIS<250SQCM: CPT

## 2023-05-10 PROCEDURE — 99214 OFFICE O/P EST MOD 30 MIN: CPT | Mod: 25

## 2023-05-10 NOTE — DISCUSSION/SUMMARY
[FreeTextEntry1] : Mr. Kennedy  is a 62 year old Male presenting today for excimer laser treatment for Vitiligo as prescribed by Dr. Chi Hatfield. Ordered protocol is outlined as below.\par \par Starting dose:\par Periocular: 100 mJ/cm2\par Scalp, Face, Ear, Neck, Axilla, Bikini: 150 mJ/cm2\par Trunk, Arms, Legs: 200 mJ/cm2\par Hands & Feet: 300 mJ/cm2\par Maintenance dose:\par No Erythema or Erythema lasting <24 hours increase by 50mj\par Erythema lasting 24-48 hours keep same fluence\par Erythema lasting 48-60 hours decrease by 50mj\par Erythema lasting 60-72 hours hold treatment and notify MD\par CONTINUE treatment until area re-pigments\par STOP treatment when area hyper-pigments\par \par Use goggles/all safety parameters.\par \par Patient tolerated treatment \par \par Today's treatment: 5/10/2023  Treatment # 15  950 mJ  235cm2 \par \par History:\par 5/8/2023  Treatment # 14  900 mJ  239 cm2 \par \par  5/3/2023  Treatment # 13  800 mJ  239 cm2 \par  4/28/2023  Treatment # 12  800 mJ  240 cm2 \par 4/26/2023  Treatment # 11  750 mJ  249 cm2 \par \par 4/24/2023  Treatment # 10  700 mJ  253 cm2 \par  4/21/2023  Treatment # 9  650 mJ  253 cm2 \par 4/19/2023  Treatment # 8  600 mJ  260 cm2 \par \par  4/17/2023  Treatment # 8  550 mJ  264 cm2 \par  4/14/2023  Treatment # 8  500 mJ  264 cm2 \par 4/12/2023  Treatment # 7  450 mJ  267 cm2 \par 4/10/2023  Treatment # 6  400 mJ  264 cm2\par  4/7/2023  Treatment # 5  350 mJ  267 cm2 \par 4/5/2023  Treatment # 4  300 mJ  260 cm2 \par \par 4/3/2023  Treatment # 3  250 mJ  260 cm2 \par 3/29/2023  Treatment # 2  200 mJ  257 cm2 \par 3/27/2023  Treatment # 1  150 mJ  251 cm2 \par

## 2023-05-12 ENCOUNTER — APPOINTMENT (OUTPATIENT)
Dept: DERMATOLOGY | Facility: CLINIC | Age: 63
End: 2023-05-12
Payer: MEDICAID

## 2023-05-12 PROCEDURE — 96920 EXCIMER LSR PSRIASIS<250SQCM: CPT

## 2023-05-12 NOTE — DISCUSSION/SUMMARY
[FreeTextEntry1] : Mr. Kennedy  is a 62 year old Male presenting today for excimer laser treatment for Vitiligo as prescribed by Dr. Chi Hatfield. Ordered protocol is outlined as below.\par \par Starting dose:\par Periocular: 100 mJ/cm2\par Scalp, Face, Ear, Neck, Axilla, Bikini: 150 mJ/cm2\par Trunk, Arms, Legs: 200 mJ/cm2\par Hands & Feet: 300 mJ/cm2\par Maintenance dose:\par No Erythema or Erythema lasting <24 hours increase by 50mj\par Erythema lasting 24-48 hours keep same fluence\par Erythema lasting 48-60 hours decrease by 50mj\par Erythema lasting 60-72 hours hold treatment and notify MD\par CONTINUE treatment until area re-pigments\par STOP treatment when area hyper-pigments\par \par Use goggles/all safety parameters.\par \par Patient tolerated treatment \par \par Today's treatment: 5/12/2023  Treatment # 16  1000 mJ  222cm2 \par \par History:\par 5/10/2023  Treatment # 15  950 mJ  235cm2 \par 5/8/2023  Treatment # 14  900 mJ  239 cm2 \par \par  5/3/2023  Treatment # 13  800 mJ  239 cm2 \par  4/28/2023  Treatment # 12  800 mJ  240 cm2 \par 4/26/2023  Treatment # 11  750 mJ  249 cm2 \par \par 4/24/2023  Treatment # 10  700 mJ  253 cm2 \par  4/21/2023  Treatment # 9  650 mJ  253 cm2 \par 4/19/2023  Treatment # 8  600 mJ  260 cm2 \par \par  4/17/2023  Treatment # 8  550 mJ  264 cm2 \par  4/14/2023  Treatment # 8  500 mJ  264 cm2 \par 4/12/2023  Treatment # 7  450 mJ  267 cm2 \par 4/10/2023  Treatment # 6  400 mJ  264 cm2\par  4/7/2023  Treatment # 5  350 mJ  267 cm2 \par 4/5/2023  Treatment # 4  300 mJ  260 cm2 \par \par 4/3/2023  Treatment # 3  250 mJ  260 cm2 \par 3/29/2023  Treatment # 2  200 mJ  257 cm2 \par 3/27/2023  Treatment # 1  150 mJ  251 cm2 \par

## 2023-05-15 ENCOUNTER — APPOINTMENT (OUTPATIENT)
Dept: DERMATOLOGY | Facility: CLINIC | Age: 63
End: 2023-05-15
Payer: MEDICAID

## 2023-05-15 PROCEDURE — 96920 EXCIMER LSR PSRIASIS<250SQCM: CPT

## 2023-05-15 NOTE — DISCUSSION/SUMMARY
[FreeTextEntry1] : Mr. Kennedy  is a 62 year old Male presenting today for excimer laser treatment for Vitiligo as prescribed by Dr. Chi Hatfield. Ordered protocol is outlined as below.\par \par Starting dose:\par Periocular: 100 mJ/cm2\par Scalp, Face, Ear, Neck, Axilla, Bikini: 150 mJ/cm2\par Trunk, Arms, Legs: 200 mJ/cm2\par Hands & Feet: 300 mJ/cm2\par Maintenance dose:\par No Erythema or Erythema lasting <24 hours increase by 50mj\par Erythema lasting 24-48 hours keep same fluence\par Erythema lasting 48-60 hours decrease by 50mj\par Erythema lasting 60-72 hours hold treatment and notify MD\par CONTINUE treatment until area re-pigments\par STOP treatment when area hyper-pigments\par \par Use goggles/all safety parameters.\par \par Patient tolerated treatment \par \par Today's treatment: 5/15/2023  Treatment # 17  1000 mJ  230cm2 \par \par History:\par 5/12/2023  Treatment # 16  1000 mJ  222cm2 \par 5/10/2023  Treatment # 15  950 mJ  235cm2 \par 5/8/2023  Treatment # 14  900 mJ  239 cm2 \par \par  5/3/2023  Treatment # 13  800 mJ  239 cm2 \par  4/28/2023  Treatment # 12  800 mJ  240 cm2 \par 4/26/2023  Treatment # 11  750 mJ  249 cm2 \par \par 4/24/2023  Treatment # 10  700 mJ  253 cm2 \par  4/21/2023  Treatment # 9  650 mJ  253 cm2 \par 4/19/2023  Treatment # 8  600 mJ  260 cm2 \par \par  4/17/2023  Treatment # 8  550 mJ  264 cm2 \par  4/14/2023  Treatment # 8  500 mJ  264 cm2 \par 4/12/2023  Treatment # 7  450 mJ  267 cm2 \par 4/10/2023  Treatment # 6  400 mJ  264 cm2\par  4/7/2023  Treatment # 5  350 mJ  267 cm2 \par 4/5/2023  Treatment # 4  300 mJ  260 cm2 \par \par 4/3/2023  Treatment # 3  250 mJ  260 cm2 \par 3/29/2023  Treatment # 2  200 mJ  257 cm2 \par 3/27/2023  Treatment # 1  150 mJ  251 cm2 \par

## 2023-05-17 ENCOUNTER — APPOINTMENT (OUTPATIENT)
Dept: DERMATOLOGY | Facility: CLINIC | Age: 63
End: 2023-05-17
Payer: MEDICAID

## 2023-05-17 PROCEDURE — 96920 EXCIMER LSR PSRIASIS<250SQCM: CPT

## 2023-05-17 NOTE — DISCUSSION/SUMMARY
[FreeTextEntry1] : Mr. Kennedy  is a 62 year old Male presenting today for excimer laser treatment for Vitiligo as prescribed by Dr. Chi Hatfield. Ordered protocol is outlined as below.\par \par Starting dose:\par Periocular: 100 mJ/cm2\par Scalp, Face, Ear, Neck, Axilla, Bikini: 150 mJ/cm2\par Trunk, Arms, Legs: 200 mJ/cm2\par Hands & Feet: 300 mJ/cm2\par Maintenance dose:\par No Erythema or Erythema lasting <24 hours increase by 50mj\par Erythema lasting 24-48 hours keep same fluence\par Erythema lasting 48-60 hours decrease by 50mj\par Erythema lasting 60-72 hours hold treatment and notify MD\par CONTINUE treatment until area re-pigments\par STOP treatment when area hyper-pigments\par \par Use goggles/all safety parameters.\par \par Patient tolerated treatment \par \par Today's treatment: 5/15/2023  Treatment # 17  1000 mJ  224cm2 \par \par History:\par 5/15/2023  Treatment # 17  1000 mJ  230cm2 \par 5/12/2023  Treatment # 16  1000 mJ  222cm2 \par 5/10/2023  Treatment # 15  950 mJ  235cm2 \par 5/8/2023  Treatment # 14  900 mJ  239 cm2 \par \par  5/3/2023  Treatment # 13  800 mJ  239 cm2 \par  4/28/2023  Treatment # 12  800 mJ  240 cm2 \par 4/26/2023  Treatment # 11  750 mJ  249 cm2 \par \par 4/24/2023  Treatment # 10  700 mJ  253 cm2 \par  4/21/2023  Treatment # 9  650 mJ  253 cm2 \par 4/19/2023  Treatment # 8  600 mJ  260 cm2 \par \par  4/17/2023  Treatment # 8  550 mJ  264 cm2 \par  4/14/2023  Treatment # 8  500 mJ  264 cm2 \par 4/12/2023  Treatment # 7  450 mJ  267 cm2 \par 4/10/2023  Treatment # 6  400 mJ  264 cm2\par  4/7/2023  Treatment # 5  350 mJ  267 cm2 \par 4/5/2023  Treatment # 4  300 mJ  260 cm2 \par \par 4/3/2023  Treatment # 3  250 mJ  260 cm2 \par 3/29/2023  Treatment # 2  200 mJ  257 cm2 \par 3/27/2023  Treatment # 1  150 mJ  251 cm2 \par

## 2023-05-19 ENCOUNTER — APPOINTMENT (OUTPATIENT)
Dept: DERMATOLOGY | Facility: CLINIC | Age: 63
End: 2023-05-19
Payer: MEDICAID

## 2023-05-19 PROCEDURE — 96920 EXCIMER LSR PSRIASIS<250SQCM: CPT

## 2023-05-19 NOTE — DISCUSSION/SUMMARY
[FreeTextEntry1] : Mr. Kennedy  is a 62 year old Male presenting today for excimer laser treatment for Vitiligo as prescribed by Dr. Chi Hatfield. Ordered protocol is outlined as below.\par \par Starting dose:\par Periocular: 100 mJ/cm2\par Scalp, Face, Ear, Neck, Axilla, Bikini: 150 mJ/cm2\par Trunk, Arms, Legs: 200 mJ/cm2\par Hands & Feet: 300 mJ/cm2\par Maintenance dose:\par No Erythema or Erythema lasting <24 hours increase by 50mj\par Erythema lasting 24-48 hours keep same fluence\par Erythema lasting 48-60 hours decrease by 50mj\par Erythema lasting 60-72 hours hold treatment and notify MD\par CONTINUE treatment until area re-pigments\par STOP treatment when area hyper-pigments\par \par Use goggles/all safety parameters.\par \par Patient tolerated treatment \par \par Today's treatment: 5/19/2023  Treatment # 17  1000 mJ  227cm2 \par \par History:\par 5/15/2023  Treatment # 17  1000 mJ  224cm2\par 5/15/2023  Treatment # 17  1000 mJ  230cm2 \par 5/12/2023  Treatment # 16  1000 mJ  222cm2 \par 5/10/2023  Treatment # 15  950 mJ  235cm2 \par 5/8/2023  Treatment # 14  900 mJ  239 cm2 \par \par  5/3/2023  Treatment # 13  800 mJ  239 cm2 \par  4/28/2023  Treatment # 12  800 mJ  240 cm2 \par 4/26/2023  Treatment # 11  750 mJ  249 cm2 \par \par 4/24/2023  Treatment # 10  700 mJ  253 cm2 \par  4/21/2023  Treatment # 9  650 mJ  253 cm2 \par 4/19/2023  Treatment # 8  600 mJ  260 cm2 \par \par  4/17/2023  Treatment # 8  550 mJ  264 cm2 \par  4/14/2023  Treatment # 8  500 mJ  264 cm2 \par 4/12/2023  Treatment # 7  450 mJ  267 cm2 \par 4/10/2023  Treatment # 6  400 mJ  264 cm2\par  4/7/2023  Treatment # 5  350 mJ  267 cm2 \par 4/5/2023  Treatment # 4  300 mJ  260 cm2 \par \par 4/3/2023  Treatment # 3  250 mJ  260 cm2 \par 3/29/2023  Treatment # 2  200 mJ  257 cm2 \par 3/27/2023  Treatment # 1  150 mJ  251 cm2 \par

## 2023-05-22 ENCOUNTER — APPOINTMENT (OUTPATIENT)
Dept: DERMATOLOGY | Facility: CLINIC | Age: 63
End: 2023-05-22
Payer: MEDICAID

## 2023-05-22 PROCEDURE — 96920 EXCIMER LSR PSRIASIS<250SQCM: CPT

## 2023-05-22 NOTE — DISCUSSION/SUMMARY
[FreeTextEntry1] : Mr. Kennedy  is a 62 year old Male presenting today for excimer laser treatment for Vitiligo as prescribed by Dr. Chi Hatfield. Ordered protocol is outlined as below.\par \par Starting dose:\par Periocular: 100 mJ/cm2\par Scalp, Face, Ear, Neck, Axilla, Bikini: 150 mJ/cm2\par Trunk, Arms, Legs: 200 mJ/cm2\par Hands & Feet: 300 mJ/cm2\par Maintenance dose:\par No Erythema or Erythema lasting <24 hours increase by 50mj\par Erythema lasting 24-48 hours keep same fluence\par Erythema lasting 48-60 hours decrease by 50mj\par Erythema lasting 60-72 hours hold treatment and notify MD\par CONTINUE treatment until area re-pigments\par STOP treatment when area hyper-pigments\par \par Use goggles/all safety parameters.\par \par Patient tolerated treatment \par \par Today's treatment: 5/22/2023  Treatment # 18  1050 mJ  224cm2 \par \par History:\par 5/19/2023  Treatment # 17  1000 mJ  227cm2 \par \par 5/15/2023  Treatment # 17  1000 mJ  224cm2\par 5/15/2023  Treatment # 17  1000 mJ  230cm2 \par 5/12/2023  Treatment # 16  1000 mJ  222cm2 \par 5/10/2023  Treatment # 15  950 mJ  235cm2 \par 5/8/2023  Treatment # 14  900 mJ  239 cm2 \par \par  5/3/2023  Treatment # 13  800 mJ  239 cm2 \par  4/28/2023  Treatment # 12  800 mJ  240 cm2 \par 4/26/2023  Treatment # 11  750 mJ  249 cm2 \par \par 4/24/2023  Treatment # 10  700 mJ  253 cm2 \par  4/21/2023  Treatment # 9  650 mJ  253 cm2 \par 4/19/2023  Treatment # 8  600 mJ  260 cm2 \par \par  4/17/2023  Treatment # 8  550 mJ  264 cm2 \par  4/14/2023  Treatment # 8  500 mJ  264 cm2 \par 4/12/2023  Treatment # 7  450 mJ  267 cm2 \par 4/10/2023  Treatment # 6  400 mJ  264 cm2\par  4/7/2023  Treatment # 5  350 mJ  267 cm2 \par 4/5/2023  Treatment # 4  300 mJ  260 cm2 \par \par 4/3/2023  Treatment # 3  250 mJ  260 cm2 \par 3/29/2023  Treatment # 2  200 mJ  257 cm2 \par 3/27/2023  Treatment # 1  150 mJ  251 cm2 \par

## 2023-05-23 ENCOUNTER — OUTPATIENT (OUTPATIENT)
Dept: OUTPATIENT SERVICES | Facility: HOSPITAL | Age: 63
LOS: 1 days | End: 2023-05-23

## 2023-05-23 ENCOUNTER — APPOINTMENT (OUTPATIENT)
Dept: INTERNAL MEDICINE | Facility: CLINIC | Age: 63
End: 2023-05-23

## 2023-05-23 ENCOUNTER — NON-APPOINTMENT (OUTPATIENT)
Age: 63
End: 2023-05-23

## 2023-05-23 DIAGNOSIS — Z98.890 OTHER SPECIFIED POSTPROCEDURAL STATES: Chronic | ICD-10-CM

## 2023-05-23 DIAGNOSIS — Z96.641 PRESENCE OF RIGHT ARTIFICIAL HIP JOINT: Chronic | ICD-10-CM

## 2023-05-23 NOTE — H&P ADULT - FAMILY HISTORY
Father  Still living? Unknown  Family history of MI (myocardial infarction), Age at diagnosis: 51-60
n/a

## 2023-05-24 ENCOUNTER — APPOINTMENT (OUTPATIENT)
Dept: DERMATOLOGY | Facility: CLINIC | Age: 63
End: 2023-05-24
Payer: MEDICAID

## 2023-05-24 PROCEDURE — 96920 EXCIMER LSR PSRIASIS<250SQCM: CPT

## 2023-05-24 NOTE — DISCUSSION/SUMMARY
[FreeTextEntry1] : Mr. Kennedy  is a 62 year old Male presenting today for excimer laser treatment for Vitiligo as prescribed by Dr. Chi Hatfield. Ordered protocol is outlined as below.\par \par Starting dose:\par Periocular: 100 mJ/cm2\par Scalp, Face, Ear, Neck, Axilla, Bikini: 150 mJ/cm2\par Trunk, Arms, Legs: 200 mJ/cm2\par Hands & Feet: 300 mJ/cm2\par Maintenance dose:\par No Erythema or Erythema lasting <24 hours increase by 50mj\par Erythema lasting 24-48 hours keep same fluence\par Erythema lasting 48-60 hours decrease by 50mj\par Erythema lasting 60-72 hours hold treatment and notify MD\par CONTINUE treatment until area re-pigments\par STOP treatment when area hyper-pigments\par \par Use goggles/all safety parameters.\par \par Patient tolerated treatment \par \par Today's treatment: 5/24/2023  Treatment # 19 1100 mJ  230cm2 \par \par History:\par 5/22/2023  Treatment # 18  1050 mJ  224cm2 \par 5/19/2023  Treatment # 17  1000 mJ  227cm2 \par \par 5/15/2023  Treatment # 17  1000 mJ  224cm2\par 5/15/2023  Treatment # 17  1000 mJ  230cm2 \par 5/12/2023  Treatment # 16  1000 mJ  222cm2 \par 5/10/2023  Treatment # 15  950 mJ  235cm2 \par 5/8/2023  Treatment # 14  900 mJ  239 cm2 \par \par  5/3/2023  Treatment # 13  800 mJ  239 cm2 \par  4/28/2023  Treatment # 12  800 mJ  240 cm2 \par 4/26/2023  Treatment # 11  750 mJ  249 cm2 \par \par 4/24/2023  Treatment # 10  700 mJ  253 cm2 \par  4/21/2023  Treatment # 9  650 mJ  253 cm2 \par 4/19/2023  Treatment # 8  600 mJ  260 cm2 \par \par  4/17/2023  Treatment # 8  550 mJ  264 cm2 \par  4/14/2023  Treatment # 8  500 mJ  264 cm2 \par 4/12/2023  Treatment # 7  450 mJ  267 cm2 \par 4/10/2023  Treatment # 6  400 mJ  264 cm2\par  4/7/2023  Treatment # 5  350 mJ  267 cm2 \par 4/5/2023  Treatment # 4  300 mJ  260 cm2 \par \par 4/3/2023  Treatment # 3  250 mJ  260 cm2 \par 3/29/2023  Treatment # 2  200 mJ  257 cm2 \par 3/27/2023  Treatment # 1  150 mJ  251 cm2 \par

## 2023-05-26 ENCOUNTER — APPOINTMENT (OUTPATIENT)
Dept: DERMATOLOGY | Facility: CLINIC | Age: 63
End: 2023-05-26

## 2023-05-31 ENCOUNTER — APPOINTMENT (OUTPATIENT)
Dept: DERMATOLOGY | Facility: CLINIC | Age: 63
End: 2023-05-31
Payer: MEDICAID

## 2023-05-31 PROCEDURE — 96920 EXCIMER LSR PSRIASIS<250SQCM: CPT

## 2023-05-31 NOTE — DISCUSSION/SUMMARY
[FreeTextEntry1] : Mr. Kennedy  is a 62 year old Male presenting today for excimer laser treatment for Vitiligo as prescribed by Dr. Chi Hatfield. Ordered protocol is outlined as below.\par \par Starting dose:\par Periocular: 100 mJ/cm2\par Scalp, Face, Ear, Neck, Axilla, Bikini: 150 mJ/cm2\par Trunk, Arms, Legs: 200 mJ/cm2\par Hands & Feet: 300 mJ/cm2\par Maintenance dose:\par No Erythema or Erythema lasting <24 hours increase by 50mj\par Erythema lasting 24-48 hours keep same fluence\par Erythema lasting 48-60 hours decrease by 50mj\par Erythema lasting 60-72 hours hold treatment and notify MD\par CONTINUE treatment until area re-pigments\par STOP treatment when area hyper-pigments\par \par Use goggles/all safety parameters.\par \par Patient tolerated treatment \par \par Today's treatment: 5/31/2023  Treatment # 20 1100 mJ  230cm2 \par \par History:\par  5/24/2023  Treatment # 19 1100 mJ  230cm2 \par 5/22/2023  Treatment # 18  1050 mJ  224cm2 \par 5/19/2023  Treatment # 17  1000 mJ  227cm2 \par \par 5/15/2023  Treatment # 17  1000 mJ  224cm2\par 5/15/2023  Treatment # 17  1000 mJ  230cm2 \par 5/12/2023  Treatment # 16  1000 mJ  222cm2 \par 5/10/2023  Treatment # 15  950 mJ  235cm2 \par 5/8/2023  Treatment # 14  900 mJ  239 cm2 \par \par  5/3/2023  Treatment # 13  800 mJ  239 cm2 \par  4/28/2023  Treatment # 12  800 mJ  240 cm2 \par 4/26/2023  Treatment # 11  750 mJ  249 cm2 \par \par 4/24/2023  Treatment # 10  700 mJ  253 cm2 \par  4/21/2023  Treatment # 9  650 mJ  253 cm2 \par 4/19/2023  Treatment # 8  600 mJ  260 cm2 \par \par  4/17/2023  Treatment # 8  550 mJ  264 cm2 \par  4/14/2023  Treatment # 8  500 mJ  264 cm2 \par 4/12/2023  Treatment # 7  450 mJ  267 cm2 \par 4/10/2023  Treatment # 6  400 mJ  264 cm2\par  4/7/2023  Treatment # 5  350 mJ  267 cm2 \par 4/5/2023  Treatment # 4  300 mJ  260 cm2 \par \par 4/3/2023  Treatment # 3  250 mJ  260 cm2 \par 3/29/2023  Treatment # 2  200 mJ  257 cm2 \par 3/27/2023  Treatment # 1  150 mJ  251 cm2 \par

## 2023-06-02 ENCOUNTER — APPOINTMENT (OUTPATIENT)
Dept: DERMATOLOGY | Facility: CLINIC | Age: 63
End: 2023-06-02
Payer: MEDICAID

## 2023-06-02 PROCEDURE — 96920 EXCIMER LSR PSRIASIS<250SQCM: CPT

## 2023-06-02 NOTE — DISCUSSION/SUMMARY
[FreeTextEntry1] : Mr. Kennedy  is a 62 year old Male presenting today for excimer laser treatment for Vitiligo as prescribed by Dr. Chi Hatfield. Ordered protocol is outlined as below.\par \par Starting dose:\par Periocular: 100 mJ/cm2\par Scalp, Face, Ear, Neck, Axilla, Bikini: 150 mJ/cm2\par Trunk, Arms, Legs: 200 mJ/cm2\par Hands & Feet: 300 mJ/cm2\par Maintenance dose:\par No Erythema or Erythema lasting <24 hours increase by 50mj\par Erythema lasting 24-48 hours keep same fluence\par Erythema lasting 48-60 hours decrease by 50mj\par Erythema lasting 60-72 hours hold treatment and notify MD\par CONTINUE treatment until area re-pigments\par STOP treatment when area hyper-pigments\par \par Use goggles/all safety parameters.\par \par Patient tolerated treatment \par \par Today's treatment: 6/02/2023  Treatment # 21 1200 mJ  231cm2 \par \par History:\par  5/31/2023  Treatment # 20 1100 mJ  230cm2 \par  5/24/2023  Treatment # 19 1100 mJ  230cm2 \par 5/22/2023  Treatment # 18  1050 mJ  224cm2 \par 5/19/2023  Treatment # 17  1000 mJ  227cm2 \par \par 5/15/2023  Treatment # 17  1000 mJ  224cm2\par 5/15/2023  Treatment # 17  1000 mJ  230cm2 \par 5/12/2023  Treatment # 16  1000 mJ  222cm2 \par 5/10/2023  Treatment # 15  950 mJ  235cm2 \par 5/8/2023  Treatment # 14  900 mJ  239 cm2 \par \par  5/3/2023  Treatment # 13  800 mJ  239 cm2 \par  4/28/2023  Treatment # 12  800 mJ  240 cm2 \par 4/26/2023  Treatment # 11  750 mJ  249 cm2 \par \par 4/24/2023  Treatment # 10  700 mJ  253 cm2 \par  4/21/2023  Treatment # 9  650 mJ  253 cm2 \par 4/19/2023  Treatment # 8  600 mJ  260 cm2 \par \par  4/17/2023  Treatment # 8  550 mJ  264 cm2 \par  4/14/2023  Treatment # 8  500 mJ  264 cm2 \par 4/12/2023  Treatment # 7  450 mJ  267 cm2 \par 4/10/2023  Treatment # 6  400 mJ  264 cm2\par  4/7/2023  Treatment # 5  350 mJ  267 cm2 \par 4/5/2023  Treatment # 4  300 mJ  260 cm2 \par \par 4/3/2023  Treatment # 3  250 mJ  260 cm2 \par 3/29/2023  Treatment # 2  200 mJ  257 cm2 \par 3/27/2023  Treatment # 1  150 mJ  251 cm2 \par

## 2023-06-05 ENCOUNTER — APPOINTMENT (OUTPATIENT)
Dept: DERMATOLOGY | Facility: CLINIC | Age: 63
End: 2023-06-05
Payer: MEDICAID

## 2023-06-05 PROCEDURE — 96920 EXCIMER LSR PSRIASIS<250SQCM: CPT

## 2023-06-05 NOTE — DISCUSSION/SUMMARY
[FreeTextEntry1] : Mr. Kennedy  is a 62 year old Male presenting today for excimer laser treatment for Vitiligo as prescribed by Dr. Chi Hatfield. Ordered protocol is outlined as below.\par \par Starting dose:\par Periocular: 100 mJ/cm2\par Scalp, Face, Ear, Neck, Axilla, Bikini: 150 mJ/cm2\par Trunk, Arms, Legs: 200 mJ/cm2\par Hands & Feet: 300 mJ/cm2\par Maintenance dose:\par No Erythema or Erythema lasting <24 hours increase by 50mj\par Erythema lasting 24-48 hours keep same fluence\par Erythema lasting 48-60 hours decrease by 50mj\par Erythema lasting 60-72 hours hold treatment and notify MD\par CONTINUE treatment until area re-pigments\par STOP treatment when area hyper-pigments\par \par Use goggles/all safety parameters.\par \par Patient tolerated treatment \par \par Today's treatment: 6/5/2023  Treatment # 22 1250 mJ  231cm2 \par \par History:\par 6/02/2023  Treatment # 21 1200 mJ  231cm2 \par  5/31/2023  Treatment # 20 1100 mJ  230cm2 \par  5/24/2023  Treatment # 19 1100 mJ  230cm2 \par 5/22/2023  Treatment # 18  1050 mJ  224cm2 \par 5/19/2023  Treatment # 17  1000 mJ  227cm2 \par \par 5/15/2023  Treatment # 17  1000 mJ  224cm2\par 5/15/2023  Treatment # 17  1000 mJ  230cm2 \par 5/12/2023  Treatment # 16  1000 mJ  222cm2 \par 5/10/2023  Treatment # 15  950 mJ  235cm2 \par 5/8/2023  Treatment # 14  900 mJ  239 cm2 \par \par  5/3/2023  Treatment # 13  800 mJ  239 cm2 \par  4/28/2023  Treatment # 12  800 mJ  240 cm2 \par 4/26/2023  Treatment # 11  750 mJ  249 cm2 \par \par 4/24/2023  Treatment # 10  700 mJ  253 cm2 \par  4/21/2023  Treatment # 9  650 mJ  253 cm2 \par 4/19/2023  Treatment # 8  600 mJ  260 cm2 \par \par  4/17/2023  Treatment # 8  550 mJ  264 cm2 \par  4/14/2023  Treatment # 8  500 mJ  264 cm2 \par 4/12/2023  Treatment # 7  450 mJ  267 cm2 \par 4/10/2023  Treatment # 6  400 mJ  264 cm2\par  4/7/2023  Treatment # 5  350 mJ  267 cm2 \par 4/5/2023  Treatment # 4  300 mJ  260 cm2 \par \par 4/3/2023  Treatment # 3  250 mJ  260 cm2 \par 3/29/2023  Treatment # 2  200 mJ  257 cm2 \par 3/27/2023  Treatment # 1  150 mJ  251 cm2 \par

## 2023-06-06 NOTE — DISCHARGE NOTE NURSING/CASE MANAGEMENT/SOCIAL WORK - NSDCPETBCESMAN_GEN_ALL_CORE
If you are a smoker, it is important for your health to stop smoking. Please be aware that second hand smoke is also harmful.
What Type Of Note Output Would You Prefer (Optional)?: Standard Output
How Severe Is Your Acne?: moderate
Is This A New Presentation, Or A Follow-Up?: Acne

## 2023-06-06 NOTE — ASU PREOP CHECKLIST - 2.
[de-identified] : 58 yo female here for follow up. \par \par H/o prediabetes. Last hga1c improved to 5.9 from 6. Cutting out white bread, potatoes, and rice. Still eating some rice, mostly brown. Eating more salad bowl at school. \par \par Still struggling with insomnia. Has been working on sleep hygiene. Has been trying valerian root tea. Doing 15 min of weights about an hour before bed which has helped with sleep. However sciatica has been acting up. First had sciatica after the birth of her daughter over 20 years ago.  covid negative 1/17/22

## 2023-06-06 NOTE — HISTORY OF PRESENT ILLNESS
[FreeTextEntry1] : Mr. Kennedy is a 62 year-old man with known MV CAD s/p CABG with Dr. Turpin in 2/2019. He has been doing well without issues. He has not been able to lose weight however has been walking over 2 miles a day. He recently underwent a lipoma removal from his face/cheek and radiation therapy. Needs achilles surgery for tear.\par \par Follow-up 1/18/23 - Reports intermittent dizziness since last month. Presented to [facility] and underwent nuclear stress test which was abnormal. Continues to report this symptom. Notes compliance with cardiac medications.\par \par Follow-up 2/15/23 - Underwent diagnostic coronary/bypass angiogram on 1/23/23 which showed occluded SVG to LCx with other grafts patent. No anginal symptoms noted. No new issues.\par \par Follow-up 4/5/23 - notes some chest discomfort on exertion/activity. Reports compliance with cardiac meds.\par \par Active Issues:\par 1. Actively treated CAD\par 2. Actively treated cough\par 3. Actively treated HLD\par 4. Actively treated HTN

## 2023-06-07 ENCOUNTER — APPOINTMENT (OUTPATIENT)
Dept: DERMATOLOGY | Facility: CLINIC | Age: 63
End: 2023-06-07
Payer: MEDICAID

## 2023-06-07 PROCEDURE — 96920 EXCIMER LSR PSRIASIS<250SQCM: CPT

## 2023-06-07 NOTE — DISCUSSION/SUMMARY
[FreeTextEntry1] : Mr. Kennedy  is a 62 year old Male presenting today for excimer laser treatment for Vitiligo as prescribed by Dr. Chi Hatfield. Ordered protocol is outlined as below.\par \par Starting dose:\par Periocular: 100 mJ/cm2\par Scalp, Face, Ear, Neck, Axilla, Bikini: 150 mJ/cm2\par Trunk, Arms, Legs: 200 mJ/cm2\par Hands & Feet: 300 mJ/cm2\par Maintenance dose:\par No Erythema or Erythema lasting <24 hours increase by 50mj\par Erythema lasting 24-48 hours keep same fluence\par Erythema lasting 48-60 hours decrease by 50mj\par Erythema lasting 60-72 hours hold treatment and notify MD\par CONTINUE treatment until area re-pigments\par STOP treatment when area hyper-pigments\par \par Use goggles/all safety parameters.\par \par Patient tolerated treatment \par \par Today's treatment: 6/7/2023  Treatment # 23 1300 mJ  200cm2 \par \par History:\par 6/5/2023  Treatment # 22 1250 mJ  231cm2 \par 6/02/2023  Treatment # 21 1200 mJ  231cm2 \par  5/31/2023  Treatment # 20 1100 mJ  230cm2 \par  5/24/2023  Treatment # 19 1100 mJ  230cm2 \par 5/22/2023  Treatment # 18  1050 mJ  224cm2 \par 5/19/2023  Treatment # 17  1000 mJ  227cm2 \par \par 5/15/2023  Treatment # 17  1000 mJ  224cm2\par 5/15/2023  Treatment # 17  1000 mJ  230cm2 \par 5/12/2023  Treatment # 16  1000 mJ  222cm2 \par 5/10/2023  Treatment # 15  950 mJ  235cm2 \par 5/8/2023  Treatment # 14  900 mJ  239 cm2 \par \par  5/3/2023  Treatment # 13  800 mJ  239 cm2 \par  4/28/2023  Treatment # 12  800 mJ  240 cm2 \par 4/26/2023  Treatment # 11  750 mJ  249 cm2 \par \par 4/24/2023  Treatment # 10  700 mJ  253 cm2 \par  4/21/2023  Treatment # 9  650 mJ  253 cm2 \par 4/19/2023  Treatment # 8  600 mJ  260 cm2 \par \par  4/17/2023  Treatment # 8  550 mJ  264 cm2 \par  4/14/2023  Treatment # 8  500 mJ  264 cm2 \par 4/12/2023  Treatment # 7  450 mJ  267 cm2 \par 4/10/2023  Treatment # 6  400 mJ  264 cm2\par  4/7/2023  Treatment # 5  350 mJ  267 cm2 \par 4/5/2023  Treatment # 4  300 mJ  260 cm2 \par \par 4/3/2023  Treatment # 3  250 mJ  260 cm2 \par 3/29/2023  Treatment # 2  200 mJ  257 cm2 \par 3/27/2023  Treatment # 1  150 mJ  251 cm2 \par

## 2023-06-09 ENCOUNTER — APPOINTMENT (OUTPATIENT)
Dept: DERMATOLOGY | Facility: CLINIC | Age: 63
End: 2023-06-09

## 2023-06-12 ENCOUNTER — APPOINTMENT (OUTPATIENT)
Dept: DERMATOLOGY | Facility: CLINIC | Age: 63
End: 2023-06-12

## 2023-06-14 ENCOUNTER — APPOINTMENT (OUTPATIENT)
Dept: DERMATOLOGY | Facility: CLINIC | Age: 63
End: 2023-06-14
Payer: MEDICAID

## 2023-06-14 PROCEDURE — 96920 EXCIMER LSR PSRIASIS<250SQCM: CPT

## 2023-06-14 NOTE — DISCUSSION/SUMMARY
[FreeTextEntry1] : Mr. Kennedy  is a 62 year old Male presenting today for excimer laser treatment for Vitiligo as prescribed by Dr. Chi Hatfield. Ordered protocol is outlined as below.\par \par Starting dose:\par Periocular: 100 mJ/cm2\par Scalp, Face, Ear, Neck, Axilla, Bikini: 150 mJ/cm2\par Trunk, Arms, Legs: 200 mJ/cm2\par Hands & Feet: 300 mJ/cm2\par Maintenance dose:\par No Erythema or Erythema lasting <24 hours increase by 50mj\par Erythema lasting 24-48 hours keep same fluence\par Erythema lasting 48-60 hours decrease by 50mj\par Erythema lasting 60-72 hours hold treatment and notify MD\par CONTINUE treatment until area re-pigments\par STOP treatment when area hyper-pigments\par \par Use goggles/all safety parameters.\par \par Patient tolerated treatment \par \par Today's treatment: 6/14/2023  Treatment # 24 1350 mJ  188cm2 \par \par History:\par  6/7/2023  Treatment # 23 1300 mJ  200cm2 \par 6/5/2023  Treatment # 22 1250 mJ  231cm2 \par 6/02/2023  Treatment # 21 1200 mJ  231cm2 \par  5/31/2023  Treatment # 20 1100 mJ  230cm2 \par  5/24/2023  Treatment # 19 1100 mJ  230cm2 \par 5/22/2023  Treatment # 18  1050 mJ  224cm2 \par 5/19/2023  Treatment # 17  1000 mJ  227cm2 \par \par 5/15/2023  Treatment # 17  1000 mJ  224cm2\par 5/15/2023  Treatment # 17  1000 mJ  230cm2 \par 5/12/2023  Treatment # 16  1000 mJ  222cm2 \par 5/10/2023  Treatment # 15  950 mJ  235cm2 \par 5/8/2023  Treatment # 14  900 mJ  239 cm2 \par \par  5/3/2023  Treatment # 13  800 mJ  239 cm2 \par  4/28/2023  Treatment # 12  800 mJ  240 cm2 \par 4/26/2023  Treatment # 11  750 mJ  249 cm2 \par \par 4/24/2023  Treatment # 10  700 mJ  253 cm2 \par  4/21/2023  Treatment # 9  650 mJ  253 cm2 \par 4/19/2023  Treatment # 8  600 mJ  260 cm2 \par \par  4/17/2023  Treatment # 8  550 mJ  264 cm2 \par  4/14/2023  Treatment # 8  500 mJ  264 cm2 \par 4/12/2023  Treatment # 7  450 mJ  267 cm2 \par 4/10/2023  Treatment # 6  400 mJ  264 cm2\par  4/7/2023  Treatment # 5  350 mJ  267 cm2 \par 4/5/2023  Treatment # 4  300 mJ  260 cm2 \par \par 4/3/2023  Treatment # 3  250 mJ  260 cm2 \par 3/29/2023  Treatment # 2  200 mJ  257 cm2 \par 3/27/2023  Treatment # 1  150 mJ  251 cm2 \par

## 2023-06-16 ENCOUNTER — APPOINTMENT (OUTPATIENT)
Dept: CT IMAGING | Facility: CLINIC | Age: 63
End: 2023-06-16
Payer: MEDICAID

## 2023-06-16 ENCOUNTER — APPOINTMENT (OUTPATIENT)
Dept: MRI IMAGING | Facility: CLINIC | Age: 63
End: 2023-06-16
Payer: MEDICAID

## 2023-06-16 ENCOUNTER — APPOINTMENT (OUTPATIENT)
Dept: DERMATOLOGY | Facility: CLINIC | Age: 63
End: 2023-06-16

## 2023-06-16 ENCOUNTER — OUTPATIENT (OUTPATIENT)
Dept: OUTPATIENT SERVICES | Facility: HOSPITAL | Age: 63
LOS: 1 days | End: 2023-06-16
Payer: MEDICAID

## 2023-06-16 DIAGNOSIS — R22.0 LOCALIZED SWELLING, MASS AND LUMP, HEAD: Chronic | ICD-10-CM

## 2023-06-16 DIAGNOSIS — C49.9 MALIGNANT NEOPLASM OF CONNECTIVE AND SOFT TISSUE, UNSPECIFIED: ICD-10-CM

## 2023-06-16 DIAGNOSIS — Z00.8 ENCOUNTER FOR OTHER GENERAL EXAMINATION: ICD-10-CM

## 2023-06-16 DIAGNOSIS — Z98.890 OTHER SPECIFIED POSTPROCEDURAL STATES: Chronic | ICD-10-CM

## 2023-06-16 DIAGNOSIS — Z95.1 PRESENCE OF AORTOCORONARY BYPASS GRAFT: Chronic | ICD-10-CM

## 2023-06-16 PROCEDURE — 71260 CT THORAX DX C+: CPT | Mod: 26

## 2023-06-16 PROCEDURE — 70542 MRI ORBIT/FACE/NECK W/DYE: CPT

## 2023-06-16 PROCEDURE — 70542 MRI ORBIT/FACE/NECK W/DYE: CPT | Mod: 26

## 2023-06-16 PROCEDURE — A9585: CPT

## 2023-06-16 PROCEDURE — 71260 CT THORAX DX C+: CPT

## 2023-06-19 ENCOUNTER — APPOINTMENT (OUTPATIENT)
Dept: DERMATOLOGY | Facility: CLINIC | Age: 63
End: 2023-06-19
Payer: MEDICAID

## 2023-06-19 PROCEDURE — 96920 EXCIMER LSR PSRIASIS<250SQCM: CPT

## 2023-06-19 NOTE — DISCUSSION/SUMMARY
Hpi Title: Evaluation of Skin Lesions [FreeTextEntry1] : Mr. Kennedy  is a 62 year old Male presenting today for excimer laser treatment for Vitiligo as prescribed by Dr. Chi Hatfield. Ordered protocol is outlined as below.\par \par Starting dose:\par Periocular: 100 mJ/cm2\par Scalp, Face, Ear, Neck, Axilla, Bikini: 150 mJ/cm2\par Trunk, Arms, Legs: 200 mJ/cm2\par Hands & Feet: 300 mJ/cm2\par Maintenance dose:\par No Erythema or Erythema lasting <24 hours increase by 50mj\par Erythema lasting 24-48 hours keep same fluence\par Erythema lasting 48-60 hours decrease by 50mj\par Erythema lasting 60-72 hours hold treatment and notify MD\par CONTINUE treatment until area re-pigments\par STOP treatment when area hyper-pigments\par \par Use goggles/all safety parameters.\par \par Patient tolerated treatment \par \par Today's treatment: 6/19/2023  Treatment # 25 1400mJ  178cm2 \par \par History:\par 6/14/2023  Treatment # 24 1350 mJ  188cm2 \par 6/7/2023  Treatment # 23 1300 mJ  200cm2 \par 6/5/2023  Treatment # 22 1250 mJ  231cm2 \par 6/02/2023  Treatment # 21 1200 mJ  231cm2 \par  5/31/2023  Treatment # 20 1100 mJ  230cm2 \par  5/24/2023  Treatment # 19 1100 mJ  230cm2 \par 5/22/2023  Treatment # 18  1050 mJ  224cm2 \par 5/19/2023  Treatment # 17  1000 mJ  227cm2 \par \par 5/15/2023  Treatment # 17  1000 mJ  224cm2\par 5/15/2023  Treatment # 17  1000 mJ  230cm2 \par 5/12/2023  Treatment # 16  1000 mJ  222cm2 \par 5/10/2023  Treatment # 15  950 mJ  235cm2 \par 5/8/2023  Treatment # 14  900 mJ  239 cm2 \par \par  5/3/2023  Treatment # 13  800 mJ  239 cm2 \par  4/28/2023  Treatment # 12  800 mJ  240 cm2 \par 4/26/2023  Treatment # 11  750 mJ  249 cm2 \par \par 4/24/2023  Treatment # 10  700 mJ  253 cm2 \par  4/21/2023  Treatment # 9  650 mJ  253 cm2 \par 4/19/2023  Treatment # 8  600 mJ  260 cm2 \par \par  4/17/2023  Treatment # 8  550 mJ  264 cm2 \par  4/14/2023  Treatment # 8  500 mJ  264 cm2 \par 4/12/2023  Treatment # 7  450 mJ  267 cm2 \par 4/10/2023  Treatment # 6  400 mJ  264 cm2\par  4/7/2023  Treatment # 5  350 mJ  267 cm2 \par 4/5/2023  Treatment # 4  300 mJ  260 cm2 \par \par 4/3/2023  Treatment # 3  250 mJ  260 cm2 \par 3/29/2023  Treatment # 2  200 mJ  257 cm2 \par 3/27/2023  Treatment # 1  150 mJ  251 cm2 \par  How Severe Are Your Spot(S)?: mild Have Your Spot(S) Been Treated In The Past?: has not been treated

## 2023-06-21 ENCOUNTER — APPOINTMENT (OUTPATIENT)
Dept: DERMATOLOGY | Facility: CLINIC | Age: 63
End: 2023-06-21
Payer: MEDICAID

## 2023-06-21 PROCEDURE — 96920 EXCIMER LSR PSRIASIS<250SQCM: CPT

## 2023-06-21 NOTE — DISCUSSION/SUMMARY
[FreeTextEntry1] : Mr. Kennedy  is a 62 year old Male presenting today for excimer laser treatment for Vitiligo as prescribed by Dr. Chi Hatfield. Ordered protocol is outlined as below.\par \par Starting dose:\par Periocular: 100 mJ/cm2\par Scalp, Face, Ear, Neck, Axilla, Bikini: 150 mJ/cm2\par Trunk, Arms, Legs: 200 mJ/cm2\par Hands & Feet: 300 mJ/cm2\par Maintenance dose:\par No Erythema or Erythema lasting <24 hours increase by 50mj\par Erythema lasting 24-48 hours keep same fluence\par Erythema lasting 48-60 hours decrease by 50mj\par Erythema lasting 60-72 hours hold treatment and notify MD\par CONTINUE treatment until area re-pigments\par STOP treatment when area hyper-pigments\par \par Use goggles/all safety parameters.\par \par Patient tolerated treatment \par \par Today's treatment: 6/21/2023  Treatment # 26 1400mJ  155cm2 \par \par History:\par 6/19/2023  Treatment # 25 1400mJ  178cm2 \par 6/14/2023  Treatment # 24 1350 mJ  188cm2 \par 6/7/2023  Treatment # 23 1300 mJ  200cm2 \par 6/5/2023  Treatment # 22 1250 mJ  231cm2 \par 6/02/2023  Treatment # 21 1200 mJ  231cm2 \par  5/31/2023  Treatment # 20 1100 mJ  230cm2 \par  5/24/2023  Treatment # 19 1100 mJ  230cm2 \par 5/22/2023  Treatment # 18  1050 mJ  224cm2 \par 5/19/2023  Treatment # 17  1000 mJ  227cm2 \par \par 5/15/2023  Treatment # 17  1000 mJ  224cm2\par 5/15/2023  Treatment # 17  1000 mJ  230cm2 \par 5/12/2023  Treatment # 16  1000 mJ  222cm2 \par 5/10/2023  Treatment # 15  950 mJ  235cm2 \par 5/8/2023  Treatment # 14  900 mJ  239 cm2 \par \par  5/3/2023  Treatment # 13  800 mJ  239 cm2 \par  4/28/2023  Treatment # 12  800 mJ  240 cm2 \par 4/26/2023  Treatment # 11  750 mJ  249 cm2 \par \par 4/24/2023  Treatment # 10  700 mJ  253 cm2 \par  4/21/2023  Treatment # 9  650 mJ  253 cm2 \par 4/19/2023  Treatment # 8  600 mJ  260 cm2 \par \par  4/17/2023  Treatment # 8  550 mJ  264 cm2 \par  4/14/2023  Treatment # 8  500 mJ  264 cm2 \par 4/12/2023  Treatment # 7  450 mJ  267 cm2 \par 4/10/2023  Treatment # 6  400 mJ  264 cm2\par  4/7/2023  Treatment # 5  350 mJ  267 cm2 \par 4/5/2023  Treatment # 4  300 mJ  260 cm2 \par \par 4/3/2023  Treatment # 3  250 mJ  260 cm2 \par 3/29/2023  Treatment # 2  200 mJ  257 cm2 \par 3/27/2023  Treatment # 1  150 mJ  251 cm2 \par

## 2023-06-23 ENCOUNTER — APPOINTMENT (OUTPATIENT)
Dept: DERMATOLOGY | Facility: CLINIC | Age: 63
End: 2023-06-23
Payer: MEDICAID

## 2023-06-23 PROCEDURE — 96920 EXCIMER LSR PSRIASIS<250SQCM: CPT

## 2023-06-23 NOTE — DISCUSSION/SUMMARY
[FreeTextEntry1] : Mr. Kennedy  is a 62 year old Male presenting today for excimer laser treatment for Vitiligo as prescribed by Dr. Chi Hatfield. Ordered protocol is outlined as below.\par \par Starting dose:\par Periocular: 100 mJ/cm2\par Scalp, Face, Ear, Neck, Axilla, Bikini: 150 mJ/cm2\par Trunk, Arms, Legs: 200 mJ/cm2\par Hands & Feet: 300 mJ/cm2\par Maintenance dose:\par No Erythema or Erythema lasting <24 hours increase by 50mj\par Erythema lasting 24-48 hours keep same fluence\par Erythema lasting 48-60 hours decrease by 50mj\par Erythema lasting 60-72 hours hold treatment and notify MD\par CONTINUE treatment until area re-pigments\par STOP treatment when area hyper-pigments\par \par Use goggles/all safety parameters.\par \par Patient tolerated treatment \par \par Today's treatment: 6/23/2023  Treatment # 27 1400mJ  157cm2 \par \par History:\par 6/21/2023  Treatment # 26 1400mJ  155cm2 \par 6/19/2023  Treatment # 25 1400mJ  178cm2 \par 6/14/2023  Treatment # 24 1350 mJ  188cm2 \par 6/7/2023  Treatment # 23 1300 mJ  200cm2 \par 6/5/2023  Treatment # 22 1250 mJ  231cm2 \par 6/02/2023  Treatment # 21 1200 mJ  231cm2 \par  5/31/2023  Treatment # 20 1100 mJ  230cm2 \par  5/24/2023  Treatment # 19 1100 mJ  230cm2 \par 5/22/2023  Treatment # 18  1050 mJ  224cm2 \par 5/19/2023  Treatment # 17  1000 mJ  227cm2 \par \par 5/15/2023  Treatment # 17  1000 mJ  224cm2\par 5/15/2023  Treatment # 17  1000 mJ  230cm2 \par 5/12/2023  Treatment # 16  1000 mJ  222cm2 \par 5/10/2023  Treatment # 15  950 mJ  235cm2 \par 5/8/2023  Treatment # 14  900 mJ  239 cm2 \par \par  5/3/2023  Treatment # 13  800 mJ  239 cm2 \par  4/28/2023  Treatment # 12  800 mJ  240 cm2 \par 4/26/2023  Treatment # 11  750 mJ  249 cm2 \par \par 4/24/2023  Treatment # 10  700 mJ  253 cm2 \par  4/21/2023  Treatment # 9  650 mJ  253 cm2 \par 4/19/2023  Treatment # 8  600 mJ  260 cm2 \par \par  4/17/2023  Treatment # 8  550 mJ  264 cm2 \par  4/14/2023  Treatment # 8  500 mJ  264 cm2 \par 4/12/2023  Treatment # 7  450 mJ  267 cm2 \par 4/10/2023  Treatment # 6  400 mJ  264 cm2\par  4/7/2023  Treatment # 5  350 mJ  267 cm2 \par 4/5/2023  Treatment # 4  300 mJ  260 cm2 \par \par 4/3/2023  Treatment # 3  250 mJ  260 cm2 \par 3/29/2023  Treatment # 2  200 mJ  257 cm2 \par 3/27/2023  Treatment # 1  150 mJ  251 cm2 \par

## 2023-06-26 ENCOUNTER — APPOINTMENT (OUTPATIENT)
Dept: DERMATOLOGY | Facility: CLINIC | Age: 63
End: 2023-06-26
Payer: MEDICAID

## 2023-06-26 ENCOUNTER — APPOINTMENT (OUTPATIENT)
Dept: RADIATION ONCOLOGY | Facility: CLINIC | Age: 63
End: 2023-06-26
Payer: MEDICAID

## 2023-06-26 VITALS
BODY MASS INDEX: 32.65 KG/M2 | RESPIRATION RATE: 16 BRPM | TEMPERATURE: 97.34 F | OXYGEN SATURATION: 96 % | HEART RATE: 94 BPM | HEIGHT: 67 IN | WEIGHT: 208 LBS | DIASTOLIC BLOOD PRESSURE: 72 MMHG | SYSTOLIC BLOOD PRESSURE: 104 MMHG

## 2023-06-26 PROCEDURE — 99213 OFFICE O/P EST LOW 20 MIN: CPT

## 2023-06-26 PROCEDURE — 96920 EXCIMER LSR PSRIASIS<250SQCM: CPT

## 2023-06-26 NOTE — DISCUSSION/SUMMARY
[FreeTextEntry1] : Mr. Kennedy  is a 62 year old Male presenting today for excimer laser treatment for Vitiligo as prescribed by Dr. Chi Hatfield. Ordered protocol is outlined as below.\par \par Starting dose:\par Periocular: 100 mJ/cm2\par Scalp, Face, Ear, Neck, Axilla, Bikini: 150 mJ/cm2\par Trunk, Arms, Legs: 200 mJ/cm2\par Hands & Feet: 300 mJ/cm2\par Maintenance dose:\par No Erythema or Erythema lasting <24 hours increase by 50mj\par Erythema lasting 24-48 hours keep same fluence\par Erythema lasting 48-60 hours decrease by 50mj\par Erythema lasting 60-72 hours hold treatment and notify MD\par CONTINUE treatment until area re-pigments\par STOP treatment when area hyper-pigments\par \par Use goggles/all safety parameters.\par \par Patient tolerated treatment \par \par Today's treatment: 6/26/2023  Treatment # 28 1400mJ  148cm2 \par \par History:\par 6/23/2023  Treatment # 27 1400mJ  157cm2 \par 6/21/2023  Treatment # 26 1400mJ  155cm2 \par 6/19/2023  Treatment # 25 1400mJ  178cm2 \par 6/14/2023  Treatment # 24 1350 mJ  188cm2 \par 6/7/2023  Treatment # 23 1300 mJ  200cm2 \par 6/5/2023  Treatment # 22 1250 mJ  231cm2 \par 6/02/2023  Treatment # 21 1200 mJ  231cm2 \par  5/31/2023  Treatment # 20 1100 mJ  230cm2 \par  5/24/2023  Treatment # 19 1100 mJ  230cm2 \par 5/22/2023  Treatment # 18  1050 mJ  224cm2 \par 5/19/2023  Treatment # 17  1000 mJ  227cm2 \par \par 5/15/2023  Treatment # 17  1000 mJ  224cm2\par 5/15/2023  Treatment # 17  1000 mJ  230cm2 \par 5/12/2023  Treatment # 16  1000 mJ  222cm2 \par 5/10/2023  Treatment # 15  950 mJ  235cm2 \par 5/8/2023  Treatment # 14  900 mJ  239 cm2 \par \par  5/3/2023  Treatment # 13  800 mJ  239 cm2 \par  4/28/2023  Treatment # 12  800 mJ  240 cm2 \par 4/26/2023  Treatment # 11  750 mJ  249 cm2 \par \par 4/24/2023  Treatment # 10  700 mJ  253 cm2 \par  4/21/2023  Treatment # 9  650 mJ  253 cm2 \par 4/19/2023  Treatment # 8  600 mJ  260 cm2 \par \par  4/17/2023  Treatment # 8  550 mJ  264 cm2 \par  4/14/2023  Treatment # 8  500 mJ  264 cm2 \par 4/12/2023  Treatment # 7  450 mJ  267 cm2 \par 4/10/2023  Treatment # 6  400 mJ  264 cm2\par  4/7/2023  Treatment # 5  350 mJ  267 cm2 \par 4/5/2023  Treatment # 4  300 mJ  260 cm2 \par \par 4/3/2023  Treatment # 3  250 mJ  260 cm2 \par 3/29/2023  Treatment # 2  200 mJ  257 cm2 \par 3/27/2023  Treatment # 1  150 mJ  251 cm2 \par

## 2023-06-26 NOTE — REVIEW OF SYSTEMS
[Dysphagia: Grade 0] : Dysphagia: Grade 0 [Fatigue: Grade 0] : Fatigue: Grade 0 [Mucositis Oral: Grade 0] : Mucositis Oral: Grade 0  [Xerostomia: Grade 1 - Symptomatic (e.g., dry or thick saliva) without significant dietary alteration; unstimulated saliva flow >0.2 ml/min] : Xerostomia: Grade 1 - Symptomatic (e.g., dry or thick saliva) without significant dietary alteration; unstimulated saliva flow >0.2 ml/min [Oral Pain: Grade 0] : Oral Pain: Grade 0 [Dysgeusia: Grade 1- Altered taste but no change in diet] : Dysgeusia: Grade 1 - Altered taste but no change in diet [Skin Hyperpigmentation: Grade 0] : Skin Hyperpigmentation: Grade 0 [Dermatitis Radiation: Grade 0] : Dermatitis Radiation: Grade 0

## 2023-06-26 NOTE — HISTORY OF PRESENT ILLNESS
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 Completed RT to oral cavity 6/3/2021 receiving  total dose of 66 Gy. \par \par 6/16/2023 MRI Orbit Face and/or Neck: IMPRESSION: \par 1. Stable follow-up examination without evidence of residual or recurrent neoplasm within the left lower face.\par 2. 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 No new or enlarging cervical lymph nodes.\par \par Presents today for follow up. Doing well. Denies pain, states has occasional brief tingling to left lower lip. \par \par

## 2023-06-27 ENCOUNTER — NON-APPOINTMENT (OUTPATIENT)
Age: 63
End: 2023-06-27

## 2023-06-28 ENCOUNTER — NON-APPOINTMENT (OUTPATIENT)
Age: 63
End: 2023-06-28

## 2023-06-28 ENCOUNTER — APPOINTMENT (OUTPATIENT)
Dept: DERMATOLOGY | Facility: CLINIC | Age: 63
End: 2023-06-28
Payer: MEDICAID

## 2023-06-28 PROCEDURE — 96920 EXCIMER LSR PSRIASIS<250SQCM: CPT

## 2023-06-28 NOTE — DISCUSSION/SUMMARY
[FreeTextEntry1] : Mr. Kennedy  is a 62 year old Male presenting today for excimer laser treatment for Vitiligo as prescribed by Dr. Chi Hatfield. Ordered protocol is outlined as below.\par \par Starting dose:\par Periocular: 100 mJ/cm2\par Scalp, Face, Ear, Neck, Axilla, Bikini: 150 mJ/cm2\par Trunk, Arms, Legs: 200 mJ/cm2\par Hands & Feet: 300 mJ/cm2\par Maintenance dose:\par No Erythema or Erythema lasting <24 hours increase by 50mj\par Erythema lasting 24-48 hours keep same fluence\par Erythema lasting 48-60 hours decrease by 50mj\par Erythema lasting 60-72 hours hold treatment and notify MD\par CONTINUE treatment until area re-pigments\par STOP treatment when area hyper-pigments\par \par Use goggles/all safety parameters.\par \par Patient tolerated treatment \par \par Today's treatment: 6/28/2023  Treatment # 29 1400mJ  158cm2 \par \par History:\par 6/26/2023  Treatment # 28 1400mJ  148cm2 \par 6/23/2023  Treatment # 27 1400mJ  157cm2 \par 6/21/2023  Treatment # 26 1400mJ  155cm2 \par 6/19/2023  Treatment # 25 1400mJ  178cm2 \par 6/14/2023  Treatment # 24 1350 mJ  188cm2 \par 6/7/2023  Treatment # 23 1300 mJ  200cm2 \par 6/5/2023  Treatment # 22 1250 mJ  231cm2 \par 6/02/2023  Treatment # 21 1200 mJ  231cm2 \par  5/31/2023  Treatment # 20 1100 mJ  230cm2 \par  5/24/2023  Treatment # 19 1100 mJ  230cm2 \par 5/22/2023  Treatment # 18  1050 mJ  224cm2 \par 5/19/2023  Treatment # 17  1000 mJ  227cm2 \par \par 5/15/2023  Treatment # 17  1000 mJ  224cm2\par 5/15/2023  Treatment # 17  1000 mJ  230cm2 \par 5/12/2023  Treatment # 16  1000 mJ  222cm2 \par 5/10/2023  Treatment # 15  950 mJ  235cm2 \par 5/8/2023  Treatment # 14  900 mJ  239 cm2 \par \par  5/3/2023  Treatment # 13  800 mJ  239 cm2 \par  4/28/2023  Treatment # 12  800 mJ  240 cm2 \par 4/26/2023  Treatment # 11  750 mJ  249 cm2 \par \par 4/24/2023  Treatment # 10  700 mJ  253 cm2 \par  4/21/2023  Treatment # 9  650 mJ  253 cm2 \par 4/19/2023  Treatment # 8  600 mJ  260 cm2 \par \par  4/17/2023  Treatment # 8  550 mJ  264 cm2 \par  4/14/2023  Treatment # 8  500 mJ  264 cm2 \par 4/12/2023  Treatment # 7  450 mJ  267 cm2 \par 4/10/2023  Treatment # 6  400 mJ  264 cm2\par  4/7/2023  Treatment # 5  350 mJ  267 cm2 \par 4/5/2023  Treatment # 4  300 mJ  260 cm2 \par \par 4/3/2023  Treatment # 3  250 mJ  260 cm2 \par 3/29/2023  Treatment # 2  200 mJ  257 cm2 \par 3/27/2023  Treatment # 1  150 mJ  251 cm2 \par

## 2023-06-30 ENCOUNTER — APPOINTMENT (OUTPATIENT)
Dept: DERMATOLOGY | Facility: CLINIC | Age: 63
End: 2023-06-30
Payer: MEDICAID

## 2023-06-30 PROCEDURE — 96920 EXCIMER LSR PSRIASIS<250SQCM: CPT

## 2023-06-30 NOTE — DISCUSSION/SUMMARY
[FreeTextEntry1] : Mr. Kennedy  is a 62 year old Male presenting today for excimer laser treatment for Vitiligo as prescribed by Dr. Chi Hatfield. Ordered protocol is outlined as below.\par \par Starting dose:\par Periocular: 100 mJ/cm2\par Scalp, Face, Ear, Neck, Axilla, Bikini: 150 mJ/cm2\par Trunk, Arms, Legs: 200 mJ/cm2\par Hands & Feet: 300 mJ/cm2\par Maintenance dose:\par No Erythema or Erythema lasting <24 hours increase by 50mj\par Erythema lasting 24-48 hours keep same fluence\par Erythema lasting 48-60 hours decrease by 50mj\par Erythema lasting 60-72 hours hold treatment and notify MD\par CONTINUE treatment until area re-pigments\par STOP treatment when area hyper-pigments\par \par Use goggles/all safety parameters.\par \par Patient tolerated treatment \par \par Today's treatment: 6/30/2023  Treatment # 30 1400mJ  158cm2 \par \par History:\par 6/28/2023  Treatment # 29 1400mJ  158cm2\par 6/26/2023  Treatment # 28 1400mJ  148cm2 \par 6/23/2023  Treatment # 27 1400mJ  157cm2 \par 6/21/2023  Treatment # 26 1400mJ  155cm2 \par 6/19/2023  Treatment # 25 1400mJ  178cm2 \par 6/14/2023  Treatment # 24 1350 mJ  188cm2 \par 6/7/2023  Treatment # 23 1300 mJ  200cm2 \par 6/5/2023  Treatment # 22 1250 mJ  231cm2 \par 6/02/2023  Treatment # 21 1200 mJ  231cm2 \par  5/31/2023  Treatment # 20 1100 mJ  230cm2 \par  5/24/2023  Treatment # 19 1100 mJ  230cm2 \par 5/22/2023  Treatment # 18  1050 mJ  224cm2 \par 5/19/2023  Treatment # 17  1000 mJ  227cm2 \par \par 5/15/2023  Treatment # 17  1000 mJ  224cm2\par 5/15/2023  Treatment # 17  1000 mJ  230cm2 \par 5/12/2023  Treatment # 16  1000 mJ  222cm2 \par 5/10/2023  Treatment # 15  950 mJ  235cm2 \par 5/8/2023  Treatment # 14  900 mJ  239 cm2 \par \par  5/3/2023  Treatment # 13  800 mJ  239 cm2 \par  4/28/2023  Treatment # 12  800 mJ  240 cm2 \par 4/26/2023  Treatment # 11  750 mJ  249 cm2 \par \par 4/24/2023  Treatment # 10  700 mJ  253 cm2 \par  4/21/2023  Treatment # 9  650 mJ  253 cm2 \par 4/19/2023  Treatment # 8  600 mJ  260 cm2 \par \par  4/17/2023  Treatment # 8  550 mJ  264 cm2 \par  4/14/2023  Treatment # 8  500 mJ  264 cm2 \par 4/12/2023  Treatment # 7  450 mJ  267 cm2 \par 4/10/2023  Treatment # 6  400 mJ  264 cm2\par  4/7/2023  Treatment # 5  350 mJ  267 cm2 \par 4/5/2023  Treatment # 4  300 mJ  260 cm2 \par \par 4/3/2023  Treatment # 3  250 mJ  260 cm2 \par 3/29/2023  Treatment # 2  200 mJ  257 cm2 \par 3/27/2023  Treatment # 1  150 mJ  251 cm2 \par

## 2023-07-03 ENCOUNTER — APPOINTMENT (OUTPATIENT)
Dept: DERMATOLOGY | Facility: CLINIC | Age: 63
End: 2023-07-03
Payer: MEDICAID

## 2023-07-03 PROCEDURE — 96920 EXCIMER LSR PSRIASIS<250SQCM: CPT

## 2023-07-03 NOTE — DISCUSSION/SUMMARY
[FreeTextEntry1] : Mr. Kennedy  is a 62 year old Male presenting today for excimer laser treatment for Vitiligo as prescribed by Dr. Chi Hatfield. Ordered protocol is outlined as below.\par \par Starting dose:\par Periocular: 100 mJ/cm2\par Scalp, Face, Ear, Neck, Axilla, Bikini: 150 mJ/cm2\par Trunk, Arms, Legs: 200 mJ/cm2\par Hands & Feet: 300 mJ/cm2\par Maintenance dose:\par No Erythema or Erythema lasting <24 hours increase by 50mj\par Erythema lasting 24-48 hours keep same fluence\par Erythema lasting 48-60 hours decrease by 50mj\par Erythema lasting 60-72 hours hold treatment and notify MD\par CONTINUE treatment until area re-pigments\par STOP treatment when area hyper-pigments\par \par Use goggles/all safety parameters.\par \par Patient tolerated treatment \par \par Today's treatment: 07/03/2023  Treatment # 31 1400mJ  145cm2 \par \par History:\par 6/30/2023  Treatment # 30 1400mJ  158cm2 \par 6/28/2023  Treatment # 29 1400mJ  158cm2\par 6/26/2023  Treatment # 28 1400mJ  148cm2 \par 6/23/2023  Treatment # 27 1400mJ  157cm2 \par 6/21/2023  Treatment # 26 1400mJ  155cm2 \par 6/19/2023  Treatment # 25 1400mJ  178cm2 \par 6/14/2023  Treatment # 24 1350 mJ  188cm2 \par 6/7/2023  Treatment # 23 1300 mJ  200cm2 \par 6/5/2023  Treatment # 22 1250 mJ  231cm2 \par 6/02/2023  Treatment # 21 1200 mJ  231cm2 \par  5/31/2023  Treatment # 20 1100 mJ  230cm2 \par  5/24/2023  Treatment # 19 1100 mJ  230cm2 \par 5/22/2023  Treatment # 18  1050 mJ  224cm2 \par 5/19/2023  Treatment # 17  1000 mJ  227cm2 \par \par 5/15/2023  Treatment # 17  1000 mJ  224cm2\par 5/15/2023  Treatment # 17  1000 mJ  230cm2 \par 5/12/2023  Treatment # 16  1000 mJ  222cm2 \par 5/10/2023  Treatment # 15  950 mJ  235cm2 \par 5/8/2023  Treatment # 14  900 mJ  239 cm2 \par \par  5/3/2023  Treatment # 13  800 mJ  239 cm2 \par  4/28/2023  Treatment # 12  800 mJ  240 cm2 \par 4/26/2023  Treatment # 11  750 mJ  249 cm2 \par \par 4/24/2023  Treatment # 10  700 mJ  253 cm2 \par  4/21/2023  Treatment # 9  650 mJ  253 cm2 \par 4/19/2023  Treatment # 8  600 mJ  260 cm2 \par \par  4/17/2023  Treatment # 8  550 mJ  264 cm2 \par  4/14/2023  Treatment # 8  500 mJ  264 cm2 \par 4/12/2023  Treatment # 7  450 mJ  267 cm2 \par 4/10/2023  Treatment # 6  400 mJ  264 cm2\par  4/7/2023  Treatment # 5  350 mJ  267 cm2 \par 4/5/2023  Treatment # 4  300 mJ  260 cm2 \par \par 4/3/2023  Treatment # 3  250 mJ  260 cm2 \par 3/29/2023  Treatment # 2  200 mJ  257 cm2 \par 3/27/2023  Treatment # 1  150 mJ  251 cm2 \par

## 2023-07-10 ENCOUNTER — APPOINTMENT (OUTPATIENT)
Dept: DERMATOLOGY | Facility: CLINIC | Age: 63
End: 2023-07-10
Payer: MEDICAID

## 2023-07-10 PROCEDURE — 96920 EXCIMER LSR PSRIASIS<250SQCM: CPT

## 2023-07-14 ENCOUNTER — APPOINTMENT (OUTPATIENT)
Dept: DERMATOLOGY | Facility: CLINIC | Age: 63
End: 2023-07-14
Payer: MEDICAID

## 2023-07-14 PROCEDURE — 96920 EXCIMER LSR PSRIASIS<250SQCM: CPT

## 2023-07-14 NOTE — DISCUSSION/SUMMARY
[FreeTextEntry1] : Mr. Kennedy  is a 62 year old Male presenting today for excimer laser treatment for Vitiligo as prescribed by Dr. Chi Hatfield. Ordered protocol is outlined as below.\par \par Starting dose:\par Periocular: 100 mJ/cm2\par Scalp, Face, Ear, Neck, Axilla, Bikini: 150 mJ/cm2\par Trunk, Arms, Legs: 200 mJ/cm2\par Hands & Feet: 300 mJ/cm2\par Maintenance dose:\par No Erythema or Erythema lasting <24 hours increase by 50mj\par Erythema lasting 24-48 hours keep same fluence\par Erythema lasting 48-60 hours decrease by 50mj\par Erythema lasting 60-72 hours hold treatment and notify MD\par CONTINUE treatment until area re-pigments\par STOP treatment when area hyper-pigments\par \par Use goggles/all safety parameters.\par \par Patient tolerated treatment \par \par Today's treatment: 07/14/2023  Treatment # 33 1400mJ  151cm2 \par \par History:\par 07/10/2023  Treatment # 32 1400mJ  158cm2 \par 07/03/2023  Treatment # 31 1400mJ  145cm2 \par 6/30/2023  Treatment # 30 1400mJ  158cm2 \par 6/28/2023  Treatment # 29 1400mJ  158cm2\par 6/26/2023  Treatment # 28 1400mJ  148cm2 \par 6/23/2023  Treatment # 27 1400mJ  157cm2 \par 6/21/2023  Treatment # 26 1400mJ  155cm2 \par 6/19/2023  Treatment # 25 1400mJ  178cm2 \par 6/14/2023  Treatment # 24 1350 mJ  188cm2 \par 6/7/2023  Treatment # 23 1300 mJ  200cm2 \par 6/5/2023  Treatment # 22 1250 mJ  231cm2 \par 6/02/2023  Treatment # 21 1200 mJ  231cm2 \par  5/31/2023  Treatment # 20 1100 mJ  230cm2 \par  5/24/2023  Treatment # 19 1100 mJ  230cm2 \par 5/22/2023  Treatment # 18  1050 mJ  224cm2 \par 5/19/2023  Treatment # 17  1000 mJ  227cm2 \par \par 5/15/2023  Treatment # 17  1000 mJ  224cm2\par 5/15/2023  Treatment # 17  1000 mJ  230cm2 \par 5/12/2023  Treatment # 16  1000 mJ  222cm2 \par 5/10/2023  Treatment # 15  950 mJ  235cm2 \par 5/8/2023  Treatment # 14  900 mJ  239 cm2 \par \par  5/3/2023  Treatment # 13  800 mJ  239 cm2 \par  4/28/2023  Treatment # 12  800 mJ  240 cm2 \par 4/26/2023  Treatment # 11  750 mJ  249 cm2 \par \par 4/24/2023  Treatment # 10  700 mJ  253 cm2 \par  4/21/2023  Treatment # 9  650 mJ  253 cm2 \par 4/19/2023  Treatment # 8  600 mJ  260 cm2 \par \par  4/17/2023  Treatment # 8  550 mJ  264 cm2 \par  4/14/2023  Treatment # 8  500 mJ  264 cm2 \par 4/12/2023  Treatment # 7  450 mJ  267 cm2 \par 4/10/2023  Treatment # 6  400 mJ  264 cm2\par  4/7/2023  Treatment # 5  350 mJ  267 cm2 \par 4/5/2023  Treatment # 4  300 mJ  260 cm2 \par \par 4/3/2023  Treatment # 3  250 mJ  260 cm2 \par 3/29/2023  Treatment # 2  200 mJ  257 cm2 \par 3/27/2023  Treatment # 1  150 mJ  251 cm2 \par

## 2023-07-17 ENCOUNTER — APPOINTMENT (OUTPATIENT)
Dept: DERMATOLOGY | Facility: CLINIC | Age: 63
End: 2023-07-17
Payer: MEDICAID

## 2023-07-17 PROCEDURE — 96920 EXCIMER LSR PSRIASIS<250SQCM: CPT

## 2023-07-17 NOTE — DISCUSSION/SUMMARY
[FreeTextEntry1] : Mr. Kennedy  is a 62 year old Male presenting today for excimer laser treatment for Vitiligo as prescribed by Dr. Chi Hatfield. Ordered protocol is outlined as below.\par \par Starting dose:\par Periocular: 100 mJ/cm2\par Scalp, Face, Ear, Neck, Axilla, Bikini: 150 mJ/cm2\par Trunk, Arms, Legs: 200 mJ/cm2\par Hands & Feet: 300 mJ/cm2\par Maintenance dose:\par No Erythema or Erythema lasting <24 hours increase by 50mj\par Erythema lasting 24-48 hours keep same fluence\par Erythema lasting 48-60 hours decrease by 50mj\par Erythema lasting 60-72 hours hold treatment and notify MD\par CONTINUE treatment until area re-pigments\par STOP treatment when area hyper-pigments\par \par Use goggles/all safety parameters.\par \par Patient tolerated treatment \par \par Today's treatment: 07/17/2023  Treatment # 34 1400mJ  158cm2 \par \par History:\par 07/14/2023  Treatment # 33 1400mJ  151cm2 \par 07/10/2023  Treatment # 32 1400mJ  158cm2 \par 07/03/2023  Treatment # 31 1400mJ  145cm2 \par 6/30/2023  Treatment # 30 1400mJ  158cm2 \par 6/28/2023  Treatment # 29 1400mJ  158cm2\par 6/26/2023  Treatment # 28 1400mJ  148cm2 \par 6/23/2023  Treatment # 27 1400mJ  157cm2 \par 6/21/2023  Treatment # 26 1400mJ  155cm2 \par 6/19/2023  Treatment # 25 1400mJ  178cm2 \par 6/14/2023  Treatment # 24 1350 mJ  188cm2 \par 6/7/2023  Treatment # 23 1300 mJ  200cm2 \par 6/5/2023  Treatment # 22 1250 mJ  231cm2 \par 6/02/2023  Treatment # 21 1200 mJ  231cm2 \par  5/31/2023  Treatment # 20 1100 mJ  230cm2 \par  5/24/2023  Treatment # 19 1100 mJ  230cm2 \par 5/22/2023  Treatment # 18  1050 mJ  224cm2 \par 5/19/2023  Treatment # 17  1000 mJ  227cm2 \par \par 5/15/2023  Treatment # 17  1000 mJ  224cm2\par 5/15/2023  Treatment # 17  1000 mJ  230cm2 \par 5/12/2023  Treatment # 16  1000 mJ  222cm2 \par 5/10/2023  Treatment # 15  950 mJ  235cm2 \par 5/8/2023  Treatment # 14  900 mJ  239 cm2 \par \par  5/3/2023  Treatment # 13  800 mJ  239 cm2 \par  4/28/2023  Treatment # 12  800 mJ  240 cm2 \par 4/26/2023  Treatment # 11  750 mJ  249 cm2 \par \par 4/24/2023  Treatment # 10  700 mJ  253 cm2 \par  4/21/2023  Treatment # 9  650 mJ  253 cm2 \par 4/19/2023  Treatment # 8  600 mJ  260 cm2 \par \par  4/17/2023  Treatment # 8  550 mJ  264 cm2 \par  4/14/2023  Treatment # 8  500 mJ  264 cm2 \par 4/12/2023  Treatment # 7  450 mJ  267 cm2 \par 4/10/2023  Treatment # 6  400 mJ  264 cm2\par  4/7/2023  Treatment # 5  350 mJ  267 cm2 \par 4/5/2023  Treatment # 4  300 mJ  260 cm2 \par \par 4/3/2023  Treatment # 3  250 mJ  260 cm2 \par 3/29/2023  Treatment # 2  200 mJ  257 cm2 \par 3/27/2023  Treatment # 1  150 mJ  251 cm2 \par

## 2023-07-28 ENCOUNTER — APPOINTMENT (OUTPATIENT)
Dept: DERMATOLOGY | Facility: CLINIC | Age: 63
End: 2023-07-28
Payer: MEDICAID

## 2023-07-28 PROCEDURE — 96920 EXCIMER LSR PSRIASIS<250SQCM: CPT

## 2023-07-28 NOTE — DISCUSSION/SUMMARY
[FreeTextEntry1] : Mr. Kennedy  is a 62 year old Male presenting today for excimer laser treatment for Vitiligo as prescribed by Dr. Chi Hatfield. Ordered protocol is outlined as below.\par \par Starting dose:\par Periocular: 100 mJ/cm2\par Scalp, Face, Ear, Neck, Axilla, Bikini: 150 mJ/cm2\par Trunk, Arms, Legs: 200 mJ/cm2\par Hands & Feet: 300 mJ/cm2\par Maintenance dose:\par No Erythema or Erythema lasting <24 hours increase by 50mj\par Erythema lasting 24-48 hours keep same fluence\par Erythema lasting 48-60 hours decrease by 50mj\par Erythema lasting 60-72 hours hold treatment and notify MD\par CONTINUE treatment until area re-pigments\par STOP treatment when area hyper-pigments\par \par Use goggles/all safety parameters.\par \par Patient tolerated treatment \par \par Today's treatment: 07/28/2023  Treatment #35 1400mJ (CAP) 141cm2 \par \par History:\par 07/17/2023  Treatment # 34 1400mJ  158cm2 \par 07/14/2023  Treatment # 33 1400mJ  151cm2 \par 07/10/2023  Treatment # 32 1400mJ  158cm2 \par 07/03/2023  Treatment # 31 1400mJ  145cm2 \par 6/30/2023  Treatment # 30 1400mJ  158cm2 \par 6/28/2023  Treatment # 29 1400mJ  158cm2\par 6/26/2023  Treatment # 28 1400mJ  148cm2 \par 6/23/2023  Treatment # 27 1400mJ  157cm2 \par 6/21/2023  Treatment # 26 1400mJ  155cm2 \par 6/19/2023  Treatment # 25 1400mJ  178cm2 \par 6/14/2023  Treatment # 24 1350 mJ  188cm2 \par 6/7/2023  Treatment # 23 1300 mJ  200cm2 \par 6/5/2023  Treatment # 22 1250 mJ  231cm2 \par 6/02/2023  Treatment # 21 1200 mJ  231cm2 \par  5/31/2023  Treatment # 20 1100 mJ  230cm2 \par  5/24/2023  Treatment # 19 1100 mJ  230cm2 \par 5/22/2023  Treatment # 18  1050 mJ  224cm2 \par 5/19/2023  Treatment # 17  1000 mJ  227cm2 \par \par 5/15/2023  Treatment # 17  1000 mJ  224cm2\par 5/15/2023  Treatment # 17  1000 mJ  230cm2 \par 5/12/2023  Treatment # 16  1000 mJ  222cm2 \par 5/10/2023  Treatment # 15  950 mJ  235cm2 \par 5/8/2023  Treatment # 14  900 mJ  239 cm2 \par \par  5/3/2023  Treatment # 13  800 mJ  239 cm2 \par  4/28/2023  Treatment # 12  800 mJ  240 cm2 \par 4/26/2023  Treatment # 11  750 mJ  249 cm2 \par \par 4/24/2023  Treatment # 10  700 mJ  253 cm2 \par  4/21/2023  Treatment # 9  650 mJ  253 cm2 \par 4/19/2023  Treatment # 8  600 mJ  260 cm2 \par \par  4/17/2023  Treatment # 8  550 mJ  264 cm2 \par  4/14/2023  Treatment # 8  500 mJ  264 cm2 \par 4/12/2023  Treatment # 7  450 mJ  267 cm2 \par 4/10/2023  Treatment # 6  400 mJ  264 cm2\par  4/7/2023  Treatment # 5  350 mJ  267 cm2 \par 4/5/2023  Treatment # 4  300 mJ  260 cm2 \par \par 4/3/2023  Treatment # 3  250 mJ  260 cm2 \par 3/29/2023  Treatment # 2  200 mJ  257 cm2 \par 3/27/2023  Treatment # 1  150 mJ  251 cm2 \par

## 2023-08-16 NOTE — DISCUSSION/SUMMARY
[FreeTextEntry1] : Mr. Kennedy  is a 62 year old Male presenting today for excimer laser treatment for Vitiligo as prescribed by Dr. Chi Hatfield. Ordered protocol is outlined as below.  Starting dose: Periocular: 100 mJ/cm2 Scalp, Face, Ear, Neck, Axilla, Bikini: 150 mJ/cm2 Trunk, Arms, Legs: 200 mJ/cm2 Hands & Feet: 300 mJ/cm2 Maintenance dose: No Erythema or Erythema lasting <24 hours increase by 50mj Erythema lasting 24-48 hours keep same fluence Erythema lasting 48-60 hours decrease by 50mj Erythema lasting 60-72 hours hold treatment and notify MD CONTINUE treatment until area re-pigments STOP treatment when area hyper-pigments  Use goggles/all safety parameters.  Patient tolerated treatment   Today's treatment: 07/28/2023  Treatment # 35 1400mJ  158cm2   History: 07/14/2023  Treatment # 33 1400mJ  151cm2  07/10/2023  Treatment # 32 1400mJ  158cm2  07/03/2023  Treatment # 31 1400mJ  145cm2  6/30/2023  Treatment # 30 1400mJ  158cm2  6/28/2023  Treatment # 29 1400mJ  158cm2 6/26/2023  Treatment # 28 1400mJ  148cm2  6/23/2023  Treatment # 27 1400mJ  157cm2  6/21/2023  Treatment # 26 1400mJ  155cm2  6/19/2023  Treatment # 25 1400mJ  178cm2  6/14/2023  Treatment # 24 1350 mJ  188cm2  6/7/2023  Treatment # 23 1300 mJ  200cm2  6/5/2023  Treatment # 22 1250 mJ  231cm2  6/02/2023  Treatment # 21 1200 mJ  231cm2   5/31/2023  Treatment # 20 1100 mJ  230cm2   5/24/2023  Treatment # 19 1100 mJ  230cm2  5/22/2023  Treatment # 18  1050 mJ  224cm2  5/19/2023  Treatment # 17  1000 mJ  227cm2   5/15/2023  Treatment # 17  1000 mJ  224cm2 5/15/2023  Treatment # 17  1000 mJ  230cm2  5/12/2023  Treatment # 16  1000 mJ  222cm2  5/10/2023  Treatment # 15  950 mJ  235cm2  5/8/2023  Treatment # 14  900 mJ  239 cm2    5/3/2023  Treatment # 13  800 mJ  239 cm2   4/28/2023  Treatment # 12  800 mJ  240 cm2  4/26/2023  Treatment # 11  750 mJ  249 cm2   4/24/2023  Treatment # 10  700 mJ  253 cm2   4/21/2023  Treatment # 9  650 mJ  253 cm2  4/19/2023  Treatment # 8  600 mJ  260 cm2    4/17/2023  Treatment # 8  550 mJ  264 cm2   4/14/2023  Treatment # 8  500 mJ  264 cm2  4/12/2023  Treatment # 7  450 mJ  267 cm2  4/10/2023  Treatment # 6  400 mJ  264 cm2  4/7/2023  Treatment # 5  350 mJ  267 cm2  4/5/2023  Treatment # 4  300 mJ  260 cm2   4/3/2023  Treatment # 3  250 mJ  260 cm2  3/29/2023  Treatment # 2  200 mJ  257 cm2  3/27/2023  Treatment # 1  150 mJ  251 cm2

## 2023-08-25 ENCOUNTER — APPOINTMENT (OUTPATIENT)
Dept: DERMATOLOGY | Facility: CLINIC | Age: 63
End: 2023-08-25
Payer: MEDICAID

## 2023-08-25 DIAGNOSIS — Z79.899 OTHER LONG TERM (CURRENT) DRUG THERAPY: ICD-10-CM

## 2023-08-25 DIAGNOSIS — L73.9 FOLLICULAR DISORDER, UNSPECIFIED: ICD-10-CM

## 2023-08-25 PROCEDURE — 99214 OFFICE O/P EST MOD 30 MIN: CPT

## 2023-08-25 RX ORDER — BENZOYL PEROXIDE 100 MG/ML
10 LIQUID TOPICAL
Qty: 1 | Refills: 2 | Status: ACTIVE | COMMUNITY
Start: 2023-08-25 | End: 1900-01-01

## 2023-08-25 NOTE — HISTORY OF PRESENT ILLNESS
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 Vilitgo on scalp and hands, now only on face; getting excimer treatment. stopped protopic M-F and betamethasone Sat/Sun. Using opzelura BID. Has appt with cards in 1 month to discuss.  2. Psoriasis- clear on skyrizi, tolerating well. No joint pain. Now doing injections [id number]  Meds: irbesartan, ezetimibe, atorvastatin, baby ASA

## 2023-08-25 NOTE — PHYSICAL EXAM
[FreeTextEntry3] : General: well appearing person in nad, alert, pleasant Focused Skin Exam per patient preference: - depigmented macules coalescing into patches on crown of scalp; some areas of repigmentation in center when compared to prior photos - depigmented patches on b/l dorsal hands; new depigmented patches on cheeks, photos taken today for chart - no active psoriasis lesions today

## 2023-08-25 NOTE — ASSESSMENT
[FreeTextEntry1] : 1. Psoriasis, plaque type, ~8 % BSA previously, clear on skyrizi, no joint pain  Chronic condition, improved  - Failed hydrocortisone 2.5% ointment and triamcinolone 0.1% ointment  - Reviewed diagnosis and chronic nature of condition - C/w Skyrizi clearance attained from  Dr. Irene (Rad Onc) and Dr. Erwin (ID), on Tenofovir prescribed by ID; can increased to q5 months 2/2 possible worsening of vitiligo - R/b/a and SED previously discussed including but not limited to injection site reaction, immunosuppression, risk of infection and malignancy. Patient verbalizes understandings of risks. - If develops any active plaques, can c/w betamethasone diprop 0.05% ointment BID to active areas on body on weekdays and calcipotriene 0.005% ointment on weekends. Avoid face, axilla, groin. SED including atrophy, dyspigmentation, telangiectasias, striae. Proper use reviewed including only using to affected area and avoidance of prolonged use.    2. High risk medication use - Personal hx of liposarcoma- cleared by rad onc to be on Skyrizi. last seen 6/2023 with no evidence of disease recurrence - previously Hep B core AB positive- patient now on Vemildy and cleared by ID to be on Skyrizi- discussed to f/u with ID if he should continue. - Patient instructed to follow with PCP yearly, stay up to date on age-appropriate malignancy screening and vaccines including yearly flu vaccine. Avoid live vaccines.  - Discussed need for close followup and periodic lab monitoring while on this medication - CBC, CMP, hepatitis B and C serologies, quantiferon gold, HIV ordered today  3. Vitiligo  Chronic condition, progressing; BSA 5% - Affecting crown of scalp, hands and now with new areas on cheeks.  - stop opzelura until he can discuss with cardiologist in end of September - Continue tacrolimus 0.1% ointment BID on face and scalp M-F; can use betamethasone on Sat/Sun but discussed risk of atrophy. SED including burning sensation and black box warning. - For hands: c/w betamethasone diprop 0.05% ointment to hands BID on weekdays and tacrolimus 0.1% ointment BID on weekends, SED - educated risk/benefit of excimer laser with patient in addition to creams, discussed hands take longer to improve - c/w excimer 2-3x weekly - there some rare cases of biologics worsening vitiligo, some cases of IL-12/IL-23, no cases of IL-23 yet. https://www.ncbi.nlm.nih.gov/pmc/articles/UXV0491430/  4. folliculitis, mild, chronic, flare, scalp - discussed nature, chronicity, and unpredictable course of condition - start BP wash to scalp daily  RTC 3 mos

## 2023-09-14 LAB
ALBUMIN SERPL ELPH-MCNC: 4 G/DL
ALP BLD-CCNC: 117 U/L
ALT SERPL-CCNC: 22 U/L
ANION GAP SERPL CALC-SCNC: 10 MMOL/L
AST SERPL-CCNC: 20 U/L
BASOPHILS # BLD AUTO: 0.04 K/UL
BASOPHILS NFR BLD AUTO: 0.6 %
BILIRUB SERPL-MCNC: 0.4 MG/DL
BUN SERPL-MCNC: 22 MG/DL
CALCIUM SERPL-MCNC: 9.2 MG/DL
CHLORIDE SERPL-SCNC: 105 MMOL/L
CO2 SERPL-SCNC: 24 MMOL/L
CREAT SERPL-MCNC: 1 MG/DL
EGFR: 85 ML/MIN/1.73M2
EOSINOPHIL # BLD AUTO: 0.21 K/UL
EOSINOPHIL NFR BLD AUTO: 3.1 %
GLUCOSE SERPL-MCNC: 152 MG/DL
HBV CORE IGG+IGM SER QL: REACTIVE
HBV SURFACE AB SER QL: REACTIVE
HBV SURFACE AG SER QL: NONREACTIVE
HCT VFR BLD CALC: 46.2 %
HCV AB SER QL: NONREACTIVE
HCV S/CO RATIO: 0.08 S/CO
HGB BLD-MCNC: 14.7 G/DL
IMM GRANULOCYTES NFR BLD AUTO: 0.1 %
LYMPHOCYTES # BLD AUTO: 0.86 K/UL
LYMPHOCYTES NFR BLD AUTO: 12.7 %
M TB IFN-G BLD-IMP: NEGATIVE
MAN DIFF?: NORMAL
MCHC RBC-ENTMCNC: 27.2 PG
MCHC RBC-ENTMCNC: 31.8 GM/DL
MCV RBC AUTO: 85.6 FL
MONOCYTES # BLD AUTO: 0.77 K/UL
MONOCYTES NFR BLD AUTO: 11.3 %
NEUTROPHILS # BLD AUTO: 4.9 K/UL
NEUTROPHILS NFR BLD AUTO: 72.2 %
PLATELET # BLD AUTO: 273 K/UL
POTASSIUM SERPL-SCNC: 4.8 MMOL/L
PROT SERPL-MCNC: 6.7 G/DL
QUANTIFERON TB PLUS MITOGEN MINUS NIL: 9.1 IU/ML
QUANTIFERON TB PLUS NIL: 0.02 IU/ML
QUANTIFERON TB PLUS TB1 MINUS NIL: 0.01 IU/ML
QUANTIFERON TB PLUS TB2 MINUS NIL: 0.01 IU/ML
RBC # BLD: 5.4 M/UL
RBC # FLD: 15.2 %
SODIUM SERPL-SCNC: 139 MMOL/L
WBC # FLD AUTO: 6.79 K/UL

## 2023-09-14 RX ORDER — TENOFOVIR DISOPROXIL FUMARATE 300 MG/1
300 TABLET ORAL DAILY
Qty: 30 | Refills: 6 | Status: ACTIVE | COMMUNITY
Start: 2022-11-28 | End: 1900-01-01

## 2023-09-20 ENCOUNTER — APPOINTMENT (OUTPATIENT)
Dept: CARDIOLOGY | Facility: CLINIC | Age: 63
End: 2023-09-20
Payer: MEDICAID

## 2023-09-20 ENCOUNTER — NON-APPOINTMENT (OUTPATIENT)
Age: 63
End: 2023-09-20

## 2023-09-20 VITALS — HEART RATE: 72 BPM | DIASTOLIC BLOOD PRESSURE: 74 MMHG | OXYGEN SATURATION: 99 % | SYSTOLIC BLOOD PRESSURE: 114 MMHG

## 2023-09-20 PROCEDURE — 93000 ELECTROCARDIOGRAM COMPLETE: CPT

## 2023-09-20 PROCEDURE — 99214 OFFICE O/P EST MOD 30 MIN: CPT | Mod: 25

## 2023-10-16 NOTE — ED ADULT NURSE NOTE - NS ED NURSE RECORD ANOTHER HT AND WT
Yes

## 2023-11-08 NOTE — ED ADULT TRIAGE NOTE - RESPIRATORY RATE (BREATHS/MIN)
----- Message from Reyna Suarez MD sent at 11/8/2023  1:40 PM CST -----  Please contact the patient and let them know that their results were fine and do not require any change in treatment.   18

## 2023-11-17 ENCOUNTER — APPOINTMENT (OUTPATIENT)
Dept: DERMATOLOGY | Facility: CLINIC | Age: 63
End: 2023-11-17
Payer: MEDICAID

## 2023-11-17 DIAGNOSIS — M19.90 UNSPECIFIED OSTEOARTHRITIS, UNSPECIFIED SITE: ICD-10-CM

## 2023-11-17 DIAGNOSIS — L40.9 PSORIASIS, UNSPECIFIED: ICD-10-CM

## 2023-11-17 DIAGNOSIS — Z76.89 PERSONS ENCOUNTERING HEALTH SERVICES IN OTHER SPECIFIED CIRCUMSTANCES: ICD-10-CM

## 2023-11-17 PROCEDURE — 99214 OFFICE O/P EST MOD 30 MIN: CPT | Mod: 25

## 2023-11-17 PROCEDURE — 96401 CHEMO ANTI-NEOPL SQ/IM: CPT

## 2023-11-17 RX ORDER — BETAMETHASONE DIPROPIONATE 0.5 MG/G
0.05 OINTMENT TOPICAL
Qty: 1 | Refills: 6 | Status: ACTIVE | COMMUNITY
Start: 2022-10-04 | End: 1900-01-01

## 2023-11-17 RX ORDER — TACROLIMUS 1 MG/G
0.1 OINTMENT TOPICAL
Qty: 1 | Refills: 5 | Status: ACTIVE | COMMUNITY
Start: 2022-06-06 | End: 1900-01-01

## 2023-12-20 ENCOUNTER — RX RENEWAL (OUTPATIENT)
Age: 63
End: 2023-12-20

## 2023-12-26 RX ORDER — CLOPIDOGREL BISULFATE 75 MG/1
75 TABLET, FILM COATED ORAL DAILY
Qty: 30 | Refills: 9 | Status: ACTIVE | COMMUNITY
Start: 2023-01-18 | End: 1900-01-01

## 2023-12-27 ENCOUNTER — APPOINTMENT (OUTPATIENT)
Dept: RHEUMATOLOGY | Facility: CLINIC | Age: 63
End: 2023-12-27
Payer: MEDICAID

## 2023-12-27 VITALS
HEART RATE: 97 BPM | OXYGEN SATURATION: 98 % | SYSTOLIC BLOOD PRESSURE: 135 MMHG | HEIGHT: 67 IN | WEIGHT: 198 LBS | BODY MASS INDEX: 31.08 KG/M2 | DIASTOLIC BLOOD PRESSURE: 85 MMHG

## 2023-12-27 DIAGNOSIS — M25.561 PAIN IN RIGHT KNEE: ICD-10-CM

## 2023-12-27 DIAGNOSIS — G89.29 DORSALGIA, UNSPECIFIED: ICD-10-CM

## 2023-12-27 DIAGNOSIS — M54.9 DORSALGIA, UNSPECIFIED: ICD-10-CM

## 2023-12-27 PROCEDURE — 99204 OFFICE O/P NEW MOD 45 MIN: CPT

## 2023-12-27 NOTE — DATA REVIEWED
[FreeTextEntry1] : Hep B core Ab positive (on tenofovir) QuantiFERON-TB negative.  CMP: LFTs, Cr wnl

## 2023-12-27 NOTE — ASSESSMENT
[FreeTextEntry1] : 1. Right hip pain orthopedics evaluate to evaluation prosthesis integrity given replacement >20 years ago.  Tylenol as needed for pain 2. Psoriasis and back  on Skyrizi no peripheral inflammatory arthritis  can not rule out SIJ involvement.  If present, may need to switch Rx as IL-23 inhibitors are not indicated for axial disease.  check SIJ imaging.   Patient to f/u after imaging.

## 2023-12-27 NOTE — HISTORY OF PRESENT ILLNESS
[FreeTextEntry1] : Patient presents for initial visit today.   patient was referred by a dermatologist for evaluation of psoriatic arthritis.  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  patient psoriasis has been treated with Skyrizi which he has been tolerating well. Patient started feeling right hip pain 2 weeks ago.  He underwent right hip replacement 22 years ago.   Patient denies any trauma.  Pain is also felt in the lower back and right knee.  Patient denies any other joint swelling or prolonged morning stiffness.

## 2023-12-27 NOTE — REASON FOR VISIT
[Initial Evaluation] : an initial evaluation [FreeTextEntry1] : joint pain, evaluate for psoriatic arthritis.

## 2023-12-29 ENCOUNTER — APPOINTMENT (OUTPATIENT)
Dept: ORTHOPEDIC SURGERY | Facility: CLINIC | Age: 63
End: 2023-12-29
Payer: MEDICAID

## 2023-12-29 VITALS — HEIGHT: 68 IN | BODY MASS INDEX: 29.86 KG/M2 | WEIGHT: 197 LBS

## 2023-12-29 DIAGNOSIS — M25.552 PAIN IN LEFT HIP: ICD-10-CM

## 2023-12-29 DIAGNOSIS — M25.551 PAIN IN RIGHT HIP: ICD-10-CM

## 2023-12-29 PROCEDURE — 99214 OFFICE O/P EST MOD 30 MIN: CPT | Mod: 25

## 2023-12-29 PROCEDURE — 72170 X-RAY EXAM OF PELVIS: CPT

## 2023-12-29 RX ORDER — MELOXICAM 15 MG/1
15 TABLET ORAL DAILY
Qty: 30 | Refills: 0 | Status: ACTIVE | COMMUNITY
Start: 2023-12-29 | End: 1900-01-01

## 2023-12-29 NOTE — HISTORY OF PRESENT ILLNESS
[FreeTextEntry1] : Mr. Kennedy is a 62 year-old man with known MV CAD s/p CABG with Dr. Turpin in 2/2019. He has been doing well without issues. He has not been able to lose weight however has been walking over 2 miles a day. He recently underwent a lipoma removal from his face/cheek and radiation therapy. Needs achilles surgery for tear.\par  \par  Follow-up 1/18/23 - Reports intermittent dizziness since last month. Presented to [facility] and underwent nuclear stress test which was abnormal. Continues to report this symptom. Notes compliance with cardiac medications.\par  \par  Follow-up 2/15/23 - Underwent diagnostic coronary/bypass angiogram on 1/23/23 which showed occluded SVG to LCx with other grafts patent. No anginal symptoms noted. No new issues.\par  \par  Follow-up 4/5/23 - notes some chest discomfort on exertion/activity. Reports compliance with cardiac meds.\par  \par  Active Issues:\par  1. Actively treated CAD\par  2. Actively treated cough\par  3. Actively treated HLD\par  4. Actively treated HTN

## 2023-12-29 NOTE — DISCUSSION/SUMMARY
[FreeTextEntry1] : 1. C/w management of the CAD and symptom control 2. C/w current management. 3. Old records requested and reviewed with performing physician.  4. Primary and secondary prevention of cardiovascular and related conditions discussed at length, including but not limited to diet and lifestyle modification. 5. Follow up 3-6 months [EKG obtained to assist in diagnosis and management of assessed problem(s)] : EKG obtained to assist in diagnosis and management of assessed problem(s)

## 2024-01-26 ENCOUNTER — NON-APPOINTMENT (OUTPATIENT)
Age: 64
End: 2024-01-26

## 2024-03-04 ENCOUNTER — NON-APPOINTMENT (OUTPATIENT)
Age: 64
End: 2024-03-04

## 2024-03-18 ENCOUNTER — APPOINTMENT (OUTPATIENT)
Dept: CARDIOLOGY | Facility: CLINIC | Age: 64
End: 2024-03-18
Payer: MEDICAID

## 2024-03-18 VITALS — OXYGEN SATURATION: 98 % | SYSTOLIC BLOOD PRESSURE: 116 MMHG | HEART RATE: 90 BPM | DIASTOLIC BLOOD PRESSURE: 76 MMHG

## 2024-03-18 DIAGNOSIS — I10 ESSENTIAL (PRIMARY) HYPERTENSION: ICD-10-CM

## 2024-03-18 DIAGNOSIS — I25.10 ATHEROSCLEROTIC HEART DISEASE OF NATIVE CORONARY ARTERY W/OUT ANGINA PECTORIS: ICD-10-CM

## 2024-03-18 DIAGNOSIS — Z95.1 PRESENCE OF AORTOCORONARY BYPASS GRAFT: ICD-10-CM

## 2024-03-18 DIAGNOSIS — E78.5 HYPERLIPIDEMIA, UNSPECIFIED: ICD-10-CM

## 2024-03-18 PROCEDURE — 99214 OFFICE O/P EST MOD 30 MIN: CPT | Mod: 25

## 2024-03-18 PROCEDURE — G2211 COMPLEX E/M VISIT ADD ON: CPT | Mod: NC,1L

## 2024-03-18 PROCEDURE — 93000 ELECTROCARDIOGRAM COMPLETE: CPT

## 2024-03-23 NOTE — H&P PST ADULT - TEMPERATURE IN FAHRENHEIT (DEGREES F)
CD10+ DLBCL, positive for BCL-2 rearrangement, negative for MYC rearrangement.  CNS-IPI score: 4; high risk, Age >60, , >2 extra maira sites stage 4  Plan for curative intent treatment with R-CHOP along with IV MTX (3.5g/m2) on D14 given extensive spine involvement  Consented for MR-CHOP and placed in chart  To start Rituxan tomorrow 3/22  ECHO reviewed EF 71%, hepatitis/EBV/HIV panel negative   Pain control/PT/OT  Start allopurinol 300 mg daily  Monitor TLS labs and CBC daily, Rasburicase 3mg if uric acid >8 and 6mg for uric acid>12  Transfuse if Hb <8, PLT <10 or <15 and febrile or <50 and bleeding
98.3

## 2024-04-01 ENCOUNTER — INPATIENT (INPATIENT)
Facility: HOSPITAL | Age: 64
LOS: 3 days | Discharge: HOME HEALTH | DRG: 540 | End: 2024-04-05
Attending: INTERNAL MEDICINE | Admitting: INTERNAL MEDICINE
Payer: MEDICAID

## 2024-04-01 VITALS
DIASTOLIC BLOOD PRESSURE: 79 MMHG | OXYGEN SATURATION: 100 % | SYSTOLIC BLOOD PRESSURE: 116 MMHG | HEART RATE: 91 BPM | RESPIRATION RATE: 18 BRPM | TEMPERATURE: 98 F | HEIGHT: 68 IN | WEIGHT: 192.02 LBS

## 2024-04-01 DIAGNOSIS — R22.0 LOCALIZED SWELLING, MASS AND LUMP, HEAD: Chronic | ICD-10-CM

## 2024-04-01 DIAGNOSIS — Z96.641 PRESENCE OF RIGHT ARTIFICIAL HIP JOINT: Chronic | ICD-10-CM

## 2024-04-01 DIAGNOSIS — Z95.1 PRESENCE OF AORTOCORONARY BYPASS GRAFT: Chronic | ICD-10-CM

## 2024-04-01 DIAGNOSIS — Z98.890 OTHER SPECIFIED POSTPROCEDURAL STATES: Chronic | ICD-10-CM

## 2024-04-01 DIAGNOSIS — M86.9 OSTEOMYELITIS, UNSPECIFIED: ICD-10-CM

## 2024-04-01 LAB
ADD ON TEST-SPECIMEN IN LAB: SIGNIFICANT CHANGE UP
ALBUMIN SERPL ELPH-MCNC: 4 G/DL — SIGNIFICANT CHANGE UP (ref 3.3–5)
ALP SERPL-CCNC: 119 U/L — SIGNIFICANT CHANGE UP (ref 40–120)
ALT FLD-CCNC: 14 U/L — SIGNIFICANT CHANGE UP (ref 10–45)
ANION GAP SERPL CALC-SCNC: 14 MMOL/L — SIGNIFICANT CHANGE UP (ref 5–17)
AST SERPL-CCNC: 17 U/L — SIGNIFICANT CHANGE UP (ref 10–40)
BASE EXCESS BLDV CALC-SCNC: -1 MMOL/L — SIGNIFICANT CHANGE UP (ref -2–3)
BASOPHILS # BLD AUTO: 0.06 K/UL — SIGNIFICANT CHANGE UP (ref 0–0.2)
BASOPHILS NFR BLD AUTO: 0.7 % — SIGNIFICANT CHANGE UP (ref 0–2)
BILIRUB SERPL-MCNC: 0.3 MG/DL — SIGNIFICANT CHANGE UP (ref 0.2–1.2)
BUN SERPL-MCNC: 16 MG/DL — SIGNIFICANT CHANGE UP (ref 7–23)
CA-I SERPL-SCNC: 1.21 MMOL/L — SIGNIFICANT CHANGE UP (ref 1.15–1.33)
CALCIUM SERPL-MCNC: 9 MG/DL — SIGNIFICANT CHANGE UP (ref 8.4–10.5)
CHLORIDE BLDV-SCNC: 103 MMOL/L — SIGNIFICANT CHANGE UP (ref 96–108)
CHLORIDE SERPL-SCNC: 103 MMOL/L — SIGNIFICANT CHANGE UP (ref 96–108)
CO2 BLDV-SCNC: 27 MMOL/L — HIGH (ref 22–26)
CO2 SERPL-SCNC: 22 MMOL/L — SIGNIFICANT CHANGE UP (ref 22–31)
CREAT SERPL-MCNC: 0.91 MG/DL — SIGNIFICANT CHANGE UP (ref 0.5–1.3)
EGFR: 94 ML/MIN/1.73M2 — SIGNIFICANT CHANGE UP
EOSINOPHIL # BLD AUTO: 0.25 K/UL — SIGNIFICANT CHANGE UP (ref 0–0.5)
EOSINOPHIL NFR BLD AUTO: 3.1 % — SIGNIFICANT CHANGE UP (ref 0–6)
GAS PNL BLDV: 136 MMOL/L — SIGNIFICANT CHANGE UP (ref 136–145)
GAS PNL BLDV: SIGNIFICANT CHANGE UP
GLUCOSE BLDV-MCNC: 158 MG/DL — HIGH (ref 70–99)
GLUCOSE SERPL-MCNC: 151 MG/DL — HIGH (ref 70–99)
HCO3 BLDV-SCNC: 25 MMOL/L — SIGNIFICANT CHANGE UP (ref 22–29)
HCT VFR BLD CALC: 37.1 % — LOW (ref 39–50)
HCT VFR BLDA CALC: 36 % — LOW (ref 39–51)
HGB BLD CALC-MCNC: 12.1 G/DL — LOW (ref 12.6–17.4)
HGB BLD-MCNC: 11.5 G/DL — LOW (ref 13–17)
IMM GRANULOCYTES NFR BLD AUTO: 0.2 % — SIGNIFICANT CHANGE UP (ref 0–0.9)
LACTATE BLDV-MCNC: 1.7 MMOL/L — SIGNIFICANT CHANGE UP (ref 0.5–2)
LYMPHOCYTES # BLD AUTO: 0.92 K/UL — LOW (ref 1–3.3)
LYMPHOCYTES # BLD AUTO: 11.3 % — LOW (ref 13–44)
MCHC RBC-ENTMCNC: 25.7 PG — LOW (ref 27–34)
MCHC RBC-ENTMCNC: 31 GM/DL — LOW (ref 32–36)
MCV RBC AUTO: 82.8 FL — SIGNIFICANT CHANGE UP (ref 80–100)
MONOCYTES # BLD AUTO: 0.77 K/UL — SIGNIFICANT CHANGE UP (ref 0–0.9)
MONOCYTES NFR BLD AUTO: 9.5 % — SIGNIFICANT CHANGE UP (ref 2–14)
NEUTROPHILS # BLD AUTO: 6.12 K/UL — SIGNIFICANT CHANGE UP (ref 1.8–7.4)
NEUTROPHILS NFR BLD AUTO: 75.2 % — SIGNIFICANT CHANGE UP (ref 43–77)
NRBC # BLD: 0 /100 WBCS — SIGNIFICANT CHANGE UP (ref 0–0)
PCO2 BLDV: 48 MMHG — SIGNIFICANT CHANGE UP (ref 42–55)
PH BLDV: 7.33 — SIGNIFICANT CHANGE UP (ref 7.32–7.43)
PLATELET # BLD AUTO: 417 K/UL — HIGH (ref 150–400)
PO2 BLDV: 31 MMHG — SIGNIFICANT CHANGE UP (ref 25–45)
POTASSIUM BLDV-SCNC: 4.1 MMOL/L — SIGNIFICANT CHANGE UP (ref 3.5–5.1)
POTASSIUM SERPL-MCNC: 4.2 MMOL/L — SIGNIFICANT CHANGE UP (ref 3.5–5.3)
POTASSIUM SERPL-SCNC: 4.2 MMOL/L — SIGNIFICANT CHANGE UP (ref 3.5–5.3)
PROT SERPL-MCNC: 8 G/DL — SIGNIFICANT CHANGE UP (ref 6–8.3)
RBC # BLD: 4.48 M/UL — SIGNIFICANT CHANGE UP (ref 4.2–5.8)
RBC # FLD: 16.5 % — HIGH (ref 10.3–14.5)
SAO2 % BLDV: 38.9 % — LOW (ref 67–88)
SODIUM SERPL-SCNC: 139 MMOL/L — SIGNIFICANT CHANGE UP (ref 135–145)
WBC # BLD: 8.14 K/UL — SIGNIFICANT CHANGE UP (ref 3.8–10.5)
WBC # FLD AUTO: 8.14 K/UL — SIGNIFICANT CHANGE UP (ref 3.8–10.5)

## 2024-04-01 PROCEDURE — 99222 1ST HOSP IP/OBS MODERATE 55: CPT

## 2024-04-01 PROCEDURE — 73120 X-RAY EXAM OF HAND: CPT | Mod: 26,LT

## 2024-04-01 PROCEDURE — 99285 EMERGENCY DEPT VISIT HI MDM: CPT | Mod: 25

## 2024-04-01 RX ORDER — ATORVASTATIN CALCIUM 80 MG/1
1 TABLET, FILM COATED ORAL
Qty: 0 | Refills: 0 | DISCHARGE

## 2024-04-01 RX ORDER — VANCOMYCIN HCL 1 G
1000 VIAL (EA) INTRAVENOUS ONCE
Refills: 0 | Status: COMPLETED | OUTPATIENT
Start: 2024-04-01 | End: 2024-04-01

## 2024-04-01 RX ORDER — ASPIRIN/CALCIUM CARB/MAGNESIUM 324 MG
1 TABLET ORAL
Qty: 0 | Refills: 0 | DISCHARGE

## 2024-04-01 RX ORDER — ACETAMINOPHEN 500 MG
1000 TABLET ORAL ONCE
Refills: 0 | Status: COMPLETED | OUTPATIENT
Start: 2024-04-01 | End: 2024-04-01

## 2024-04-01 RX ORDER — LOSARTAN POTASSIUM 100 MG/1
25 TABLET, FILM COATED ORAL DAILY
Refills: 0 | Status: DISCONTINUED | OUTPATIENT
Start: 2024-04-01 | End: 2024-04-05

## 2024-04-01 RX ORDER — ASPIRIN/CALCIUM CARB/MAGNESIUM 324 MG
81 TABLET ORAL DAILY
Refills: 0 | Status: DISCONTINUED | OUTPATIENT
Start: 2024-04-01 | End: 2024-04-05

## 2024-04-01 RX ORDER — TENOFOVIR DISOPROXIL FUMARATE 300 MG/1
1 TABLET, FILM COATED ORAL
Qty: 0 | Refills: 0 | DISCHARGE

## 2024-04-01 RX ORDER — CLOPIDOGREL BISULFATE 75 MG/1
1 TABLET, FILM COATED ORAL
Qty: 0 | Refills: 0 | DISCHARGE

## 2024-04-01 RX ORDER — IRBESARTAN 75 MG/1
1 TABLET ORAL
Refills: 0 | DISCHARGE

## 2024-04-01 RX ORDER — CLOPIDOGREL BISULFATE 75 MG/1
75 TABLET, FILM COATED ORAL DAILY
Refills: 0 | Status: DISCONTINUED | OUTPATIENT
Start: 2024-04-01 | End: 2024-04-05

## 2024-04-01 RX ORDER — VANCOMYCIN HCL 1 G
1000 VIAL (EA) INTRAVENOUS EVERY 12 HOURS
Refills: 0 | Status: DISCONTINUED | OUTPATIENT
Start: 2024-04-01 | End: 2024-04-05

## 2024-04-01 RX ORDER — TENOFOVIR DISOPROXIL FUMARATE 300 MG/1
300 TABLET, FILM COATED ORAL DAILY
Refills: 0 | Status: DISCONTINUED | OUTPATIENT
Start: 2024-04-01 | End: 2024-04-05

## 2024-04-01 RX ORDER — ATORVASTATIN CALCIUM 80 MG/1
80 TABLET, FILM COATED ORAL AT BEDTIME
Refills: 0 | Status: DISCONTINUED | OUTPATIENT
Start: 2024-04-01 | End: 2024-04-05

## 2024-04-01 RX ORDER — EZETIMIBE 10 MG/1
1 TABLET ORAL
Qty: 0 | Refills: 0 | DISCHARGE

## 2024-04-01 RX ADMIN — ATORVASTATIN CALCIUM 80 MILLIGRAM(S): 80 TABLET, FILM COATED ORAL at 23:05

## 2024-04-01 RX ADMIN — Medication 400 MILLIGRAM(S): at 09:15

## 2024-04-01 RX ADMIN — Medication 1000 MILLIGRAM(S): at 13:38

## 2024-04-01 RX ADMIN — Medication 250 MILLIGRAM(S): at 08:18

## 2024-04-01 NOTE — PATIENT PROFILE ADULT - FALL HARM RISK - UNIVERSAL INTERVENTIONS
Bed in lowest position, wheels locked, appropriate side rails in place/Call bell, personal items and telephone in reach/Instruct patient to call for assistance before getting out of bed or chair/Non-slip footwear when patient is out of bed/[place] to call system/Physically safe environment - no spills, clutter or unnecessary equipment/Purposeful Proactive Rounding/Room/bathroom lighting operational, light cord in reach

## 2024-04-01 NOTE — ED PROVIDER NOTE - OBJECTIVE STATEMENT
64 y/o male, pmh HTN, HLD, CAD, CABG, presents to the ER for left middle finger infection.  has been having swelling to his finger for the past 6 weeks. states he was started on amoxicillin by an outside urgent care. states saw his pcp, had mri done with concern for possible osteomyelitis. was advised to come to the ER for IV abx.  finger initially drained clear fluid but has since resolved. denies f/n/v/d, CP, SOB, HA, dizziness, abdominal pain, bleeding from finger.

## 2024-04-01 NOTE — CONSULT NOTE ADULT - ASSESSMENT
62 y/o male, pmh HTN, HLD, CAD, CABG, presents to the ER for left middle finger infection with imaging findings consistent with osteomyelitis. Patient currently non-septic.    Plan:  -    NOTE NOT COMPLETED    Plastic Surgery   [phone]   62 y/o male, pmh HTN, HLD, CAD, CABG, presents to the ER for left middle finger infection with imaging findings consistent with osteomyelitis. Patient currently non-septic.    Plan:  - Admit to medicine  - C/w IV vancomycin  - Monitor swelling  - If drainage begins, send culture  - Plastics to follow    To be discussed with Dr. Chatman  Note not final until signed by Dr. Chatman      Plastic Surgery   [phone]   64 y/o male, pmh HTN, HLD, CAD, CABG, presents to the ER for left middle finger infection with imaging findings consistent with osteomyelitis. Patient currently non-septic.    Plan:  - No surgical intervention at this time  - Admit to medicine  - C/w IV vancomycin  - Monitor swelling  - If drainage begins, send culture  - Plastics to follow    To be discussed with Dr. Chatman  Note not final until signed by Dr. Chatman      Plastic Surgery   [phone]   64 y/o male, pmh HTN, HLD, CAD, CABG, presents to the ER for left middle finger infection with imaging findings consistent with osteomyelitis. Patient currently non-septic.    Plan:  - No surgical intervention at this time  - Admit to medicine  - C/w IV vancomycin  - Monitor swelling  - If drainage begins, send culture  - Rec ID consult for IV abx choice  - Rec Rheum consult for sporadic nature of wound  - Plastics to follow    To be discussed with Dr. Chatman  Note not final until signed by Dr. Chatman    Plastic Surgery   [phone]

## 2024-04-01 NOTE — CONSULT NOTE ADULT - ASSESSMENT
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  s/p antibiotics psoriasis on skyrizi and chronic hep B on tenfovoir, had had Paronychia of L 3rd finger but about 5 weeks ago when he was in [place], it developed purulent drainage and he was given amoxicillin which he took for about 4 weeks but still has edema, erythema and pain of the 3rd DIP, MRI outside showed possible osteo of 3rd distal phalanx and proximal mid phalanx vs inflammatory etiology   afebrile, WBC: normal  xray here: Decreased bone mineralization with erosive changes and fragmentation at the distal interphalangeal joint of the middle finger most consistent   with osteomyelitis. There is surrounding soft tissue swelling.    immunocompromised pt with psoriasis on skyziri, had recent L 3rd paronychia which developed purulent drainage and now dactylitis and MRI/xray with possible osteo  s/p 4 weeks of amox with no improvement, plastic did not recommend any surgical intervention  as pt is immunocompromised and recent paronychia, infection is possible but psoriasis itself can cause dactylitis as well  chronic hep B on tenofovir     * rheum eval to r/o psoriatic dactylitis   * c/w vanco 1 q 12 and check trough before the 4th dose  * MRSA/MSSA PCR    The above assessment and plan was discussed with the primary team    Halima Erwin MD  contact on teams  After 5pm and on weekends call [phone]

## 2024-04-01 NOTE — ED PROVIDER NOTE - CROS ED CONS ALL NEG
Physical Therapy  Cancellation/No-show Note  Patient Name:  Steph Lemons  :  1971   Date:  2021  Cancelled visits to date: 1  No-shows to date: 0    Patient status for today's appointment patient:  [x]  Cancelled   []  Rescheduled appointment  []  No-show     Reason given by patient:  []  Patient ill  []  Conflicting appointment  []  No transportation    []  Conflict with work  [x]  No reason given  []  Other:     Comments:      Phone call information:   []  Phone call made today to patient at _ time at number provided:      []  Patient answered, conversation as follows:    []  Patient did not answer, message left as follows:  []  Phone call not made today  [x]  Phone call not needed - pt contacted us to cancel and provided reason for cancellation.      Electronically signed by:  Nolan Gallego PT negative...

## 2024-04-01 NOTE — ED ADULT NURSE NOTE - OBJECTIVE STATEMENT
Called and spoke with pt and discussed her [facility] paperwork that she states her PCP ad previously filled out and they will be taking care of her paperwork now.She had no further questions.   29 yo with abdominal pain, admitted for suspected TOA  Pt is afebrile, without pain, clinically stable 62yo M aaox4 h/o HTN. HLD, CAD, CABG, presents ambulatory to Ed from home, as per pt reports 6-7 weeks ago onset drained a fluid from the L 3rd finger , since then gradually started a swelling, redness, pain on  L 3rd finger, went to UC , prescriptive ABX , his pcp ordered a MRI and rcc pt to come to Ed fro IV ABX,  and r/o osteomyelitis, ,on examination Pt denies CP, SOB, HA, vision changes, n/v/d, fevers chills, abdominal pain, weakness Safety and comfort measures initiated- bed placed in lowest position and side rails raised.

## 2024-04-01 NOTE — CONSULT NOTE ADULT - SUBJECTIVE AND OBJECTIVE BOX
64 y/o male, pmh HTN, HLD, CAD, CABG, presents to the ER for left middle finger infection. Pt states that swelling started 6 weeks ago. Outside urgent care started him on amoxicillin with lack of improvement. Saw his pcp, had mri done with concern for possible osteomyelitis. Advised to come to the ER for IV abx.     X-ray performed in ED and shows "Decreased bone mineralization with erosive changes and fragmentation at the distal interphalangeal joint of the middle finger most consistent with osteomyelitis. There is surrounding soft tissue swelling." Labs in ED with normal WBC (8.14) and afebrile.    Started on vancomycin in ED and received 1 dose.   Plastic surgery consulted for hand consult.     Patient seen and examined. States that finger initially drained clear fluid but has since resolved. Denies f/n/v/d, CP, SOB, HA, dizziness, abdominal pain, bleeding from finger.    PAST MEDICAL & SURGICAL HISTORY:  Psoriasis    HTN (hypertension)    Cervical radiculopathy    CAD (coronary artery disease)    Sleep apnea  uses CPAP    Cheek mass  Liposarcoma - left - RT treatments - restriction to opening of mouth    Other specified soft tissue disorders  Cataract  left    Obesity  History of hip replacement, total, right  2001    S/P CABG x 4  1/2019    Cheek mass  liposuction x 2 to left cheek    H/O sinus surgery    MEDICATIONS  (STANDING):  acetaminophen   IVPB .. 1000 milliGRAM(s) IV Intermittent Once    MEDICATIONS  (PRN):    Vital Signs Last 24 Hrs  T(C): 36.7 (01 Apr 2024 08:04), Max: 36.7 (01 Apr 2024 08:04)  T(F): 98 (01 Apr 2024 08:04), Max: 98 (01 Apr 2024 08:04)  HR: 99 (01 Apr 2024 08:04) (91 - 99)  BP: 119/77 (01 Apr 2024 08:04) (116/79 - 119/77)  BP(mean): --  RR: 18 (01 Apr 2024 08:04) (18 - 18)  SpO2: 97% (01 Apr 2024 08:04) (97% - 100%)    Parameters below as of 01 Apr 2024 08:04  Patient On (Oxygen Delivery Method): room air    T(C): 36.7 (04-01-24 @ 08:04), Max: 36.7 (04-01-24 @ 08:04)  HR: 99 (04-01-24 @ 08:04) (91 - 99)  BP: 119/77 (04-01-24 @ 08:04) (116/79 - 119/77)  RR: 18 (04-01-24 @ 08:04) (18 - 18)  SpO2: 97% (04-01-24 @ 08:04) (97% - 100%)    PHYSICAL EXAM  CONSTITUTIONAL: Well groomed, no apparent distress  ENMT: Oral mucosa with moist membranes.   RESP: No respiratory distress  CV: RRR  GI: Soft, NT, ND   62 y/o male, pmh HTN, HLD, CAD, CABG, presents to the ER for left middle finger infection. Pt states that swelling started 6 weeks ago. PCP started him on amoxicillin with lack of improvement. Was seen by urgent care few weeks after initial appointment and had I&D and sent for MRI. MRI outside report reads concern for possible osteomyelitis. Seen by hand surgeon from [facility] today and advised to come to the ER for IV abx.     X-ray performed in ED and shows "Decreased bone mineralization with erosive changes and fragmentation at the distal interphalangeal joint of the middle finger most consistent with osteomyelitis. There is surrounding soft tissue swelling." Labs in ED with normal WBC (8.14) and afebrile.    Started on vancomycin in ED and received 1 dose.   Plastic surgery consulted for hand consult.     Patient seen and examined. States that finger initially drained clear fluid but has since resolved. States that he had an I&D in urgent care about 1 week ago where purulent fluid was expressed and since then, no fluid. Denies f/n/v/d, CP, SOB, HA, dizziness, abdominal pain, bleeding from finger.        PAST MEDICAL & SURGICAL HISTORY:  Psoriasis    HTN (hypertension)    Cervical radiculopathy    CAD (coronary artery disease)    Sleep apnea  uses CPAP    Cheek mass  Liposarcoma - left - RT treatments - restriction to opening of mouth    Other specified soft tissue disorders  Cataract  left    Obesity  History of hip replacement, total, right  2001    S/P CABG x 4  1/2019    Cheek mass  liposuction x 2 to left cheek    H/O sinus surgery    MEDICATIONS  (STANDING):  acetaminophen   IVPB .. 1000 milliGRAM(s) IV Intermittent Once    MEDICATIONS  (PRN):    Vital Signs Last 24 Hrs  T(C): 36.7 (01 Apr 2024 08:04), Max: 36.7 (01 Apr 2024 08:04)  T(F): 98 (01 Apr 2024 08:04), Max: 98 (01 Apr 2024 08:04)  HR: 99 (01 Apr 2024 08:04) (91 - 99)  BP: 119/77 (01 Apr 2024 08:04) (116/79 - 119/77)  BP(mean): --  RR: 18 (01 Apr 2024 08:04) (18 - 18)  SpO2: 97% (01 Apr 2024 08:04) (97% - 100%)    Parameters below as of 01 Apr 2024 08:04  Patient On (Oxygen Delivery Method): room air    T(C): 36.7 (04-01-24 @ 08:04), Max: 36.7 (04-01-24 @ 08:04)  HR: 99 (04-01-24 @ 08:04) (91 - 99)  BP: 119/77 (04-01-24 @ 08:04) (116/79 - 119/77)  RR: 18 (04-01-24 @ 08:04) (18 - 18)  SpO2: 97% (04-01-24 @ 08:04) (97% - 100%)    PHYSICAL EXAM  CONSTITUTIONAL: Well groomed, no apparent distress  ENMT: Oral mucosa with moist membranes.   RESP: No respiratory distress  CV: RRR  GI: Soft, NT, ND  EXTREMITY: Left hand 3rd finger erythematous, swollen and tender to palpation. sensory and motor intact. Flexion of finger limited 2/2 swelling and pain. No drainage. No fluctuance.   62 y/o male, pmh HTN, HLD, CAD, CABG, presents to the ER for left middle finger infection. Pt states that swelling started 6 weeks ago. Denies any trauma. PCP started him on amoxicillin with lack of improvement. Was seen by urgent care few weeks after initial appointment and had I&D and sent for MRI. MRI outside report reads concern for possible osteomyelitis. Seen by hand surgeon from [facility] today and advised to come to the ER for IV abx.     X-ray performed in ED and shows "Decreased bone mineralization with erosive changes and fragmentation at the distal interphalangeal joint of the middle finger most consistent with osteomyelitis. There is surrounding soft tissue swelling." Labs in ED with normal WBC (8.14) and afebrile.    Started on vancomycin in ED and received 1 dose.   Plastic surgery consulted for hand consult.     Patient seen and examined. States that finger initially drained clear fluid but has since resolved. States that he had an I&D in urgent care about 1 week ago where purulent fluid was expressed and since then, no fluid. Denies f/n/v/d, CP, SOB, HA, dizziness, abdominal pain, bleeding from finger.        PAST MEDICAL & SURGICAL HISTORY:  Psoriasis    HTN (hypertension)    Cervical radiculopathy    CAD (coronary artery disease)    Sleep apnea  uses CPAP    Cheek mass  Liposarcoma - left - RT treatments - restriction to opening of mouth    Other specified soft tissue disorders  Cataract  left    Obesity  History of hip replacement, total, right  2001    S/P CABG x 4  1/2019    Cheek mass  liposuction x 2 to left cheek    H/O sinus surgery    MEDICATIONS  (STANDING):  acetaminophen   IVPB .. 1000 milliGRAM(s) IV Intermittent Once    MEDICATIONS  (PRN):    Vital Signs Last 24 Hrs  T(C): 36.7 (01 Apr 2024 08:04), Max: 36.7 (01 Apr 2024 08:04)  T(F): 98 (01 Apr 2024 08:04), Max: 98 (01 Apr 2024 08:04)  HR: 99 (01 Apr 2024 08:04) (91 - 99)  BP: 119/77 (01 Apr 2024 08:04) (116/79 - 119/77)  BP(mean): --  RR: 18 (01 Apr 2024 08:04) (18 - 18)  SpO2: 97% (01 Apr 2024 08:04) (97% - 100%)    Parameters below as of 01 Apr 2024 08:04  Patient On (Oxygen Delivery Method): room air    T(C): 36.7 (04-01-24 @ 08:04), Max: 36.7 (04-01-24 @ 08:04)  HR: 99 (04-01-24 @ 08:04) (91 - 99)  BP: 119/77 (04-01-24 @ 08:04) (116/79 - 119/77)  RR: 18 (04-01-24 @ 08:04) (18 - 18)  SpO2: 97% (04-01-24 @ 08:04) (97% - 100%)    PHYSICAL EXAM  CONSTITUTIONAL: Well groomed, no apparent distress  ENMT: Oral mucosa with moist membranes.   RESP: No respiratory distress  CV: RRR  GI: Soft, NT, ND  EXTREMITY: Left hand 3rd finger erythematous, swollen and tender to palpation. sensory and motor intact. Flexion of finger limited 2/2 swelling and pain. No drainage. No fluctuance.

## 2024-04-01 NOTE — PATIENT PROFILE ADULT - FUNCTIONAL ASSESSMENT - BASIC MOBILITY 6.
4-calculated by average/Not able to assess (calculate score using [facility] averaging method)

## 2024-04-01 NOTE — ED PROVIDER NOTE - ENMT, MLM
Airway patent, Nasal mucosa clear. Mouth with normal mucosa. Throat has no vesicles, no oropharyngeal exudates and uvula is midline. home w/ parent

## 2024-04-01 NOTE — ED PROVIDER NOTE - PHYSICAL EXAMINATION
MSK: swelling to left middle finger. +ttp. no drainage from finger. no bleeding present. nailbed intact. full sensation intact. patient able to flex and extend his finger.

## 2024-04-01 NOTE — CONSULT NOTE ADULT - TIME BILLING
- Review of records, telemetry, vital signs and daily labs.   - General and cardiovascular physical examination.  - Generation of cardiovascular treatment plan.  - Coordination of care.      Patient was seen and examined by me on 4/1/24,interim events noted,labs and radiology studies reviewed.  Bobo Saenz MD,FACC.  [address] [place], [place] [id number].  [phone]

## 2024-04-01 NOTE — CONSULT NOTE ADULT - ATTENDING COMMENTS
Pt seen at bedside. 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 Pt reports a history of intermittent drainage of a cyst on the dorsum of the DIP for the last several months. However, this area became red and swollen a few weeks ago prior to a trip to [place]. Area was drained in urgent care and pus drained out, different from the usual jelly-like material. Treated with courses of oral antibiotics with limited response. Was advised to go to the ER by an outside hand surgeon after outpatient MRI demonstrated osteomyelitis.    On exam, distal MF is edematous and erythematous. Moderate nail fold retraction present. DIPJ is tender only to deep palpation. Area around terminal slip is boggy but not fluctuant. ~20 extensor lag of DIPJ. No appreciable swan neck deformity.    X-ray demonstrates significantly decreased joint space. Outpt MRI showed osteo and no drainable collection.    History c/w with chronic draining mucous cyst, which either got infected directly or from an adjacent paronychia. Or, less likely, related to rheumatological issues.     No surgical intervention warranted at this time. Joint salvage is not an option currently. Anticipate autoarthrodesis.

## 2024-04-01 NOTE — ED ADULT NURSE NOTE - NURSING ED SKIN COLOR
Did you know?      You can schedule a video visit for follow-up appointments as well as future appointments for certain conditions.  Please see the below link.     Video Visits ([email or web address])     If you have not already done so,  I encourage you to sign up for [employer] ([email or web address]).  This will allow you to review your results, securely communicate with a provider, and schedule virtual visits as well.  
normal for race

## 2024-04-01 NOTE — ED ADULT NURSE NOTE - ED STAT RN HANDOFF DETAILS
1100 Report given to receiving change of shift SANGEETHA Malloy S patient is in no acute distress. Patient vital signs stable, plan of care explained.

## 2024-04-01 NOTE — ED PROVIDER NOTE - CLINICAL SUMMARY MEDICAL DECISION MAKING FREE TEXT BOX
Mr Brent has 6 weeks of swelling and erythema of the left middle finger. MRI done as outpatient suggests osteomyelitis. start iv vancomycin (pt failed amoxicillin).   will admit after xray

## 2024-04-01 NOTE — ED PROVIDER NOTE - PROGRESS NOTE DETAILS
Armando Ford PA-C: hand consulted. awaiting call back Armando Ford PA-C: spoke with hand. will come see patient. Armando Ford PA-C: patient seen by hand, recommend admission to medicine. discussed with ED attending

## 2024-04-01 NOTE — CONSULT NOTE ADULT - SUBJECTIVE AND OBJECTIVE BOX
HPI:  62 y/o male, pmh HTN, HLD, CAD, CABG, presents to the ER for left middle finger infection.  has been having swelling to his finger for the past 6 weeks. [facility] he was started on amoxicillin by an outside urgent care.  saw his pcp, had mri done with concern for possible osteomyelitis. was advised to come to the ER for IV abx.  finger initially drained clear fluid but has since resolved. denies f/n/v/d, CP, SOB, HA, dizziness, abdominal pain, bleeding from finger.   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      Cheek mass  liposuction x 2 to left cheek      H/O sinus surgery          Allergies    No Known Allergies    Intolerances        ANTIMICROBIALS:  tenofovir disoproxil fumarate (VIREAD) 300 daily      OTHER MEDS:  aspirin enteric coated 81 milliGRAM(s) Oral daily  atorvastatin 80 milliGRAM(s) Oral at bedtime  clopidogrel Tablet 75 milliGRAM(s) Oral daily  losartan 25 milliGRAM(s) Oral daily      SOCIAL HISTORY:  from [place]  no smoking, alcohol or drug abuse  recent trip to [facility]    FAMILY HISTORY:  Family history of coronary artery disease  father when 59    Family history of MI (myocardial infarction) (Father)  father        ROS:    All other systems negative     Constitutional: no fever, no chills, no weight loss, no night sweats  Eye: no eye pain, no redness, no vision changes  ENT:  no sore throat, no rhinorrhea  Cardiovascular:  no chest pain, no palpitation  Respiratory:  no SOB, no cough  GI:  no abd pain, no vomiting, no diarrhea  urinary: no dysuria, no hematuria, no flank pain  : no penile discharge or bleeding  musculoskeletal:  L 3rd finger edema, erythema and slight decreased ROM  skin:  no rash  neurology:  no headache, no seizure, no change in mental status  psych: no anxiety, no depression     Physical Exam:    General:    NAD, non toxic  Head: atraumatic, normocephalic  Eyes: normal sclera and conjunctiva  ENT:   no oropharyngeal lesions, no LAD, neck supple  Cardio:    regular   Respiratory:   comfortable on RA  abd:   soft, , not tender  :     no CVAT, no suprapubic tenderness, no miles  Musculoskeletal : L 3rd finger edema, erythema of tip and DIP, some tenderness and decreased ROM  Skin:    hypopigmented patches on face and hand  vascular: no phlebitis  Neurologic:     no focal deficits  psych: normal affect      Drug Dosing Weight  Height (cm): 172.7 (01 Apr 2024 07:38)  Weight (kg): 87.1 (01 Apr 2024 07:38)  BMI (kg/m2): 29.2 (01 Apr 2024 07:38)  BSA (m2): 2.01 (01 Apr 2024 07:38)    Vital Signs Last 24 Hrs  T(F): 97.7 (04-01-24 @ 14:33), Max: 98 (04-01-24 @ 08:04)    Vital Signs Last 24 Hrs  HR: 73 (04-01-24 @ 14:33) (73 - 99)  BP: 127/82 (04-01-24 @ 14:33) (116/79 - 127/82)  RR: 18 (04-01-24 @ 14:33)  SpO2: 100% (04-01-24 @ 14:33) (97% - 100%)  Wt(kg): --                          11.5   8.14  )-----------( 417      ( 01 Apr 2024 08:17 )             37.1       04-01    139  |  103  |  16  ----------------------------<  151<H>  4.2   |  22  |  0.91    Ca    9.0      01 Apr 2024 08:17    TPro  8.0  /  Alb  4.0  /  TBili  0.3  /  DBili  x   /  AST  17  /  ALT  14  /  AlkPhos  119  04-01      Urinalysis Basic - ( 01 Apr 2024 08:17 )    Color: x / Appearance: x / SG: x / pH: x  Gluc: 151 mg/dL / Ketone: x  / Bili: x / Urobili: x   Blood: x / Protein: x / Nitrite: x   Leuk Esterase: x / RBC: x / WBC x   Sq Epi: x / Non Sq Epi: x / Bacteria: x        MICROBIOLOGY:  v              RADIOLOGY:    Images independently visualized and reviewed personally,  findings as below    < from: Xray Hand 2 Views, Left (04.01.24 @ 08:17) >  IMPRESSION:  Decreased bone mineralization with erosive changes and fragmentation at   the distal interphalangeal joint of the middle finger most consistent   with osteomyelitis. There is surrounding soft tissue swelling.    < end of copied text >

## 2024-04-01 NOTE — ED ADULT TRIAGE NOTE - CHIEF COMPLAINT QUOTE
swollen lft middle finger 6wks  had antibx and mri tests w/o relief  sent for iv antibx per dr mahoney

## 2024-04-01 NOTE — CONSULT NOTE ADULT - ASSESSMENT
The patient is a 63y Male complaining of Lt middle finger infection.    Lt middle finger OM:    OP MRI hand - OM finger  Plastic Sx eval appreciated  ID eval called  IV Bakari  F/up with CS    CAD:    Cw ASQA/Plavix

## 2024-04-02 LAB
ANION GAP SERPL CALC-SCNC: 12 MMOL/L — SIGNIFICANT CHANGE UP (ref 5–17)
BUN SERPL-MCNC: 11 MG/DL — SIGNIFICANT CHANGE UP (ref 7–23)
CALCIUM SERPL-MCNC: 8.9 MG/DL — SIGNIFICANT CHANGE UP (ref 8.4–10.5)
CHLORIDE SERPL-SCNC: 104 MMOL/L — SIGNIFICANT CHANGE UP (ref 96–108)
CO2 SERPL-SCNC: 22 MMOL/L — SIGNIFICANT CHANGE UP (ref 22–31)
CREAT SERPL-MCNC: 0.85 MG/DL — SIGNIFICANT CHANGE UP (ref 0.5–1.3)
EGFR: 97 ML/MIN/1.73M2 — SIGNIFICANT CHANGE UP
GLUCOSE SERPL-MCNC: 101 MG/DL — HIGH (ref 70–99)
HCT VFR BLD CALC: 37.6 % — LOW (ref 39–50)
HGB BLD-MCNC: 11.4 G/DL — LOW (ref 13–17)
MCHC RBC-ENTMCNC: 25.3 PG — LOW (ref 27–34)
MCHC RBC-ENTMCNC: 30.3 GM/DL — LOW (ref 32–36)
MCV RBC AUTO: 83.6 FL — SIGNIFICANT CHANGE UP (ref 80–100)
MRSA PCR RESULT.: SIGNIFICANT CHANGE UP
NRBC # BLD: 0 /100 WBCS — SIGNIFICANT CHANGE UP (ref 0–0)
PLATELET # BLD AUTO: 376 K/UL — SIGNIFICANT CHANGE UP (ref 150–400)
POTASSIUM SERPL-MCNC: 4.1 MMOL/L — SIGNIFICANT CHANGE UP (ref 3.5–5.3)
POTASSIUM SERPL-SCNC: 4.1 MMOL/L — SIGNIFICANT CHANGE UP (ref 3.5–5.3)
RBC # BLD: 4.5 M/UL — SIGNIFICANT CHANGE UP (ref 4.2–5.8)
RBC # FLD: 16.4 % — HIGH (ref 10.3–14.5)
S AUREUS DNA NOSE QL NAA+PROBE: SIGNIFICANT CHANGE UP
SODIUM SERPL-SCNC: 138 MMOL/L — SIGNIFICANT CHANGE UP (ref 135–145)
WBC # BLD: 7.12 K/UL — SIGNIFICANT CHANGE UP (ref 3.8–10.5)
WBC # FLD AUTO: 7.12 K/UL — SIGNIFICANT CHANGE UP (ref 3.8–10.5)

## 2024-04-02 PROCEDURE — 99232 SBSQ HOSP IP/OBS MODERATE 35: CPT

## 2024-04-02 PROCEDURE — 73220 MRI UPPR EXTREMITY W/O&W/DYE: CPT | Mod: 26,LT

## 2024-04-02 PROCEDURE — 99223 1ST HOSP IP/OBS HIGH 75: CPT | Mod: GC

## 2024-04-02 RX ORDER — HEPARIN SODIUM 5000 [USP'U]/ML
5000 INJECTION INTRAVENOUS; SUBCUTANEOUS EVERY 8 HOURS
Refills: 0 | Status: DISCONTINUED | OUTPATIENT
Start: 2024-04-02 | End: 2024-04-05

## 2024-04-02 RX ADMIN — TENOFOVIR DISOPROXIL FUMARATE 300 MILLIGRAM(S): 300 TABLET, FILM COATED ORAL at 11:28

## 2024-04-02 RX ADMIN — Medication 250 MILLIGRAM(S): at 17:10

## 2024-04-02 RX ADMIN — ATORVASTATIN CALCIUM 80 MILLIGRAM(S): 80 TABLET, FILM COATED ORAL at 22:39

## 2024-04-02 RX ADMIN — LOSARTAN POTASSIUM 25 MILLIGRAM(S): 100 TABLET, FILM COATED ORAL at 05:34

## 2024-04-02 RX ADMIN — CLOPIDOGREL BISULFATE 75 MILLIGRAM(S): 75 TABLET, FILM COATED ORAL at 11:27

## 2024-04-02 RX ADMIN — Medication 81 MILLIGRAM(S): at 11:27

## 2024-04-02 RX ADMIN — Medication 250 MILLIGRAM(S): at 05:34

## 2024-04-02 NOTE — PROGRESS NOTE ADULT - ASSESSMENT
The patient is a 63y Male complaining of Lt middle finger infection.    L third finger Dactylitis    Plastic Sx f/up appreciated  ID eval appreciated  IV Vanco  F/up with CS  MR Lt hand with contrast    CAD:    Cw ASQA/Plavix  Cardio eval appreciated

## 2024-04-02 NOTE — PROGRESS NOTE ADULT - ASSESSMENT
64 y/o male, pmh HTN, HLD, CAD, CABG, presents to the ER for left middle finger infection with imaging findings consistent with osteomyelitis. Patient currently non-septic. No surgical intervention at this time.     Plan:  - C/w IV abx per ID recommendations  - Monitor swelling  - F/u Blood cx  - F/u Rheum consult  - Plastics to follow    Plastic Surgery   [phone]

## 2024-04-02 NOTE — PROGRESS NOTE ADULT - ASSESSMENT
62 y/o male, pmh HTN, HLD, CAD, CABG, presents to the ER for left middle finger infection. Pt states that swelling started 6 weeks ago    #Osteomyelitis  Started on Vancomycin      #CAD s/p CABG  - [facility] 4/2023- Unsuccessful LCX  PCI due to inability to wire cross.   -Chronic Stable ischemic heart disease  -Continue clopidogrel  -BP controlled on Losartan        #HLD  -On Zetia

## 2024-04-02 NOTE — CONSULT NOTE ADULT - SUBJECTIVE AND OBJECTIVE BOX
***consult has been received. note is in progress and incomplete without attending attestation***    Francesco Flor MD, PGY-5  Rheumatology Fellow  Reachable on TEAMS    NELDA QUISPE  [phone]    HPI:  64 y/o male, pmh HTN, HLD, CAD, CABG, presents to the ER for left middle finger infection. states has been having swelling to his finger for the past 6 weeks. states he was started on amoxicillin by an outside urgent care. states saw his pcp, had mri done with concern for possible osteomyelitis. was advised to come to the ER for IV abx. states finger initially drained clear fluid but has since resolved. denies f/n/v/d, CP, SOB, HA, dizziness, abdominal pain, bleeding from finger.   (01 Apr 2024 12:49)      Rheumatology consulted for "rule out dactylitis"    Rheumatology History  Follows with Dr. Shafer. established care Dec 2023. Treated with Skyrizi  Examination at the time did not show peripheral arthritis  s/p remote R hip replacement  patient complained of R hip and knee pain. unclear if there was axial involvement in the disease  Of note the patient has a history of osteomyelitis vs Will's Angina in 2021 after a removal of L facial Lipoma    Rheum ROS    Denies fevers/chills/weight loss/night sweats/alopecia/sinus disease/asthma history/oral ulcers/dysphagia/vision changes/Raynauds/VTE/miscarraiges/sicca symptoms/urinary changes/edema/SOB/joint pain/swelling/jaw claudication/rash/photosensitivity/morning stiffness    Endorses fevers/chills/weight loss/night sweats/alopecia/sinus disease/asthma history/oral ulcers/dysphagia/vision changes/Raynauds/VTE/miscarraiges/sicca symptoms/urinary changes/edema/SOB/joint pain/swelling/jaw claudication/rash/photosensitivity/morning stiffness      MEDICATIONS  (STANDING):  aspirin enteric coated 81 milliGRAM(s) Oral daily  atorvastatin 80 milliGRAM(s) Oral at bedtime  clopidogrel Tablet 75 milliGRAM(s) Oral daily  losartan 25 milliGRAM(s) Oral daily  tenofovir disoproxil fumarate (VIREAD) 300 milliGRAM(s) Oral daily  vancomycin  IVPB 1000 milliGRAM(s) IV Intermittent every 12 hours    MEDICATIONS  (PRN):      Allergies    No Known Allergies    Intolerances        PERTINENT MEDICATION HISTORY:    SOCIAL HISTORY:  OCCUPATION:  TRAVEL HISTORY:    FAMILY HISTORY:  Family history of coronary artery disease  father when 59    Family history of MI (myocardial infarction) (Father)  father        Vital Signs Last 24 Hrs  T(C): 36.4 (02 Apr 2024 08:28), Max: 36.6 (01 Apr 2024 18:32)  T(F): 97.6 (02 Apr 2024 08:28), Max: 97.9 (02 Apr 2024 01:54)  HR: 75 (02 Apr 2024 08:28) (73 - 97)  BP: 122/82 (02 Apr 2024 08:28) (117/78 - 134/81)  BP(mean): --  RR: 18 (02 Apr 2024 08:28) (17 - 18)  SpO2: 98% (02 Apr 2024 08:28) (98% - 100%)    Parameters below as of 02 Apr 2024 08:28  Patient On (Oxygen Delivery Method): room air        Physical Exam:  General: No apparent distress  HEENT: EOMI, MMM  CVS: +S1/S2, RRR, no murmurs/rubs/gallops  Resp: CTA b/l. No crackles/wheezing  GI: Soft, NT/ND +BS  MSK: no swelling/warmth/erythema of the joints of the UE/LE  Neuro: AAOx3  Skin: no visible rashes    LABS:                        11.4   7.12  )-----------( 376      ( 02 Apr 2024 06:46 )             37.6     04-02    138  |  104  |  11  ----------------------------<  101<H>  4.1   |  22  |  0.85    Ca    8.9      02 Apr 2024 06:44    TPro  8.0  /  Alb  4.0  /  TBili  0.3  /  DBili  x   /  AST  17  /  ALT  14  /  AlkPhos  119  04-01      Urinalysis Basic - ( 02 Apr 2024 06:44 )    Color: x / Appearance: x / SG: x / pH: x  Gluc: 101 mg/dL / Ketone: x  / Bili: x / Urobili: x   Blood: x / Protein: x / Nitrite: x   Leuk Esterase: x / RBC: x / WBC x   Sq Epi: x / Non Sq Epi: x / Bacteria: x        Rheumatology Work Up    C-Reactive Protein, Serum: 4 mg/L [0 - 4] (04-01-24 @ 08:17)  Sedimentation Rate, Erythrocyte: 72 mm/hr *H* [0 - 20] (04-01-24 @ 08:17)  Creatine Kinase, Serum: 215 U/L *H* [30 - 200] (12-23-22 @ 15:05)  Sedimentation Rate, Erythrocyte: 85 mm/hr *H* [1 - 15] (06-23-21 @ 06:14)            RADIOLOGY & ADDITIONAL STUDIES:     ***consult has been received. note is in progress and incomplete without attending attestation***    Francesco Flor MD, PGY-5  Rheumatology Fellow  Reachable on TEAMS    NELDA QUISPE  [phone]    HPI:  64 y/o male, pmh HTN, HLD, CAD, CABG, presents to the ER for left middle finger infection. states has been having swelling to his finger for the past 6 weeks. states he was started on amoxicillin by an outside urgent care. states saw his pcp, had mri done with concern for possible osteomyelitis. was advised to come to the ER for IV abx. states finger initially drained clear fluid but has since resolved. denies f/n/v/d, CP, SOB, HA, dizziness, abdominal pain, bleeding from finger.   (01 Apr 2024 12:49)      Rheumatology consulted for "rule out dactylitis"    Rheumatology History  Follows with Dr. Shafer. established care Dec 2023. Treated with Skyrizi  Examination at the time did not show peripheral arthritis  s/p remote R hip replacement  patient complained of R hip and knee pain. unclear if there was axial involvement in the disease  Of note the patient has a history of osteomyelitis vs Will's Angina in 2021 after a removal of L facial Lipoma  Patient states he has been off Skyrizi for 6 months    Rheum ROS    Denies fevers/chills/weight loss/night sweats/alopecia/sinus disease/asthma history/oral ulcers/dysphagia/vision changes/Raynauds/VTE/miscarraiges/sicca symptoms/urinary changes/edema/SOB/joint pain/swelling/jaw claudication/rash/photosensitivity/morning stiffness    Endorses fevers/chills/weight loss/night sweats/alopecia/sinus disease/asthma history/oral ulcers/dysphagia/vision changes/Raynauds/VTE/miscarraiges/sicca symptoms/urinary changes/edema/SOB/joint pain/swelling/jaw claudication/rash/photosensitivity/morning stiffness      MEDICATIONS  (STANDING):  aspirin enteric coated 81 milliGRAM(s) Oral daily  atorvastatin 80 milliGRAM(s) Oral at bedtime  clopidogrel Tablet 75 milliGRAM(s) Oral daily  losartan 25 milliGRAM(s) Oral daily  tenofovir disoproxil fumarate (VIREAD) 300 milliGRAM(s) Oral daily  vancomycin  IVPB 1000 milliGRAM(s) IV Intermittent every 12 hours    MEDICATIONS  (PRN):      Allergies    No Known Allergies    Intolerances        PERTINENT MEDICATION HISTORY:    SOCIAL HISTORY:  OCCUPATION:  TRAVEL HISTORY:    FAMILY HISTORY:  Family history of coronary artery disease  father when 59    Family history of MI (myocardial infarction) (Father)  father        Vital Signs Last 24 Hrs  T(C): 36.4 (02 Apr 2024 08:28), Max: 36.6 (01 Apr 2024 18:32)  T(F): 97.6 (02 Apr 2024 08:28), Max: 97.9 (02 Apr 2024 01:54)  HR: 75 (02 Apr 2024 08:28) (73 - 97)  BP: 122/82 (02 Apr 2024 08:28) (117/78 - 134/81)  BP(mean): --  RR: 18 (02 Apr 2024 08:28) (17 - 18)  SpO2: 98% (02 Apr 2024 08:28) (98% - 100%)    Parameters below as of 02 Apr 2024 08:28  Patient On (Oxygen Delivery Method): room air        Physical Exam:  General: No apparent distress  HEENT: EOMI, MMM  CVS: +S1/S2, RRR, no murmurs/rubs/gallops  Resp: CTA b/l. No crackles/wheezing  GI: Soft, NT/ND +BS  MSK: no swelling/warmth/erythema of the joints of the UE/LE  Neuro: AAOx3  Skin: no visible rashes    LABS:                        11.4   7.12  )-----------( 376      ( 02 Apr 2024 06:46 )             37.6     04-02    138  |  104  |  11  ----------------------------<  101<H>  4.1   |  22  |  0.85    Ca    8.9      02 Apr 2024 06:44    TPro  8.0  /  Alb  4.0  /  TBili  0.3  /  DBili  x   /  AST  17  /  ALT  14  /  AlkPhos  119  04-01      Urinalysis Basic - ( 02 Apr 2024 06:44 )    Color: x / Appearance: x / SG: x / pH: x  Gluc: 101 mg/dL / Ketone: x  / Bili: x / Urobili: x   Blood: x / Protein: x / Nitrite: x   Leuk Esterase: x / RBC: x / WBC x   Sq Epi: x / Non Sq Epi: x / Bacteria: x        Rheumatology Work Up    C-Reactive Protein, Serum: 4 mg/L [0 - 4] (04-01-24 @ 08:17)  Sedimentation Rate, Erythrocyte: 72 mm/hr *H* [0 - 20] (04-01-24 @ 08:17)  Creatine Kinase, Serum: 215 U/L *H* [30 - 200] (12-23-22 @ 15:05)  Sedimentation Rate, Erythrocyte: 85 mm/hr *H* [1 - 15] (06-23-21 @ 06:14)        RADIOLOGY & ADDITIONAL STUDIES:  < from: Xray Hand 2 Views, Left (04.01.24 @ 08:17) >  ACC: [id number] EXAM:  XR HAND 2 VIEWS LT   ORDERED BY: KARLA PHAM     PROCEDURE DATE:  04/01/2024          INTERPRETATION:  CLINICAL INDICATION: Concern for osteomyelitis of the   middle finger    EXAM: 3 views of the left hand    COMPARISON: None      IMPRESSION:  Decreased bone mineralization with erosive changes and fragmentation at   the distal interphalangeal joint of the middle finger most consistent   with osteomyelitis. There is surrounding soft tissue swelling.    --- End of Report ---    < end of copied text >     Francesco Flor MD, PGY-5  Rheumatology Fellow  Reachable on TEAMS    NELDA QUISPE  [phone]    HPI:  62 y/o male, pmh HTN, HLD, CAD, CABG, presents to the ER for left middle finger infection. states has been having swelling to his finger for the past 6 weeks. states he was started on amoxicillin by an outside urgent care. states saw his pcp, had mri done with concern for possible osteomyelitis. was advised to come to the ER for IV abx. states finger initially drained clear fluid but has since resolved. denies f/n/v/d, CP, SOB, HA, dizziness, abdominal pain, bleeding from finger.   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 The patient originally had paronychia in the L 3rd finger after a manicure 6 weeks ago. Review of patient's phone based photos appeared infectious with erythema and fluid collection. Originally was treated with 1 week of doxycycline and then 2 weeks of bactrim. After bactrim initial improvement was noted, however the patient developed recurrence of the swelling. The patient was then evaluated by hand surgery and PCP and was placed on augmentin for 2 weeks without improvement in the symptoms. The patient states he went to urgent care and had incision with drainage of scant jelly-like fluid.  The patient eventually had an MR done on 3/18 which resulted in findings concerning for osteo vs inflammatory arthritis and patient went to the ED for expedited evaluation. In the ED the patient had BCx drawn (negative) and was started on vancomycin. The patient had xray performed with findings concerning for osteomyelitis    Rheumatology History  Follows with Dr. Shafer. established care Dec 2023. Treated with Skyrizi  Examination at the time did not show peripheral arthritis  s/p remote R hip replacement  patient complained of R hip and knee pain. unclear if there was axial involvement in the disease  Of note the patient has a history of osteomyelitis vs Will's Angina in 2021 after a removal of L facial Lipoma  Patient states he has been off Skyrizi for several months (in his fridge)      MEDICATIONS  (STANDING):  aspirin enteric coated 81 milliGRAM(s) Oral daily  atorvastatin 80 milliGRAM(s) Oral at bedtime  clopidogrel Tablet 75 milliGRAM(s) Oral daily  losartan 25 milliGRAM(s) Oral daily  tenofovir disoproxil fumarate (VIREAD) 300 milliGRAM(s) Oral daily  vancomycin  IVPB 1000 milliGRAM(s) IV Intermittent every 12 hours    MEDICATIONS  (PRN):      Allergies    No Known Allergies    Intolerances        FAMILY HISTORY:  Family history of coronary artery disease  father when 59    Family history of MI (myocardial infarction) (Father)  father        Vital Signs Last 24 Hrs  T(C): 36.4 (02 Apr 2024 08:28), Max: 36.6 (01 Apr 2024 18:32)  T(F): 97.6 (02 Apr 2024 08:28), Max: 97.9 (02 Apr 2024 01:54)  HR: 75 (02 Apr 2024 08:28) (73 - 97)  BP: 122/82 (02 Apr 2024 08:28) (117/78 - 134/81)  BP(mean): --  RR: 18 (02 Apr 2024 08:28) (17 - 18)  SpO2: 98% (02 Apr 2024 08:28) (98% - 100%)    Parameters below as of 02 Apr 2024 08:28  Patient On (Oxygen Delivery Method): room air      Physical Exam:  General: No apparent distress. ambulating around the room  CVS: +S1/S2, RRR, no murmurs/rubs/gallops  Resp: CTA b/l. No crackles/wheezing  MSK: L 3rd digit swelling towards the distal end. no uniform enlargement across the entire finger. no warmth or erythema. pain with palpation and movement of the digit. no synovitis. no enthesitis. otherwise no other swelling/warmth/erythema of the joints of the UE/LE  Neuro: AAOx3  Skin: multiple patches of depigmented skin across head and UE. no active PsO lesions noted    LABS:                        11.4   7.12  )-----------( 376      ( 02 Apr 2024 06:46 )             37.6     04-02    138  |  104  |  11  ----------------------------<  101<H>  4.1   |  22  |  0.85    Ca    8.9      02 Apr 2024 06:44    TPro  8.0  /  Alb  4.0  /  TBili  0.3  /  DBili  x   /  AST  17  /  ALT  14  /  AlkPhos  119  04-01      Urinalysis Basic - ( 02 Apr 2024 06:44 )    Color: x / Appearance: x / SG: x / pH: x  Gluc: 101 mg/dL / Ketone: x  / Bili: x / Urobili: x   Blood: x / Protein: x / Nitrite: x   Leuk Esterase: x / RBC: x / WBC x   Sq Epi: x / Non Sq Epi: x / Bacteria: x        Rheumatology Work Up    C-Reactive Protein, Serum: 4 mg/L [0 - 4] (04-01-24 @ 08:17)  Sedimentation Rate, Erythrocyte: 72 mm/hr *H* [0 - 20] (04-01-24 @ 08:17)  Creatine Kinase, Serum: 215 U/L *H* [30 - 200] (12-23-22 @ 15:05)  Sedimentation Rate, Erythrocyte: 85 mm/hr *H* [1 - 15] (06-23-21 @ 06:14)        RADIOLOGY & ADDITIONAL STUDIES:  < from: Xray Hand 2 Views, Left (04.01.24 @ 08:17) >  ACC: [id number] EXAM:  XR HAND 2 VIEWS LT   ORDERED BY: KARLA PHAM     PROCEDURE DATE:  04/01/2024          INTERPRETATION:  CLINICAL INDICATION: Concern for osteomyelitis of the   middle finger    EXAM: 3 views of the left hand    COMPARISON: None      IMPRESSION:  Decreased bone mineralization with erosive changes and fragmentation at   the distal interphalangeal joint of the middle finger most consistent   with osteomyelitis. There is surrounding soft tissue swelling.    --- End of Report ---    < end of copied text >

## 2024-04-02 NOTE — PROGRESS NOTE ADULT - ASSESSMENT
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  s/p antibiotics psoriasis on skyrizi and chronic hep B on tenfovoir, had had Paronychia of L 3rd finger but about 5 weeks ago when he was in [place], it developed purulent drainage and he was given amoxicillin which he took for about 4 weeks but still has edema, erythema and pain of the 3rd DIP, MRI outside showed possible osteo of 3rd distal phalanx and proximal mid phalanx vs inflammatory etiology   afebrile, WBC: normal  xray here: Decreased bone mineralization with erosive changes and fragmentation at the distal interphalangeal joint of the middle finger most consistent   with osteomyelitis. There is surrounding soft tissue swelling.    immunocompromised pt with psoriasis on skyziri, had recent L 3rd paronychia which developed purulent drainage and now dactylitis and MRI/xray with possible osteo  s/p 4 weeks of amox with no improvement, plastic did not recommend any surgical intervention  as pt is immunocompromised and recent paronychia, will treat as infection, but psoriasis itself can cause dactylitis as well  chronic hep B on tenofovir     * c/w vanco 1 q 12 and check trough before the 4th dose  * if no surgical interventions then will have to do a 6 week course of vanco  * weekly CBC, CMP, ESR, CRP and vanco trough while on antibiotics  * follow with hand and rheum    The above assessment and plan was discussed with the primary team    Halima Erwin MD  contact on teams  After 5pm and on weekends call [phone]

## 2024-04-02 NOTE — CONSULT NOTE ADULT - ASSESSMENT
64 y/o male, pmh HTN, HLD, CAD, CABG, presents to the ER for left middle finger infection with history of Psoriatic Arthritis    ##Infectious Dactylitis  -history of immunosuppression and prior history of osteonecrosis/osteomyelitis s/p lipoma resection in 2021  -x-ray read concerning for osteomyelitis  -?outpatient MR with concern for osteomyelitis  -the most obvious concern would be to rule out infection  -while PsA can cause dactylitis the risk of infection outweights PsA activity at this point in time since there is imaging evidence concerning for dactylitis in 2 separate imaging studies  -immunosuppression is not generally advisable in patients with ongoing infections, would not escalate until this is actually properly assessed    PLAN  -get a copy of the outpatient MR  -rule out infection  -no immunosuppression in a patient with 2 imaging modalities concerning for osteomyelitis    Thank you for this interesting consult 62 y/o male, pmh HTN, HLD, CAD, CABG, presents to the ER for left middle finger infection with history of Psoriatic Arthritis    ##L third finger Dactylitis  -history of immunosuppression and prior history of osteonecrosis/osteomyelitis s/p lipoma resection in 2021  -in the setting of having paronychia approximately 6-7 weeks ago  -x-ray read concerning for osteomyelitis  -outpatient MR with concern for osteomyelitis vs inflammatory arthritis  -?osteitis, sterile vs infectious. may benefit from bone biopsy to fully evaluate prior to consideration of immunosuppression vs tailoring antibiotic treatment  -no other evidence of PsA activity otherwise  -s/p multiple treatment options for infection including doxycycline, bactrim, and prolonged Augmentin course  -originally had improvement with Bactrim per patient's daughter    PLAN  -     64 y/o male, pmh HTN, HLD, CAD, CABG, presents to the ER for left middle finger infection with history of Psoriatic Arthritis    ##L third finger Dactylitis  -history of immunosuppression and prior history of osteonecrosis/osteomyelitis s/p lipoma resection in 2021  -in the setting of having paronychia approximately 6-7 weeks ago  -x-ray read concerning for osteomyelitis  -outpatient MR with concern for osteomyelitis vs inflammatory arthritis  -?osteitis, sterile vs infectious. may benefit from bone biopsy to fully evaluate prior to consideration of immunosuppression vs tailoring antibiotic treatment  -no other evidence of PsA activity otherwise  -s/p multiple treatment options for infection including doxycycline, bactrim, and prolonged Augmentin course  -originally had improvement with Bactrim per patient's daughter  -d/w radiology, xray findings not typical for PsA      PLAN  -MR contrast L hand to eval for progress (ordered)  -appreciate ID, c/w vancomycin. to evaluate response  -would hold off steroids for now, if no clinical response to abx, will consider low dose  -will check uric acid level    d/w radiology. d/w ID    case d/w Dr. Aurora Flor MD, PGY-5  Rheumatology Fellow  Reachable on TEAMS 62 y/o male, pmh HTN, HLD, CAD, CABG, presents to the ER for left middle finger infection with history of Psoriatic Arthritis    ##L third finger Dactylitis  -history of immunosuppression and prior history of osteonecrosis/osteomyelitis s/p lipoma resection in 2021  -in the setting of having paronychia approximately 6-7 weeks ago  -x-ray read concerning for osteomyelitis  -outpatient MR with concern for osteomyelitis vs inflammatory arthritis  -?osteitis, sterile vs infectious. may benefit from bone biopsy to fully evaluate prior to consideration of immunosuppression vs tailoring antibiotic treatment  -no other evidence of PsA activity otherwise  -s/p multiple treatment options for infection including doxycycline, bactrim, and prolonged Augmentin course  -originally had improvement with Bactrim per patient's daughter  -d/w radiology, xray findings and clinical presentation not typical for PsA      PLAN  -MR contrast L hand to eval for progress (ordered)  -appreciate ID, c/w vancomycin. to evaluate response  -would hold off steroids for now, if no clinical response to abx, will consider low dose  -will check uric acid level    d/w radiology. d/w ID    case d/w Dr. Aurora Flor MD, PGY-5  Rheumatology Fellow  Reachable on TEAMS

## 2024-04-03 LAB
ALBUMIN SERPL ELPH-MCNC: 3.9 G/DL — SIGNIFICANT CHANGE UP (ref 3.3–5)
ALP SERPL-CCNC: 111 U/L — SIGNIFICANT CHANGE UP (ref 40–120)
ALT FLD-CCNC: 12 U/L — SIGNIFICANT CHANGE UP (ref 10–45)
ANION GAP SERPL CALC-SCNC: 14 MMOL/L — SIGNIFICANT CHANGE UP (ref 5–17)
AST SERPL-CCNC: 16 U/L — SIGNIFICANT CHANGE UP (ref 10–40)
BASOPHILS # BLD AUTO: 0.07 K/UL — SIGNIFICANT CHANGE UP (ref 0–0.2)
BASOPHILS NFR BLD AUTO: 1 % — SIGNIFICANT CHANGE UP (ref 0–2)
BILIRUB SERPL-MCNC: 0.4 MG/DL — SIGNIFICANT CHANGE UP (ref 0.2–1.2)
BUN SERPL-MCNC: 11 MG/DL — SIGNIFICANT CHANGE UP (ref 7–23)
CALCIUM SERPL-MCNC: 9.6 MG/DL — SIGNIFICANT CHANGE UP (ref 8.4–10.5)
CHLORIDE SERPL-SCNC: 101 MMOL/L — SIGNIFICANT CHANGE UP (ref 96–108)
CO2 SERPL-SCNC: 23 MMOL/L — SIGNIFICANT CHANGE UP (ref 22–31)
CREAT SERPL-MCNC: 0.84 MG/DL — SIGNIFICANT CHANGE UP (ref 0.5–1.3)
EGFR: 97 ML/MIN/1.73M2 — SIGNIFICANT CHANGE UP
EOSINOPHIL # BLD AUTO: 0.26 K/UL — SIGNIFICANT CHANGE UP (ref 0–0.5)
EOSINOPHIL NFR BLD AUTO: 3.8 % — SIGNIFICANT CHANGE UP (ref 0–6)
GLUCOSE SERPL-MCNC: 86 MG/DL — SIGNIFICANT CHANGE UP (ref 70–99)
HCT VFR BLD CALC: 39.2 % — SIGNIFICANT CHANGE UP (ref 39–50)
HGB BLD-MCNC: 11.8 G/DL — LOW (ref 13–17)
IMM GRANULOCYTES NFR BLD AUTO: 0.1 % — SIGNIFICANT CHANGE UP (ref 0–0.9)
LYMPHOCYTES # BLD AUTO: 0.96 K/UL — LOW (ref 1–3.3)
LYMPHOCYTES # BLD AUTO: 14 % — SIGNIFICANT CHANGE UP (ref 13–44)
MCHC RBC-ENTMCNC: 24.9 PG — LOW (ref 27–34)
MCHC RBC-ENTMCNC: 30.1 GM/DL — LOW (ref 32–36)
MCV RBC AUTO: 82.7 FL — SIGNIFICANT CHANGE UP (ref 80–100)
MONOCYTES # BLD AUTO: 0.77 K/UL — SIGNIFICANT CHANGE UP (ref 0–0.9)
MONOCYTES NFR BLD AUTO: 11.2 % — SIGNIFICANT CHANGE UP (ref 2–14)
NEUTROPHILS # BLD AUTO: 4.8 K/UL — SIGNIFICANT CHANGE UP (ref 1.8–7.4)
NEUTROPHILS NFR BLD AUTO: 69.9 % — SIGNIFICANT CHANGE UP (ref 43–77)
NRBC # BLD: 0 /100 WBCS — SIGNIFICANT CHANGE UP (ref 0–0)
PLATELET # BLD AUTO: 387 K/UL — SIGNIFICANT CHANGE UP (ref 150–400)
POTASSIUM SERPL-MCNC: 4.4 MMOL/L — SIGNIFICANT CHANGE UP (ref 3.5–5.3)
POTASSIUM SERPL-SCNC: 4.4 MMOL/L — SIGNIFICANT CHANGE UP (ref 3.5–5.3)
PROT SERPL-MCNC: 7.7 G/DL — SIGNIFICANT CHANGE UP (ref 6–8.3)
RBC # BLD: 4.74 M/UL — SIGNIFICANT CHANGE UP (ref 4.2–5.8)
RBC # FLD: 16.1 % — HIGH (ref 10.3–14.5)
SODIUM SERPL-SCNC: 138 MMOL/L — SIGNIFICANT CHANGE UP (ref 135–145)
URATE SERPL-MCNC: 4.7 MG/DL — SIGNIFICANT CHANGE UP (ref 3.4–8.8)
VANCOMYCIN TROUGH SERPL-MCNC: 11.9 UG/ML — SIGNIFICANT CHANGE UP (ref 10–20)
VANCOMYCIN TROUGH SERPL-MCNC: 25.2 UG/ML — CRITICAL HIGH (ref 10–20)
WBC # BLD: 6.87 K/UL — SIGNIFICANT CHANGE UP (ref 3.8–10.5)
WBC # FLD AUTO: 6.87 K/UL — SIGNIFICANT CHANGE UP (ref 3.8–10.5)

## 2024-04-03 PROCEDURE — 93930 UPPER EXTREMITY STUDY: CPT | Mod: 26

## 2024-04-03 PROCEDURE — 99232 SBSQ HOSP IP/OBS MODERATE 35: CPT | Mod: GC

## 2024-04-03 PROCEDURE — 99232 SBSQ HOSP IP/OBS MODERATE 35: CPT

## 2024-04-03 RX ORDER — CHLORHEXIDINE GLUCONATE 213 G/1000ML
1 SOLUTION TOPICAL DAILY
Refills: 0 | Status: DISCONTINUED | OUTPATIENT
Start: 2024-04-03 | End: 2024-04-05

## 2024-04-03 RX ADMIN — CLOPIDOGREL BISULFATE 75 MILLIGRAM(S): 75 TABLET, FILM COATED ORAL at 11:41

## 2024-04-03 RX ADMIN — LOSARTAN POTASSIUM 25 MILLIGRAM(S): 100 TABLET, FILM COATED ORAL at 05:58

## 2024-04-03 RX ADMIN — HEPARIN SODIUM 5000 UNIT(S): 5000 INJECTION INTRAVENOUS; SUBCUTANEOUS at 05:58

## 2024-04-03 RX ADMIN — Medication 250 MILLIGRAM(S): at 17:18

## 2024-04-03 RX ADMIN — HEPARIN SODIUM 5000 UNIT(S): 5000 INJECTION INTRAVENOUS; SUBCUTANEOUS at 13:21

## 2024-04-03 RX ADMIN — HEPARIN SODIUM 5000 UNIT(S): 5000 INJECTION INTRAVENOUS; SUBCUTANEOUS at 21:50

## 2024-04-03 RX ADMIN — Medication 250 MILLIGRAM(S): at 05:58

## 2024-04-03 RX ADMIN — Medication 81 MILLIGRAM(S): at 11:41

## 2024-04-03 RX ADMIN — TENOFOVIR DISOPROXIL FUMARATE 300 MILLIGRAM(S): 300 TABLET, FILM COATED ORAL at 11:41

## 2024-04-03 RX ADMIN — ATORVASTATIN CALCIUM 80 MILLIGRAM(S): 80 TABLET, FILM COATED ORAL at 21:50

## 2024-04-03 NOTE — PROGRESS NOTE ADULT - ASSESSMENT
The patient is a 63y Male complaining of Lt middle finger infection.    L third finger Dactylitis    Plastic Sx/ID  f/up appreciated  IV Vanco for 6 weeks  MR Lt hand with contrast: OM 3rd digit  PICC line    CAD:    Cw ASQA/Plavix  Cardio eval appreciated The patient is a 63y Male complaining of Lt middle finger infection.    L third finger Dactylitis    Plastic Sx/ID  f/up appreciated  IV Vanco for 6 weeks  MR Lt hand with contrast: OM 3rd digit  PICC line    CAD:    Cw ASQA/Plavix  Cardio eval appreciated    Hepatitis B:    Cont home meds

## 2024-04-03 NOTE — PROGRESS NOTE ADULT - ASSESSMENT
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  s/p antibiotics psoriasis on skyrizi and chronic hep B on tenfovoir, had had Paronychia of L 3rd finger but about 5 weeks ago when he was in [place], it developed purulent drainage and he was given amoxicillin which he took for about 4 weeks but still has edema, erythema and pain of the 3rd DIP, MRI outside showed possible osteo of 3rd distal phalanx and proximal mid phalanx vs inflammatory etiology   afebrile, WBC: normal  xray here: Decreased bone mineralization with erosive changes and fragmentation at the distal interphalangeal joint of the middle finger most consistent   with osteomyelitis. There is surrounding soft tissue swelling.    immunocompromised pt with psoriasis on skyziri, had recent L 3rd paronychia which developed purulent drainage and now dactylitis and MRI with osteo of the middle and distal 3rd phalanx and likely septic arthritis  s/p 4 weeks of amox with no improvement, plastic did not recommend any surgical intervention  as pt is immunocompromised and recent paronychia, will treat as infection, but psoriasis itself can cause dactylitis as well  chronic hep B on tenofovir     * c/w vanco 1 q 12   * will do a 6 week course of vanco through 5/13  * weekly CBC, CMP, ESR, CRP and vanco trough while on antibiotics      The above assessment and plan was discussed with the primary team    Halima Erwin MD  contact on teams  After 5pm and on weekends call [phone]

## 2024-04-03 NOTE — CONSULT NOTE ADULT - SUBJECTIVE AND OBJECTIVE BOX
Interventional Radiology    Evaluate for Procedure: PICC placement    HPI: 63 year old Male with a PMH of HTN, HLD, CAD, CABG, presents to the ER for left middle finger infection with imaging findings consistent with osteomyelitis. Plan for IV Abx. IR consulted for PICC placement    Allergies: No Known Allergies    Medications (Abx/Cardiac/Anticoagulation/Blood Products)  aspirin enteric coated: 81 milliGRAM(s) Oral (04-03 @ 11:41)  clopidogrel Tablet: 75 milliGRAM(s) Oral (04-03 @ 11:41)  heparin   Injectable: 5000 Unit(s) SubCutaneous (04-03 @ 13:21)  losartan: 25 milliGRAM(s) Oral (04-03 @ 05:58)  tenofovir disoproxil fumarate (VIREAD): 300 milliGRAM(s) Oral (04-03 @ 11:41)  vancomycin  IVPB: 250 mL/Hr IV Intermittent (04-03 @ 05:58)    Data: T(C): 36.5  HR: 87  BP: 105/73  RR: 18  SpO2: 100%    -WBC 6.87 / HgB 11.8 / Hct 39.2 / Plt 387  -Na 138 / Cl 101 / BUN 11 / Glucose 86  -K 4.4 / CO2 23 / Cr 0.84  -ALT 12 / Alk Phos 111 / T.Bili 0.4  -INR 1.17 / PTT 33.6        Assessment/Plan:  63 year old Male with a PMH of HTN, HLD, CAD, CABG, presents to the ER for left middle finger infection with imaging findings consistent with osteomyelitis. Plan for IV Abx. IR consulted for PICC placement  - Place IR procedure order for PICC  - Will plan for PICC placement tomorrow 4/4/24  - Hold anticoagulation for procedure

## 2024-04-03 NOTE — CHART NOTE - NSCHARTNOTEFT_GEN_A_CORE
Vanco trough level today is 25.2, will hold am dose. F/U with ID in am  Nafisa Talbot   Medicine ACP

## 2024-04-03 NOTE — PROGRESS NOTE ADULT - ASSESSMENT
62 y/o male, pmh HTN, HLD, CAD, CABG, presents to the ER for left middle finger infection with imaging findings consistent with osteomyelitis. Patient currently non-septic. No surgical intervention at this time.     Plan:  - C/w IV abx per ID recommendations, seems to be improving on current regimen  - Monitor swelling  - F/u Blood cx - NGTD so far  - F/u MRI read  - Plastics to follow    Plastic Surgery   [phone]

## 2024-04-03 NOTE — CONSULT NOTE ADULT - REASON FOR ADMISSION
The patient is a 63y Male complaining of Lt middle finger infection.

## 2024-04-03 NOTE — PROGRESS NOTE ADULT - ASSESSMENT
64 y/o male, pmh HTN, HLD, CAD, CABG, presents to the ER for left middle finger infection. Pt states that swelling started 6 weeks ago    #Osteomyelitis  Started on Vancomycin      #CAD s/p CABG  - [facility] 4/2023- Unsuccessful LCX  PCI due to inability to wire cross.   -Chronic Stable ischemic heart disease  -Continue clopidogrel  -BP controlled on Losartan        #HLD  -On Zetia

## 2024-04-03 NOTE — PROGRESS NOTE ADULT - ATTENDING COMMENTS
Per pt report, improving on abx.     Reviewed MRI with Dr. Fidelina Barrera of Radiology. No clear joint effusion. Joint space is significantly narrowed. Area of possible effusion is dorsal, adjacent to terminal slip, corresponding to boggy, nonfluctuant, minimally tender area on exam.    With this late presentation, the joint is beyond salvage, and likely to undergo autoarthrodesis in the course of osteomyeltis treatment. Reviewed the risks or terminal slip rupture and swan neck deformity with the patient, as well as the limited utility of arthrotomy at this time, A formal arthrodesis, if needed, is much more likely to succeed after the osteomyelitis is treated. There is currently no evidence of a clinically-significant drainable collection that would speed his treatment.     Impression: infected mucous cyst resulted in destruction of the DIPJ and osteomyelitis of the distal and middle phalanges. Less likely dactylitis.    Agree with medical management. Will follow clinically as outpatient. Patient expresses agreement with this plan.

## 2024-04-04 ENCOUNTER — TRANSCRIPTION ENCOUNTER (OUTPATIENT)
Age: 64
End: 2024-04-04

## 2024-04-04 PROCEDURE — 99232 SBSQ HOSP IP/OBS MODERATE 35: CPT

## 2024-04-04 PROCEDURE — 36573 INSJ PICC RS&I 5 YR+: CPT | Mod: 53

## 2024-04-04 RX ORDER — VANCOMYCIN HCL 1 G
1 VIAL (EA) INTRAVENOUS
Qty: 78 | Refills: 0
Start: 2024-04-04 | End: 2024-05-12

## 2024-04-04 RX ADMIN — HEPARIN SODIUM 5000 UNIT(S): 5000 INJECTION INTRAVENOUS; SUBCUTANEOUS at 21:08

## 2024-04-04 RX ADMIN — HEPARIN SODIUM 5000 UNIT(S): 5000 INJECTION INTRAVENOUS; SUBCUTANEOUS at 06:18

## 2024-04-04 RX ADMIN — HEPARIN SODIUM 5000 UNIT(S): 5000 INJECTION INTRAVENOUS; SUBCUTANEOUS at 14:16

## 2024-04-04 RX ADMIN — TENOFOVIR DISOPROXIL FUMARATE 300 MILLIGRAM(S): 300 TABLET, FILM COATED ORAL at 11:41

## 2024-04-04 RX ADMIN — LOSARTAN POTASSIUM 25 MILLIGRAM(S): 100 TABLET, FILM COATED ORAL at 06:18

## 2024-04-04 RX ADMIN — Medication 81 MILLIGRAM(S): at 11:40

## 2024-04-04 RX ADMIN — ATORVASTATIN CALCIUM 80 MILLIGRAM(S): 80 TABLET, FILM COATED ORAL at 21:08

## 2024-04-04 RX ADMIN — Medication 250 MILLIGRAM(S): at 17:25

## 2024-04-04 RX ADMIN — CHLORHEXIDINE GLUCONATE 1 APPLICATION(S): 213 SOLUTION TOPICAL at 11:43

## 2024-04-04 RX ADMIN — CLOPIDOGREL BISULFATE 75 MILLIGRAM(S): 75 TABLET, FILM COATED ORAL at 11:41

## 2024-04-04 NOTE — PROGRESS NOTE ADULT - TIME BILLING
- Review of records, telemetry, vital signs and daily labs.   - General and cardiovascular physical examination.  - Generation of cardiovascular treatment plan.  - Coordination of care.      Patient was seen and examined by me on  04/03/2024,interim events noted,labs and radiology studies reviewed.  Bobo Saenz MD,FACC.  [address] [place], [place] [id number].  [phone]
- Review of records, telemetry, vital signs and daily labs.   - General and cardiovascular physical examination.  - Generation of cardiovascular treatment plan.  - Coordination of care.      Patient was seen and examined by me on  04/04/2024,interim events noted,labs and radiology studies reviewed.  Bobo Saenz MD,FACC.  [address] [place], [place] [id number].  [phone]
- Review of records, telemetry, vital signs and daily labs.   - General and cardiovascular physical examination.  - Generation of cardiovascular treatment plan.  - Coordination of care.      Patient was seen and examined by me on  04/02/2024,interim events noted,labs and radiology studies reviewed.  Bobo Saenz MD,FACC.  [address] [place], [place] [id number].  [phone]

## 2024-04-04 NOTE — DISCHARGE NOTE PROVIDER - CARE PROVIDER_API CALL
Halima Erwin)  Infectious Disease  [address] [place] [id number]  Phone: [phone]  Fax: [phone]  Follow Up Time: 1 week

## 2024-04-04 NOTE — PROCEDURE NOTE - NSPOSTPRCRAD_GEN_A_CORE
depth of insertion/fluoroscopy/line adjusted to depth of insertion/line in appropriate postion/postion of catheter/ultrasound

## 2024-04-04 NOTE — PROGRESS NOTE ADULT - ASSESSMENT
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  s/p antibiotics psoriasis on skyrizi and chronic hep B on tenfovoir, had had Paronychia of L 3rd finger but about 5 weeks ago when he was in [place], it developed purulent drainage and he was given amoxicillin which he took for about 4 weeks but still has edema, erythema and pain of the 3rd DIP, MRI outside showed possible osteo of 3rd distal phalanx and proximal mid phalanx vs inflammatory etiology   afebrile, WBC: normal  xray here: Decreased bone mineralization with erosive changes and fragmentation at the distal interphalangeal joint of the middle finger most consistent   with osteomyelitis. There is surrounding soft tissue swelling.    immunocompromised pt with psoriasis on skyziri, had recent L 3rd paronychia which developed purulent drainage and now dactylitis and MRI with osteo of the middle and distal 3rd phalanx and likely septic arthritis  s/p 4 weeks of amox with no improvement, plastic did not recommend any surgical intervention  as pt is immunocompromised and recent paronychia, will treat as infection, but psoriasis itself can cause dactylitis as well  chronic hep B on tenofovir     * c/w vanco 1 q 12 , the vanco level was 25 but was not a trough  * will do a 6 week course of vanco through 5/13  * weekly CBC, CMP, ESR, CRP and vanco trough while on antibiotics      The above assessment and plan was discussed with the primary team    Halima Erwin MD  contact on teams  After 5pm and on weekends call [phone]

## 2024-04-04 NOTE — PROGRESS NOTE ADULT - ASSESSMENT
The patient is a 63y Male complaining of Lt middle finger infection.    L third finger Dactylitis    Plastic Sx/ID  f/up appreciated  IV Vanco for 6 weeks  MR Lt hand with contrast: OM 3rd digit  PICC line    CAD:    Cw ASQA/Plavix  Cardio eval appreciated    Hepatitis B:    Cont home meds

## 2024-04-04 NOTE — DISCHARGE NOTE PROVIDER - NSDCCPCAREPLAN_GEN_ALL_CORE_FT
PRINCIPAL DISCHARGE DIAGNOSIS  Diagnosis: Osteomyelitis  Assessment and Plan of Treatment: 6 week course of vanco through 5/13 and weekly CBC, CMP, ESR, CRP and vanco trough while on antibiotics.  * weekly CBC, CMP, ESR, CRP and vanco trough while on antibiotics

## 2024-04-04 NOTE — PRE PROCEDURE NOTE - PRE PROCEDURE EVALUATION
Interventional Radiology    HPI: 63 year old Male with a PMH of HTN, HLD, CAD, CABG, presents to the ER for left middle finger infection with imaging findings consistent with osteomyelitis. Plan for IV Abx. IR consulted for PICC placement.    Allergies: No Known Allergies    Medications (Abx/Cardiac/Anticoagulation/Blood Products)  aspirin enteric coated: 81 milliGRAM(s) Oral (04-03 @ 11:41)  clopidogrel Tablet: 75 milliGRAM(s) Oral (04-03 @ 11:41)  heparin   Injectable: 5000 Unit(s) SubCutaneous (04-04 @ 06:18)  losartan: 25 milliGRAM(s) Oral (04-04 @ 06:18)  tenofovir disoproxil fumarate (VIREAD): 300 milliGRAM(s) Oral (04-03 @ 11:41)  vancomycin  IVPB: 250 mL/Hr IV Intermittent (04-03 @ 17:18)    Data:    T(C): 36.8  HR: 88  BP: 110/74  RR: 18  SpO2: 97%    Exam  General: No acute distress  Chest: Non labored breathing  Abdomen: Non-distended    -WBC 6.87 / HgB 11.8 / Hct 39.2 / Plt 387  -Na 138 / Cl 101 / BUN 11 / Glucose 86  -K 4.4 / CO2 23 / Cr 0.84  -ALT 12 / Alk Phos 111 / T.Bili 0.4    Plan: 63y Male presents for PICC placement for abx.    -Risks/Benefits/alternatives explained with the patient and/or healthcare proxy and witnessed informed consent obtained.    Interventional Radiology    HPI: 63 year old Male with a PMH of HTN, HLD, CAD, CABG, presents to the ER for left middle finger infection with imaging findings consistent with osteomyelitis. Plan for IV Abx. IR consulted for PICC placement.    Allergies: No Known Allergies    Medications (Abx/Cardiac/Anticoagulation/Blood Products)  aspirin enteric coated: 81 milliGRAM(s) Oral (04-03 @ 11:41)  clopidogrel Tablet: 75 milliGRAM(s) Oral (04-03 @ 11:41)  heparin   Injectable: 5000 Unit(s) SubCutaneous (04-04 @ 06:18)  losartan: 25 milliGRAM(s) Oral (04-04 @ 06:18)  tenofovir disoproxil fumarate (VIREAD): 300 milliGRAM(s) Oral (04-03 @ 11:41)  vancomycin  IVPB: 250 mL/Hr IV Intermittent (04-03 @ 17:18)    Data:    T(C): 36.8  HR: 88  BP: 110/74  RR: 18  SpO2: 97%    Exam  General: No acute distress  Chest: Non labored breathing  Abdomen: Non-distended    -WBC 6.87 / HgB 11.8 / Hct 39.2 / Plt 387  -Na 138 / Cl 101 / BUN 11 / Glucose 86  -K 4.4 / CO2 23 / Cr 0.84  -ALT 12 / Alk Phos 111 / T.Bili 0.4    Plan: 63y Male presents for PICC placement for abx (single lumen as per primary).    -Risks/Benefits/alternatives explained with the patient and/or healthcare proxy and witnessed informed consent obtained.

## 2024-04-04 NOTE — DISCHARGE NOTE PROVIDER - NSDCMRMEDTOKEN_GEN_ALL_CORE_FT
10cc NC flush BID before and after Antibiotics: 10cc NC flush BID before and after Antibiotics  aspirin 81 mg oral delayed release tablet: 1 tab(s) orally once a day  atorvastatin 80 mg oral tablet: 1 tab(s) orally once a day  clopidogrel 75 mg oral tablet: 1 tab(s) orally once a day  ezetimibe 10 mg oral tablet: 1 tab(s) orally once a day  irbesartan 300 mg oral tablet: 1 tab(s) orally once a day  vancomycin 1 g intravenous injection: 1 gram(s) intravenous every 12 hours END DATE 5/13/24  weekly CBC, Weekly CMP, ESR, CRP and vanco trough while on antibiotics: weekly CBC, CMP, ESR, CRP and vanco trough while on antibiotics  Call result Dr Erwin [phone] MDD: 0   aspirin 81 mg oral delayed release tablet: 1 tab(s) orally once a day  atorvastatin 80 mg oral tablet: 1 tab(s) orally once a day  clopidogrel 75 mg oral tablet: 1 tab(s) orally once a day  ezetimibe 10 mg oral tablet: 1 tab(s) orally once a day  irbesartan 300 mg oral tablet: 1 tab(s) orally once a day  vancomycin 1 g intravenous injection: 1 gram(s) intravenous every 12 hours END DATE 5/13/24

## 2024-04-04 NOTE — PROGRESS NOTE ADULT - ASSESSMENT
62 y/o male, pmh HTN, HLD, CAD, CABG, presents to the ER for left middle finger infection. Pt states that swelling started 6 weeks ago    #Osteomyelitis  Started on Vancomycin      #CAD s/p CABG  - [facility] 4/2023- Unsuccessful LCX  PCI due to inability to wire cross.   -Chronic Stable ischemic heart disease  -Continue clopidogrel  -BP controlled on Losartan        #HLD  -On Zetia

## 2024-04-04 NOTE — PROCEDURE NOTE - NSPROCDETAILS_GEN_ALL_CORE
fluoroscopy/location identified, draped/prepped, sterile technique used/sterile dressing applied/sterile technique, catheter placed/supine position/Trendelenburg position/ultrasound guidance

## 2024-04-04 NOTE — DISCHARGE NOTE PROVIDER - NSDCFUSCHEDAPPT_GEN_ALL_CORE_FT
Chi Hatfield  [facility]  DERM [address]  Scheduled Appointment: 05/17/2024    Buck Irene  [facility]  RADMED [address] [facility]  Scheduled Appointment: 06/27/2024

## 2024-04-04 NOTE — DISCHARGE NOTE PROVIDER - HOSPITAL COURSE
HPI:  64 y/o male, pmh HTN, HLD, CAD, CABG, presents to the ER for left middle finger infection.  has been having swelling to his finger for the past 6 weeks. states he was started on amoxicillin by an outside urgent care. states saw his pcp, had mri done with concern for possible osteomyelitis. was advised to come to the ER for IV abx.  finger initially drained clear fluid but has since resolved. denies f/n/v/d, CP, SOB, HA, dizziness, abdominal pain, bleeding from finger.   (01 Apr 2024 12:49)    Hospital Course:  Patient admitted with  L third finger Dactylitis. Plastic surgery and infectious disease consulted. MR Lt hand with contrast: OM 3rd digit. xray here: Decreased bone mineralization with erosive changes and fragmentation at the distal interphalangeal joint of the middle finger most consistent with osteomyelitis. There is surrounding soft tissue swelling. Infectious diease recommended 6 week course of vanco through 5/13 and weekly CBC, CMP, ESR, CRP and vanco trough while on antibiotics. Patient s/p PICC placement      Important Medication Changes and Reason:  6 week course of vanco through 5/13 and weekly CBC, CMP, ESR, CRP and vanco trough while on antibiotics.    Active or Pending Issues Requiring Follow-up:    Advanced Directives:   [x ] Full code  [ ] DNR  [ ] Hospice    Discharge Diagnoses:  L third finger Dactylitis       62 y/o male, pmh HTN, HLD, CAD, CABG, presents to the ER for left middle finger infection.  has been having swelling to his finger for the past 6 weeks. states he was started on amoxicillin by an outside urgent care.  saw his pcp, had mri done with concern for possible osteomyelitis. was advised to come to the ER for IV abx.  finger initially drained clear fluid but has since resolved. denies f/n/v/d, CP, SOB, HA, dizziness, abdominal pain, bleeding from finger.   (01 Apr 2024 12:49)    Hospital Course:  Patient admitted with  L third finger Dactylitis. Plastic surgery and infectious disease consulted. MR Lt hand with contrast: OM 3rd digit. xray here: Decreased bone mineralization with erosive changes and fragmentation at the distal interphalangeal joint of the middle finger most consistent with osteomyelitis. There is surrounding soft tissue swelling. Infectious diease recommended 6 week course of vanco through 5/13 and weekly CBC, CMP, ESR, CRP and vanco trough while on antibiotics. Patient s/p PICC placement      Important Medication Changes and Reason:  6 week course of vanco through 5/13 and weekly CBC, CMP, ESR, CRP and vanco trough while on antibiotics.    Active or Pending Issues Requiring Follow-up:    Advanced Directives:   [x ] Full code  [ ] DNR  [ ] Hospice    Discharge Diagnoses:  L third finger Dactylitis

## 2024-04-04 NOTE — PROGRESS NOTE ADULT - ASSESSMENT
62 y/o male, pmh HTN, HLD, CAD, CABG, presents to the ER for left middle finger infection with imaging findings consistent with osteomyelitis. Patient currently non-septic. No surgical intervention at this time.     Plan:  - Appreciate repeat MRI, no surgical intervention  - C/w IV abx per ID recommendations, seems to be improving on current regimen  - Monitor swelling  - F/u Blood cx - NGTD so far  - Plastics to follow    Plastic Surgery   [phone]

## 2024-04-04 NOTE — DISCHARGE NOTE PROVIDER - NSDCFUADDAPPT_GEN_ALL_CORE_FT
Follow up with infectious disease -    - 6 week course of vanco through 5/13 and weekly CBC, CMP, ESR, CRP and vanco trough while on antibiotics.  - weekly CBC, CMP, ESR, CRP and vanco trough while on antibiotics

## 2024-04-05 ENCOUNTER — TRANSCRIPTION ENCOUNTER (OUTPATIENT)
Age: 64
End: 2024-04-05

## 2024-04-05 VITALS
HEART RATE: 76 BPM | RESPIRATION RATE: 18 BRPM | OXYGEN SATURATION: 99 % | DIASTOLIC BLOOD PRESSURE: 61 MMHG | SYSTOLIC BLOOD PRESSURE: 101 MMHG | TEMPERATURE: 98 F

## 2024-04-05 PROCEDURE — 36415 COLL VENOUS BLD VENIPUNCTURE: CPT

## 2024-04-05 PROCEDURE — 80048 BASIC METABOLIC PNL TOTAL CA: CPT

## 2024-04-05 PROCEDURE — 99232 SBSQ HOSP IP/OBS MODERATE 35: CPT

## 2024-04-05 PROCEDURE — 82435 ASSAY OF BLOOD CHLORIDE: CPT

## 2024-04-05 PROCEDURE — 86140 C-REACTIVE PROTEIN: CPT

## 2024-04-05 PROCEDURE — 87641 MR-STAPH DNA AMP PROBE: CPT

## 2024-04-05 PROCEDURE — 85018 HEMOGLOBIN: CPT

## 2024-04-05 PROCEDURE — 84295 ASSAY OF SERUM SODIUM: CPT

## 2024-04-05 PROCEDURE — 83605 ASSAY OF LACTIC ACID: CPT

## 2024-04-05 PROCEDURE — 82947 ASSAY GLUCOSE BLOOD QUANT: CPT

## 2024-04-05 PROCEDURE — 84132 ASSAY OF SERUM POTASSIUM: CPT

## 2024-04-05 PROCEDURE — 80053 COMPREHEN METABOLIC PANEL: CPT

## 2024-04-05 PROCEDURE — 93930 UPPER EXTREMITY STUDY: CPT

## 2024-04-05 PROCEDURE — 85014 HEMATOCRIT: CPT

## 2024-04-05 PROCEDURE — 82330 ASSAY OF CALCIUM: CPT

## 2024-04-05 PROCEDURE — 99285 EMERGENCY DEPT VISIT HI MDM: CPT | Mod: 25

## 2024-04-05 PROCEDURE — 73220 MRI UPPR EXTREMITY W/O&W/DYE: CPT | Mod: MC

## 2024-04-05 PROCEDURE — 96368 THER/DIAG CONCURRENT INF: CPT

## 2024-04-05 PROCEDURE — A9585: CPT

## 2024-04-05 PROCEDURE — C1751: CPT

## 2024-04-05 PROCEDURE — 85025 COMPLETE CBC W/AUTO DIFF WBC: CPT

## 2024-04-05 PROCEDURE — 36573 INSJ PICC RS&I 5 YR+: CPT

## 2024-04-05 PROCEDURE — 87040 BLOOD CULTURE FOR BACTERIA: CPT

## 2024-04-05 PROCEDURE — 96365 THER/PROPH/DIAG IV INF INIT: CPT

## 2024-04-05 PROCEDURE — 80202 ASSAY OF VANCOMYCIN: CPT

## 2024-04-05 PROCEDURE — 87640 STAPH A DNA AMP PROBE: CPT

## 2024-04-05 PROCEDURE — 85027 COMPLETE CBC AUTOMATED: CPT

## 2024-04-05 PROCEDURE — 85652 RBC SED RATE AUTOMATED: CPT

## 2024-04-05 PROCEDURE — 84550 ASSAY OF BLOOD/URIC ACID: CPT

## 2024-04-05 PROCEDURE — 82803 BLOOD GASES ANY COMBINATION: CPT

## 2024-04-05 PROCEDURE — 73120 X-RAY EXAM OF HAND: CPT

## 2024-04-05 RX ADMIN — HEPARIN SODIUM 5000 UNIT(S): 5000 INJECTION INTRAVENOUS; SUBCUTANEOUS at 05:17

## 2024-04-05 RX ADMIN — Medication 81 MILLIGRAM(S): at 11:49

## 2024-04-05 RX ADMIN — TENOFOVIR DISOPROXIL FUMARATE 300 MILLIGRAM(S): 300 TABLET, FILM COATED ORAL at 11:49

## 2024-04-05 RX ADMIN — Medication 250 MILLIGRAM(S): at 05:16

## 2024-04-05 RX ADMIN — CHLORHEXIDINE GLUCONATE 1 APPLICATION(S): 213 SOLUTION TOPICAL at 11:53

## 2024-04-05 RX ADMIN — LOSARTAN POTASSIUM 25 MILLIGRAM(S): 100 TABLET, FILM COATED ORAL at 05:17

## 2024-04-05 RX ADMIN — CLOPIDOGREL BISULFATE 75 MILLIGRAM(S): 75 TABLET, FILM COATED ORAL at 11:49

## 2024-04-05 NOTE — PROGRESS NOTE ADULT - PROVIDER SPECIALTY LIST ADULT
Infectious Disease
Infectious Disease
Plastic Surgery
Internal Medicine
Plastic Surgery
Infectious Disease
Infectious Disease
Internal Medicine
Internal Medicine
Cardiology

## 2024-04-05 NOTE — PROGRESS NOTE ADULT - SUBJECTIVE AND OBJECTIVE BOX
Patient is a 63y old  Male who presents with a chief complaint of The patient is a 63y Male complaining of Lt middle finger infection. (02 Apr 2024 15:36)      SUBJECTIVE / OVERNIGHT EVENTS:    Events noted.  CONSTITUTIONAL: No fever,  or fatigue  RESPIRATORY: No cough, wheezing,  No shortness of breath  CARDIOVASCULAR: No chest pain, palpitations, dizziness, or leg swelling  GASTROINTESTINAL: No abdominal or epigastric pain.       MEDICATIONS  (STANDING):  aspirin enteric coated 81 milliGRAM(s) Oral daily  atorvastatin 80 milliGRAM(s) Oral at bedtime  clopidogrel Tablet 75 milliGRAM(s) Oral daily  losartan 25 milliGRAM(s) Oral daily  tenofovir disoproxil fumarate (VIREAD) 300 milliGRAM(s) Oral daily  vancomycin  IVPB 1000 milliGRAM(s) IV Intermittent every 12 hours    MEDICATIONS  (PRN):        CAPILLARY BLOOD GLUCOSE        I&O's Summary      T(C): 37 (04-02-24 @ 16:11), Max: 37 (04-02-24 @ 16:11)  HR: 75 (04-02-24 @ 16:11) (75 - 83)  BP: 123/79 (04-02-24 @ 16:11) (117/78 - 129/81)  RR: 18 (04-02-24 @ 13:12) (18 - 18)  SpO2: 99% (04-02-24 @ 16:11) (98% - 100%)    PHYSICAL EXAM:    NECK: Supple, No JVD  CHEST/LUNG: Clear to auscultation bilaterally; No wheezing.  HEART: Regular rate and rhythm; No murmurs, rubs, or gallops  ABDOMEN: Soft, Nontender, Nondistended; Bowel sounds present  EXTREMITIES:   No edema  NEUROLOGY: AAO X 3      LABS:                        11.4   7.12  )-----------( 376      ( 02 Apr 2024 06:46 )             37.6     04-02    138  |  104  |  11  ----------------------------<  101<H>  4.1   |  22  |  0.85    Ca    8.9      02 Apr 2024 06:44    TPro  8.0  /  Alb  4.0  /  TBili  0.3  /  DBili  x   /  AST  17  /  ALT  14  /  AlkPhos  119  04-01          Urinalysis Basic - ( 02 Apr 2024 06:44 )    Color: x / Appearance: x / SG: x / pH: x  Gluc: 101 mg/dL / Ketone: x  / Bili: x / Urobili: x   Blood: x / Protein: x / Nitrite: x   Leuk Esterase: x / RBC: x / WBC x   Sq Epi: x / Non Sq Epi: x / Bacteria: x      CAPILLARY BLOOD GLUCOSE            RADIOLOGY & ADDITIONAL TESTS:    Imaging Personally Reviewed:    Consultant(s) Notes Reviewed:      Care Discussed with Consultants/Other Providers:    Lawrence Pierce MD, CMD, FACP    [address] [place] [address]  Office Tel: [phone]  Cell: [phone]  
Plastic Surgery Progress Note (pg [facility]: [id number], [facility]: [phone])    SUBJECTIVE  The patient was seen and examined. No acute events overnight. Received PICC line yesterday. Pain controlled, afebrile w/ stable vitals.     OBJECTIVE  ___________________________________________________  VITAL SIGNS / I&O's   Vital Signs Last 24 Hrs  T(C): 36.8 (05 Apr 2024 00:29), Max: 36.9 (04 Apr 2024 08:35)  T(F): 98.3 (05 Apr 2024 00:29), Max: 98.4 (04 Apr 2024 08:35)  HR: 88 (05 Apr 2024 00:29) (85 - 88)  BP: 108/73 (05 Apr 2024 00:29) (96/68 - 108/73)  BP(mean): --  RR: 18 (05 Apr 2024 00:29) (18 - 18)  SpO2: 96% (05 Apr 2024 00:29) (96% - 99%)    Parameters below as of 05 Apr 2024 00:29  Patient On (Oxygen Delivery Method): room air          04 Apr 2024 07:01  -  05 Apr 2024 07:00  --------------------------------------------------------  IN:    Oral Fluid: 240 mL  Total IN: 240 mL    OUT:  Total OUT: 0 mL    Total NET: 240 mL        ___________________________________________________  PHYSICAL EXAM    CONSTITUTIONAL: Well groomed, no apparent distress  ENMT: Oral mucosa with moist membranes.   RESP: No respiratory distress  CV: RRR  GI: Soft, NT, ND  EXTREMITY: Left hand 3rd finger w/ very slight erythema, somewhat swollen and tender to palpation. sensory and motor intact. Flexion of finger limited 2/2 swelling and pain, but ROM much improved at PIPJ.  No drainage. No fluctuance.    ___________________________________________________  LABS            CAPILLARY BLOOD GLUCOSE              ___________________________________________________  MICRO  Recent Cultures:  Specimen Source: .Blood Blood, 04-01 @ 08:05; Results   No growth at 72 Hours; Gram Stain: --; Organism: --  Specimen Source: .Blood Blood, 04-01 @ 07:00; Results   No growth at 72 Hours; Gram Stain: --; Organism: --    ___________________________________________________  MEDICATIONS  (STANDING):  aspirin enteric coated 81 milliGRAM(s) Oral daily  atorvastatin 80 milliGRAM(s) Oral at bedtime  chlorhexidine 2% Cloths 1 Application(s) Topical daily  clopidogrel Tablet 75 milliGRAM(s) Oral daily  heparin   Injectable 5000 Unit(s) SubCutaneous every 8 hours  losartan 25 milliGRAM(s) Oral daily  tenofovir disoproxil fumarate (VIREAD) 300 milliGRAM(s) Oral daily  vancomycin  IVPB 1000 milliGRAM(s) IV Intermittent every 12 hours    MEDICATIONS  (PRN):  
  Follow Up:  finger osteo    Interval History/ROS: pt afebrile and doing well, no vomiting or SOB, finger with unchanged edema and pain    e in mental status  psych: no anxiety, no depression         Allergies  No Known Allergies        ANTIMICROBIALS:  tenofovir disoproxil fumarate (VIREAD) 300 daily  vancomycin  IVPB 1000 every 12 hours      OTHER MEDS:  aspirin enteric coated 81 milliGRAM(s) Oral daily  atorvastatin 80 milliGRAM(s) Oral at bedtime  clopidogrel Tablet 75 milliGRAM(s) Oral daily  losartan 25 milliGRAM(s) Oral daily      Vital Signs Last 24 Hrs  T(C): 36.6 (02 Apr 2024 13:12), Max: 36.6 (01 Apr 2024 18:32)  T(F): 97.9 (02 Apr 2024 13:12), Max: 97.9 (02 Apr 2024 01:54)  HR: 82 (02 Apr 2024 13:12) (73 - 83)  BP: 129/81 (02 Apr 2024 13:12) (117/78 - 134/81)  BP(mean): --  RR: 18 (02 Apr 2024 13:12) (17 - 18)  SpO2: 100% (02 Apr 2024 13:12) (98% - 100%)    Parameters below as of 02 Apr 2024 13:12  Patient On (Oxygen Delivery Method): room air        Physical Exam:  General:    NAD, non toxic, sitting on a chair  Respiratory:   comfortable on RA  abd:   soft, , not tender  :     no CVAT, no suprapubic tenderness, no miles  Musculoskeletal : L 3rd finger edema, erythema of tip and DIP, some tenderness and decreased ROM  Skin:    hypopigmented patches on face and hand  vascular: no phlebitis                          11.4   7.12  )-----------( 376      ( 02 Apr 2024 06:46 )             37.6       04-02    138  |  104  |  11  ----------------------------<  101<H>  4.1   |  22  |  0.85    Ca    8.9      02 Apr 2024 06:44    TPro  8.0  /  Alb  4.0  /  TBili  0.3  /  DBili  x   /  AST  17  /  ALT  14  /  AlkPhos  119  04-01      Urinalysis Basic - ( 02 Apr 2024 06:44 )    Color: x / Appearance: x / SG: x / pH: x  Gluc: 101 mg/dL / Ketone: x  / Bili: x / Urobili: x   Blood: x / Protein: x / Nitrite: x   Leuk Esterase: x / RBC: x / WBC x   Sq Epi: x / Non Sq Epi: x / Bacteria: x        MICROBIOLOGY:  v  .Blood Blood  04-01-24   No growth at 24 hours  --  --      .Blood Blood  04-01-24   No growth at 24 hours  --  --                RADIOLOGY:  Images independently visualized and reviewed personally, findings as below  < from: Xray Hand 2 Views, Left (04.01.24 @ 08:17) >    IMPRESSION:  Decreased bone mineralization with erosive changes and fragmentation at   the distal interphalangeal joint of the middle finger most consistent   with osteomyelitis. There is surrounding soft tissue swelling.    < end of copied text >  
MR#[id number]  PATIENT NAME:KOFI QUISPE    DATE OF SERVICE: 04-02-24 @ 07:57  Patient was seen and examined by Bobo Saenz MD on    04-02-24 @ 07:57 .  Interim events noted.Consultant notes ,Labs,Telemetry reviewed by me       HOSPITAL COURSE: HPI:  64 y/o male, pmh HTN, HLD, CAD, CABG, presents to the ER for left middle finger infection. states has been having swelling to his finger for the past 6 weeks. [facility] he was started on amoxicillin by an outside urgent care. states saw his pcp, had mri done with concern for possible osteomyelitis. was advised to come to the ER for IV abx. [facility] finger initially drained clear fluid but has since resolved. denies f/n/v/d, CP, SOB, HA, dizziness, abdominal pain, bleeding from finger.   (01 Apr 2024 12:49)      INTERIM EVENTS:Patient seen at bedside ,interim events noted.Awake alert remains afebrile no dyspnea       PMH -reviewed admission note, no change since admission  HEART FAILURE: Acute[ ]Chronic[x ] Systolic[ ] Diastolic[ x] Combined Systolic and Diastolic[ ]  CAD[x ] CABG[x ] PCI[ ]  DEVICES[ ] PPM[ ] ICD[ ] ILR[ ]  ATRIAL FIBRILLATION[ ] Paroxysmal[ ] Permanent[ ] CHADS2-[  ]  JUNE[ ] CKD1[ ] CKD2[ ] CKD3[ ] CKD4[ ] ESRD[ ]  COPD[ ] HTN[x ]   DM[ ] Type1[ ] Type 2[ ]   CVA[ ] Paresis[ ]    AMBULATION: Assisted[ ] Cane/walker[ ] Independent[x ]    MEDICATIONS  (STANDING):  aspirin enteric coated 81 milliGRAM(s) Oral daily  atorvastatin 80 milliGRAM(s) Oral at bedtime  clopidogrel Tablet 75 milliGRAM(s) Oral daily  losartan 25 milliGRAM(s) Oral daily  tenofovir disoproxil fumarate (VIREAD) 300 milliGRAM(s) Oral daily  vancomycin  IVPB 1000 milliGRAM(s) IV Intermittent every 12 hours    MEDICATIONS  (PRN):            REVIEW OF SYSTEMS:  Constitutional: [ ] fever, [ ]weight loss,  [ x]fatigue [ ]weight gain  Eyes: [ ] visual changes  Respiratory: [ ]shortness of breath;  [ ] cough, [ ]wheezing, [ ]chills, [ ]hemoptysis  Cardiovascular: [ ] chest pain, [ ]palpitations, [ ]dizziness,  [ ]leg swelling[ ]orthopnea[ ]PND  Gastrointestinal: [ ] abdominal pain, [ ]nausea, [ ]vomiting,  [ ]diarrhea [ ]Constipation [ ]Melena  Genitourinary: [ ] dysuria, [ ] hematuria [ ][place]  Neurologic: [ ] headaches [ ] tremors[ ]weakness [ ]Paralysis Right[ ] Left[ ]  Skin: [ ] itching, [ ]burning, [ ] rashes  Endocrine: [ ] heat or cold intolerance  Musculoskeletal: [ x] joint pain or swelling; [ ] muscle, back, or extremity pain  Psychiatric: [ ] depression, [ ]anxiety, [ ]mood swings, or [ ]difficulty sleeping  Hematologic: [ ] easy bruising, [ ] bleeding gums    [ ] All remaining systems negative except as per above.   [ ]Unable to obtain.  [x] No change in ROS since admission      Vital Signs Last 24 Hrs  T(C): 36.6 (02 Apr 2024 01:54), Max: 36.7 (01 Apr 2024 08:04)  T(F): 97.9 (02 Apr 2024 01:54), Max: 98 (01 Apr 2024 08:04)  HR: 83 (02 Apr 2024 01:54) (73 - 99)  BP: 117/78 (02 Apr 2024 01:54) (117/78 - 134/81)  RR: 18 (02 Apr 2024 01:54) (17 - 18)  SpO2: 100% (02 Apr 2024 01:54) (97% - 100%)    Parameters below as of 02 Apr 2024 01:54  Patient On (Oxygen Delivery Method): room air      I&O's Summary      PHYSICAL EXAM:  General: No acute distress BMI-30  HEENT: EOMI, PERRL  Neck: Supple, [ ] JVD  Lungs: Equal air entry bilaterally; [ ] rales [ ] wheezing [ ] rhonchi  Heart: Regular rate and rhythm; [x ] murmur   2/6 [ x] systolic [ ] diastolic [ ] radiation[ ] rubs [ ]  gallops  Abdomen: Nontender, bowel sounds present  Extremities: No clubbing, cyanosis, [ ] edema [ ]Pulses  equal and intact  Nervous system:  Alert & Oriented X3, no focal deficits  Psychiatric: Normal affect  Skin: No rashes or lesions    LABS:  04-02    138  |  104  |  11  ----------------------------<  101<H>  4.1   |  22  |  0.85    Ca    8.9      02 Apr 2024 06:44    TPro  8.0  /  Alb  4.0  /  TBili  0.3  /  DBili  x   /  AST  17  /  ALT  14  /  AlkPhos  119  04-01    Creatinine Trend: 0.85<--, 0.91<--                        11.4   7.12  )-----------( 376      ( 02 Apr 2024 06:46 )             37.6         TTE with Doppler (w/Cont) (11.19.20 @ 07:20) >  Conclusions:  1. Endocardial visualization enhanced with intravenous  injection of Ultrasonic Enhancing Agent (Definity). Overall  preserved left ventricular ejection fraction. The basal  inferolateral wall is hypokinetic.  2. Normal right ventricular size and function.  *** Compared with echocardiogram of 1/14/2019, EF has  improved.     ECG    NORMAL SINUS RHYTHM  LEFT AXIS DEVIATION  PULMONARY DISEASE PATTERN  ABNORMAL ECG    Xray Hand 2 Views, Left (04.01.24 @ 08:17) >  IMPRESSION:  Decreased bone mineralization with erosive changes and fragmentation at  the distal interphalangeal joint of the middle finger most consistent   with osteomyelitis. There is surrounding soft tissue swelling.      Culture - Blood (06.22.21 @ 20:18)   Specimen Source: .Blood Blood-Peripheral  Culture Results:   No Growth Final          
  Follow Up:  finger osteo    Interval History/ROS: pt afebrile and doing well, no vomiting or SOB, finger slightly improved, s/p picc               Allergies  No Known Allergies        ANTIMICROBIALS:  tenofovir disoproxil fumarate (VIREAD) 300 daily  vancomycin  IVPB 1000 every 12 hours      OTHER MEDS:  aspirin enteric coated 81 milliGRAM(s) Oral daily  atorvastatin 80 milliGRAM(s) Oral at bedtime  chlorhexidine 2% Cloths 1 Application(s) Topical daily  clopidogrel Tablet 75 milliGRAM(s) Oral daily  heparin   Injectable 5000 Unit(s) SubCutaneous every 8 hours  losartan 25 milliGRAM(s) Oral daily      Vital Signs Last 24 Hrs  T(C): 36.9 (04 Apr 2024 08:35), Max: 36.9 (04 Apr 2024 08:35)  T(F): 98.4 (04 Apr 2024 08:35), Max: 98.4 (04 Apr 2024 08:35)  HR: 88 (04 Apr 2024 08:35) (81 - 91)  BP: 96/68 (04 Apr 2024 08:35) (96/68 - 117/79)  BP(mean): --  RR: 18 (04 Apr 2024 08:35) (18 - 18)  SpO2: 97% (04 Apr 2024 08:35) (97% - 99%)    Parameters below as of 04 Apr 2024 08:35  Patient On (Oxygen Delivery Method): room air        Physical Exam:  General:    NAD, non toxic, sitting on a chair  Respiratory:   comfortable on RA  abd:   soft, , not tender  :     no CVAT, no suprapubic tenderness, no miles  Musculoskeletal : L 3rd finger edema, erythema of tip and DIP, some tenderness and decreased ROM  Skin:    hypopigmented patches on face and hand  vascular: RUE picc                            11.8   6.87  )-----------( 387      ( 03 Apr 2024 06:57 )             39.2       04-03    138  |  101  |  11  ----------------------------<  86  4.4   |  23  |  0.84    Ca    9.6      03 Apr 2024 07:01    TPro  7.7  /  Alb  3.9  /  TBili  0.4  /  DBili  x   /  AST  16  /  ALT  12  /  AlkPhos  111  04-03      Urinalysis Basic - ( 03 Apr 2024 07:01 )    Color: x / Appearance: x / SG: x / pH: x  Gluc: 86 mg/dL / Ketone: x  / Bili: x / Urobili: x   Blood: x / Protein: x / Nitrite: x   Leuk Esterase: x / RBC: x / WBC x   Sq Epi: x / Non Sq Epi: x / Bacteria: x        MICROBIOLOGY:  Vancomycin Level, Trough: 25.2 ug/mL (04-03-24 @ 17:59)  v  .Blood Blood  04-01-24   No growth at 48 Hours  --  --      .Blood Blood  04-01-24   No growth at 48 Hours  --  --                RADIOLOGY:  Images independently visualized and reviewed personally, findings as below  < from: MR Hand w/wo IV Cont, Left (04.02.24 @ 23:09) >  IMPRESSION:  Osteomyelitis of the middle and distal third phalanx with a moderate   enhancing joint effusion with concerning for superimposed septic   arthritis.    < end of copied text >  
Patient is a 63y old  Male who presents with a chief complaint of The patient is a 63y Male complaining of Lt middle finger infection. (03 Apr 2024 15:04)      SUBJECTIVE / OVERNIGHT EVENTS:    Events noted.  CONSTITUTIONAL: No fever,  or fatigue  RESPIRATORY: No cough, wheezing,  No shortness of breath  CARDIOVASCULAR: No chest pain, palpitations, dizziness, or leg swelling  GASTROINTESTINAL: No abdominal or epigastric pain. No nausea, vomiting.      MEDICATIONS  (STANDING):  aspirin enteric coated 81 milliGRAM(s) Oral daily  atorvastatin 80 milliGRAM(s) Oral at bedtime  clopidogrel Tablet 75 milliGRAM(s) Oral daily  heparin   Injectable 5000 Unit(s) SubCutaneous every 8 hours  losartan 25 milliGRAM(s) Oral daily  tenofovir disoproxil fumarate (VIREAD) 300 milliGRAM(s) Oral daily  vancomycin  IVPB 1000 milliGRAM(s) IV Intermittent every 12 hours    MEDICATIONS  (PRN):        CAPILLARY BLOOD GLUCOSE        I&O's Summary      T(C): 36.5 (04-03-24 @ 10:00), Max: 37 (04-02-24 @ 16:11)  HR: 87 (04-03-24 @ 10:00) (75 - 87)  BP: 105/73 (04-03-24 @ 10:00) (105/73 - 123/79)  RR: 18 (04-03-24 @ 10:00) (18 - 18)  SpO2: 100% (04-03-24 @ 10:00) (97% - 100%)    PHYSICAL EXAM:  GENERAL: NAD  NECK: Supple, No JVD  CHEST/LUNG: Clear to auscultation bilaterally; No wheezing.  HEART: Regular rate and rhythm; No murmurs, rubs, or gallops  ABDOMEN: Soft, Nontender, Nondistended; Bowel sounds present  EXTREMITIES:   No edema  NEUROLOGY: AAO X 3      LABS:                        11.8   6.87  )-----------( 387      ( 03 Apr 2024 06:57 )             39.2     04-03    138  |  101  |  11  ----------------------------<  86  4.4   |  23  |  0.84    Ca    9.6      03 Apr 2024 07:01    TPro  7.7  /  Alb  3.9  /  TBili  0.4  /  DBili  x   /  AST  16  /  ALT  12  /  AlkPhos  111  04-03          Urinalysis Basic - ( 03 Apr 2024 07:01 )    Color: x / Appearance: x / SG: x / pH: x  Gluc: 86 mg/dL / Ketone: x  / Bili: x / Urobili: x   Blood: x / Protein: x / Nitrite: x   Leuk Esterase: x / RBC: x / WBC x   Sq Epi: x / Non Sq Epi: x / Bacteria: x      CAPILLARY BLOOD GLUCOSE            RADIOLOGY & ADDITIONAL TESTS:    Imaging Personally Reviewed:    Consultant(s) Notes Reviewed:      Care Discussed with Consultants/Other Providers:    Lawrence Pierce MD, CMD, FACP    [address] [place] [address]  Office Tel: [phone]  Cell: [phone]  
Plastic Surgery Progress Note (pg [facility]: [id number], [facility]: [phone])    SUBJECTIVE  The patient was seen and examined. No acute events overnight. Pain controlled, afebrile w/ stable vitals. Notes finger is still painful to touch but looks and feels improved.     OBJECTIVE  ___________________________________________________  VITAL SIGNS / I&O's   Vital Signs Last 24 Hrs  T(C): 36.6 (03 Apr 2024 05:49), Max: 37 (02 Apr 2024 16:11)  T(F): 97.9 (03 Apr 2024 05:49), Max: 98.6 (02 Apr 2024 16:11)  HR: 83 (03 Apr 2024 05:49) (75 - 83)  BP: 112/75 (03 Apr 2024 05:49) (112/75 - 129/81)  BP(mean): --  RR: 18 (03 Apr 2024 05:49) (18 - 18)  SpO2: 99% (03 Apr 2024 05:49) (97% - 100%)    Parameters below as of 03 Apr 2024 05:49  Patient On (Oxygen Delivery Method): room air          ___________________________________________________  PHYSICAL EXAM    CONSTITUTIONAL: Well groomed, no apparent distress  ENMT: Oral mucosa with moist membranes.   RESP: No respiratory distress  CV: RRR  GI: Soft, NT, ND  EXTREMITY: Left hand 3rd finger now less erythematous, slightly less swollen and tender to palpation. sensory and motor intact. Flexion of finger limited 2/2 swelling and pain, but ROM much improved at PIPJ.  No drainage. No fluctuance.    ___________________________________________________  LABS                        11.8   6.87  )-----------( 387      ( 03 Apr 2024 06:57 )             39.2     03 Apr 2024 07:01    138    |  101    |  11     ----------------------------<  86     4.4     |  23     |  0.84     Ca    9.6        03 Apr 2024 07:01    TPro  7.7    /  Alb  3.9    /  TBili  0.4    /  DBili  x      /  AST  16     /  ALT  12     /  AlkPhos  111    03 Apr 2024 07:01      CAPILLARY BLOOD GLUCOSE            Urinalysis Basic - ( 03 Apr 2024 07:01 )    Color: x / Appearance: x / SG: x / pH: x  Gluc: 86 mg/dL / Ketone: x  / Bili: x / Urobili: x   Blood: x / Protein: x / Nitrite: x   Leuk Esterase: x / RBC: x / WBC x   Sq Epi: x / Non Sq Epi: x / Bacteria: x      ___________________________________________________  MICRO  Recent Cultures:  Specimen Source: .Blood Blood, 04-01 @ 08:05; Results   No growth at 24 hours; Gram Stain: --; Organism: --  Specimen Source: .Blood Blood, 04-01 @ 07:00; Results   No growth at 24 hours; Gram Stain: --; Organism: --    ___________________________________________________  MEDICATIONS  (STANDING):  aspirin enteric coated 81 milliGRAM(s) Oral daily  atorvastatin 80 milliGRAM(s) Oral at bedtime  clopidogrel Tablet 75 milliGRAM(s) Oral daily  heparin   Injectable 5000 Unit(s) SubCutaneous every 8 hours  losartan 25 milliGRAM(s) Oral daily  tenofovir disoproxil fumarate (VIREAD) 300 milliGRAM(s) Oral daily  vancomycin  IVPB 1000 milliGRAM(s) IV Intermittent every 12 hours    MEDICATIONS  (PRN):  
SUBJECTIVE: The patient was seen and examined. No acute events overnight. Pain and swelling improving per patient.      Vital Signs Last 24 Hrs  T(C): 36.8 (04 Apr 2024 01:17), Max: 36.8 (04 Apr 2024 01:17)  T(F): 98.2 (04 Apr 2024 01:17), Max: 98.2 (04 Apr 2024 01:17)  HR: 88 (04 Apr 2024 06:00) (81 - 91)  BP: 110/74 (04 Apr 2024 06:00) (103/74 - 117/79)  BP(mean): --  RR: 18 (04 Apr 2024 01:17) (18 - 18)  SpO2: 97% (04 Apr 2024 01:17) (97% - 100%)    Parameters below as of 04 Apr 2024 01:17  Patient On (Oxygen Delivery Method): room air        I&O's Detail    03 Apr 2024 07:01  -  04 Apr 2024 06:59  --------------------------------------------------------  IN:    Oral Fluid: 250 mL  Total IN: 250 mL    OUT:  Total OUT: 0 mL    Total NET: 250 mL          Physical Exam:  CONSTITUTIONAL: Well groomed, no apparent distress  ENMT: Oral mucosa with moist membranes.   RESP: No respiratory distress  CV: RRR  GI: Soft, NT, ND  EXTREMITY: Left hand 3rd finger now less erythematous, slightly less swollen and tender to palpation. sensory and motor intact. Flexion of finger limited 2/2 swelling and pain, but ROM much improved at PIPJ.  No drainage. No fluctuance.       LABS:                        11.8   6.87  )-----------( 387      ( 03 Apr 2024 06:57 )             39.2     04-03    138  |  101  |  11  ----------------------------<  86  4.4   |  23  |  0.84    Ca    9.6      03 Apr 2024 07:01    TPro  7.7  /  Alb  3.9  /  TBili  0.4  /  DBili  x   /  AST  16  /  ALT  12  /  AlkPhos  111  04-03      Urinalysis Basic - ( 03 Apr 2024 07:01 )    Color: x / Appearance: x / SG: x / pH: x  Gluc: 86 mg/dL / Ketone: x  / Bili: x / Urobili: x   Blood: x / Protein: x / Nitrite: x   Leuk Esterase: x / RBC: x / WBC x   Sq Epi: x / Non Sq Epi: x / Bacteria: x        RADIOLOGY & ADDITIONAL STUDIES:  
SUBJECTIVE: The patient was seen and examined. No acute events overnight. Pt denies any overt improvement in symptoms. Denies any worsening.       Vital Signs Last 24 Hrs  T(C): 36.4 (02 Apr 2024 08:28), Max: 36.6 (01 Apr 2024 18:32)  T(F): 97.6 (02 Apr 2024 08:28), Max: 97.9 (02 Apr 2024 01:54)  HR: 75 (02 Apr 2024 08:28) (73 - 97)  BP: 122/82 (02 Apr 2024 08:28) (117/78 - 134/81)  BP(mean): --  RR: 18 (02 Apr 2024 08:28) (17 - 18)  SpO2: 98% (02 Apr 2024 08:28) (98% - 100%)    Parameters below as of 02 Apr 2024 08:28  Patient On (Oxygen Delivery Method): room air      I&O's Detail      PHYSICAL EXAM  CONSTITUTIONAL: Well groomed, no apparent distress  ENMT: Oral mucosa with moist membranes.   RESP: No respiratory distress  CV: RRR  GI: Soft, NT, ND  EXTREMITY: Left hand 3rd finger erythematous, swollen and tender to palpation. sensory and motor intact. Flexion of finger limited 2/2 swelling and pain. No drainage. No fluctuance. exam unchanged from initial    LABS:                        11.4   7.12  )-----------( 376      ( 02 Apr 2024 06:46 )             37.6     04-02    138  |  104  |  11  ----------------------------<  101<H>  4.1   |  22  |  0.85    Ca    8.9      02 Apr 2024 06:44    TPro  8.0  /  Alb  4.0  /  TBili  0.3  /  DBili  x   /  AST  17  /  ALT  14  /  AlkPhos  119  04-01      Urinalysis Basic - ( 02 Apr 2024 06:44 )    Color: x / Appearance: x / SG: x / pH: x  Gluc: 101 mg/dL / Ketone: x  / Bili: x / Urobili: x   Blood: x / Protein: x / Nitrite: x   Leuk Esterase: x / RBC: x / WBC x   Sq Epi: x / Non Sq Epi: x / Bacteria: x        RADIOLOGY & ADDITIONAL STUDIES:  
  Follow Up:  finger osteo    Interval History/ROS: pt afebrile and doing well, no vomiting or SOB, finger continues to gradually improve, plan for discharge        Allergies  No Known Allergies        ANTIMICROBIALS:  tenofovir disoproxil fumarate (VIREAD) 300 daily  vancomycin  IVPB 1000 every 12 hours      OTHER MEDS:  aspirin enteric coated 81 milliGRAM(s) Oral daily  atorvastatin 80 milliGRAM(s) Oral at bedtime  chlorhexidine 2% Cloths 1 Application(s) Topical daily  clopidogrel Tablet 75 milliGRAM(s) Oral daily  heparin   Injectable 5000 Unit(s) SubCutaneous every 8 hours  losartan 25 milliGRAM(s) Oral daily      Vital Signs Last 24 Hrs  T(C): 36.6 (05 Apr 2024 08:32), Max: 36.8 (05 Apr 2024 00:29)  T(F): 97.9 (05 Apr 2024 08:32), Max: 98.3 (05 Apr 2024 00:29)  HR: 76 (05 Apr 2024 08:32) (76 - 88)  BP: 101/61 (05 Apr 2024 08:32) (101/61 - 108/73)  BP(mean): --  RR: 18 (05 Apr 2024 08:32) (18 - 18)  SpO2: 99% (05 Apr 2024 08:32) (96% - 99%)    Parameters below as of 05 Apr 2024 08:32  Patient On (Oxygen Delivery Method): room air        Physical Exam:  General:    NAD, non toxic, sitting on a chair  Respiratory:   comfortable on RA  abd:   soft, , not tender  :     no CVAT, no suprapubic tenderness, no miles  Musculoskeletal : improved L 3rd finger edema, erythema of tip   Skin:    hypopigmented patches on face and hand  vascular: RUE picc                      MICROBIOLOGY:  v  .Blood Blood  04-01-24   No growth at 4 days  --  --      .Blood Blood  04-01-24   No growth at 4 days  --  --                RADIOLOGY:  Images independently visualized and reviewed personally, findings as below  < from: MR Hand w/wo IV Cont, Left (04.02.24 @ 23:09) >  IMPRESSION:  Osteomyelitis of the middle and distal third phalanx with a moderate   enhancing joint effusion with concerning for superimposed septic   arthritis.    < end of copied text >  
  Follow Up:  finger osteo    Interval History/ROS: pt afebrile and doing well, no vomiting or SOB, finger slightly improved           Allergies  No Known Allergies        ANTIMICROBIALS:  tenofovir disoproxil fumarate (VIREAD) 300 daily  vancomycin  IVPB 1000 every 12 hours      OTHER MEDS:  aspirin enteric coated 81 milliGRAM(s) Oral daily  atorvastatin 80 milliGRAM(s) Oral at bedtime  clopidogrel Tablet 75 milliGRAM(s) Oral daily  heparin   Injectable 5000 Unit(s) SubCutaneous every 8 hours  losartan 25 milliGRAM(s) Oral daily      Vital Signs Last 24 Hrs  T(C): 36.5 (03 Apr 2024 10:00), Max: 37 (02 Apr 2024 16:11)  T(F): 97.7 (03 Apr 2024 10:00), Max: 98.6 (02 Apr 2024 16:11)  HR: 87 (03 Apr 2024 10:00) (75 - 87)  BP: 105/73 (03 Apr 2024 10:00) (105/73 - 123/79)  BP(mean): --  RR: 18 (03 Apr 2024 10:00) (18 - 18)  SpO2: 100% (03 Apr 2024 10:00) (97% - 100%)    Parameters below as of 03 Apr 2024 10:00  Patient On (Oxygen Delivery Method): room air        Physical Exam:  General:    NAD, non toxic, sitting on a chair  Respiratory:   comfortable on RA  abd:   soft, , not tender  :     no CVAT, no suprapubic tenderness, no miles  Musculoskeletal : L 3rd finger edema, erythema of tip and DIP, some tenderness and decreased ROM  Skin:    hypopigmented patches on face and hand  vascular: no phlebitis                            11.8   6.87  )-----------( 387      ( 03 Apr 2024 06:57 )             39.2       04-03    138  |  101  |  11  ----------------------------<  86  4.4   |  23  |  0.84    Ca    9.6      03 Apr 2024 07:01    TPro  7.7  /  Alb  3.9  /  TBili  0.4  /  DBili  x   /  AST  16  /  ALT  12  /  AlkPhos  111  04-03      Urinalysis Basic - ( 03 Apr 2024 07:01 )    Color: x / Appearance: x / SG: x / pH: x  Gluc: 86 mg/dL / Ketone: x  / Bili: x / Urobili: x   Blood: x / Protein: x / Nitrite: x   Leuk Esterase: x / RBC: x / WBC x   Sq Epi: x / Non Sq Epi: x / Bacteria: x        MICROBIOLOGY:  Vancomycin Level, Trough: 11.9 ug/mL (04-03-24 @ 07:01)  v  .Blood Blood  04-01-24   No growth at 24 hours  --  --      .Blood Blood  04-01-24   No growth at 24 hours  --  --                RADIOLOGY:  Images independently visualized and reviewed personally, findings as below  < from: MR Hand w/wo IV Cont, Left (04.02.24 @ 23:09) >    IMPRESSION:  Osteomyelitis of the middle and distal third phalanx with a moderate   enhancing joint effusion with concerning for superimposed septic   arthritis.    < end of copied text >  
Patient is a 63y old  Male who presents with a chief complaint of The patient is a 63y Male complaining of Lt middle finger infection. (03 Apr 2024 15:04)      SUBJECTIVE / OVERNIGHT EVENTS:    Events noted.  CONSTITUTIONAL: No fever,  or fatigue  RESPIRATORY: No cough, wheezing,  No shortness of breath  CARDIOVASCULAR: No chest pain, palpitations, dizziness, or leg swelling  GASTROINTESTINAL: No abdominal or epigastric pain. No nausea, vomiting.      MEDICATIONS  (STANDING):  aspirin enteric coated 81 milliGRAM(s) Oral daily  atorvastatin 80 milliGRAM(s) Oral at bedtime  clopidogrel Tablet 75 milliGRAM(s) Oral daily  heparin   Injectable 5000 Unit(s) SubCutaneous every 8 hours  losartan 25 milliGRAM(s) Oral daily  tenofovir disoproxil fumarate (VIREAD) 300 milliGRAM(s) Oral daily  vancomycin  IVPB 1000 milliGRAM(s) IV Intermittent every 12 hours    MEDICATIONS  (PRN):        CAPILLARY BLOOD GLUCOSE        I&O's Summary      T(C): 36.5 (04-03-24 @ 10:00), Max: 37 (04-02-24 @ 16:11)  HR: 87 (04-03-24 @ 10:00) (75 - 87)  BP: 105/73 (04-03-24 @ 10:00) (105/73 - 123/79)  RR: 18 (04-03-24 @ 10:00) (18 - 18)  SpO2: 100% (04-03-24 @ 10:00) (97% - 100%)    PHYSICAL EXAM:  GENERAL: NAD  NECK: Supple, No JVD  CHEST/LUNG: Clear to auscultation bilaterally; No wheezing.  HEART: Regular rate and rhythm; No murmurs, rubs, or gallops  ABDOMEN: Soft, Nontender, Nondistended; Bowel sounds present  EXTREMITIES:   No edema  NEUROLOGY: AAO X 3      LABS:                        11.8   6.87  )-----------( 387      ( 03 Apr 2024 06:57 )             39.2     04-03    138  |  101  |  11  ----------------------------<  86  4.4   |  23  |  0.84    Ca    9.6      03 Apr 2024 07:01    TPro  7.7  /  Alb  3.9  /  TBili  0.4  /  DBili  x   /  AST  16  /  ALT  12  /  AlkPhos  111  04-03          Urinalysis Basic - ( 03 Apr 2024 07:01 )    Color: x / Appearance: x / SG: x / pH: x  Gluc: 86 mg/dL / Ketone: x  / Bili: x / Urobili: x   Blood: x / Protein: x / Nitrite: x   Leuk Esterase: x / RBC: x / WBC x   Sq Epi: x / Non Sq Epi: x / Bacteria: x      CAPILLARY BLOOD GLUCOSE            RADIOLOGY & ADDITIONAL TESTS:    Imaging Personally Reviewed:    Consultant(s) Notes Reviewed:      Care Discussed with Consultants/Other Providers:    Lawrence Pierce MD, CMD, FACP    [id number] [address]  Office Tel: [phone]  Cell: [phone]  
PATIENT SEEN AND EXAMINED BY ANABELA MAYES M.D. ON :- 4/3/24  DATE OF SERVICE:  4/3/24           Interim events noted,Labs ,Radiological studies and Cardiology tests reviewed .    Patient is a 63y old  Male who presents with a chief complaint of The patient is a 63y Male complaining of Lt middle finger infection. (03 Apr 2024 15:59)      HPI:  64 y/o male, pmh HTN, HLD, CAD, CABG, presents to the ER for left middle finger infection.  has been having swelling to his finger for the past 6 weeks. states he was started on amoxicillin by an outside urgent care. states saw his pcp, had mri done with concern for possible osteomyelitis. was advised to come to the ER for IV abx. states finger initially drained clear fluid but has since resolved. denies f/n/v/d, CP, SOB, HA, dizziness, abdominal pain, bleeding from finger.   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      Cheek mass  liposuction x 2 to left cheek      H/O sinus surgery          PREVIOUS DIAGNOSTIC TESTING:      ECHO  FINDINGS:    STRESS  FINDINGS:    CATHETERIZATION  FINDINGS:    MEDICATIONS  (STANDING):  aspirin enteric coated 81 milliGRAM(s) Oral daily  atorvastatin 80 milliGRAM(s) Oral at bedtime  chlorhexidine 2% Cloths 1 Application(s) Topical daily  clopidogrel Tablet 75 milliGRAM(s) Oral daily  heparin   Injectable 5000 Unit(s) SubCutaneous every 8 hours  losartan 25 milliGRAM(s) Oral daily  tenofovir disoproxil fumarate (VIREAD) 300 milliGRAM(s) Oral daily  vancomycin  IVPB 1000 milliGRAM(s) IV Intermittent every 12 hours    MEDICATIONS  (PRN):      FAMILY HISTORY:  Family history of coronary artery disease  father when 59    Family history of MI (myocardial infarction) (Father)  father        SOCIAL HISTORY:    CIGARETTES:    ALCOHOL:    REVIEW OF SYSTEMS:  CONSTITUTIONAL: No fever, weight loss, or fatigue  EYES: No eye pain, visual disturbances, or discharge  ENMT:  No difficulty hearing, tinnitus, vertigo; No sinus or throat pain  NECK: No pain or stiffness  RESPIRATORY: No cough, wheezing, chills or hemoptysis; No shortness of breath  CARDIOVASCULAR: No chest pain, palpitations, dizziness, or leg swelling  GASTROINTESTINAL: No abdominal or epigastric pain. No nausea, vomiting, or hematemesis; No diarrhea or constipation. No melena or hematochezia.  GENITOURINARY: No dysuria, frequency, hematuria, or incontinence  NEUROLOGICAL: No headaches, memory loss, loss of strength, numbness, or tremors  SKIN: No itching, burning, rashes, or lesions   LYMPH NODES: No enlarged glands  ENDOCRINE: No heat or cold intolerance; No hair loss  MUSCULOSKELETAL: No joint pain or swelling; No muscle, back, or extremity pain  PSYCHIATRIC: No depression, anxiety, mood swings, or difficulty sleeping  HEME/LYMPH: No easy bruising, or bleeding gums  ALLERY AND IMMUNOLOGIC: No hives or eczema    Vital Signs Last 24 Hrs  T(C): 36.4 (03 Apr 2024 16:02), Max: 36.7 (03 Apr 2024 00:46)  T(F): 97.6 (03 Apr 2024 16:02), Max: 98.1 (03 Apr 2024 00:46)  HR: 81 (03 Apr 2024 16:02) (81 - 87)  BP: 117/79 (03 Apr 2024 16:02) (105/73 - 117/79)  BP(mean): --  RR: 18 (03 Apr 2024 16:02) (18 - 18)  SpO2: 98% (03 Apr 2024 16:02) (97% - 100%)    Parameters below as of 03 Apr 2024 16:02  Patient On (Oxygen Delivery Method): room air          PHYSICAL EXAM:  GENERAL: NAD, well-groomed, well-developed  HEAD:  Atraumatic, Normocephalic  EYES: EOMI, PERRLA, conjunctiva and sclera clear  ENMT: No tonsillar erythema, exudates, or enlargement; Moist mucous membranes, Good dentition, No lesions  NECK: Supple, No JVD, Normal thyroid  NERVOUS SYSTEM:  Alert & Oriented X3, Good concentration; Motor Strength 5/5 B/L upper and lower extremities; DTRs 2+ intact and symmetric  CHEST/LUNG: Clear to percussion bilaterally; No rales, rhonchi, wheezing, or rubs  HEART: Regular rate and rhythm; No murmurs, rubs, or gallops  ABDOMEN: Soft, Nontender, Nondistended; Bowel sounds present  EXTREMITIES:  2+ Peripheral Pulses, No clubbing, cyanosis, or edema  LYMPH: No lymphadenopathy noted  SKIN: No rashes or lesions      INTERPRETATION OF TELEMETRY:    ECG:    EMIVASC:     LABS:                        11.8   6.87  )-----------( 387      ( 03 Apr 2024 06:57 )             39.2     04-03    138  |  101  |  11  ----------------------------<  86  4.4   |  23  |  0.84    Ca    9.6      03 Apr 2024 07:01    TPro  7.7  /  Alb  3.9  /  TBili  0.4  /  DBili  x   /  AST  16  /  ALT  12  /  AlkPhos  111  04-03          Urinalysis Basic - ( 03 Apr 2024 07:01 )    Color: x / Appearance: x / SG: x / pH: x  Gluc: 86 mg/dL / Ketone: x  / Bili: x / Urobili: x   Blood: x / Protein: x / Nitrite: x   Leuk Esterase: x / RBC: x / WBC x   Sq Epi: x / Non Sq Epi: x / Bacteria: x      Lipid Panel:   I&O's Summary      RADIOLOGY & ADDITIONAL STUDIES:
PATIENT SEEN AND EXAMINED BY ANABELA MAYES M.D. ON :- 4/4/24  DATE OF SERVICE:  4/4/24           Interim events noted,Labs ,Radiological studies and Cardiology tests reviewed .    Patient is a 63y old  Male who presents with a chief complaint of The patient is a 63y Male complaining of Lt middle finger infection. (04 Apr 2024 12:50)      HPI:  64 y/o male, pmh HTN, HLD, CAD, CABG, presents to the ER for left middle finger infection.  has been having swelling to his finger for the past 6 weeks. states he was started on amoxicillin by an outside urgent care. states saw his pcp, had mri done with concern for possible osteomyelitis. was advised to come to the ER for IV abx. states finger initially drained clear fluid but has since resolved. denies f/n/v/d, CP, SOB, HA, dizziness, abdominal pain, bleeding from finger.   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      Cheek mass  liposuction x 2 to left cheek      H/O sinus surgery          PREVIOUS DIAGNOSTIC TESTING:      ECHO  FINDINGS:    STRESS  FINDINGS:    CATHETERIZATION  FINDINGS:    MEDICATIONS  (STANDING):  aspirin enteric coated 81 milliGRAM(s) Oral daily  atorvastatin 80 milliGRAM(s) Oral at bedtime  chlorhexidine 2% Cloths 1 Application(s) Topical daily  clopidogrel Tablet 75 milliGRAM(s) Oral daily  heparin   Injectable 5000 Unit(s) SubCutaneous every 8 hours  losartan 25 milliGRAM(s) Oral daily  tenofovir disoproxil fumarate (VIREAD) 300 milliGRAM(s) Oral daily  vancomycin  IVPB 1000 milliGRAM(s) IV Intermittent every 12 hours    MEDICATIONS  (PRN):      FAMILY HISTORY:  Family history of coronary artery disease  father when 59    Family history of MI (myocardial infarction) (Father)  father        SOCIAL HISTORY:    CIGARETTES:    ALCOHOL:    REVIEW OF SYSTEMS:  CONSTITUTIONAL: No fever, weight loss, or fatigue  EYES: No eye pain, visual disturbances, or discharge  ENMT:  No difficulty hearing, tinnitus, vertigo; No sinus or throat pain  NECK: No pain or stiffness  RESPIRATORY: No cough, wheezing, chills or hemoptysis; No shortness of breath  CARDIOVASCULAR: No chest pain, palpitations, dizziness, or leg swelling  GASTROINTESTINAL: No abdominal or epigastric pain. No nausea, vomiting, or hematemesis; No diarrhea or constipation. No melena or hematochezia.  GENITOURINARY: No dysuria, frequency, hematuria, or incontinence  NEUROLOGICAL: No headaches, memory loss, loss of strength, numbness, or tremors  SKIN: No itching, burning, rashes, or lesions   LYMPH NODES: No enlarged glands  ENDOCRINE: No heat or cold intolerance; No hair loss  MUSCULOSKELETAL: No joint pain or swelling; No muscle, back, or extremity pain  PSYCHIATRIC: No depression, anxiety, mood swings, or difficulty sleeping  HEME/LYMPH: No easy bruising, or bleeding gums  ALLERY AND IMMUNOLOGIC: No hives or eczema    Vital Signs Last 24 Hrs  T(C): 36.7 (04 Apr 2024 16:18), Max: 36.9 (04 Apr 2024 08:35)  T(F): 98.1 (04 Apr 2024 16:18), Max: 98.4 (04 Apr 2024 08:35)  HR: 85 (04 Apr 2024 16:18) (85 - 91)  BP: 103/66 (04 Apr 2024 16:18) (96/68 - 110/74)  BP(mean): --  RR: 18 (04 Apr 2024 16:18) (18 - 18)  SpO2: 98% (04 Apr 2024 16:18) (97% - 99%)    Parameters below as of 04 Apr 2024 16:18  Patient On (Oxygen Delivery Method): room air          PHYSICAL EXAM:  GENERAL: NAD, well-groomed, well-developed  HEAD:  Atraumatic, Normocephalic  EYES: EOMI, PERRLA, conjunctiva and sclera clear  ENMT: No tonsillar erythema, exudates, or enlargement; Moist mucous membranes, Good dentition, No lesions  NECK: Supple, No JVD, Normal thyroid  NERVOUS SYSTEM:  Alert & Oriented X3, Good concentration; Motor Strength 5/5 B/L upper and lower extremities; DTRs 2+ intact and symmetric  CHEST/LUNG: Clear to percussion bilaterally; No rales, rhonchi, wheezing, or rubs  HEART: Regular rate and rhythm; No murmurs, rubs, or gallops  ABDOMEN: Soft, Nontender, Nondistended; Bowel sounds present  EXTREMITIES:  2+ Peripheral Pulses, No clubbing, cyanosis, or edema  LYMPH: No lymphadenopathy noted  SKIN: No rashes or lesions      INTERPRETATION OF TELEMETRY:    ECG:    EMIVASC:     LABS:                        11.8   6.87  )-----------( 387      ( 03 Apr 2024 06:57 )             39.2     04-03    138  |  101  |  11  ----------------------------<  86  4.4   |  23  |  0.84    Ca    9.6      03 Apr 2024 07:01    TPro  7.7  /  Alb  3.9  /  TBili  0.4  /  DBili  x   /  AST  16  /  ALT  12  /  AlkPhos  111  04-03          Urinalysis Basic - ( 03 Apr 2024 07:01 )    Color: x / Appearance: x / SG: x / pH: x  Gluc: 86 mg/dL / Ketone: x  / Bili: x / Urobili: x   Blood: x / Protein: x / Nitrite: x   Leuk Esterase: x / RBC: x / WBC x   Sq Epi: x / Non Sq Epi: x / Bacteria: x      Lipid Panel:   I&O's Summary    03 Apr 2024 07:01  -  04 Apr 2024 07:00  --------------------------------------------------------  IN: 250 mL / OUT: 0 mL / NET: 250 mL    04 Apr 2024 07:01  -  04 Apr 2024 20:15  --------------------------------------------------------  IN: 240 mL / OUT: 0 mL / NET: 240 mL        RADIOLOGY & ADDITIONAL STUDIES:

## 2024-04-05 NOTE — DISCHARGE NOTE NURSING/CASE MANAGEMENT/SOCIAL WORK - PATIENT PORTAL LINK FT
You can access the FollowMyHealth Patient Portal offered by [facility] by registering at the following website: [email or web address]. By joining [facility]’s FollowMyHealth portal, you will also be able to view your health information using other applications (apps) compatible with our system.

## 2024-04-05 NOTE — DISCHARGE NOTE NURSING/CASE MANAGEMENT/SOCIAL WORK - NSDCPEFALRISK_GEN_ALL_CORE
For information on Fall & Injury Prevention, visit: [email or web address] OR  [email or web address] OR  https://www.cdc.gov/steadi/patient.html

## 2024-04-05 NOTE — PROGRESS NOTE ADULT - ASSESSMENT
64 y/o male, pmh HTN, HLD, CAD, CABG, presents to the ER for left middle finger infection with imaging findings consistent with osteomyelitis. Patient currently non-septic. No surgical intervention at this time.     Plan:  - No surgical intervention, MRI w/o drainable collection  - C/w IV abx per ID recommendations  - OK to dc with outpatient followup with Dr. Chatman  - Monitor swelling    Plastic Surgery   [phone]

## 2024-04-05 NOTE — DISCHARGE NOTE NURSING/CASE MANAGEMENT/SOCIAL WORK - NSDCVIVACCINE_GEN_ALL_CORE_FT
influenza, injectable, quadrivalent, preservative free; 15-Sep-2018 18:09; Elizabeth Lim (JANENE); [employer]; [id number] (Exp. Date: 30-Jun-2019); IntraMuscular; Deltoid Left.; 0.5 milliLiter(s); VIS (VIS Published: 07-Aug-2015, VIS Presented: 15-Sep-2018);

## 2024-04-05 NOTE — PROGRESS NOTE ADULT - REASON FOR ADMISSION
The patient is a 63y Male complaining of Lt middle finger infection.

## 2024-04-05 NOTE — PROGRESS NOTE ADULT - ASSESSMENT
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  s/p antibiotics psoriasis on skyrizi and chronic hep B on tenfovoir, had had Paronychia of L 3rd finger but about 5 weeks ago when he was in [place], it developed purulent drainage and he was given amoxicillin which he took for about 4 weeks but still has edema, erythema and pain of the 3rd DIP, MRI outside showed possible osteo of 3rd distal phalanx and proximal mid phalanx vs inflammatory etiology   afebrile, WBC: normal  xray here: Decreased bone mineralization with erosive changes and fragmentation at the distal interphalangeal joint of the middle finger most consistent   with osteomyelitis. There is surrounding soft tissue swelling.    immunocompromised pt with psoriasis on skyziri, had recent L 3rd paronychia which developed purulent drainage and now dactylitis and MRI with osteo of the middle and distal 3rd phalanx and likely septic arthritis  s/p 4 weeks of amox with no improvement, plastic did not recommend any surgical intervention  as pt is immunocompromised and recent paronychia, will treat as infection, but psoriasis itself can cause dactylitis as well  chronic hep B on tenofovir     * c/w vanco 1 q 12   * will do a 6 week course of vanco through 5/13  * weekly CBC, CMP, ESR, CRP and vanco trough while on antibiotics      The above assessment and plan was discussed with the primary team    Halima Erwin MD  contact on teams  After 5pm and on weekends call [phone]

## 2024-04-10 ENCOUNTER — NON-APPOINTMENT (OUTPATIENT)
Age: 64
End: 2024-04-10

## 2024-04-22 ENCOUNTER — OUTPATIENT (OUTPATIENT)
Dept: OUTPATIENT SERVICES | Facility: HOSPITAL | Age: 64
LOS: 1 days | End: 2024-04-22
Payer: MEDICAID

## 2024-04-22 ENCOUNTER — APPOINTMENT (OUTPATIENT)
Dept: INFECTIOUS DISEASE | Facility: CLINIC | Age: 64
End: 2024-04-22
Payer: MEDICAID

## 2024-04-22 VITALS
HEART RATE: 94 BPM | SYSTOLIC BLOOD PRESSURE: 145 MMHG | BODY MASS INDEX: 30.62 KG/M2 | DIASTOLIC BLOOD PRESSURE: 87 MMHG | WEIGHT: 202 LBS | HEIGHT: 68 IN | OXYGEN SATURATION: 97 % | TEMPERATURE: 208.04 F

## 2024-04-22 DIAGNOSIS — M86.9 OSTEOMYELITIS, UNSPECIFIED: ICD-10-CM

## 2024-04-22 DIAGNOSIS — D84.9 IMMUNODEFICIENCY, UNSPECIFIED: ICD-10-CM

## 2024-04-22 DIAGNOSIS — B97.89 OTHER VIRAL AGENTS AS THE CAUSE OF DISEASES CLASSIFIED ELSEWHERE: ICD-10-CM

## 2024-04-22 DIAGNOSIS — B18.1 CHRONIC VIRAL HEPATITIS B W/OUT DELTA-AGENT: ICD-10-CM

## 2024-04-22 DIAGNOSIS — Z98.890 OTHER SPECIFIED POSTPROCEDURAL STATES: Chronic | ICD-10-CM

## 2024-04-22 DIAGNOSIS — Z95.1 PRESENCE OF AORTOCORONARY BYPASS GRAFT: Chronic | ICD-10-CM

## 2024-04-22 DIAGNOSIS — R22.0 LOCALIZED SWELLING, MASS AND LUMP, HEAD: Chronic | ICD-10-CM

## 2024-04-22 DIAGNOSIS — Z96.641 PRESENCE OF RIGHT ARTIFICIAL HIP JOINT: Chronic | ICD-10-CM

## 2024-04-22 PROCEDURE — 99214 OFFICE O/P EST MOD 30 MIN: CPT

## 2024-04-22 PROCEDURE — G0463: CPT

## 2024-04-22 NOTE — REVIEW OF SYSTEMS
[Fever] : no fever [Chills] : no chills [Eye Pain] : no eye pain [Red Eyes] : eyes not red [Earache] : no earache [Loss Of Hearing] : no hearing loss [Chest Pain] : no chest pain [Palpitations] : no palpitations [Shortness Of Breath] : no shortness of breath [Wheezing] : no wheezing [Cough] : no cough [Abdominal Pain] : no abdominal pain [Vomiting] : no vomiting [Diarrhea] : no diarrhea [Dysuria] : no dysuria [Confused] : no confusion [Convulsions] : no convulsions [Suicidal] : not suicidal [Easy Bleeding] : no tendency for easy bleeding [Easy Bruising] : no tendency for easy bruising [Negative] : Heme/Lymph [FreeTextEntry9] : improving finger edema and pain [de-identified] : itchy rash on antecubital area now improved

## 2024-04-22 NOTE — HISTORY OF PRESENT ILLNESS
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 There is surrounding soft tissue swelling. MRI with osteo of the middle and distal 3rd phalanx and likely septic arthritis pt was discharged with a 6 week course of vanco, now here for f/u, doing better, improved edema and pain of 3rd finger

## 2024-04-22 NOTE — PHYSICAL EXAM
[General Appearance - Alert] : alert [General Appearance - In No Acute Distress] : in no acute distress [Sclera] : the sclera and conjunctiva were normal [Outer Ear] : the ears and nose were normal in appearance [Neck Appearance] : the appearance of the neck was normal [] : no respiratory distress [Auscultation Breath Sounds / Voice Sounds] : lungs were clear to auscultation bilaterally [Heart Sounds] : normal S1 and S2 [Bowel Sounds] : normal bowel sounds [Abdomen Soft] : soft [Abdomen Tenderness] : non-tender [Costovertebral Angle Tenderness] : no CVA tenderness [No Palpable Adenopathy] : no palpable adenopathy [FreeTextEntry1] : hypopigmented patches on face, UEs, erythema of antecubital area [No Focal Deficits] : no focal deficits [Affect] : the affect was normal

## 2024-04-22 NOTE — ASSESSMENT
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 There is surrounding soft tissue swelling. MRI with osteo of the middle and distal 3rd phalanx and likely septic arthritis pt was discharged with a 6 week course of vanco, now here for f/u, doing better, improved edema and pain of 3rd finger, had erythema and pruritus in both antecubital area that is improving  immunocompromised pt with psoriasis on skyziri, had recent L 3rd paronychia which developed purulent drainage and now dactylitis and MRI with osteo of the middle and distal 3rd phalanx and likely septic arthritis s/p 4 weeks of amox with no improvement, plastic did not recommend any surgical intervention as pt is immunocompromised and recent paronychia, so treated as infection, but psoriasis itself can cause dactylitis as well, discharged with 6 weeks of vanco through 5/13 chronic hep B on tenofovir   * c/w vanco  to complete a 6 week course  through 5/13 * weekly CBC, CMP, ESR, CRP and vanco trough while on antibiotics * f/u with hand surgery * c/w tenofovir for chronic hep B * follow with derm for the antecubital rash, does not appear to be a reaction as it is only in antecubital areas

## 2024-04-29 NOTE — ED PROVIDER NOTE - ENMT, MLM
Urine shows minimal protein and suspect this is due to uncontrolled DM. Will re-evaluate in 4-6  months.  
Airway patent.

## 2024-05-03 DIAGNOSIS — D84.9 IMMUNODEFICIENCY, UNSPECIFIED: ICD-10-CM

## 2024-05-03 DIAGNOSIS — M86.9 OSTEOMYELITIS, UNSPECIFIED: ICD-10-CM

## 2024-05-03 DIAGNOSIS — B18.1 CHRONIC VIRAL HEPATITIS B WITHOUT DELTA-AGENT: ICD-10-CM

## 2024-05-13 ENCOUNTER — OFFICE (OUTPATIENT)
Dept: URBAN - METROPOLITAN AREA CLINIC 27 | Facility: CLINIC | Age: 64
Setting detail: OPHTHALMOLOGY
End: 2024-05-13
Payer: COMMERCIAL

## 2024-05-13 DIAGNOSIS — H43.813: ICD-10-CM

## 2024-05-13 PROCEDURE — 92014 COMPRE OPH EXAM EST PT 1/>: CPT | Performed by: OPHTHALMOLOGY

## 2024-05-13 ASSESSMENT — LID EXAM ASSESSMENTS
OS_EDEMA: LLL 1+
OD_EDEMA: RLL 1+

## 2024-05-13 ASSESSMENT — CONFRONTATIONAL VISUAL FIELD TEST (CVF)
OS_FINDINGS: FULL
OD_FINDINGS: FULL

## 2024-05-17 ENCOUNTER — APPOINTMENT (OUTPATIENT)
Dept: DERMATOLOGY | Facility: CLINIC | Age: 64
End: 2024-05-17
Payer: MEDICAID

## 2024-05-17 DIAGNOSIS — L80 VITILIGO: ICD-10-CM

## 2024-05-17 DIAGNOSIS — L30.9 DERMATITIS, UNSPECIFIED: ICD-10-CM

## 2024-05-17 PROCEDURE — 99214 OFFICE O/P EST MOD 30 MIN: CPT | Mod: 25

## 2024-05-17 PROCEDURE — 96401 CHEMO ANTI-NEOPL SQ/IM: CPT

## 2024-05-17 RX ORDER — TRIAMCINOLONE ACETONIDE 1 MG/G
0.1 OINTMENT TOPICAL
Qty: 1 | Refills: 2 | Status: ACTIVE | COMMUNITY
Start: 2024-05-17 | End: 1900-01-01

## 2024-05-20 NOTE — HISTORY OF PRESENT ILLNESS
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 Vilitgo on scalp, hands, face; previously used excimer, did not find that helpful so not interested at this time to trying this again.  - Feels it is worsening - new spots on scalp, hands and prior spots enlarging. Using protopic BID (M-F on face; Sat/Sun on hands) and betamethasone BID (M-F on hands; Sat/Cardona on face), says he notices improvement in color on this regimen.   2. Psoriasis- clear on skyrizi, tolerating well. Now doing injections q6months. Requesting that we administer his injection at this visit. feels well. Did take vancomycin a few weeks ago for an infection, developed an itchy rash around the same time, was told it would clear soon.   Meds: irbesartan, ezetimibe, atorvastatin, baby ASA

## 2024-05-20 NOTE — PHYSICAL EXAM
[FreeTextEntry3] : General: well appearing person in nad, alert, pleasant Focused Skin Exam per patient preference: - depigmented macules coalescing into patches on crown of scalp; some areas of repigmentation in center when compared to prior photos - depigmented patches on b/l dorsal hands and cheeks, with few areas of repigmentation today - eczematous plaques on arms and back today

## 2024-05-20 NOTE — ASSESSMENT
[FreeTextEntry1] : 1. Psoriasis, plaque type,  improved on skyrizi Chronic condition, improved - Failed hydrocortisone 2.5% ointment and triamcinolone 0.1% ointment - Reviewed diagnosis and chronic nature of condition - C/w Skyrizi q6mo; clearance attained from Dr. Irene (Rad Onc) and Dr. Erwin (ID), on Tenofovir prescribed by ID. Per patient requested, injected today into L arm, tolerated well.  - R/b/a and SED previously discussed including but not limited to injection site reaction, immunosuppression, risk of infection and malignancy. Patient verbalizes understandings of risks. - If develops any active plaques, can c/w betamethasone diprop 0.05% ointment BID to active areas on body on weekdays and calcipotriene 0.005% ointment on weekends. Avoid face, axilla, groin. SED including atrophy, dyspigmentation, telangiectasias, striae. Proper use reviewed including only using to affected area and avoidance of prolonged use. - RTC 3 mo- will consider more frequent injections (back to standard dosing) if flare. Note: per rheum note, if patient with SIJ axial disease, may need to switch to another systemic as IL-23i not indicated for axial disease  2. High risk medication use - Personal hx of liposarcoma- cleared by rad onc to be on Skyrizi. last seen 6/2023 with no evidence of disease recurrence - previously Hep B core AB positive- patient now on Vemildy and cleared by ID to be on Skyrizi- discussed to f/u with ID if he should continue. - Patient instructed to follow with PCP yearly, stay up to date on age-appropriate malignancy screening and vaccines including yearly flu vaccine. Avoid live vaccines. - Discussed need for close followup and periodic lab monitoring while on this medication - CBC, CMP stable 9/2023; quant gold and HCV neg 9/2023; HBV stable with positive core HBV and surface HBV, HSag neg 9/2023 (follows with ID, on antiviral)  3. Vitiligo Chronic condition, not at treatment goal; BSA 5% - START Opzelura BID - pt states he cleared this with his cardiologist ; Side effects discussed including application site acne, application site itching, common cold, headache, urinary tract infection, application site redness, and fever.  Discussed risk of increased major adverse cardiovascular events and thromboembolic risk noted in those taking oral JOHN-inhibitors. There is also an increased risk of serious infection and malignancy in those taking oral JOHN-inhibitors. There may be an increased risk of HSV and VZV reactivation. Cannot be used if any current active, serious infection, including localized infections.  - photos in chart - Consider re-starting excimer 2-3x weekly, not interested today - there some rare cases of biologics worsening vitiligo, some cases of IL-12/IL-23, no cases of IL-23 yet. https://www.ncbi.nlm.nih.gov/pmc/articles/TTA5653385/  4. Eczematous Dermatitis, chronic flare - appears eczematous today - start gentle skin care - Start triamcinolone 0.1% ointment BID to affected areas for 2 weeks. Proper medication use and side effects discussed, including cutaneous atrophy and striae. Avoid long-term use; do not use on face, axillae, groin. - [facility]

## 2024-05-22 RX ORDER — RUXOLITINIB 15 MG/G
1.5 CREAM TOPICAL
Qty: 1 | Refills: 2 | Status: ACTIVE | COMMUNITY
Start: 2023-04-28

## 2024-06-01 PROBLEM — Z95.1 S/P CABG X 4: Status: ACTIVE | Noted: 2019-01-24

## 2024-06-01 PROBLEM — E78.5 HLD (HYPERLIPIDEMIA): Status: ACTIVE | Noted: 2020-06-30

## 2024-06-01 PROBLEM — I25.10 CAD, MULTIPLE VESSEL: Status: ACTIVE | Noted: 2019-01-24

## 2024-06-01 PROBLEM — I10 BENIGN ESSENTIAL HTN: Status: ACTIVE | Noted: 2020-06-30

## 2024-06-01 NOTE — HISTORY OF PRESENT ILLNESS
[FreeTextEntry1] : Mr. Kennedy is a 63 year-old man with known MV CAD s/p CABG with Dr. Turpin in 2/2019. He has been doing well without issues. He has not been able to lose weight however has been walking over 2 miles a day. He recently underwent a lipoma removal from his face/cheek and radiation therapy. Needs achilles surgery for tear.  Follow-up - notes much improved chest discomfort on exertion/activity. Reports compliance with cardiac meds.  Active Issues: 1. Actively treated CAD 2. Actively treated cough 3. Actively treated HLD 4. Actively treated HTN

## 2024-06-03 RX ORDER — RUXOLITINIB 15 MG/G
1.5 CREAM TOPICAL
Qty: 1 | Refills: 5 | Status: ACTIVE | COMMUNITY
Start: 2024-05-17

## 2024-06-11 NOTE — H&P PST ADULT - VENOUS THROMBOEMBOLISM AGE
ENT     - Notified about desaturations  - Evaluated patient noted to be 88-89% on FiO2 40 and 10L via trach collar   - medicine and pulmonary called (pulmonary called d/t complete atelectasis of the right middle lobe and near complete atelectasis of the right lower lobe  - Recommended VBG and CXR as well as reposition and continuing with Xopenex and chest PT   - Patient  O2 improved with position 90-95% on Fio2 40 and 10L trach collar       Patient seen and examined around 2am - Flap noted to be congested   - OMFS and PRS notified immediately   - Decision was made to take patient back to the OR urgently- Patient made NPO     Patient was transported to the OR uneventfully     -In the OR patient tracheostomy tube changed           Procedure Note   Pt was placed in a supine position with neck extended.  #6 shiley  uncuffed trach tube was removed - 4 sutures were cut and replaced with a #6 cuffed shiley and cuff inflated  Clear secretions suctioned from stoma. 1%lidiocaine with epi was used as local injection. Tracheostomy tube is secured with 4 sutures using 2.0 silk. Patient suctioned, no sign of distress. Patient tolerated the procedure well without complication. (2) 61-74 years

## 2024-07-08 NOTE — PATIENT PROFILE ADULT - ABILITY TO HEAR (WITH HEARING AID OR HEARING APPLIANCE IF NORMALLY USED):
Adequate: hears normal conversation without difficulty
Detail Level: Zone
Render In Strict Bullet Format?: No
Continue Regimen: Ketoconazole shampoo, clobetasol scalp solution

## 2024-07-17 ENCOUNTER — OUTPATIENT (OUTPATIENT)
Dept: OUTPATIENT SERVICES | Facility: HOSPITAL | Age: 64
LOS: 1 days | End: 2024-07-17

## 2024-07-17 ENCOUNTER — APPOINTMENT (OUTPATIENT)
Dept: INTERNAL MEDICINE | Facility: CLINIC | Age: 64
End: 2024-07-17

## 2024-07-17 DIAGNOSIS — Z96.641 PRESENCE OF RIGHT ARTIFICIAL HIP JOINT: Chronic | ICD-10-CM

## 2024-07-17 DIAGNOSIS — R22.0 LOCALIZED SWELLING, MASS AND LUMP, HEAD: Chronic | ICD-10-CM

## 2024-07-17 DIAGNOSIS — Z95.1 PRESENCE OF AORTOCORONARY BYPASS GRAFT: Chronic | ICD-10-CM

## 2024-07-18 ENCOUNTER — NON-APPOINTMENT (OUTPATIENT)
Age: 64
End: 2024-07-18

## 2024-07-24 ENCOUNTER — NON-APPOINTMENT (OUTPATIENT)
Age: 64
End: 2024-07-24

## 2024-07-24 ENCOUNTER — APPOINTMENT (OUTPATIENT)
Dept: CARDIOLOGY | Facility: CLINIC | Age: 64
End: 2024-07-24

## 2024-07-24 VITALS
BODY MASS INDEX: 30.01 KG/M2 | SYSTOLIC BLOOD PRESSURE: 113 MMHG | HEIGHT: 68 IN | DIASTOLIC BLOOD PRESSURE: 75 MMHG | OXYGEN SATURATION: 96 % | WEIGHT: 198 LBS | HEART RATE: 91 BPM

## 2024-07-24 PROCEDURE — 93000 ELECTROCARDIOGRAM COMPLETE: CPT

## 2024-07-24 PROCEDURE — 99214 OFFICE O/P EST MOD 30 MIN: CPT | Mod: 25

## 2024-07-24 PROCEDURE — G2211 COMPLEX E/M VISIT ADD ON: CPT | Mod: NC

## 2024-08-02 ENCOUNTER — RX RENEWAL (OUTPATIENT)
Age: 64
End: 2024-08-02

## 2024-08-05 ENCOUNTER — APPOINTMENT (OUTPATIENT)
Dept: CT IMAGING | Facility: CLINIC | Age: 64
End: 2024-08-05

## 2024-08-05 ENCOUNTER — OUTPATIENT (OUTPATIENT)
Dept: OUTPATIENT SERVICES | Facility: HOSPITAL | Age: 64
LOS: 1 days | End: 2024-08-05
Payer: MEDICAID

## 2024-08-05 ENCOUNTER — APPOINTMENT (OUTPATIENT)
Dept: MRI IMAGING | Facility: CLINIC | Age: 64
End: 2024-08-05

## 2024-08-05 DIAGNOSIS — R22.0 LOCALIZED SWELLING, MASS AND LUMP, HEAD: Chronic | ICD-10-CM

## 2024-08-05 DIAGNOSIS — C49.9 MALIGNANT NEOPLASM OF CONNECTIVE AND SOFT TISSUE, UNSPECIFIED: ICD-10-CM

## 2024-08-05 DIAGNOSIS — Z98.890 OTHER SPECIFIED POSTPROCEDURAL STATES: Chronic | ICD-10-CM

## 2024-08-05 DIAGNOSIS — Z96.641 PRESENCE OF RIGHT ARTIFICIAL HIP JOINT: Chronic | ICD-10-CM

## 2024-08-05 DIAGNOSIS — Z00.8 ENCOUNTER FOR OTHER GENERAL EXAMINATION: ICD-10-CM

## 2024-08-05 DIAGNOSIS — Z95.1 PRESENCE OF AORTOCORONARY BYPASS GRAFT: Chronic | ICD-10-CM

## 2024-08-05 PROCEDURE — A9585: CPT

## 2024-08-05 PROCEDURE — 71260 CT THORAX DX C+: CPT

## 2024-08-05 PROCEDURE — 70543 MRI ORBT/FAC/NCK W/O &W/DYE: CPT

## 2024-08-05 PROCEDURE — 71260 CT THORAX DX C+: CPT | Mod: 26

## 2024-08-05 PROCEDURE — 70543 MRI ORBT/FAC/NCK W/O &W/DYE: CPT | Mod: 26

## 2024-08-15 ENCOUNTER — APPOINTMENT (OUTPATIENT)
Dept: RADIATION ONCOLOGY | Facility: CLINIC | Age: 64
End: 2024-08-15
Payer: MEDICAID

## 2024-08-15 VITALS
SYSTOLIC BLOOD PRESSURE: 123 MMHG | DIASTOLIC BLOOD PRESSURE: 82 MMHG | WEIGHT: 208.99 LBS | HEART RATE: 86 BPM | BODY MASS INDEX: 31.78 KG/M2 | RESPIRATION RATE: 16 BRPM | OXYGEN SATURATION: 99 %

## 2024-08-15 PROCEDURE — 99213 OFFICE O/P EST LOW 20 MIN: CPT | Mod: GC

## 2024-08-15 NOTE — REVIEW OF SYSTEMS
[Dysphagia: Grade 0] : Dysphagia: Grade 0 [Fatigue: Grade 0] : Fatigue: Grade 0 [Mucositis Oral: Grade 0] : Mucositis Oral: Grade 0  [Xerostomia: Grade 1 - Symptomatic (e.g., dry or thick saliva) without significant dietary alteration; unstimulated saliva flow >0.2 ml/min] : Xerostomia: Grade 1 - Symptomatic (e.g., dry or thick saliva) without significant dietary alteration; unstimulated saliva flow >0.2 ml/min [Oral Pain: Grade 0] : Oral Pain: Grade 0 [Dysgeusia: Grade 1- Altered taste but no change in diet] : Dysgeusia: Grade 1 - Altered taste but no change in diet [Skin Hyperpigmentation: Grade 0] : Skin Hyperpigmentation: Grade 0 [Dermatitis Radiation: Grade 0] : Dermatitis Radiation: Grade 0 [Xerostomia: Grade 0] : Xerostomia: Grade 0 [Dysgeusia: Grade 0] : Dysgeusia: Grade 0

## 2024-08-16 ENCOUNTER — APPOINTMENT (OUTPATIENT)
Dept: DERMATOLOGY | Facility: CLINIC | Age: 64
End: 2024-08-16
Payer: MEDICAID

## 2024-08-16 DIAGNOSIS — L80 VITILIGO: ICD-10-CM

## 2024-08-16 DIAGNOSIS — Z79.899 OTHER LONG TERM (CURRENT) DRUG THERAPY: ICD-10-CM

## 2024-08-16 DIAGNOSIS — L40.9 PSORIASIS, UNSPECIFIED: ICD-10-CM

## 2024-08-16 DIAGNOSIS — L30.9 DERMATITIS, UNSPECIFIED: ICD-10-CM

## 2024-08-16 PROCEDURE — 99214 OFFICE O/P EST MOD 30 MIN: CPT | Mod: 25

## 2024-08-16 PROCEDURE — 96372 THER/PROPH/DIAG INJ SC/IM: CPT

## 2024-08-16 PROCEDURE — 96401 CHEMO ANTI-NEOPL SQ/IM: CPT

## 2024-08-19 NOTE — ASSESSMENT
[FreeTextEntry1] : 1. Psoriasis, plaque type,  improved on skyrizi Chronic condition, improved - Failed hydrocortisone 2.5% ointment and triamcinolone 0.1% ointment - Reviewed diagnosis and chronic nature of condition - C/w Skyrizi q4mo; clearance attained from Dr. Irene (Rad Onc) and Dr. Erwin (ID), on Tenofovir prescribed by ID. Per patient requested, injected today into L arm, tolerated well.  Lot [id number], exp 10/25 - R/b/a and SED previously discussed including but not limited to injection site reaction, immunosuppression, risk of infection and malignancy. Patient verbalizes understandings of risks. - If develops any active plaques, can c/w betamethasone diprop 0.05% ointment BID to active areas on body on weekdays and calcipotriene 0.005% ointment on weekends. Avoid face, axilla, groin. SED including atrophy, dyspigmentation, telangiectasias, striae. Proper use reviewed including only using to affected area and avoidance of prolonged use. - RTC 4 mo- will consider more frequent injections (back to standard dosing) if flare. Note: per rheum note, if patient with SIJ axial disease, may need to switch to another systemic as IL-23i not indicated for axial disease  2. High risk medication use - Personal hx of liposarcoma- cleared by rad onc to be on Skyrizi. last seen 8/2024 with no evidence of disease recurrence - previously Hep B core AB positive- patient now on Vemildy and cleared by ID to be on Skyrizi- discussed to f/u with ID if he should continue. - Patient instructed to follow with PCP yearly, stay up to date on age-appropriate malignancy screening and vaccines including yearly flu vaccine. Avoid live vaccines. - Discussed need for close followup and periodic lab monitoring while on this medication - CBC, CMP stable 9/2023; quant gold and HCV neg 9/2023; HBV stable with positive core HBV and surface HBV, HSag neg 9/2023 (follows with ID). Patient to get repeat labs prior to next visit  3. Vitiligo Chronic condition, not at treatment goal; BSA 5% - opzelura not approved. c/w betamethasone M-F to hands, tacrolimus on weekends. Opposite on face. - photos in chart - Consider re-starting excimer 2-3x weekly, not interested today due to time commitment  - there some rare cases of biologics worsening vitiligo, some cases of IL-12/IL-23, no cases of IL-23 yet. https://www.ncbi.nlm.nih.gov/pmc/articles/HKM7046325/  4. Eczematous Dermatitis, chronic improved - appears eczematous today on arm but patient reports clears with skyrizi injfection - gentle skin care - will reassess at [place]  RTC 3-4 mo

## 2024-08-19 NOTE — HISTORY OF PRESENT ILLNESS
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 Vilitgo on scalp, hands, face; previously used excimer, did not find that helpful so not interested at this time to trying this again.  - Stable, not improving-. Using protopic BID (M-F on face; Sat/Sun on hands) and betamethasone BID (M-F on hands; Sat/Cardona on face), says he notices improvement in color on this regimen.  Unable to get opzelura due to price/not covered. 2. Psoriasis- rash on back cleared, but has dry rash on arm, says it clears with skyrizi, last injection 4 months ago. Requesting that we administer his injection at this visit. feels well  Meds: irbesartan, ezetimibe, atorvastatin, baby ASA

## 2024-08-19 NOTE — HISTORY OF PRESENT ILLNESS
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 Vilitgo on scalp, hands, face; previously used excimer, did not find that helpful so not interested at this time to trying this again.  - Stable, not improving-. Using protopic BID (M-F on face; Sat/Sun on hands) and betamethasone BID (M-F on hands; Sat/Cardona on face), says he notices improvement in color on this regimen.  Unable to get opzelura due to price/not covered. 2. Psoriasis- rash on back cleared, but has dry rash on arm, says it clears with skyrizi, last injection 4 months ago. Requesting that we administer his injection at this visit. feels well  Meds: irbesartan, ezetimibe, atorvastatin, baby ASA

## 2024-08-19 NOTE — ASSESSMENT
[FreeTextEntry1] : 1. Psoriasis, plaque type,  improved on skyrizi Chronic condition, improved - Failed hydrocortisone 2.5% ointment and triamcinolone 0.1% ointment - Reviewed diagnosis and chronic nature of condition - C/w Skyrizi q4mo; clearance attained from Dr. Irene (Rad Onc) and Dr. Erwin (ID), on Tenofovir prescribed by ID. Per patient requested, injected today into L arm, tolerated well.  Lot [id number], exp 10/25 - R/b/a and SED previously discussed including but not limited to injection site reaction, immunosuppression, risk of infection and malignancy. Patient verbalizes understandings of risks. - If develops any active plaques, can c/w betamethasone diprop 0.05% ointment BID to active areas on body on weekdays and calcipotriene 0.005% ointment on weekends. Avoid face, axilla, groin. SED including atrophy, dyspigmentation, telangiectasias, striae. Proper use reviewed including only using to affected area and avoidance of prolonged use. - RTC 4 mo- will consider more frequent injections (back to standard dosing) if flare. Note: per rheum note, if patient with SIJ axial disease, may need to switch to another systemic as IL-23i not indicated for axial disease  2. High risk medication use - Personal hx of liposarcoma- cleared by rad onc to be on Skyrizi. last seen 8/2024 with no evidence of disease recurrence - previously Hep B core AB positive- patient now on Vemildy and cleared by ID to be on Skyrizi- discussed to f/u with ID if he should continue. - Patient instructed to follow with PCP yearly, stay up to date on age-appropriate malignancy screening and vaccines including yearly flu vaccine. Avoid live vaccines. - Discussed need for close followup and periodic lab monitoring while on this medication - CBC, CMP stable 9/2023; quant gold and HCV neg 9/2023; HBV stable with positive core HBV and surface HBV, HSag neg 9/2023 (follows with ID). Patient to get repeat labs prior to next visit  3. Vitiligo Chronic condition, not at treatment goal; BSA 5% - opzelura not approved. c/w betamethasone M-F to hands, tacrolimus on weekends. Opposite on face. - photos in chart - Consider re-starting excimer 2-3x weekly, not interested today due to time commitment  - there some rare cases of biologics worsening vitiligo, some cases of IL-12/IL-23, no cases of IL-23 yet. https://www.ncbi.nlm.nih.gov/pmc/articles/RSF6869396/  4. Eczematous Dermatitis, chronic improved - appears eczematous today on arm but patient reports clears with skyrizi injfection - gentle skin care - will reassess at [place]  RTC 3-4 mo

## 2024-08-19 NOTE — PHYSICAL EXAM
[FreeTextEntry3] : General: well appearing person in nad, alert, pleasant Focused Skin Exam per patient preference: - depigmented macules coalescing into patches on crown of scalp; some areas of repigmentation in center when compared to prior photos - depigmented patches on b/l dorsal hands and cheeks, with few areas of repigmentation today - eczematous plaques on arms and back today left flexor arm today

## 2024-08-22 NOTE — HISTORY OF PRESENT ILLNESS
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 Completed RT to oral cavity 6/3/2021 receiving total dose of 66 Gy.   8/5/2024 CT Chest: IMPRESSION: Stable exam  8/5/2024 MRI Orbit, Face and/or Neck: IMPRESSION: Nodular fat containing foci within the left face/buccal space similar in appearance to the prior exams likely representing residual fat. Continued follow-up is advised. Decreasing adjacent areas of soft tissue thickening and fat plane haziness compatible with evolving posttreatment changes. No significant change in the abnormal signal of the left mandibular vertical ramus which may represent sequela of osteoradionecrosis and/or osteomyelitis. The possibility of a pathologic fracture in this region is not excluded. No new or enlarging cervical lymph nodes.  Presents today for follow up. He is doing well since his completed treatment. He has some trismus but continues to do exercises for the jaw.

## 2024-08-22 NOTE — PHYSICAL EXAM
[General Appearance - Well Developed] : well developed [General Appearance - Well Nourished] : well nourished [Sclera] : the sclera and conjunctiva were normal [Extraocular Movements] : extraocular movements were intact [Outer Ear] : the ears and nose were normal in appearance [Hearing Threshold Finger Rub Not [place]] : hearing was normal [Examination Of The Oral Cavity] : the lips and gums were normal [Normal] : supple with no thyromegaly or masses appreciated [] : no respiratory distress [Exaggerated Use Of Accessory Muscles For Inspiration] : no accessory muscle use [Range of Motion to Joints] : range of motion to joints [Motor Tone] : muscle strength and tone were normal [Skin Color & Pigmentation] : normal skin color and pigmentation [No Focal Deficits] : no focal deficits [Oriented To Time, Place, And Person] : oriented to person, place, and time [de-identified] : trismus present

## 2024-08-22 NOTE — HISTORY OF PRESENT ILLNESS
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 Completed RT to oral cavity 6/3/2021 receiving total dose of 66 Gy.   8/5/2024 CT Chest: IMPRESSION: Stable exam  8/5/2024 MRI Orbit, Face and/or Neck: IMPRESSION: Nodular fat containing foci within the left face/buccal space similar in appearance to the prior exams likely representing residual fat. Continued follow-up is advised. Decreasing adjacent areas of soft tissue thickening and fat plane haziness compatible with evolving posttreatment changes. No significant change in the abnormal signal of the left mandibular vertical ramus which may represent sequela of osteoradionecrosis and/or osteomyelitis. The possibility of a pathologic fracture in this region is not excluded. No new or enlarging cervical lymph nodes.  Presents today for follow up. He is doing well since his completed treatment. He has some trismus but continues to do exercises for the jaw.

## 2024-08-22 NOTE — PHYSICAL EXAM
[General Appearance - Well Developed] : well developed [General Appearance - Well Nourished] : well nourished [Sclera] : the sclera and conjunctiva were normal [Extraocular Movements] : extraocular movements were intact [Outer Ear] : the ears and nose were normal in appearance [Hearing Threshold Finger Rub Not [place]] : hearing was normal [Examination Of The Oral Cavity] : the lips and gums were normal [Normal] : supple with no thyromegaly or masses appreciated [] : no respiratory distress [Exaggerated Use Of Accessory Muscles For Inspiration] : no accessory muscle use [Range of Motion to Joints] : range of motion to joints [Motor Tone] : muscle strength and tone were normal [Skin Color & Pigmentation] : normal skin color and pigmentation [No Focal Deficits] : no focal deficits [Oriented To Time, Place, And Person] : oriented to person, place, and time [de-identified] : trismus present

## 2024-08-22 NOTE — PHYSICAL EXAM
[General Appearance - Well Developed] : well developed [General Appearance - Well Nourished] : well nourished [Sclera] : the sclera and conjunctiva were normal [Extraocular Movements] : extraocular movements were intact [Outer Ear] : the ears and nose were normal in appearance [Hearing Threshold Finger Rub Not [place]] : hearing was normal [Examination Of The Oral Cavity] : the lips and gums were normal [Normal] : supple with no thyromegaly or masses appreciated [] : no respiratory distress [Exaggerated Use Of Accessory Muscles For Inspiration] : no accessory muscle use [Range of Motion to Joints] : range of motion to joints [Motor Tone] : muscle strength and tone were normal [Skin Color & Pigmentation] : normal skin color and pigmentation [No Focal Deficits] : no focal deficits [Oriented To Time, Place, And Person] : oriented to person, place, and time [de-identified] : trismus present Instructions (Optional): A. Left lateral mid back 1.0cm Reshave DN Detail Level: Detailed Introduction Text (Please End With A Colon): The following procedure was deferred:

## 2024-08-26 ENCOUNTER — APPOINTMENT (OUTPATIENT)
Dept: ORTHOPEDIC SURGERY | Facility: CLINIC | Age: 64
End: 2024-08-26
Payer: MEDICAID

## 2024-08-26 VITALS
HEART RATE: 93 BPM | DIASTOLIC BLOOD PRESSURE: 78 MMHG | SYSTOLIC BLOOD PRESSURE: 125 MMHG | WEIGHT: 208 LBS | HEIGHT: 68 IN | BODY MASS INDEX: 31.52 KG/M2

## 2024-08-26 DIAGNOSIS — G89.29 DORSALGIA, UNSPECIFIED: ICD-10-CM

## 2024-08-26 DIAGNOSIS — M54.9 DORSALGIA, UNSPECIFIED: ICD-10-CM

## 2024-08-26 DIAGNOSIS — Z96.641 PRESENCE OF RIGHT ARTIFICIAL HIP JOINT: ICD-10-CM

## 2024-08-26 PROCEDURE — 72100 X-RAY EXAM L-S SPINE 2/3 VWS: CPT

## 2024-08-26 PROCEDURE — 73502 X-RAY EXAM HIP UNI 2-3 VIEWS: CPT | Mod: RT

## 2024-08-26 PROCEDURE — 99214 OFFICE O/P EST MOD 30 MIN: CPT | Mod: 25

## 2024-08-26 RX ORDER — MELOXICAM 7.5 MG/1
7.5 TABLET ORAL
Qty: 30 | Refills: 0 | Status: ACTIVE | COMMUNITY
Start: 2024-08-26 | End: 1900-01-01

## 2024-11-18 ENCOUNTER — OFFICE (OUTPATIENT)
Dept: URBAN - METROPOLITAN AREA CLINIC 27 | Facility: CLINIC | Age: 64
Setting detail: OPHTHALMOLOGY
End: 2024-11-18
Payer: MEDICARE

## 2024-11-18 DIAGNOSIS — H02.834: ICD-10-CM

## 2024-11-18 DIAGNOSIS — H02.831: ICD-10-CM

## 2024-11-18 DIAGNOSIS — H10.45: ICD-10-CM

## 2024-11-18 PROCEDURE — 92012 INTRM OPH EXAM EST PATIENT: CPT | Performed by: OPHTHALMOLOGY

## 2024-11-18 ASSESSMENT — KERATOMETRY
METHOD_AUTO_MANUAL: AUTO
OS_AXISANGLE_DEGREES: 175
OD_AXISANGLE_DEGREES: 65
OS_K1POWER_DIOPTERS: 40.75
OD_K1POWER_DIOPTERS: 40.00
OS_K2POWER_DIOPTERS: 40.75
OD_K2POWER_DIOPTERS: 41.00

## 2024-11-18 ASSESSMENT — REFRACTION_CURRENTRX
OS_OVR_VA: 20/
OD_AXIS: 017
OD_AXIS: 180
OD_ADD: +2.25
OD_VPRISM_DIRECTION: PROGS
OD_OVR_VA: 20/
OS_CYLINDER: SPH
OD_CYLINDER: +0.50
OD_SPHERE: -0.25
OS_CYLINDER: +0.75
OS_OVR_VA: 20/
OS_AXIS: 172
OS_VPRISM_DIRECTION: PROGS
OS_SPHERE: PLANO
OD_OVR_VA: 20/
OS_SPHERE: -0.50
OD_SPHERE: +1.50
OD_CYLINDER: +0.75
OS_ADD: +2.25

## 2024-11-18 ASSESSMENT — LID POSITION - DERMATOCHALASIS
OS_DERMATOCHALASIS: LUL 2+
OD_DERMATOCHALASIS: RUL 2+

## 2024-11-18 ASSESSMENT — TONOMETRY
OS_IOP_MMHG: 17
OD_IOP_MMHG: 17
OD_IOP_MMHG: 18

## 2024-11-18 ASSESSMENT — VISUAL ACUITY
OS_BCVA: 20/25-1
OD_BCVA: 20/20

## 2024-11-18 ASSESSMENT — REFRACTION_AUTOREFRACTION
OS_SPHERE: -0.50
OS_CYLINDER: +1.00
OD_SPHERE: -0.25
OS_AXIS: 173
OD_AXIS: 59
OD_CYLINDER: +0.75

## 2024-11-18 ASSESSMENT — REFRACTION_MANIFEST
OS_SPHERE: -4.00
OD_SPHERE: PLANO
OS_AXIS: 015
OS_VA1: 20/40
OD_VA1: 20/20
OD_CYLINDER: +0.75
OD_AXIS: 015
OS_CYLINDER: +1.00

## 2024-11-18 ASSESSMENT — LID EXAM ASSESSMENTS
OS_EDEMA: ABSENT
OD_EDEMA: ABSENT

## 2024-12-06 ENCOUNTER — APPOINTMENT (OUTPATIENT)
Dept: DERMATOLOGY | Facility: CLINIC | Age: 64
End: 2024-12-06
Payer: MEDICAID

## 2024-12-06 VITALS — BODY MASS INDEX: 31.23 KG/M2 | WEIGHT: 199 LBS | HEIGHT: 67 IN

## 2024-12-06 DIAGNOSIS — L80 VITILIGO: ICD-10-CM

## 2024-12-06 DIAGNOSIS — Z79.899 OTHER LONG TERM (CURRENT) DRUG THERAPY: ICD-10-CM

## 2024-12-06 DIAGNOSIS — L40.9 PSORIASIS, UNSPECIFIED: ICD-10-CM

## 2024-12-06 PROCEDURE — 99214 OFFICE O/P EST MOD 30 MIN: CPT

## 2025-01-13 NOTE — ED PROVIDER NOTE - NS_EDPROVIDERDISPOUSERTYPE_ED_A_ED
Patient is in the lateral left side position.   Procedure performed on a bed/cart.
Specimens verified per policy.
Attending Attestation (For Attendings USE Only)...

## 2025-01-27 ENCOUNTER — APPOINTMENT (OUTPATIENT)
Dept: CARDIOLOGY | Facility: CLINIC | Age: 65
End: 2025-01-27

## 2025-02-06 ENCOUNTER — RX RENEWAL (OUTPATIENT)
Age: 65
End: 2025-02-06

## 2025-03-17 NOTE — H&P ADULT - NSHPSOCIALHISTORY_GEN_ALL_CORE
Patient lives with his family typically able to participate in all ADLs independently; he drove himself to the hospital. No abnormal movements

## 2025-04-02 ENCOUNTER — APPOINTMENT (OUTPATIENT)
Dept: CARDIOLOGY | Facility: CLINIC | Age: 65
End: 2025-04-02

## 2025-04-02 ENCOUNTER — NON-APPOINTMENT (OUTPATIENT)
Age: 65
End: 2025-04-02

## 2025-04-02 VITALS
WEIGHT: 197 LBS | OXYGEN SATURATION: 98 % | BODY MASS INDEX: 30.92 KG/M2 | DIASTOLIC BLOOD PRESSURE: 81 MMHG | SYSTOLIC BLOOD PRESSURE: 124 MMHG | HEIGHT: 67 IN | HEART RATE: 95 BPM

## 2025-04-02 PROCEDURE — 93000 ELECTROCARDIOGRAM COMPLETE: CPT

## 2025-04-02 PROCEDURE — G2211 COMPLEX E/M VISIT ADD ON: CPT

## 2025-04-02 PROCEDURE — 99204 OFFICE O/P NEW MOD 45 MIN: CPT

## 2025-04-03 ENCOUNTER — APPOINTMENT (OUTPATIENT)
Dept: ORTHOPEDIC SURGERY | Facility: CLINIC | Age: 65
End: 2025-04-03
Payer: MEDICARE

## 2025-04-03 DIAGNOSIS — Z95.1 PRESENCE OF AORTOCORONARY BYPASS GRAFT: ICD-10-CM

## 2025-04-03 DIAGNOSIS — Z96.641 PRESENCE OF RIGHT ARTIFICIAL HIP JOINT: ICD-10-CM

## 2025-04-03 PROCEDURE — 99204 OFFICE O/P NEW MOD 45 MIN: CPT

## 2025-04-03 PROCEDURE — 72170 X-RAY EXAM OF PELVIS: CPT

## 2025-04-03 RX ORDER — MELOXICAM 15 MG/1
15 TABLET ORAL
Qty: 30 | Refills: 0 | Status: ACTIVE | COMMUNITY
Start: 2025-04-03 | End: 1900-01-01

## 2025-04-04 NOTE — PROGRESS NOTE ADULT - PROBLEM SELECTOR PROBLEM 3
Pt called; left message; K 5.8  Requessted call back asap  Will need to repeat;   Low K diet recommended; continue torsemide/bicarb      
Liposarcoma

## 2025-04-18 ENCOUNTER — NON-APPOINTMENT (OUTPATIENT)
Age: 65
End: 2025-04-18

## 2025-04-18 ENCOUNTER — LABORATORY RESULT (OUTPATIENT)
Age: 65
End: 2025-04-18

## 2025-04-22 ENCOUNTER — INPATIENT (INPATIENT)
Facility: HOSPITAL | Age: 65
LOS: 2 days | Discharge: HOME | DRG: 371 | End: 2025-04-25
Attending: INTERNAL MEDICINE | Admitting: INTERNAL MEDICINE
Payer: MEDICARE

## 2025-04-22 VITALS
OXYGEN SATURATION: 100 % | TEMPERATURE: 98 F | HEART RATE: 102 BPM | RESPIRATION RATE: 22 BRPM | SYSTOLIC BLOOD PRESSURE: 107 MMHG | DIASTOLIC BLOOD PRESSURE: 71 MMHG

## 2025-04-22 DIAGNOSIS — Z96.641 PRESENCE OF RIGHT ARTIFICIAL HIP JOINT: Chronic | ICD-10-CM

## 2025-04-22 DIAGNOSIS — Z98.890 OTHER SPECIFIED POSTPROCEDURAL STATES: Chronic | ICD-10-CM

## 2025-04-22 DIAGNOSIS — R22.0 LOCALIZED SWELLING, MASS AND LUMP, HEAD: Chronic | ICD-10-CM

## 2025-04-22 DIAGNOSIS — Z29.9 ENCOUNTER FOR PROPHYLACTIC MEASURES, UNSPECIFIED: ICD-10-CM

## 2025-04-22 DIAGNOSIS — D64.9 ANEMIA, UNSPECIFIED: ICD-10-CM

## 2025-04-22 DIAGNOSIS — K92.1 MELENA: ICD-10-CM

## 2025-04-22 DIAGNOSIS — I25.10 ATHEROSCLEROTIC HEART DISEASE OF NATIVE CORONARY ARTERY WITHOUT ANGINA PECTORIS: ICD-10-CM

## 2025-04-22 DIAGNOSIS — Z95.1 PRESENCE OF AORTOCORONARY BYPASS GRAFT: Chronic | ICD-10-CM

## 2025-04-22 LAB
ACANTHOCYTES BLD QL SMEAR: SLIGHT — SIGNIFICANT CHANGE UP
ALBUMIN SERPL ELPH-MCNC: 3.8 G/DL — SIGNIFICANT CHANGE UP (ref 3.3–5)
ALP SERPL-CCNC: 110 U/L — SIGNIFICANT CHANGE UP (ref 40–120)
ALT FLD-CCNC: 14 U/L — SIGNIFICANT CHANGE UP (ref 10–45)
ANION GAP SERPL CALC-SCNC: 15 MMOL/L — SIGNIFICANT CHANGE UP (ref 5–17)
ANISOCYTOSIS BLD QL: SLIGHT — SIGNIFICANT CHANGE UP
APTT BLD: 33.6 SEC — SIGNIFICANT CHANGE UP (ref 26.1–36.8)
AST SERPL-CCNC: 15 U/L — SIGNIFICANT CHANGE UP (ref 10–40)
BASOPHILS # BLD AUTO: 0.08 K/UL — SIGNIFICANT CHANGE UP (ref 0–0.2)
BASOPHILS NFR BLD AUTO: 0.8 % — SIGNIFICANT CHANGE UP (ref 0–2)
BILIRUB SERPL-MCNC: 0.3 MG/DL — SIGNIFICANT CHANGE UP (ref 0.2–1.2)
BUN SERPL-MCNC: 21 MG/DL — SIGNIFICANT CHANGE UP (ref 7–23)
CALCIUM SERPL-MCNC: 9.2 MG/DL — SIGNIFICANT CHANGE UP (ref 8.4–10.5)
CHLORIDE SERPL-SCNC: 100 MMOL/L — SIGNIFICANT CHANGE UP (ref 96–108)
CO2 SERPL-SCNC: 21 MMOL/L — LOW (ref 22–31)
CREAT SERPL-MCNC: 1.18 MG/DL — SIGNIFICANT CHANGE UP (ref 0.5–1.3)
DACRYOCYTES BLD QL SMEAR: SLIGHT — SIGNIFICANT CHANGE UP
EGFR: 68 ML/MIN/1.73M2 — SIGNIFICANT CHANGE UP
EGFR: 68 ML/MIN/1.73M2 — SIGNIFICANT CHANGE UP
EOSINOPHIL # BLD AUTO: 0.17 K/UL — SIGNIFICANT CHANGE UP (ref 0–0.5)
EOSINOPHIL NFR BLD AUTO: 1.7 % — SIGNIFICANT CHANGE UP (ref 0–6)
GLUCOSE SERPL-MCNC: 124 MG/DL — HIGH (ref 70–99)
HCT VFR BLD CALC: 23.7 % — LOW (ref 39–50)
HCT VFR BLD CALC: 30.8 % — LOW (ref 39–50)
HGB BLD-MCNC: 7 G/DL — CRITICAL LOW (ref 13–17)
HGB BLD-MCNC: 9 G/DL — LOW (ref 13–17)
HYPOCHROMIA BLD QL: SLIGHT — SIGNIFICANT CHANGE UP
INR BLD: 1.1 RATIO — SIGNIFICANT CHANGE UP (ref 0.85–1.16)
LYMPHOCYTES # BLD AUTO: 0.75 K/UL — LOW (ref 1–3.3)
LYMPHOCYTES # BLD AUTO: 7.6 % — LOW (ref 13–44)
MACROCYTES BLD QL: SLIGHT — SIGNIFICANT CHANGE UP
MANUAL SMEAR VERIFICATION: SIGNIFICANT CHANGE UP
MCHC RBC-ENTMCNC: 20 PG — LOW (ref 27–34)
MCHC RBC-ENTMCNC: 20.4 PG — LOW (ref 27–34)
MCHC RBC-ENTMCNC: 29.2 G/DL — LOW (ref 32–36)
MCHC RBC-ENTMCNC: 29.5 G/DL — LOW (ref 32–36)
MCV RBC AUTO: 67.7 FL — LOW (ref 80–100)
MCV RBC AUTO: 69.8 FL — LOW (ref 80–100)
MICROCYTES BLD QL: SLIGHT — SIGNIFICANT CHANGE UP
MONOCYTES # BLD AUTO: 0.67 K/UL — SIGNIFICANT CHANGE UP (ref 0–0.9)
MONOCYTES NFR BLD AUTO: 6.8 % — SIGNIFICANT CHANGE UP (ref 2–14)
NEUTROPHILS # BLD AUTO: 8.23 K/UL — HIGH (ref 1.8–7.4)
NEUTROPHILS NFR BLD AUTO: 83.1 % — HIGH (ref 43–77)
NRBC BLD AUTO-RTO: 0 /100 WBCS — SIGNIFICANT CHANGE UP (ref 0–0)
OB PNL STL: NEGATIVE — SIGNIFICANT CHANGE UP
OVALOCYTES BLD QL SMEAR: SLIGHT — SIGNIFICANT CHANGE UP
PLAT MORPH BLD: NORMAL — SIGNIFICANT CHANGE UP
PLATELET # BLD AUTO: 502 K/UL — HIGH (ref 150–400)
PLATELET # BLD AUTO: 574 K/UL — HIGH (ref 150–400)
POIKILOCYTOSIS BLD QL AUTO: SLIGHT — SIGNIFICANT CHANGE UP
POLYCHROMASIA BLD QL SMEAR: SLIGHT — SIGNIFICANT CHANGE UP
POTASSIUM SERPL-MCNC: 4.7 MMOL/L — SIGNIFICANT CHANGE UP (ref 3.5–5.3)
POTASSIUM SERPL-SCNC: 4.7 MMOL/L — SIGNIFICANT CHANGE UP (ref 3.5–5.3)
PROT SERPL-MCNC: 7.7 G/DL — SIGNIFICANT CHANGE UP (ref 6–8.3)
PROTHROM AB SERPL-ACNC: 12.5 SEC — SIGNIFICANT CHANGE UP (ref 9.9–13.4)
RBC # BLD: 3.5 M/UL — LOW (ref 4.2–5.8)
RBC # BLD: 4.41 M/UL — SIGNIFICANT CHANGE UP (ref 4.2–5.8)
RBC # FLD: 22.5 % — HIGH (ref 10.3–14.5)
RBC # FLD: 23.1 % — HIGH (ref 10.3–14.5)
RBC BLD AUTO: ABNORMAL
SCHISTOCYTES BLD QL AUTO: SLIGHT — SIGNIFICANT CHANGE UP
SODIUM SERPL-SCNC: 136 MMOL/L — SIGNIFICANT CHANGE UP (ref 135–145)
WBC # BLD: 10.7 K/UL — HIGH (ref 3.8–10.5)
WBC # BLD: 9.9 K/UL — SIGNIFICANT CHANGE UP (ref 3.8–10.5)
WBC # FLD AUTO: 10.7 K/UL — HIGH (ref 3.8–10.5)
WBC # FLD AUTO: 9.9 K/UL — SIGNIFICANT CHANGE UP (ref 3.8–10.5)

## 2025-04-22 PROCEDURE — 93010 ELECTROCARDIOGRAM REPORT: CPT

## 2025-04-22 PROCEDURE — 99223 1ST HOSP IP/OBS HIGH 75: CPT

## 2025-04-22 PROCEDURE — 71045 X-RAY EXAM CHEST 1 VIEW: CPT | Mod: 26

## 2025-04-22 PROCEDURE — 99285 EMERGENCY DEPT VISIT HI MDM: CPT

## 2025-04-22 RX ORDER — RISANKIZUMAB-RZAA 150 MG/ML
150 INJECTION SUBCUTANEOUS
Refills: 0 | DISCHARGE

## 2025-04-22 RX ORDER — EZETIMIBE 10 MG/1
10 TABLET ORAL DAILY
Refills: 0 | Status: DISCONTINUED | OUTPATIENT
Start: 2025-04-22 | End: 2025-04-25

## 2025-04-22 RX ORDER — ATORVASTATIN CALCIUM 80 MG/1
80 TABLET, FILM COATED ORAL AT BEDTIME
Refills: 0 | Status: DISCONTINUED | OUTPATIENT
Start: 2025-04-22 | End: 2025-04-25

## 2025-04-22 RX ORDER — SODIUM CHLORIDE 9 G/1000ML
1000 INJECTION, SOLUTION INTRAVENOUS
Refills: 0 | Status: DISCONTINUED | OUTPATIENT
Start: 2025-04-22 | End: 2025-04-25

## 2025-04-22 RX ORDER — ACETAMINOPHEN 500 MG/5ML
650 LIQUID (ML) ORAL EVERY 6 HOURS
Refills: 0 | Status: DISCONTINUED | OUTPATIENT
Start: 2025-04-22 | End: 2025-04-25

## 2025-04-22 RX ORDER — IRBESARTAN 75 MG/1
1 TABLET ORAL
Refills: 0 | DISCHARGE

## 2025-04-22 RX ADMIN — ATORVASTATIN CALCIUM 80 MILLIGRAM(S): 80 TABLET, FILM COATED ORAL at 20:54

## 2025-04-22 RX ADMIN — Medication 40 MILLIGRAM(S): at 18:31

## 2025-04-22 NOTE — H&P ADULT - HISTORY OF PRESENT ILLNESS
64-year-old male with past medical history of coronary artery disease s/p CABG on DAPT, hyperlipidemia, hypertension, presenting to the emergency department after abnormal lab noted at outpatient dermatology appointment.  Per patient, last week on Thursday he went to the dermatologist and had blood work drawn.  Patient was notified on Saturday that he had a low hemoglobin of 6.  Patient states he was also feeling constipated, so on Saturday he took a [place] herbal medication "Juleb", and after that had an episode of dark stool that was hard.  He says he has not had the symptoms since.  Patient endorses dizziness but denies shortness of breath or chest pain.  Denies numbness.  Denies nausea/vomiting/diarrhea.  Denies any recent fever or chills.  Upon chart review, reviewed labs that were drawn on 418.  Patient had a hemoglobin of 6.7.  Last year, patient had hemoglobins between the high tens to 11.  Patient also had elevated platelet count at 437.  In ED received 1PRBC. He feels less dizzy today now after prbc.

## 2025-04-22 NOTE — ED ADULT NURSE NOTE - NSFALLUNIVINTERV_ED_ALL_ED
Bed/Stretcher in lowest position, wheels locked, appropriate side rails in place/Call bell, personal items and telephone in reach/Instruct patient to call for assistance before getting out of bed/chair/stretcher/Non-slip footwear applied when patient is off stretcher/[place] to call system/Physically safe environment - no spills, clutter or unnecessary equipment/Purposeful proactive rounding/Room/bathroom lighting operational, light cord in reach

## 2025-04-22 NOTE — ED PROVIDER NOTE - OBJECTIVE STATEMENT
Patient is a 64-year-old male with past medical history of coronary artery disease, hyperlipidemia, hypertension, presenting to the emergency department after abnormal lab noted at outpatient dermatology appointment.  Per patient, last week on Thursday he went to the dermatologist and had blood work drawn.  Patient was notified on Saturday that he had a low hemoglobin of 6.  Patient states he was also feeling constipated, so on Saturday he took a  herbal medication "Juleb", and after that had an episode of dark stool that was soft.  He says he has not had the symptoms since.  Patient endorses dizziness but denies shortness of breath or chest pain.  Denies numbness.  Denies nausea/vomiting/diarrhea.  Denies any recent fever or chills.  Upon chart review, reviewed labs that were drawn on 418.  Patient had a hemoglobin of 6.7.  Last year, patient had hemoglobins between the high tens to 11.  Patient also had elevated platelet count at 437.

## 2025-04-22 NOTE — H&P ADULT - NSHPPHYSICALEXAM_GEN_ALL_CORE
PHYSICAL EXAM:  Vital Signs Last 24 Hrs  T(C): 36.6 (04-22-25 @ 16:51)  T(F): 97.8 (04-22-25 @ 16:51), Max: 98.4 (04-22-25 @ 13:45)  HR: 86 (04-22-25 @ 16:51) (84 - 102)  BP: 123/80 (04-22-25 @ 16:51)  BP(mean): --  RR: 17 (04-22-25 @ 16:51) (17 - 22)  SpO2: 100% (04-22-25 @ 16:51) (96% - 100%)  Wt(kg): --    Constitutional: NAD, awake and alert  EYES: EOMI  ENT:  Normal Hearing, no tonsillar exudates   Neck: Soft and supple,   Respiratory: Breath sounds are clear bilaterally, No wheezing, rales or rhonchi  Cardiovascular: S1 and S2, regular rate and rhythm, no Murmurs, gallops or rubs  Gastrointestinal: Bowel Sounds present, soft, nontender, nondistended, no guarding, no rebound  Extremities: No cyanosis or clubbing or edema; warm to touch  Neurological: A/O x 3, no focal deficits  Musculoskeletal: 5/5 strength b/l upper and lower extremities  Skin: Small hypopigmented areas scattered on face.   Psych: No depression or anhedonia  Heme: No bruises, no nose bleeds

## 2025-04-22 NOTE — ED PROVIDER NOTE - CLINICAL SUMMARY MEDICAL DECISION MAKING FREE TEXT BOX
Elizabeth Trinidad, Attending Physician: 64M with concern for symptomatic anemia  in setting of concern for GIB (reviewed in HIE from Derm labs and note  Pt with anemia found on outpatient labs now with melanotic stool on 4/18. May also be iatrogenic from aruyvedic supplement vs ACD however less likely in setting of melanotic stools. Will tranfuse for symptomatic anemia and admit.

## 2025-04-22 NOTE — ED PROVIDER NOTE - ATTENDING CONTRIBUTION TO CARE
Patient has hemoglobin of 7. Patient to be consented for blood transfusion. Upon clarification with patient he has never had a transfusion in the past - MD Elder PGY1 see mdm    edited by Elizabeth Trinidad DO - attending physician.   Please see progress notes for status/labs/consult updates and ED course after initial presentation.

## 2025-04-22 NOTE — ED PROVIDER NOTE - NS ED ROS FT
GENERAL: +Fatigue, No fever or chills  EYES: No change in vision  HEENT: No trouble swallowing or speaking  CARDIAC: No chest pain  PULMONARY: No cough or SOB  GI: +One episode of melena last week. No abdominal pain, no nausea or no vomiting, no diarrhea or constipation  : No changes in urination  SKIN: No rashes  NEURO: +Dizziness No headache, no numbness  MSK: No joint pain  Otherwise as HPI or negative.

## 2025-04-22 NOTE — PATIENT PROFILE ADULT - ARRIVAL FROM
Patient requests new physician as he is currently unhappy with his current care plan. Spoke with hospitalist Dr. Sales and she will accept patient under her service and will see patient tomorrow. Update provided to Dr. Sales. Will update hospitalist service if abnormalities are found on patient's CT scans post fall.     Veronica Mckinley, APRN-CNP    
The patient's goals for the shift include      The clinical goals for the shift include      Over the shift, the patient did not make progress toward the following goals. Barriers to progression include . Recommendations to address these barriers include .    
The patient's goals for the shift include      The clinical goals for the shift include pt will remain safe from injury        
The patient's goals for the shift include      The clinical goals for the shift include remain free from falls      Problem: Pain - Adult  Goal: Verbalizes/displays adequate comfort level or baseline comfort level  Outcome: Progressing     Problem: Safety - Adult  Goal: Free from fall injury  Outcome: Progressing     Problem: Discharge Planning  Goal: Discharge to home or other facility with appropriate resources  Outcome: Progressing     Problem: Chronic Conditions and Co-morbidities  Goal: Patient's chronic conditions and co-morbidity symptoms are monitored and maintained or improved  Outcome: Progressing     Problem: Nutrition  Goal: Nutrient intake appropriate for maintaining nutritional needs  Outcome: Progressing     Problem: Pain  Goal: Takes deep breaths with improved pain control throughout the shift  Outcome: Progressing  Goal: Turns in bed with improved pain control throughout the shift  Outcome: Progressing  Goal: Walks with improved pain control throughout the shift  Outcome: Progressing  Goal: Performs ADL's with improved pain control throughout shift  Outcome: Progressing  Goal: Participates in PT with improved pain control throughout the shift  Outcome: Progressing  Goal: Free from opioid side effects throughout the shift  Outcome: Progressing  Goal: Free from acute confusion related to pain meds throughout the shift  Outcome: Progressing     Problem: Fall/Injury  Goal: Not fall by end of shift  Outcome: Progressing  Goal: Be free from injury by end of the shift  Outcome: Progressing  Goal: Verbalize understanding of personal risk factors for fall in the hospital  Outcome: Progressing  Goal: Verbalize understanding of risk factor reduction measures to prevent injury from fall in the home  Outcome: Progressing  Goal: Use assistive devices by end of the shift  Outcome: Progressing  Goal: Pace activities to prevent fatigue by end of the shift  Outcome: Progressing     
The patient's goals for the shift include      The clinical goals for the shift include to reduce pain      Problem: Pain - Adult  Goal: Verbalizes/displays adequate comfort level or baseline comfort level  Outcome: Progressing     Problem: Safety - Adult  Goal: Free from fall injury  Outcome: Progressing     Problem: Discharge Planning  Goal: Discharge to home or other facility with appropriate resources  Outcome: Progressing     Problem: Chronic Conditions and Co-morbidities  Goal: Patient's chronic conditions and co-morbidity symptoms are monitored and maintained or improved  Outcome: Progressing     Problem: Nutrition  Goal: Nutrient intake appropriate for maintaining nutritional needs  Outcome: Progressing     Problem: Pain  Goal: Takes deep breaths with improved pain control throughout the shift  Outcome: Progressing  Goal: Turns in bed with improved pain control throughout the shift  Outcome: Progressing  Goal: Walks with improved pain control throughout the shift  Outcome: Progressing  Goal: Performs ADL's with improved pain control throughout shift  Outcome: Progressing  Goal: Participates in PT with improved pain control throughout the shift  Outcome: Progressing  Goal: Free from opioid side effects throughout the shift  Outcome: Progressing  Goal: Free from acute confusion related to pain meds throughout the shift  Outcome: Progressing     Problem: Fall/Injury  Goal: Not fall by end of shift  Outcome: Progressing  Goal: Be free from injury by end of the shift  Outcome: Progressing  Goal: Verbalize understanding of personal risk factors for fall in the hospital  Outcome: Progressing  Goal: Verbalize understanding of risk factor reduction measures to prevent injury from fall in the home  Outcome: Progressing  Goal: Use assistive devices by end of the shift  Outcome: Progressing  Goal: Pace activities to prevent fatigue by end of the shift  Outcome: Progressing     
The patient's goals for the shift include  comofort    The clinical goals for the shift include pain control    Over the shift, the patient did not make progress toward the following goals. Barriers to progression include pt cont to say dilaudid is only  medication that works.  CNP talked with pt.  Pt  sleeping on and off this shift.. Recommendations to address these barriers include Recommendation for pcp to talk with pt's neuruologist re: pain medication for comfort. Safety measures maintained. No syncopal episodes this shifst.    Problem: Pain - Adult  Goal: Verbalizes/displays adequate comfort level or baseline comfort level  Outcome: Not Progressing     
The patient's goals for the shift include pain as controlled    The clinical goals for the shift include pt will remain free of injury         
Home

## 2025-04-22 NOTE — H&P ADULT - NSHPLABSRESULTS_GEN_ALL_CORE
LABS:                        7.0    9.90  )-----------( 502      ( 22 Apr 2025 11:26 )             23.7       04-22    136  |  100  |  21  ----------------------------<  124[H]  4.7   |  21[L]  |  1.18    Ca    9.2      22 Apr 2025 11:26    TPro  7.7  /  Alb  3.8  /  TBili  0.3  /  DBili  x   /  AST  15  /  ALT  14  /  AlkPhos  110  04-22          CAPILLARY BLOOD GLUCOSE    PT/INR - ( 22 Apr 2025 11:26 )   PT: 12.5 sec;   INR: 1.10 ratio         PTT - ( 22 Apr 2025 11:26 )  PTT:33.6 sec    Lactate Trend      Urinalysis Basic - ( 22 Apr 2025 11:26 )    Color: x / Appearance: x / SG: x / pH: x  Gluc: 124 mg/dL / Ketone: x  / Bili: x / Urobili: x   Blood: x / Protein: x / Nitrite: x   Leuk Esterase: x / RBC: x / WBC x   Sq Epi: x / Non Sq Epi: x / Bacteria: x    RADS:    < from: Xray Chest 1 View- PORTABLE-Urgent (Xray Chest 1 View- PORTABLE-Urgent .) (04.22.25 @ 12:41) >    IMPRESSION: Clear lungs.    --- End of Report ---    < end of copied text >

## 2025-04-22 NOTE — ED ADULT NURSE NOTE - OBJECTIVE STATEMENT
64y M PMH CABG on asa, plavix, HTN, HLD presents to the ED c/o low hemoglobin. Pt reports on Thursday he was seen at dermatology office where labs were drawn. Pt endorses episode of constipation on Friday where he took an  herbal medication "Juleb" where he later had a large dark BM. Pt received results of blood work from derm office on Saturday where he was notified that his hemoglobin was 6.7. Pt has not had dark BM since. Pt denies bright red bloody stools, nvd, abd pain,  chest pain, sob. Pt endorsing some dizziness, able to independently ambulate in ED. Pt aox4. MD Friedman at bedside. Comfort and safety maintained.

## 2025-04-22 NOTE — ED PROVIDER NOTE - PROGRESS NOTE DETAILS
Patient has hemoglobin of 7. Patient to be consented for blood transfusion. Upon clarification with patient he has never had a transfusion in the past - MD Elder PGY1 Spoke to patients PCP Dr. Morrissey; will be admitting to Dr. Saenz as per PCP request. Patient aware and okay with the plan - MD Elder PGY1

## 2025-04-22 NOTE — H&P ADULT - PROBLEM SELECTOR PLAN 3
-SCD's for DVT PPx in setting of suspected UGIB.     4. Plan discussed with patient and wife at bedside and -SCD's for DVT PPx in setting of suspected UGIB.     4. Plan discussed with patient and wife at bedside and ACP Juan Miguel.

## 2025-04-22 NOTE — H&P ADULT - ASSESSMENT
64-year-old male with past medical history of coronary artery disease s/p CABG on DAPT, hyperlipidemia, hypertension, presenting to the emergency department after abnormal lab noted at outpatient dermatology appointment.  Per patient, last week on Thursday he went to the dermatologist and had blood work drawn.  Patient was notified on Saturday that he had a low hemoglobin of 6.  Patient states he was also feeling constipated, so on Saturday he took a [place] herbal medication "Juleb", and after that had an episode of dark black stool that was hard.  He says he has not had the symptoms since. Was dizzy this morning better after PRBC.

## 2025-04-22 NOTE — PATIENT PROFILE ADULT - OVER THE PAST TWO WEEKS HAVE YOU FELT DOWN, DEPRESSED OR HOPELESS?
I have reviewed discharge instructions with the patient. The patient verbalized understanding.     no

## 2025-04-22 NOTE — H&P ADULT - PROBLEM SELECTOR PLAN 1
-Reports a couple of months ago being told his Hgb was around 10-11, but outside labs a few days ago Hgb 6.7 in setting of large dark stool which occurred after taking an  herbal supplement for constipation. Denies abd pain.   -Based on labs with low Hgb and low MCV and high platelets, suggests chronic blood loss anemia and MAXIMUS.   -Will start IV Pantoprazole 40mg BID, clears for now and NPO at MN for possible EGD tomorrow. -House GI consulted via email, f/u recs.   -F/u post transfusion CBC.   -F/u iron studies and B12 and folate. Denies known h/o thalassemia.   -Hold home DAPT for now in setting of suspected UGIB. No h/o stents, had CABG in ~2019.

## 2025-04-22 NOTE — ED PROVIDER NOTE - PHYSICAL EXAMINATION
Marci Friedman MD (PGY-1)  Physical Exam:    Gen: NAD, AOx3  Head: NCAT  HEENT: EOMI, PEERLA  Lung: CTAB, no respiratory distress, no wheezes/rhonchi/rales B/L  CV: RRR, no murmurs, rubs or gallops  Abd: soft, NT, ND, no guarding, no rigidity, no rebound tenderness, no CVA tenderness   Rectal: No external hemorrhoids noted on exam. No bright red blood noted on rectal exam.  MSK: no visible deformities, ROM normal in UE/LE, no back pain  Skin: Warm, well perfused, no rash, no leg swelling  Psych: normal affect, calm

## 2025-04-22 NOTE — H&P ADULT - PROBLEM SELECTOR PLAN 2
-Hold home DAPT for now in setting of suspected UGIB. No h/o stents, had CABG in ~2019.   -Hold home irbesartan for now in setting of suspected UGIB.  -C/w home atorvastatin.

## 2025-04-22 NOTE — ED ADULT TRIAGE NOTE - CHIEF COMPLAINT QUOTE
patient states he has low hgb, pmh anemia, last Blood transfusion 1 week ago, patient endorses 1 week of black stools and 2 days of dizziness, patient on aspirin  Denies chest pain

## 2025-04-23 LAB
ANION GAP SERPL CALC-SCNC: 11 MMOL/L — SIGNIFICANT CHANGE UP (ref 5–17)
BUN SERPL-MCNC: 16 MG/DL — SIGNIFICANT CHANGE UP (ref 7–23)
CALCIUM SERPL-MCNC: 8.8 MG/DL — SIGNIFICANT CHANGE UP (ref 8.4–10.5)
CHLORIDE SERPL-SCNC: 103 MMOL/L — SIGNIFICANT CHANGE UP (ref 96–108)
CO2 SERPL-SCNC: 23 MMOL/L — SIGNIFICANT CHANGE UP (ref 22–31)
CREAT SERPL-MCNC: 1.1 MG/DL — SIGNIFICANT CHANGE UP (ref 0.5–1.3)
EGFR: 74 ML/MIN/1.73M2 — SIGNIFICANT CHANGE UP
EGFR: 74 ML/MIN/1.73M2 — SIGNIFICANT CHANGE UP
GLUCOSE SERPL-MCNC: 89 MG/DL — SIGNIFICANT CHANGE UP (ref 70–99)
HCT VFR BLD CALC: 27.8 % — LOW (ref 39–50)
HGB BLD-MCNC: 8.3 G/DL — LOW (ref 13–17)
MCHC RBC-ENTMCNC: 20.5 PG — LOW (ref 27–34)
MCHC RBC-ENTMCNC: 29.9 G/DL — LOW (ref 32–36)
MCV RBC AUTO: 68.6 FL — LOW (ref 80–100)
NRBC BLD AUTO-RTO: 0 /100 WBCS — SIGNIFICANT CHANGE UP (ref 0–0)
PLATELET # BLD AUTO: 476 K/UL — HIGH (ref 150–400)
POTASSIUM SERPL-MCNC: 4.6 MMOL/L — SIGNIFICANT CHANGE UP (ref 3.5–5.3)
POTASSIUM SERPL-SCNC: 4.6 MMOL/L — SIGNIFICANT CHANGE UP (ref 3.5–5.3)
RBC # BLD: 4.05 M/UL — LOW (ref 4.2–5.8)
RBC # FLD: 23 % — HIGH (ref 10.3–14.5)
SODIUM SERPL-SCNC: 137 MMOL/L — SIGNIFICANT CHANGE UP (ref 135–145)
WBC # BLD: 7.43 K/UL — SIGNIFICANT CHANGE UP (ref 3.8–10.5)
WBC # FLD AUTO: 7.43 K/UL — SIGNIFICANT CHANGE UP (ref 3.8–10.5)

## 2025-04-23 RX ORDER — POLYETHYLENE GLYCOL-3350 AND ELECTROLYTES 236; 6.74; 5.86; 2.97; 22.74 G/274.31G; G/274.31G; G/274.31G; G/274.31G; G/274.31G
1000 POWDER, FOR SOLUTION ORAL
Refills: 0 | Status: DISCONTINUED | OUTPATIENT
Start: 2025-04-23 | End: 2025-04-23

## 2025-04-23 RX ORDER — POLYETHYLENE GLYCOL-3350 AND ELECTROLYTES 236; 6.74; 5.86; 2.97; 22.74 G/274.31G; G/274.31G; G/274.31G; G/274.31G; G/274.31G
1000 POWDER, FOR SOLUTION ORAL
Refills: 0 | Status: COMPLETED | OUTPATIENT
Start: 2025-04-23 | End: 2025-04-23

## 2025-04-23 RX ADMIN — Medication 40 MILLIGRAM(S): at 17:47

## 2025-04-23 RX ADMIN — ATORVASTATIN CALCIUM 80 MILLIGRAM(S): 80 TABLET, FILM COATED ORAL at 22:00

## 2025-04-23 RX ADMIN — EZETIMIBE 10 MILLIGRAM(S): 10 TABLET ORAL at 11:19

## 2025-04-23 RX ADMIN — Medication 40 MILLIGRAM(S): at 04:59

## 2025-04-23 RX ADMIN — POLYETHYLENE GLYCOL-3350 AND ELECTROLYTES 1000 MILLILITER(S): 236; 6.74; 5.86; 2.97; 22.74 POWDER, FOR SOLUTION ORAL at 17:47

## 2025-04-23 RX ADMIN — SODIUM CHLORIDE 75 MILLILITER(S): 9 INJECTION, SOLUTION INTRAVENOUS at 00:05

## 2025-04-23 RX ADMIN — POLYETHYLENE GLYCOL-3350 AND ELECTROLYTES 1000 MILLILITER(S): 236; 6.74; 5.86; 2.97; 22.74 POWDER, FOR SOLUTION ORAL at 08:41

## 2025-04-23 RX ADMIN — SODIUM CHLORIDE 75 MILLILITER(S): 9 INJECTION, SOLUTION INTRAVENOUS at 22:00

## 2025-04-23 RX ADMIN — SODIUM CHLORIDE 75 MILLILITER(S): 9 INJECTION, SOLUTION INTRAVENOUS at 12:59

## 2025-04-23 NOTE — CONSULT NOTE ADULT - ASSESSMENT
pt on a/c since open heart surgery in 2018 per his report  \never had a colon  symptoms c/w chornic gi blood loss with low mcv and high platelets  consitpation dark not consitent with acute gib  ct scan today and colon prep for tomorrow.  will do pan endosocpy, r/b/a discussed, defer to team re a/c  can not remove polyp while on

## 2025-04-23 NOTE — CONSULT NOTE ADULT - SUBJECTIVE AND OBJECTIVE BOX
Patient is a 64y Male     Patient is a 64y old  Male who presents with a chief complaint of dark stools and low Hgb on labs (22 Apr 2025 17:13)      HPI:   64-year-old male with past medical history of coronary artery disease s/p CABG on DAPT, hyperlipidemia, hypertension, presenting to the emergency department after abnormal lab noted at outpatient dermatology appointment.  Per patient, last week on Thursday he went to the dermatologist and had blood work drawn.  Patient was notified on Saturday that he had a low hemoglobin of 6.  Patient states he was also feeling constipated, so on Saturday he took a [place] herbal medication "Juleb", and after that had an episode of dark stool that was hard.  He says he has not had the symptoms since.  Patient endorses dizziness but denies shortness of breath or chest pain.  Denies numbness.  Denies nausea/vomiting/diarrhea.  Denies any recent fever or chills.  Upon chart review, reviewed labs that were drawn on 418.  Patient had a hemoglobin of 6.7.  Last year, patient had hemoglobins between the high tens to 11.  Patient also had elevated platelet count at 437.  In ED received 1PRBC. He feels less dizzy today now after prbc.  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 1000 milliLiter(s) (75 mL/Hr) IV Continuous <Continuous>  pantoprazole  Injectable 40 milliGRAM(s) IV Push two times a day      Allergies    No Known Allergies    Intolerances        SOCIAL HISTORY:  Denies ETOh,Smoking,     FAMILY HISTORY:  Family history of coronary artery disease  father when 59    Family history of MI (myocardial infarction) (Father)  father        REVIEW OF SYSTEMS:    CONSTITUTIONAL: No weakness, fevers or chills  EYES/ENT: No visual changes;  No vertigo or throat pain   NECK: No pain or stiffness  RESPIRATORY: No cough, wheezing, hemoptysis; No shortness of breath  CARDIOVASCULAR: No chest pain or palpitations  GASTROINTESTINAL: No abdominal or epigastric pain. No nausea, vomiting, or hematemesis; No diarrhea or constipation. No melena or hematochezia.  GENITOURINARY: No dysuria, frequency or hematuria  NEUROLOGICAL: No numbness or weakness  SKIN: No itching, burning, rashes, or lesions   All other review of systems is negative unless indicated above.    VITAL:  T(C): , Max: 36.9 (04-22-25 @ 13:45)  T(F): , Max: 98.4 (04-22-25 @ 13:45)  HR: 87 (04-22-25 @ 23:59)  BP: 132/80 (04-22-25 @ 23:59)  BP(mean): --  RR: 18 (04-22-25 @ 23:59)  SpO2: 99% (04-22-25 @ 23:59)  Wt(kg): --    I and O's:        PHYSICAL EXAM:    Constitutional: NAD  HEENT: PERRLA,   Neck: No JVD  Respiratory: CTA B/L  Cardiovascular: S1 and S2  Gastrointestinal: BS+, soft, NT/ND  Extremities: No peripheral edema  Neurological: A/O x 3, no focal deficits  Psychiatric: Normal mood, normal affect  : No [place]  Skin: No rashes  Access: Not applicable  Back: No CVA tenderness    LABS:                        9.0    10.70 )-----------( 574      ( 22 Apr 2025 18:29 )             30.8     04-22    136  |  100  |  21  ----------------------------<  124[H]  4.7   |  21[L]  |  1.18    Ca    9.2      22 Apr 2025 11:26    TPro  7.7  /  Alb  3.8  /  TBili  0.3  /  DBili  x   /  AST  15  /  ALT  14  /  AlkPhos  110  04-22          RADIOLOGY & ADDITIONAL STUDIES:

## 2025-04-24 LAB
HCT VFR BLD CALC: 28 % — LOW (ref 39–50)
HGB BLD-MCNC: 8.3 G/DL — LOW (ref 13–17)
MCHC RBC-ENTMCNC: 20.5 PG — LOW (ref 27–34)
MCHC RBC-ENTMCNC: 29.6 G/DL — LOW (ref 32–36)
MCV RBC AUTO: 69.1 FL — LOW (ref 80–100)
NRBC BLD AUTO-RTO: 0 /100 WBCS — SIGNIFICANT CHANGE UP (ref 0–0)
PLATELET # BLD AUTO: 482 K/UL — HIGH (ref 150–400)
RBC # BLD: 4.05 M/UL — LOW (ref 4.2–5.8)
RBC # FLD: 23.2 % — HIGH (ref 10.3–14.5)
WBC # BLD: 7.99 K/UL — SIGNIFICANT CHANGE UP (ref 3.8–10.5)
WBC # FLD AUTO: 7.99 K/UL — SIGNIFICANT CHANGE UP (ref 3.8–10.5)

## 2025-04-24 PROCEDURE — 88341 IMHCHEM/IMCYTCHM EA ADD ANTB: CPT | Mod: 26

## 2025-04-24 PROCEDURE — 88305 TISSUE EXAM BY PATHOLOGIST: CPT | Mod: 26

## 2025-04-24 PROCEDURE — 88342 IMHCHEM/IMCYTCHM 1ST ANTB: CPT | Mod: 26

## 2025-04-24 RX ADMIN — Medication 40 MILLIGRAM(S): at 05:58

## 2025-04-24 RX ADMIN — Medication 40 MILLIGRAM(S): at 21:16

## 2025-04-24 RX ADMIN — Medication 1 APPLICATION(S): at 11:49

## 2025-04-24 RX ADMIN — ATORVASTATIN CALCIUM 80 MILLIGRAM(S): 80 TABLET, FILM COATED ORAL at 21:16

## 2025-04-24 RX ADMIN — SODIUM CHLORIDE 75 MILLILITER(S): 9 INJECTION, SOLUTION INTRAVENOUS at 05:59

## 2025-04-24 NOTE — PRE PROCEDURE NOTE - PRE PROCEDURE EVALUATION
Patient is a 64y Male     Patient is a 64y old  Male who presents with a chief complaint of dark stools and low Hgb on labs (24 Apr 2025 06:02)      HPI:   64-year-old male with past medical history of coronary artery disease s/p CABG on DAPT, hyperlipidemia, hypertension, presenting to the emergency department after abnormal lab noted at outpatient dermatology appointment.  Per patient, last week on Thursday he went to the dermatologist and had blood work drawn.  Patient was notified on Saturday that he had a low hemoglobin of 6.  Patient states he was also feeling constipated, so on Saturday he took a [place] herbal medication "Juleb", and after that had an episode of dark stool that was hard.  He says he has not had the symptoms since.  Patient endorses dizziness but denies shortness of breath or chest pain.  Denies numbness.  Denies nausea/vomiting/diarrhea.  Denies any recent fever or chills.  Upon chart review, reviewed labs that were drawn on 418.  Patient had a hemoglobin of 6.7.  Last year, patient had hemoglobins between the high tens to 11.  Patient also had elevated platelet count at 437.  In ED received 1PRBC. He feels less dizzy today now after prbc.  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 1000 milliLiter(s) (75 mL/Hr) IV Continuous <Continuous>  pantoprazole  Injectable 40 milliGRAM(s) IV Push two times a day      Allergies    No Known Allergies    Intolerances        SOCIAL HISTORY:  Denies ETOh,Smoking,     FAMILY HISTORY:  Family history of coronary artery disease  father when 59    Family history of MI (myocardial infarction) (Father)  father        REVIEW OF SYSTEMS:    CONSTITUTIONAL: No weakness, fevers or chills  EYES/ENT: No visual changes;  No vertigo or throat pain   NECK: No pain or stiffness  RESPIRATORY: No cough, wheezing, hemoptysis; No shortness of breath  CARDIOVASCULAR: No chest pain or palpitations  GASTROINTESTINAL: No abdominal or epigastric pain. No nausea, vomiting, or hematemesis; No diarrhea or constipation. No melena or hematochezia.  GENITOURINARY: No dysuria, frequency or hematuria  NEUROLOGICAL: No numbness or weakness  SKIN: No itching, burning, rashes, or lesions   All other review of systems is negative unless indicated above.    VITAL:  T(C): , Max: 36.7 (04-24-25 @ 07:30)  T(F): , Max: 98.1 (04-24-25 @ 07:30)  HR: 93 (04-24-25 @ 16:34)  BP: 131/70 (04-24-25 @ 16:34)  BP(mean): --  RR: 16 (04-24-25 @ 16:34)  SpO2: 100% (04-24-25 @ 16:34)  Wt(kg): --    I and O's:    04-23 @ 07:01  -  04-24 @ 07:00  --------------------------------------------------------  IN: 0 mL / OUT: 5 mL / NET: -5 mL    04-24 @ 07:01  -  04-24 @ 17:09  --------------------------------------------------------  IN: 0 mL / OUT: 0 mL / NET: 0 mL      Height (cm): 170.2 (04-24 @ 16:34)  Weight (kg): 91.7 (04-24 @ 16:34)  BMI (kg/m2): 31.7 (04-24 @ 16:34)  BSA (m2): 2.03 (04-24 @ 16:34)    PHYSICAL EXAM:    Constitutional: NAD  HEENT: PERRLA,   Neck: No JVD  Respiratory: CTA B/L  Cardiovascular: S1 and S2  Gastrointestinal: BS+, soft, NT/ND  Extremities: No peripheral edema  Neurological: A/O x 3, no focal deficits  Psychiatric: Normal mood, normal affect  : No [place]  Skin: No rashes  Access: Not applicable  Back: No CVA tenderness    LABS:                        8.3    7.99  )-----------( 482      ( 24 Apr 2025 07:14 )             28.0     04-23    137  |  103  |  16  ----------------------------<  89  4.6   |  23  |  1.10    Ca    8.8      23 Apr 2025 07:38            RADIOLOGY & ADDITIONAL STUDIES:

## 2025-04-24 NOTE — PROGRESS NOTE ADULT - PROBLEM SELECTOR PLAN 1
-Reports a couple of months ago being told his Hgb was around 10-11, but outside labs a few days ago Hgb 6.7 in setting of large dark stool which occurred after taking an  herbal supplement for constipation. Denies abd pain.   -s/p prbc  gi consult f/u   egd/ colonoscope today
-Reports a couple of months ago being told his Hgb was around 10-11, but outside labs a few days ago Hgb 6.7 in setting of large dark stool which occurred after taking an  herbal supplement for constipation. Denies abd pain.   -s/p prbc  gi consult f/u   poss scope

## 2025-04-24 NOTE — PRE-ANESTHESIA EVALUATION ADULT - NSDENTALSD_ENT_ALL_CORE
Newly diagnosed after multiple elevated bp readings here in our office and surgery office  Thankfully, no symptoms of HA, SOB, CP, or vision changes  Will order baseline lab work  Start HCTZ 12 5 mg daily  Recheck bp in office for follow up visit in 1-2 weeks appears normal and intact

## 2025-04-25 ENCOUNTER — TRANSCRIPTION ENCOUNTER (OUTPATIENT)
Age: 65
End: 2025-04-25

## 2025-04-25 VITALS
OXYGEN SATURATION: 99 % | HEART RATE: 92 BPM | SYSTOLIC BLOOD PRESSURE: 93 MMHG | DIASTOLIC BLOOD PRESSURE: 61 MMHG | TEMPERATURE: 98 F | RESPIRATION RATE: 18 BRPM

## 2025-04-25 LAB
ALBUMIN SERPL ELPH-MCNC: 3.8 G/DL — SIGNIFICANT CHANGE UP (ref 3.3–5)
ALP SERPL-CCNC: 120 U/L — SIGNIFICANT CHANGE UP (ref 40–120)
ALT FLD-CCNC: 10 U/L — SIGNIFICANT CHANGE UP (ref 10–45)
ANION GAP SERPL CALC-SCNC: 15 MMOL/L — SIGNIFICANT CHANGE UP (ref 5–17)
AST SERPL-CCNC: 13 U/L — SIGNIFICANT CHANGE UP (ref 10–40)
BASOPHILS # BLD AUTO: 0.08 K/UL — SIGNIFICANT CHANGE UP (ref 0–0.2)
BASOPHILS NFR BLD AUTO: 1.1 % — SIGNIFICANT CHANGE UP (ref 0–2)
BILIRUB SERPL-MCNC: 0.6 MG/DL — SIGNIFICANT CHANGE UP (ref 0.2–1.2)
BUN SERPL-MCNC: 18 MG/DL — SIGNIFICANT CHANGE UP (ref 7–23)
CALCIUM SERPL-MCNC: 9.2 MG/DL — SIGNIFICANT CHANGE UP (ref 8.4–10.5)
CHLORIDE SERPL-SCNC: 100 MMOL/L — SIGNIFICANT CHANGE UP (ref 96–108)
CO2 SERPL-SCNC: 22 MMOL/L — SIGNIFICANT CHANGE UP (ref 22–31)
CREAT SERPL-MCNC: 1.04 MG/DL — SIGNIFICANT CHANGE UP (ref 0.5–1.3)
EGFR: 80 ML/MIN/1.73M2 — SIGNIFICANT CHANGE UP
EGFR: 80 ML/MIN/1.73M2 — SIGNIFICANT CHANGE UP
EOSINOPHIL # BLD AUTO: 0.2 K/UL — SIGNIFICANT CHANGE UP (ref 0–0.5)
EOSINOPHIL NFR BLD AUTO: 2.7 % — SIGNIFICANT CHANGE UP (ref 0–6)
GLUCOSE SERPL-MCNC: 88 MG/DL — SIGNIFICANT CHANGE UP (ref 70–99)
HCT VFR BLD CALC: 30.8 % — LOW (ref 39–50)
HGB BLD-MCNC: 9 G/DL — LOW (ref 13–17)
IMM GRANULOCYTES NFR BLD AUTO: 0.3 % — SIGNIFICANT CHANGE UP (ref 0–0.9)
LYMPHOCYTES # BLD AUTO: 1.05 K/UL — SIGNIFICANT CHANGE UP (ref 1–3.3)
LYMPHOCYTES # BLD AUTO: 14.2 % — SIGNIFICANT CHANGE UP (ref 13–44)
MCHC RBC-ENTMCNC: 20.4 PG — LOW (ref 27–34)
MCHC RBC-ENTMCNC: 29.2 G/DL — LOW (ref 32–36)
MCV RBC AUTO: 69.8 FL — LOW (ref 80–100)
MONOCYTES # BLD AUTO: 0.76 K/UL — SIGNIFICANT CHANGE UP (ref 0–0.9)
MONOCYTES NFR BLD AUTO: 10.2 % — SIGNIFICANT CHANGE UP (ref 2–14)
NEUTROPHILS # BLD AUTO: 5.31 K/UL — SIGNIFICANT CHANGE UP (ref 1.8–7.4)
NEUTROPHILS NFR BLD AUTO: 71.5 % — SIGNIFICANT CHANGE UP (ref 43–77)
NRBC BLD AUTO-RTO: 0 /100 WBCS — SIGNIFICANT CHANGE UP (ref 0–0)
PLATELET # BLD AUTO: 559 K/UL — HIGH (ref 150–400)
POTASSIUM SERPL-MCNC: 4.1 MMOL/L — SIGNIFICANT CHANGE UP (ref 3.5–5.3)
POTASSIUM SERPL-SCNC: 4.1 MMOL/L — SIGNIFICANT CHANGE UP (ref 3.5–5.3)
PROT SERPL-MCNC: 8.1 G/DL — SIGNIFICANT CHANGE UP (ref 6–8.3)
RBC # BLD: 4.41 M/UL — SIGNIFICANT CHANGE UP (ref 4.2–5.8)
RBC # FLD: 23.1 % — HIGH (ref 10.3–14.5)
SODIUM SERPL-SCNC: 137 MMOL/L — SIGNIFICANT CHANGE UP (ref 135–145)
WBC # BLD: 7.42 K/UL — SIGNIFICANT CHANGE UP (ref 3.8–10.5)
WBC # FLD AUTO: 7.42 K/UL — SIGNIFICANT CHANGE UP (ref 3.8–10.5)

## 2025-04-25 PROCEDURE — 86900 BLOOD TYPING SEROLOGIC ABO: CPT

## 2025-04-25 PROCEDURE — 36415 COLL VENOUS BLD VENIPUNCTURE: CPT

## 2025-04-25 PROCEDURE — P9016: CPT

## 2025-04-25 PROCEDURE — 88342 IMHCHEM/IMCYTCHM 1ST ANTB: CPT

## 2025-04-25 PROCEDURE — 85027 COMPLETE CBC AUTOMATED: CPT

## 2025-04-25 PROCEDURE — 99285 EMERGENCY DEPT VISIT HI MDM: CPT

## 2025-04-25 PROCEDURE — 93005 ELECTROCARDIOGRAM TRACING: CPT

## 2025-04-25 PROCEDURE — 85025 COMPLETE CBC W/AUTO DIFF WBC: CPT

## 2025-04-25 PROCEDURE — 82272 OCCULT BLD FECES 1-3 TESTS: CPT

## 2025-04-25 PROCEDURE — 80048 BASIC METABOLIC PNL TOTAL CA: CPT

## 2025-04-25 PROCEDURE — 88341 IMHCHEM/IMCYTCHM EA ADD ANTB: CPT

## 2025-04-25 PROCEDURE — 86850 RBC ANTIBODY SCREEN: CPT

## 2025-04-25 PROCEDURE — 86901 BLOOD TYPING SEROLOGIC RH(D): CPT

## 2025-04-25 PROCEDURE — 88305 TISSUE EXAM BY PATHOLOGIST: CPT

## 2025-04-25 PROCEDURE — 80053 COMPREHEN METABOLIC PANEL: CPT

## 2025-04-25 PROCEDURE — 85730 THROMBOPLASTIN TIME PARTIAL: CPT

## 2025-04-25 PROCEDURE — 71045 X-RAY EXAM CHEST 1 VIEW: CPT

## 2025-04-25 PROCEDURE — 86923 COMPATIBILITY TEST ELECTRIC: CPT

## 2025-04-25 PROCEDURE — 85610 PROTHROMBIN TIME: CPT

## 2025-04-25 PROCEDURE — 36430 TRANSFUSION BLD/BLD COMPNT: CPT

## 2025-04-25 RX ADMIN — SODIUM CHLORIDE 75 MILLILITER(S): 9 INJECTION, SOLUTION INTRAVENOUS at 06:01

## 2025-04-25 RX ADMIN — Medication 1 APPLICATION(S): at 12:39

## 2025-04-25 RX ADMIN — EZETIMIBE 10 MILLIGRAM(S): 10 TABLET ORAL at 12:38

## 2025-04-25 RX ADMIN — Medication 40 MILLIGRAM(S): at 06:00

## 2025-04-25 NOTE — DISCHARGE NOTE PROVIDER - NSDCFUSCHEDAPPT_GEN_ALL_CORE_FT
Chi Hatfield  [facility]  DERM [address]  Scheduled Appointment: 06/06/2025

## 2025-04-25 NOTE — DISCHARGE NOTE PROVIDER - HOSPITAL COURSE
HPI:   64-year-old male with past medical history of coronary artery disease s/p CABG on DAPT, hyperlipidemia, hypertension, presenting to the emergency department after abnormal lab noted at outpatient dermatology appointment.  Per patient, last week on Thursday he went to the dermatologist and had blood work drawn.  Patient was notified on Saturday that he had a low hemoglobin of 6.  Patient states he was also feeling constipated, so on Saturday he took a [place] herbal medication "Juleb", and after that had an episode of dark stool that was hard.  He says he has not had the symptoms since.  Patient endorses dizziness but denies shortness of breath or chest pain.  Denies numbness.  Denies nausea/vomiting/diarrhea.  Denies any recent fever or chills.  Upon chart review, reviewed labs that were drawn on 418.  Patient had a hemoglobin of 6.7.  Last year, patient had hemoglobins between the high tens to 11.  Patient also had elevated platelet count at 437.  In ED received 1PRBC. He feels less dizzy today now after prbc.     Hospital Course:  54-year-old male with past medical history of coronary artery disease s/p CABG on DAPT, hyperlipidemia, hypertension, presenting to the emergency department after abnormal lab noted at outpatient dermatology appointment. Anemia w/u with GI consulted PT s/p EGD with  noted non bleeding gastric duodenal ulcers, resume po diet tolerated well h/h stable         Important Medication Changes and Reason: Resume Plavix in one week 5/2/25    Active or Pending Issues Requiring Follow-up: Follow up with Dr. Narda POLANCOP with [facility] rec discussed with Dr. Golden PT cleared for dc home no needs     Advanced Directives:   [ x] Full code  [ ] DNR  [ ] Hospice    Discharge Diagnoses:  Anemia 2nd to Gastrci Duodenal Ulcer

## 2025-04-25 NOTE — PROGRESS NOTE ADULT - SUBJECTIVE AND OBJECTIVE BOX
NELDA [id number]  64yMale  T(C): 36.5 (04-23-25 @ 23:24), Max: 37.1 (04-23-25 @ 08:33)  HR: 99 (04-23-25 @ 23:24) (70 - 99)  BP: 147/84 (04-23-25 @ 23:24) (114/76 - 147/84)  RR: 18 (04-23-25 @ 23:24) (16 - 18)  SpO2: 98% (04-23-25 @ 23:24) (97% - 99%)  Wt(kg): --  04-23 @ 07:01  -  04-24 @ 06:02  --------------------------------------------------------  IN: 0 mL / OUT: 5 mL / NET: -5 mL      for pan endocopy later today  
Patient is a 64y Male     Patient is a 64y old  Male who presents with a chief complaint of dark stools and low Hgb on labs (24 Apr 2025 09:21)      HPI:   64-year-old male with past medical history of coronary artery disease s/p CABG on DAPT, hyperlipidemia, hypertension, presenting to the emergency department after abnormal lab noted at outpatient dermatology appointment.  Per patient, last week on Thursday he went to the dermatologist and had blood work drawn.  Patient was notified on Saturday that he had a low hemoglobin of 6.  Patient states he was also feeling constipated, so on Saturday he took a [place] herbal medication "Juleb", and after that had an episode of dark stool that was hard.  He says he has not had the symptoms since.  Patient endorses dizziness but denies shortness of breath or chest pain.  Denies numbness.  Denies nausea/vomiting/diarrhea.  Denies any recent fever or chills.  Upon chart review, reviewed labs that were drawn on 418.  Patient had a hemoglobin of 6.7.  Last year, patient had hemoglobins between the high tens to 11.  Patient also had elevated platelet count at 437.  In ED received 1PRBC. He feels less dizzy today now after prbc.  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 1000 milliLiter(s) (75 mL/Hr) IV Continuous <Continuous>  pantoprazole  Injectable 40 milliGRAM(s) IV Push two times a day      Allergies    No Known Allergies    Intolerances        SOCIAL HISTORY:  Denies ETOh,Smoking,     FAMILY HISTORY:  Family history of coronary artery disease  father when 59    Family history of MI (myocardial infarction) (Father)  father        REVIEW OF SYSTEMS:    CONSTITUTIONAL: No weakness, fevers or chills  EYES/ENT: No visual changes;  No vertigo or throat pain   NECK: No pain or stiffness  RESPIRATORY: No cough, wheezing, hemoptysis; No shortness of breath  CARDIOVASCULAR: No chest pain or palpitations  GASTROINTESTINAL: No abdominal or epigastric pain. No nausea, vomiting, or hematemesis; No diarrhea or constipation. No melena or hematochezia.  GENITOURINARY: No dysuria, frequency or hematuria  NEUROLOGICAL: No numbness or weakness  SKIN: No itching, burning, rashes, or lesions   All other review of systems is negative unless indicated above.    VITAL:  T(C): , Max: 36.7 (04-24-25 @ 15:54)  T(F): , Max: 98.1 (04-24-25 @ 15:54)  HR: 82 (04-25-25 @ 07:23)  BP: 126/84 (04-25-25 @ 07:23)  BP(mean): --  RR: 18 (04-25-25 @ 07:23)  SpO2: 98% (04-25-25 @ 07:23)  Wt(kg): --    I and O's:    04-23 @ 07:01  -  04-24 @ 07:00  --------------------------------------------------------  IN: 0 mL / OUT: 5 mL / NET: -5 mL    04-24 @ 07:01  -  04-25 @ 07:00  --------------------------------------------------------  IN: 0 mL / OUT: 0 mL / NET: 0 mL      Height (cm): 170.2 (04-24 @ 17:19)  Weight (kg): 91.7 (04-24 @ 17:19)  BMI (kg/m2): 31.7 (04-24 @ 17:19)  BSA (m2): 2.03 (04-24 @ 17:19)    PHYSICAL EXAM:    Constitutional: NAD  HEENT: PERRLA,   Neck: No JVD  Respiratory: CTA B/L  Cardiovascular: S1 and S2  Gastrointestinal: BS+, soft, NT/ND  Extremities: No peripheral edema  Neurological: A/O x 3, no focal deficits  Psychiatric: Normal mood, normal affect  : No [place]  Skin: No rashes  Access: Not applicable  Back: No CVA tenderness    LABS:                        9.0    7.42  )-----------( 559      ( 25 Apr 2025 06:44 )             30.8     04-25    137  |  100  |  18  ----------------------------<  88  4.1   |  22  |  1.04    Ca    9.2      25 Apr 2025 06:43    TPro  8.1  /  Alb  3.8  /  TBili  0.6  /  DBili  x   /  AST  13  /  ALT  10  /  AlkPhos  120  04-25          RADIOLOGY & ADDITIONAL STUDIES:                          
    SUBJECTIVE / OVERNIGHT EVENTS:  04-23-25    MEDICATIONS  (STANDING):  atorvastatin 80 milliGRAM(s) Oral at bedtime  ezetimibe 10 milliGRAM(s) Oral daily  lactated ringers. 1000 milliLiter(s) (75 mL/Hr) IV Continuous <Continuous>  pantoprazole  Injectable 40 milliGRAM(s) IV Push two times a day    MEDICATIONS  (PRN):  acetaminophen     Tablet .. 650 milliGRAM(s) Oral every 6 hours PRN Mild Pain (1 - 3), Moderate Pain (4 - 6)    T(C): 36.5 (04-23-25 @ 23:24), Max: 37.1 (04-23-25 @ 08:33)  HR: 99 (04-23-25 @ 23:24) (70 - 99)  BP: 147/84 (04-23-25 @ 23:24) (114/76 - 147/84)  RR: 18 (04-23-25 @ 23:24) (16 - 18)  SpO2: 98% (04-23-25 @ 23:24) (97% - 99%)    CAPILLARY BLOOD GLUCOSE        I&O's Summary    23 Apr 2025 07:01  -  23 Apr 2025 23:38  --------------------------------------------------------  IN: 0 mL / OUT: 5 mL / NET: -5 mL        Constitutional: No fever, fatigue  Skin: No rash.  Eyes: No recent vision problems or eye pain.  ENT: No congestion, ear pain, or sore throat.  Cardiovascular: No chest pain or palpation.  Respiratory: No cough, shortness of breath, congestion, or wheezing.  Gastrointestinal: No abdominal pain, nausea, vomiting, or diarrhea.  Genitourinary: No dysuria.  Musculoskeletal: No joint swelling.  Neurologic: No headache.    PHYSICAL EXAM:  GENERAL: NAD  EYES: EOMI, PERRLA  NECK: Supple, No JVD  CHEST/LUNG: cta daniel  HEART:  S1 , S2 +  ABDOMEN: soft , bs+  EXTREMITIES:  no edema  NEUROLOGY:alert awake      LABS:                        8.3    7.43  )-----------( 476      ( 23 Apr 2025 07:38 )             27.8     04-23    137  |  103  |  16  ----------------------------<  89  4.6   |  23  |  1.10    Ca    8.8      23 Apr 2025 07:38    TPro  7.7  /  Alb  3.8  /  TBili  0.3  /  DBili  x   /  AST  15  /  ALT  14  /  AlkPhos  110  04-22    PT/INR - ( 22 Apr 2025 11:26 )   PT: 12.5 sec;   INR: 1.10 ratio         PTT - ( 22 Apr 2025 11:26 )  PTT:33.6 sec      Urinalysis Basic - ( 23 Apr 2025 07:38 )    Color: x / Appearance: x / SG: x / pH: x  Gluc: 89 mg/dL / Ketone: x  / Bili: x / Urobili: x   Blood: x / Protein: x / Nitrite: x   Leuk Esterase: x / RBC: x / WBC x   Sq Epi: x / Non Sq Epi: x / Bacteria: x        RADIOLOGY & ADDITIONAL TESTS:    Imaging Personally Reviewed:    Consultant(s) Notes Reviewed:      Care Discussed with Consultants/Other Providers:  
    SUBJECTIVE / OVERNIGHT EVENTS:  04-24-25    MEDICATIONS  (STANDING):  atorvastatin 80 milliGRAM(s) Oral at bedtime  chlorhexidine 2% Cloths 1 Application(s) Topical daily  ezetimibe 10 milliGRAM(s) Oral daily  lactated ringers. 1000 milliLiter(s) (75 mL/Hr) IV Continuous <Continuous>  pantoprazole  Injectable 40 milliGRAM(s) IV Push two times a day    MEDICATIONS  (PRN):  acetaminophen     Tablet .. 650 milliGRAM(s) Oral every 6 hours PRN Mild Pain (1 - 3), Moderate Pain (4 - 6)    Vital Signs Last 24 Hrs  T(C): 36.6 (04-25-25 @ 00:22), Max: 36.7 (04-24-25 @ 07:30)  T(F): 97.9 (04-25-25 @ 00:22), Max: 98.1 (04-24-25 @ 07:30)  HR: 83 (04-25-25 @ 00:22) (83 - 101)  BP: 119/76 (04-25-25 @ 00:22) (95/50 - 131/70)  BP(mean): --  RR: 18 (04-25-25 @ 00:22) (14 - 18)  SpO2: 98% (04-25-25 @ 00:22) (98% - 100%)      Constitutional: No fever, fatigue  Skin: No rash.  Eyes: No recent vision problems or eye pain.  ENT: No congestion, ear pain, or sore throat.  Cardiovascular: No chest pain or palpation.  Respiratory: No cough, shortness of breath, congestion, or wheezing.  Gastrointestinal: No abdominal pain, nausea, vomiting, or diarrhea.  Genitourinary: No dysuria.  Musculoskeletal: No joint swelling.  Neurologic: No headache.    PHYSICAL EXAM:  GENERAL: NAD  EYES: EOMI, PERRLA  NECK: Supple, No JVD  CHEST/LUNG: cta daniel  HEART:  S1 , S2 +  ABDOMEN: soft , bs+  EXTREMITIES:  no edema  NEUROLOGY:alert awake    LABS:  04-23    137  |  103  |  16  ----------------------------<  89  4.6   |  23  |  1.10    Ca    8.8      23 Apr 2025 07:38      Creatinine Trend: 1.10 <--, 1.18 <--                        8.3    7.99  )-----------( 482      ( 24 Apr 2025 07:14 )             28.0     Urine Studies:  Urinalysis Basic - ( 23 Apr 2025 07:38 )    Color:  / Appearance:  / SG:  / pH:   Gluc: 89 mg/dL / Ketone:   / Bili:  / Urobili:    Blood:  / Protein:  / Nitrite:    Leuk Esterase:  / RBC:  / WBC    Sq Epi:  / Non Sq Epi:  / Bacteria:                   Sq Epi: x / Non Sq Epi: x / Bacteria: x        RADIOLOGY & ADDITIONAL TESTS:    Imaging Personally Reviewed:    Consultant(s) Notes Reviewed:      Care Discussed with Consultants/Other Providers:

## 2025-04-25 NOTE — DISCHARGE NOTE PROVIDER - CARE PROVIDER_API CALL
Ghanshyam Pierre  Internal Medicine  [address] [place] [id number]  Phone: [phone]  Fax: [phone]  Follow Up Time:     Alberto Laboy  Gastroenterology  [address] [place] [address], [address]  [place] [address]  Phone: [phone]  Fax: [phone]  Follow Up Time:

## 2025-04-25 NOTE — PROGRESS NOTE ADULT - REASON FOR ADMISSION
dark stools and low Hgb on labs

## 2025-04-25 NOTE — DISCHARGE NOTE NURSING/CASE MANAGEMENT/SOCIAL WORK - PATIENT PORTAL LINK FT
You can access the FollowMyHealth Patient Portal offered by [facility] by registering at the following website: [email or web address]. By joining [employer] [facility]’s FollowMyHealth portal, you will also be able to view your health information using other applications (apps) compatible with our system.

## 2025-04-25 NOTE — DISCHARGE NOTE PROVIDER - CARE PROVIDERS DIRECT ADDRESSES
[email or web address]

## 2025-04-25 NOTE — DISCHARGE NOTE NURSING/CASE MANAGEMENT/SOCIAL WORK - NSDCVIVACCINE_GEN_ALL_CORE_FT
influenza, injectable, quadrivalent, preservative free; 15-Sep-2018 18:09; Elizabeth Lim (JANNEE); [employer]; [id number] (Exp. Date: 30-Jun-2019); IntraMuscular; Deltoid Left.; 0.5 milliLiter(s); VIS (VIS Published: 07-Aug-2015, VIS Presented: 15-Sep-2018);

## 2025-04-25 NOTE — PROGRESS NOTE ADULT - ASSESSMENT
64-year-old male with past medical history of coronary artery disease s/p CABG on DAPT, hyperlipidemia, hypertension, presenting to the emergency department after abnormal lab noted at outpatient dermatology appointment.  Per patient, last week on Thursday he went to the dermatologist and had blood work drawn.  Patient was notified on Saturday that he had a low hemoglobin of 6.  Patient states he was also feeling constipated, so on Saturday he took a [place] herbal medication "Juleb", and after that had an episode of dark black stool that was hard.  He says he has not had the symptoms since. Was dizzy this morning better after PRBC. 
ppi daily at d/c, need follow up op for capsule endosocpy and repeat egd in 8 weeks to confirm healing  likely h.pylori, awiat path
64-year-old male with past medical history of coronary artery disease s/p CABG on DAPT, hyperlipidemia, hypertension, presenting to the emergency department after abnormal lab noted at outpatient dermatology appointment.  Per patient, last week on Thursday he went to the dermatologist and had blood work drawn.  Patient was notified on Saturday that he had a low hemoglobin of 6.  Patient states he was also feeling constipated, so on Saturday he took a [place] herbal medication "Juleb", and after that had an episode of dark black stool that was hard.  He says he has not had the symptoms since. Was dizzy this morning better after PRBC.

## 2025-04-25 NOTE — DISCHARGE NOTE NURSING/CASE MANAGEMENT/SOCIAL WORK - FINANCIAL ASSISTANCE
[facility] provides services at a reduced cost to those who are determined to be eligible through [facility]’s financial assistance program. Information regarding [facility]’s financial assistance program can be found by going to [email or web address] or by calling [phone].

## 2025-04-25 NOTE — DISCHARGE NOTE PROVIDER - NSDCMRMEDTOKEN_GEN_ALL_CORE_FT
aspirin 81 mg oral delayed release tablet: 1 tab(s) orally once a day  atorvastatin 80 mg oral tablet: 1 tab(s) orally once a day  clopidogrel 75 mg oral tablet: 1 tab(s) orally once a day RESUME AFTER 7 DAYS  5/2/25  ezetimibe 10 mg oral tablet: 1 tab(s) orally once a day  irbesartan 150 mg oral tablet: 1 tab(s) orally once a day  Protonix 20 mg oral delayed release tablet: 1 tab(s) orally once a day  Skyrizi 150 mg/mL subcutaneous solution: 150 milligram(s) subcutaneously every 6 months

## 2025-04-25 NOTE — DISCHARGE NOTE PROVIDER - NSDCCPCAREPLAN_GEN_ALL_CORE_FT
PRINCIPAL DISCHARGE DIAGNOSIS  Diagnosis: Melena  Assessment and Plan of Treatment: s/p egd nonbleeding gastric duodenal ulcers   Take meds as directed   Follow up with Dr. Liz 8 weeks for capsule study

## 2025-05-05 LAB — SURGICAL PATHOLOGY STUDY: SIGNIFICANT CHANGE UP

## 2025-05-28 ENCOUNTER — APPOINTMENT (OUTPATIENT)
Dept: ORTHOPEDIC SURGERY | Facility: CLINIC | Age: 65
End: 2025-05-28

## 2025-05-28 VITALS — HEIGHT: 67 IN | WEIGHT: 193 LBS | BODY MASS INDEX: 30.29 KG/M2

## 2025-05-28 DIAGNOSIS — Z96.641 PRESENCE OF RIGHT ARTIFICIAL HIP JOINT: ICD-10-CM

## 2025-05-28 LAB
M TB IFN-G BLD-IMP: NEGATIVE
QUANTIFERON TB PLUS MITOGEN MINUS NIL: 3.57 IU/ML
QUANTIFERON TB PLUS NIL: 0.04 IU/ML
QUANTIFERON TB PLUS TB1 MINUS NIL: 0 IU/ML
QUANTIFERON TB PLUS TB2 MINUS NIL: 0 IU/ML

## 2025-05-28 PROCEDURE — 99213 OFFICE O/P EST LOW 20 MIN: CPT

## 2025-05-30 ENCOUNTER — APPOINTMENT (OUTPATIENT)
Dept: ORTHOPEDIC SURGERY | Facility: CLINIC | Age: 65
End: 2025-05-30

## 2025-06-06 ENCOUNTER — APPOINTMENT (OUTPATIENT)
Dept: DERMATOLOGY | Facility: CLINIC | Age: 65
End: 2025-06-06

## 2025-06-11 NOTE — ASU DISCHARGE PLAN (ADULT/PEDIATRIC) - D. IF YOU HAD ANY TYPE OF SEDATION, YOU MAY EXPERIENCE LIGHTHEADEDNESS, DIZZINESS, OR SLEEPINESS FOLLOWING YOUR PROCEDURE. A RESPONSIBLE ADULT SHOULD STAY WITH YOU FOR AT LEAST 24 HOURS FOLLOWING YOUR PROCEDURE.
Writer called patient. No answer. Left message to call back clinic. Awaiting for patient to return call at this time.     Statement Selected

## 2025-06-12 NOTE — DIETITIAN INITIAL EVALUATION ADULT. - PROBLEM SELECTOR PROBLEM 2
Progress Note      6/12/2025 7:14 AM  NAME: Elenita Benitez   MRN:  [id number]   Admit Diagnosis: Hypokalemia [E87.6]  Colitis [K52.9]  Acute left flank pain [R10.9]  Infectious gastroenteritis and colitis [A09]         Primary Cardiologist: VLADIMIR Nguyễn      Assessment:       Anemia, lower GI bleed  Hypokalemia  Endometrial carcinoma  CAD s/p recent STEMI 1/25 w/ PCI of RCA; EF normal  HTN             Recommendations:     Continue aspirin, if absolutely needed then okay to hold      discontinue Plavix due to GI bleed and anemia, she has completed dual antiplatelet therapy for 6 months we have discussed about risk and benefit     Continue beta-blocker and statin    Monitor H&H, transfusion as needed, GI following, agree with GI evaluation          [x]        High complexity decision making was performed    Subjective:     HPI:     No chest pain.    Hemoglobin stable    ROS: No CP, SOB, Abd pain, nausea, vomiting, syncope, palpitations, new focal neurological symptoms     Objective:      Physical Exam:    Last 24hrs VS reviewed since prior progress note. Most recent are:    BP (!) 166/70   Pulse 52   Temp 98.2 °F (36.8 °C)   Resp 18   Ht 1.549 m (5' 1\")   Wt 72.2 kg (159 lb 2.8 oz)   SpO2 99%   BMI 30.08 kg/m²     Intake/Output Summary (Last 24 hours) at 6/12/2025 0714  Last data filed at 6/11/2025 1113  Gross per 24 hour   Intake 340 ml   Output --   Net 340 ml         General: Alert and oriented x3, no acute distress   Neck: Supple   Respiratory: No respiratory distress, clear lung sound   Cardiovascular: Regular rate rhythm, S1S2, no murmur   Abdomen: soft, non tender, non distended   Neuro: moves all extremities, oriented x3   Skin: warm and dry   Extremity: +  edema, warm to touch      Data Review    Telemetry: normal sinus rhythm          Lab Data Personally Reviewed:    Recent Labs     06/11/25  0530 06/12/25  0514   WBC 5.7 4.8   HGB 8.8* 9.3*   HCT 29.2* 30.2*    268     No results for  Hypertension, unspecified type

## 2025-06-16 ENCOUNTER — OFFICE (OUTPATIENT)
Dept: URBAN - METROPOLITAN AREA CLINIC 27 | Facility: CLINIC | Age: 65
Setting detail: OPHTHALMOLOGY
End: 2025-06-16
Payer: MEDICARE

## 2025-06-16 DIAGNOSIS — H10.45: ICD-10-CM

## 2025-06-16 DIAGNOSIS — H01.132: ICD-10-CM

## 2025-06-16 DIAGNOSIS — H43.813: ICD-10-CM

## 2025-06-16 DIAGNOSIS — H01.135: ICD-10-CM

## 2025-06-16 DIAGNOSIS — H02.831: ICD-10-CM

## 2025-06-16 DIAGNOSIS — H43.393: ICD-10-CM

## 2025-06-16 PROCEDURE — 92014 COMPRE OPH EXAM EST PT 1/>: CPT | Performed by: OPHTHALMOLOGY

## 2025-06-16 ASSESSMENT — REFRACTION_CURRENTRX
OD_ADD: +2.25
OS_CYLINDER: SPH
OD_CYLINDER: +0.75
OS_OVR_VA: 20/
OD_VPRISM_DIRECTION: PROGS
OS_OVR_VA: 20/
OD_OVR_VA: 20/
OD_AXIS: 180
OS_ADD: +2.25
OD_SPHERE: +1.50
OD_OVR_VA: 20/
OD_SPHERE: -0.25
OS_VPRISM_DIRECTION: PROGS
OD_CYLINDER: +0.50
OS_SPHERE: PLANO
OS_SPHERE: -0.50
OD_AXIS: 017
OS_CYLINDER: +0.75
OS_AXIS: 172

## 2025-06-16 ASSESSMENT — REFRACTION_MANIFEST
OD_VA1: 20/20
OD_CYLINDER: +0.75
OS_VA1: 20/40
OS_SPHERE: -4.00
OD_SPHERE: PLANO
OD_AXIS: 015
OS_AXIS: 015
OS_CYLINDER: +1.00

## 2025-06-16 ASSESSMENT — CONFRONTATIONAL VISUAL FIELD TEST (CVF)
OD_FINDINGS: FULL
OS_FINDINGS: FULL

## 2025-06-16 ASSESSMENT — VISUAL ACUITY
OS_BCVA: 20/20
OD_BCVA: 20/20-2

## 2025-06-16 ASSESSMENT — LID EXAM ASSESSMENTS
OS_EDEMA: ABSENT
OD_EDEMA: ABSENT

## 2025-06-16 ASSESSMENT — REFRACTION_AUTOREFRACTION
OD_CYLINDER: +0.50
OD_AXIS: 17
OS_CYLINDER: +1.25
OD_SPHERE: -0.25
OS_SPHERE: -0.75
OS_AXIS: 176

## 2025-06-16 ASSESSMENT — KERATOMETRY
METHOD_AUTO_MANUAL: AUTO
OD_AXISANGLE_DEGREES: 72
OS_K1POWER_DIOPTERS: 40.50
OS_AXISANGLE_DEGREES: 179
OD_K1POWER_DIOPTERS: 40.50
OS_K2POWER_DIOPTERS: 41.25
OD_K2POWER_DIOPTERS: 40.75

## 2025-06-16 ASSESSMENT — TONOMETRY
OD_IOP_MMHG: 17
OS_IOP_MMHG: 17

## 2025-06-16 ASSESSMENT — LID POSITION - DERMATOCHALASIS
OD_DERMATOCHALASIS: RUL 2+
OS_DERMATOCHALASIS: LUL 2+

## 2025-07-04 ENCOUNTER — NON-APPOINTMENT (OUTPATIENT)
Age: 65
End: 2025-07-04

## 2025-07-05 ENCOUNTER — NON-APPOINTMENT (OUTPATIENT)
Age: 65
End: 2025-07-05

## 2025-07-11 ENCOUNTER — EMERGENCY (EMERGENCY)
Facility: HOSPITAL | Age: 65
LOS: 1 days | End: 2025-07-11
Attending: STUDENT IN AN ORGANIZED HEALTH CARE EDUCATION/TRAINING PROGRAM
Payer: MEDICARE

## 2025-07-11 VITALS
DIASTOLIC BLOOD PRESSURE: 79 MMHG | HEIGHT: 67 IN | TEMPERATURE: 98 F | WEIGHT: 197.98 LBS | OXYGEN SATURATION: 100 % | SYSTOLIC BLOOD PRESSURE: 114 MMHG | RESPIRATION RATE: 16 BRPM | HEART RATE: 93 BPM

## 2025-07-11 VITALS
HEART RATE: 85 BPM | RESPIRATION RATE: 16 BRPM | SYSTOLIC BLOOD PRESSURE: 125 MMHG | TEMPERATURE: 98 F | OXYGEN SATURATION: 97 % | DIASTOLIC BLOOD PRESSURE: 78 MMHG

## 2025-07-11 DIAGNOSIS — R22.0 LOCALIZED SWELLING, MASS AND LUMP, HEAD: Chronic | ICD-10-CM

## 2025-07-11 DIAGNOSIS — Z96.641 PRESENCE OF RIGHT ARTIFICIAL HIP JOINT: Chronic | ICD-10-CM

## 2025-07-11 DIAGNOSIS — Z98.890 OTHER SPECIFIED POSTPROCEDURAL STATES: Chronic | ICD-10-CM

## 2025-07-11 DIAGNOSIS — Z95.1 PRESENCE OF AORTOCORONARY BYPASS GRAFT: Chronic | ICD-10-CM

## 2025-07-11 PROCEDURE — 99283 EMERGENCY DEPT VISIT LOW MDM: CPT

## 2025-07-11 RX ORDER — CLINDAMYCIN PHOSPHATE 150 MG/ML
1 VIAL (ML) INJECTION
Qty: 28 | Refills: 0
Start: 2025-07-11 | End: 2025-07-17

## 2025-07-11 NOTE — ED ADULT NURSE NOTE - NS ED NOTE ABUSE SUSPICION NEGLECT YN
Complex Repair And Split-Thickness Skin Graft Text: The defect edges were debeveled with a #15 scalpel blade.  The primary defect was closed partially with a complex linear closure.  Given the location of the defect, shape of the defect and the proximity to free margins a split thickness skin graft was deemed most appropriate to repair the remaining defect.  The graft was trimmed to fit the size of the remaining defect.  The graft was then placed in the primary defect, oriented appropriately, and sutured into place. Complex Repair And Dermal Autograft Text: The defect edges were debeveled with a #15 scalpel blade.  The primary defect was closed partially with a complex linear closure.  Given the location of the defect, shape of the defect and the proximity to free margins an dermal autograft was deemed most appropriate to repair the remaining defect.  The graft was trimmed to fit the size of the remaining defect.  The graft was then placed in the primary defect, oriented appropriately, and sutured into place. Melolabial Transposition Flap Text: The defect edges were debeveled with a #15 scalpel blade.  Given the location of the defect and the proximity to free margins a melolabial flap was deemed most appropriate.  Using a sterile surgical marker, an appropriate melolabial transposition flap was drawn incorporating the defect.    The area thus outlined was incised deep to adipose tissue with a #15 scalpel blade.  The skin margins were undermined to an appropriate distance in all directions utilizing iris scissors. Purse String (Intermediate) Text: Given the location of the defect and the characteristics of the surrounding skin a purse string intermediate closure was deemed most appropriate.  Undermining was performed circumferentially around the surgical defect.  A purse string suture was then placed and tightened. Dressing: dry sterile dressing Intermediate / Complex Repair - Final Wound Length In Cm: 2.6 Anesthesia Volume In Cc: 0 Render Path Notes In Note?: no Show Asc Variables: Yes Complex Repair And Single Advancement Flap Text: The defect edges were debeveled with a #15 scalpel blade.  The primary defect was closed partially with a complex linear closure.  Given the location of the remaining defect, shape of the defect and the proximity to free margins a single advancement flap was deemed most appropriate for complete closure of the defect.  Using a sterile surgical marker, an appropriate advancement flap was drawn incorporating the defect and placing the expected incisions within the relaxed skin tension lines where possible.    The area thus outlined was incised deep to adipose tissue with a #15 scalpel blade.  The skin margins were undermined to an appropriate distance in all directions utilizing iris scissors. No S Plasty Text: Given the location and shape of the defect, and the orientation of relaxed skin tension lines, an S-plasty was deemed most appropriate for repair.  Using a sterile surgical marker, the appropriate outline of the S-plasty was drawn, incorporating the defect and placing the expected incisions within the relaxed skin tension lines where possible.  The area thus outlined was incised deep to adipose tissue with a #15 scalpel blade.  The skin margins were undermined to an appropriate distance in all directions utilizing iris scissors. The skin flaps were advanced over the defect.  The opposing margins were then approximated with interrupted buried subcutaneous sutures. Complex Repair And A-T Advancement Flap Text: The defect edges were debeveled with a #15 scalpel blade.  The primary defect was closed partially with a complex linear closure.  Given the location of the remaining defect, shape of the defect and the proximity to free margins an A-T advancement flap was deemed most appropriate for complete closure of the defect.  Using a sterile surgical marker, an appropriate advancement flap was drawn incorporating the defect and placing the expected incisions within the relaxed skin tension lines where possible.    The area thus outlined was incised deep to adipose tissue with a #15 scalpel blade.  The skin margins were undermined to an appropriate distance in all directions utilizing iris scissors. Complex Repair And W Plasty Text: The defect edges were debeveled with a #15 scalpel blade.  The primary defect was closed partially with a complex linear closure.  Given the location of the remaining defect, shape of the defect and the proximity to free margins a W plasty was deemed most appropriate for complete closure of the defect.  Using a sterile surgical marker, an appropriate advancement flap was drawn incorporating the defect and placing the expected incisions within the relaxed skin tension lines where possible.    The area thus outlined was incised deep to adipose tissue with a #15 scalpel blade.  The skin margins were undermined to an appropriate distance in all directions utilizing iris scissors. Epidermal Sutures: 4-0 Nylon Crescentic Advancement Flap Text: The defect edges were debeveled with a #15 scalpel blade.  Given the location of the defect and the proximity to free margins a crescentic advancement flap was deemed most appropriate.  Using a sterile surgical marker, the appropriate advancement flap was drawn incorporating the defect and placing the expected incisions within the relaxed skin tension lines where possible.    The area thus outlined was incised deep to adipose tissue with a #15 scalpel blade.  The skin margins were undermined to an appropriate distance in all directions utilizing iris scissors. Anesthesia Volume In Cc: 4 Keystone Flap Text: The defect edges were debeveled with a #15 scalpel blade.  Given the location of the defect, shape of the defect a keystone flap was deemed most appropriate.  Using a sterile surgical marker, an appropriate keystone flap was drawn incorporating the defect, outlining the appropriate donor tissue and placing the expected incisions within the relaxed skin tension lines where possible. The area thus outlined was incised deep to adipose tissue with a #15 scalpel blade.  The skin margins were undermined to an appropriate distance in all directions around the primary defect and laterally outward around the flap utilizing iris scissors. Suture Removal: 14 days Wound Care: Bacitracin Scalpel Size: 15 blade Ear Star Wedge Flap Text: The defect edges were debeveled with a #15 blade scalpel.  Given the location of the defect and the proximity to free margins (helical rim) an ear star wedge flap was deemed most appropriate.  Using a sterile surgical marker, the appropriate flap was drawn incorporating the defect and placing the expected incisions between the helical rim and antihelix where possible.  The area thus outlined was incised through and through with a #15 scalpel blade. A-T Advancement Flap Text: The defect edges were debeveled with a #15 scalpel blade.  Given the location of the defect, shape of the defect and the proximity to free margins an A-T advancement flap was deemed most appropriate.  Using a sterile surgical marker, an appropriate advancement flap was drawn incorporating the defect and placing the expected incisions within the relaxed skin tension lines where possible.    The area thus outlined was incised deep to adipose tissue with a #15 scalpel blade.  The skin margins were undermined to an appropriate distance in all directions utilizing iris scissors. Burow's Advancement Flap Text: The defect edges were debeveled with a #15 scalpel blade.  Given the location of the defect and the proximity to free margins a Burow's advancement flap was deemed most appropriate.  Using a sterile surgical marker, the appropriate advancement flap was drawn incorporating the defect and placing the expected incisions within the relaxed skin tension lines where possible.    The area thus outlined was incised deep to adipose tissue with a #15 scalpel blade.  The skin margins were undermined to an appropriate distance in all directions utilizing iris scissors. Muscle Hinge Flap Text: The defect edges were debeveled with a #15 scalpel blade.  Given the size, depth and location of the defect and the proximity to free margins a muscle hinge flap was deemed most appropriate.  Using a sterile surgical marker, an appropriate hinge flap was drawn incorporating the defect. The area thus outlined was incised with a #15 scalpel blade.  The skin margins were undermined to an appropriate distance in all directions utilizing iris scissors. Medical Necessity Clause: This procedure was medically necessary because the lesion that was treated was: Complex Repair And Rhombic Flap Text: The defect edges were debeveled with a #15 scalpel blade.  The primary defect was closed partially with a complex linear closure.  Given the location of the remaining defect, shape of the defect and the proximity to free margins a rhombic flap was deemed most appropriate for complete closure of the defect.  Using a sterile surgical marker, an appropriate advancement flap was drawn incorporating the defect and placing the expected incisions within the relaxed skin tension lines where possible.    The area thus outlined was incised deep to adipose tissue with a #15 scalpel blade.  The skin margins were undermined to an appropriate distance in all directions utilizing iris scissors. Composite Graft Text: The defect edges were debeveled with a #15 scalpel blade.  Given the location of the defect, shape of the defect, the proximity to free margins and the fact the defect was full thickness a composite graft was deemed most appropriate.  The defect was outline and then transferred to the donor site.  A full thickness graft was then excised from the donor site. The graft was then placed in the primary defect, oriented appropriately and then sutured into place.  The secondary defect was then repaired using a primary closure. Mastoid Interpolation Flap Text: A decision was made to reconstruct the defect utilizing an interpolation axial flap and a staged reconstruction.  A telfa template was made of the defect.  This telfa template was then used to outline the mastoid interpolation flap.  The donor area for the pedicle flap was then injected with anesthesia.  The flap was excised through the skin and subcutaneous tissue down to the layer of the underlying musculature.  The pedicle flap was carefully excised within this deep plane to maintain its blood supply.  The edges of the donor site were undermined.   The donor site was closed in a primary fashion.  The pedicle was then rotated into position and sutured.  Once the tube was sutured into place, adequate blood supply was confirmed with blanching and refill.  The pedicle was then wrapped with xeroform gauze and dressed appropriately with a telfa and gauze bandage to ensure continued blood supply and protect the attached pedicle. Advancement Flap (Single) Text: The defect edges were debeveled with a #15 scalpel blade.  Given the location of the defect and the proximity to free margins a single advancement flap was deemed most appropriate.  Using a sterile surgical marker, an appropriate advancement flap was drawn incorporating the defect and placing the expected incisions within the relaxed skin tension lines where possible.    The area thus outlined was incised deep to adipose tissue with a #15 scalpel blade.  The skin margins were undermined to an appropriate distance in all directions utilizing iris scissors. Hatchet Flap Text: The defect edges were debeveled with a #15 scalpel blade.  Given the location of the defect, shape of the defect and the proximity to free margins a hatchet flap was deemed most appropriate.  Using a sterile surgical marker, an appropriate hatchet flap was drawn incorporating the defect and placing the expected incisions within the relaxed skin tension lines where possible.    The area thus outlined was incised deep to adipose tissue with a #15 scalpel blade.  The skin margins were undermined to an appropriate distance in all directions utilizing iris scissors. Complex Repair And Ftsg Text: The defect edges were debeveled with a #15 scalpel blade.  The primary defect was closed partially with a complex linear closure.  Given the location of the defect, shape of the defect and the proximity to free margins a full thickness skin graft was deemed most appropriate to repair the remaining defect.  The graft was trimmed to fit the size of the remaining defect.  The graft was then placed in the primary defect, oriented appropriately, and sutured into place. Slit Excision Additional Text (Leave Blank If You Do Not Want): A linear line was drawn on the skin overlying the lesion. An incision was made slowly until the lesion was visualized.  Once visualized, the lesion was removed with blunt dissection. Cheek-To-Nose Interpolation Flap Text: A decision was made to reconstruct the defect utilizing an interpolation axial flap and a staged reconstruction.  A telfa template was made of the defect.  This telfa template was then used to outline the Cheek-To-Nose Interpolation flap.  The donor area for the pedicle flap was then injected with anesthesia.  The flap was excised through the skin and subcutaneous tissue down to the layer of the underlying musculature.  The interpolation flap was carefully excised within this deep plane to maintain its blood supply.  The edges of the donor site were undermined.   The donor site was closed in a primary fashion.  The pedicle was then rotated into position and sutured.  Once the tube was sutured into place, adequate blood supply was confirmed with blanching and refill.  The pedicle was then wrapped with xeroform gauze and dressed appropriately with a telfa and gauze bandage to ensure continued blood supply and protect the attached pedicle. Estimated Blood Loss (Cc): minimal X Size Of Lesion In Cm (Optional): 0.4 Island Pedicle Flap-Requiring Vessel Identification Text: The defect edges were debeveled with a #15 scalpel blade.  Given the location of the defect, shape of the defect and the proximity to free margins an island pedicle advancement flap was deemed most appropriate.  Using a sterile surgical marker, an appropriate advancement flap was drawn, based on the axial vessel mentioned above, incorporating the defect, outlining the appropriate donor tissue and placing the expected incisions within the relaxed skin tension lines where possible.    The area thus outlined was incised deep to adipose tissue with a #15 scalpel blade.  The skin margins were undermined to an appropriate distance in all directions around the primary defect and laterally outward around the island pedicle utilizing iris scissors.  There was minimal undermining beneath the pedicle flap. Deep Sutures: 4-0 PGA Complex Repair And V-Y Plasty Text: The defect edges were debeveled with a #15 scalpel blade.  The primary defect was closed partially with a complex linear closure.  Given the location of the remaining defect, shape of the defect and the proximity to free margins a V-Y plasty was deemed most appropriate for complete closure of the defect.  Using a sterile surgical marker, an appropriate advancement flap was drawn incorporating the defect and placing the expected incisions within the relaxed skin tension lines where possible.    The area thus outlined was incised deep to adipose tissue with a #15 scalpel blade.  The skin margins were undermined to an appropriate distance in all directions utilizing iris scissors. Complex Repair And Z Plasty Text: The defect edges were debeveled with a #15 scalpel blade.  The primary defect was closed partially with a complex linear closure.  Given the location of the remaining defect, shape of the defect and the proximity to free margins a Z plasty was deemed most appropriate for complete closure of the defect.  Using a sterile surgical marker, an appropriate advancement flap was drawn incorporating the defect and placing the expected incisions within the relaxed skin tension lines where possible.    The area thus outlined was incised deep to adipose tissue with a #15 scalpel blade.  The skin margins were undermined to an appropriate distance in all directions utilizing iris scissors. Ftsg Text: The defect edges were debeveled with a #15 scalpel blade.  Given the location of the defect, shape of the defect and the proximity to free margins a full thickness skin graft was deemed most appropriate.  Using a sterile surgical marker, the primary defect shape was transferred to the donor site. The area thus outlined was incised deep to adipose tissue with a #15 scalpel blade.  The harvested graft was then trimmed of adipose tissue until only dermis and epidermis was left.  The skin margins of the secondary defect were undermined to an appropriate distance in all directions utilizing iris scissors.  The secondary defect was closed with interrupted buried subcutaneous sutures.  The skin edges were then re-apposed with running  sutures.  The skin graft was then placed in the primary defect and oriented appropriately. Trilobed Flap Text: The defect edges were debeveled with a #15 scalpel blade.  Given the location of the defect and the proximity to free margins a trilobed flap was deemed most appropriate.  Using a sterile surgical marker, an appropriate trilobed flap drawn around the defect.    The area thus outlined was incised deep to adipose tissue with a #15 scalpel blade.  The skin margins were undermined to an appropriate distance in all directions utilizing iris scissors. Path Notes (To The Dermatopathologist): Please check margins. Complex Repair And Epidermal Autograft Text: The defect edges were debeveled with a #15 scalpel blade.  The primary defect was closed partially with a complex linear closure.  Given the location of the defect, shape of the defect and the proximity to free margins an epidermal autograft was deemed most appropriate to repair the remaining defect.  The graft was trimmed to fit the size of the remaining defect.  The graft was then placed in the primary defect, oriented appropriately, and sutured into place. Spiral Flap Text: The defect edges were debeveled with a #15 scalpel blade.  Given the location of the defect, shape of the defect and the proximity to free margins a spiral flap was deemed most appropriate.  Using a sterile surgical marker, an appropriate rotation flap was drawn incorporating the defect and placing the expected incisions within the relaxed skin tension lines where possible. The area thus outlined was incised deep to adipose tissue with a #15 scalpel blade.  The skin margins were undermined to an appropriate distance in all directions utilizing iris scissors. Lazy S Intermediate Repair Preamble Text (Leave Blank If You Do Not Want): Undermining was performed with blunt dissection. Consent was obtained from the patient. The risks and benefits to therapy were discussed in detail. Specifically, the risks of infection, scarring, bleeding, prolonged wound healing, incomplete removal, allergy to anesthesia, nerve injury and recurrence were addressed. Prior to the procedure, the treatment site was clearly identified and confirmed by the patient. All components of Universal Protocol/PAUSE Rule completed. O-T Plasty Text: The defect edges were debeveled with a #15 scalpel blade.  Given the location of the defect, shape of the defect and the proximity to free margins an O-T plasty was deemed most appropriate.  Using a sterile surgical marker, an appropriate O-T plasty was drawn incorporating the defect and placing the expected incisions within the relaxed skin tension lines where possible.    The area thus outlined was incised deep to adipose tissue with a #15 scalpel blade.  The skin margins were undermined to an appropriate distance in all directions utilizing iris scissors. Repair Performed By Another Provider Text (Leave Blank If You Do Not Want): After the tissue was excised the defect was repaired by another provider. Cheek Interpolation Flap Text: A decision was made to reconstruct the defect utilizing an interpolation axial flap and a staged reconstruction.  A telfa template was made of the defect.  This telfa template was then used to outline the Cheek Interpolation flap.  The donor area for the pedicle flap was then injected with anesthesia.  The flap was excised through the skin and subcutaneous tissue down to the layer of the underlying musculature.  The interpolation flap was carefully excised within this deep plane to maintain its blood supply.  The edges of the donor site were undermined.   The donor site was closed in a primary fashion.  The pedicle was then rotated into position and sutured.  Once the tube was sutured into place, adequate blood supply was confirmed with blanching and refill.  The pedicle was then wrapped with xeroform gauze and dressed appropriately with a telfa and gauze bandage to ensure continued blood supply and protect the attached pedicle. Interpolation Flap Text: A decision was made to reconstruct the defect utilizing an interpolation axial flap and a staged reconstruction.  A telfa template was made of the defect.  This telfa template was then used to outline the interpolation flap.  The donor area for the pedicle flap was then injected with anesthesia.  The flap was excised through the skin and subcutaneous tissue down to the layer of the underlying musculature.  The interpolation flap was carefully excised within this deep plane to maintain its blood supply.  The edges of the donor site were undermined.   The donor site was closed in a primary fashion.  The pedicle was then rotated into position and sutured.  Once the tube was sutured into place, adequate blood supply was confirmed with blanching and refill.  The pedicle was then wrapped with xeroform gauze and dressed appropriately with a telfa and gauze bandage to ensure continued blood supply and protect the attached pedicle. Billing Type: Third-Party Bill No Repair - Repaired With Adjacent Surgical Defect Text (Leave Blank If You Do Not Want): After the excision the defect was repaired concurrently with another surgical defect which was in close approximation. Bilobed Flap Text: The defect edges were debeveled with a #15 scalpel blade.  Given the location of the defect and the proximity to free margins a bilobe flap was deemed most appropriate.  Using a sterile surgical marker, an appropriate bilobe flap drawn around the defect.    The area thus outlined was incised deep to adipose tissue with a #15 scalpel blade.  The skin margins were undermined to an appropriate distance in all directions utilizing iris scissors. Alar Island Pedicle Flap Text: The defect edges were debeveled with a #15 scalpel blade.  Given the location of the defect, shape of the defect and the proximity to the alar rim an island pedicle advancement flap was deemed most appropriate.  Using a sterile surgical marker, an appropriate advancement flap was drawn incorporating the defect, outlining the appropriate donor tissue and placing the expected incisions within the nasal ala running parallel to the alar rim. The area thus outlined was incised with a #15 scalpel blade.  The skin margins were undermined minimally to an appropriate distance in all directions around the primary defect and laterally outward around the island pedicle utilizing iris scissors.  There was minimal undermining beneath the pedicle flap. Graft Donor Site Bandage (Optional-Leave Blank If You Don't Want In Note): Steri-strips and a pressure bandage were applied to the donor site. Island Pedicle Flap Text: The defect edges were debeveled with a #15 scalpel blade.  Given the location of the defect, shape of the defect and the proximity to free margins an island pedicle advancement flap was deemed most appropriate.  Using a sterile surgical marker, an appropriate advancement flap was drawn incorporating the defect, outlining the appropriate donor tissue and placing the expected incisions within the relaxed skin tension lines where possible.    The area thus outlined was incised deep to adipose tissue with a #15 scalpel blade.  The skin margins were undermined to an appropriate distance in all directions around the primary defect and laterally outward around the island pedicle utilizing iris scissors.  There was minimal undermining beneath the pedicle flap. Star Wedge Flap Text: The defect edges were debeveled with a #15 scalpel blade.  Given the location of the defect, shape of the defect and the proximity to free margins a star wedge flap was deemed most appropriate.  Using a sterile surgical marker, an appropriate rotation flap was drawn incorporating the defect and placing the expected incisions within the relaxed skin tension lines where possible. The area thus outlined was incised deep to adipose tissue with a #15 scalpel blade.  The skin margins were undermined to an appropriate distance in all directions utilizing iris scissors. W Plasty Text: The lesion was extirpated to the level of the fat with a #15 scalpel blade.  Given the location of the defect, shape of the defect and the proximity to free margins a W-plasty was deemed most appropriate for repair.  Using a sterile surgical marker, the appropriate transposition arms of the W-plasty were drawn incorporating the defect and placing the expected incisions within the relaxed skin tension lines where possible.    The area thus outlined was incised deep to adipose tissue with a #15 scalpel blade.  The skin margins were undermined to an appropriate distance in all directions utilizing iris scissors.  The opposing transposition arms were then transposed into place in opposite direction and anchored with interrupted buried subcutaneous sutures. Purse String (Simple) Text: Given the location of the defect and the characteristics of the surrounding skin a purse string simple closure was deemed most appropriate.  Undermining was performed circumferentially around the surgical defect.  A purse string suture was then placed and tightened. Transposition Flap Text: The defect edges were debeveled with a #15 scalpel blade.  Given the location of the defect and the proximity to free margins a transposition flap was deemed most appropriate.  Using a sterile surgical marker, an appropriate transposition flap was drawn incorporating the defect.    The area thus outlined was incised deep to adipose tissue with a #15 scalpel blade.  The skin margins were undermined to an appropriate distance in all directions utilizing iris scissors. Complex Repair And Double Advancement Flap Text: The defect edges were debeveled with a #15 scalpel blade.  The primary defect was closed partially with a complex linear closure.  Given the location of the remaining defect, shape of the defect and the proximity to free margins a double advancement flap was deemed most appropriate for complete closure of the defect.  Using a sterile surgical marker, an appropriate advancement flap was drawn incorporating the defect and placing the expected incisions within the relaxed skin tension lines where possible.    The area thus outlined was incised deep to adipose tissue with a #15 scalpel blade.  The skin margins were undermined to an appropriate distance in all directions utilizing iris scissors. Eliptical Excision Additional Text (Leave Blank If You Do Not Want): The margin was drawn around the clinically apparent lesion.  An elliptical shape was then drawn on the skin incorporating the lesion and margins.  Incisions were then made along these lines to the appropriate tissue plane and the lesion was extirpated. Excisional Biopsy Additional Text (Leave Blank If You Do Not Want): The margin was drawn around the clinically apparent lesion. An elliptical shape was then drawn on the skin incorporating the lesion and margins.  Incisions were then made along these lines to the appropriate tissue plane and the lesion was extirpated. Complex Repair And Melolabial Flap Text: The defect edges were debeveled with a #15 scalpel blade.  The primary defect was closed partially with a complex linear closure.  Given the location of the remaining defect, shape of the defect and the proximity to free margins a melolabial flap was deemed most appropriate for complete closure of the defect.  Using a sterile surgical marker, an appropriate advancement flap was drawn incorporating the defect and placing the expected incisions within the relaxed skin tension lines where possible.    The area thus outlined was incised deep to adipose tissue with a #15 scalpel blade.  The skin margins were undermined to an appropriate distance in all directions utilizing iris scissors. Size Of Lesion In Cm: 0.7 Excision Method: Elliptical Post-Care Instructions: I reviewed with the patient in detail post-care instructions. Patient is not to engage in any heavy lifting, exercise, or swimming for the next 14 days. Should the patient develop any fevers, chills, bleeding, severe pain patient will contact the office immediately. Perilesional Excision Additional Text (Leave Blank If You Do Not Want): The margin was drawn around the clinically apparent lesion. Incisions were then made along these lines to the appropriate tissue plane and the lesion was extirpated. Z Plasty Text: The lesion was extirpated to the level of the fat with a #15 scalpel blade.  Given the location of the defect, shape of the defect and the proximity to free margins a Z-plasty was deemed most appropriate for repair.  Using a sterile surgical marker, the appropriate transposition arms of the Z-plasty were drawn incorporating the defect and placing the expected incisions within the relaxed skin tension lines where possible.    The area thus outlined was incised deep to adipose tissue with a #15 scalpel blade.  The skin margins were undermined to an appropriate distance in all directions utilizing iris scissors.  The opposing transposition arms were then transposed into place in opposite direction and anchored with interrupted buried subcutaneous sutures. Home Suture Removal Text: Patient was provided a home suture removal kit and will remove their sutures at home.  If they have any questions or difficulties they will call the office. Epidermal Autograft Text: The defect edges were debeveled with a #15 scalpel blade.  Given the location of the defect, shape of the defect and the proximity to free margins an epidermal autograft was deemed most appropriate.  Using a sterile surgical marker, the primary defect shape was transferred to the donor site. The epidermal graft was then harvested.  The skin graft was then placed in the primary defect and oriented appropriately. Split-Thickness Skin Graft Text: The defect edges were debeveled with a #15 scalpel blade.  Given the location of the defect, shape of the defect and the proximity to free margins a split thickness skin graft was deemed most appropriate.  Using a sterile surgical marker, the primary defect shape was transferred to the donor site. The split thickness graft was then harvested.  The skin graft was then placed in the primary defect and oriented appropriately. Saucerization Excision Additional Text (Leave Blank If You Do Not Want): The margin was drawn around the clinically apparent lesion.  Incisions were then made along these lines, in a tangential fashion, to the appropriate tissue plane and the lesion was extirpated. Bi-Rhombic Flap Text: The defect edges were debeveled with a #15 scalpel blade.  Given the location of the defect and the proximity to free margins a bi-rhombic flap was deemed most appropriate.  Using a sterile surgical marker, an appropriate rhombic flap was drawn incorporating the defect. The area thus outlined was incised deep to adipose tissue with a #15 scalpel blade.  The skin margins were undermined to an appropriate distance in all directions utilizing iris scissors. Paramedian Forehead Flap Text: A decision was made to reconstruct the defect utilizing an interpolation axial flap and a staged reconstruction.  A telfa template was made of the defect.  This telfa template was then used to outline the paramedian forehead pedicle flap.  The donor area for the pedicle flap was then injected with anesthesia.  The flap was excised through the skin and subcutaneous tissue down to the layer of the underlying musculature.  The pedicle flap was carefully excised within this deep plane to maintain its blood supply.  The edges of the donor site were undermined.   The donor site was closed in a primary fashion.  The pedicle was then rotated into position and sutured.  Once the tube was sutured into place, adequate blood supply was confirmed with blanching and refill.  The pedicle was then wrapped with xeroform gauze and dressed appropriately with a telfa and gauze bandage to ensure continued blood supply and protect the attached pedicle. Repair Type: Complex Tissue Cultured Epidermal Autograft Text: The defect edges were debeveled with a #15 scalpel blade.  Given the location of the defect, shape of the defect and the proximity to free margins a tissue cultured epidermal autograft was deemed most appropriate.  The graft was then trimmed to fit the size of the defect.  The graft was then placed in the primary defect and oriented appropriately. Complex Repair And Bilobe Flap Text: The defect edges were debeveled with a #15 scalpel blade.  The primary defect was closed partially with a complex linear closure.  Given the location of the remaining defect, shape of the defect and the proximity to free margins a bilobe flap was deemed most appropriate for complete closure of the defect.  Using a sterile surgical marker, an appropriate advancement flap was drawn incorporating the defect and placing the expected incisions within the relaxed skin tension lines where possible.    The area thus outlined was incised deep to adipose tissue with a #15 scalpel blade.  The skin margins were undermined to an appropriate distance in all directions utilizing iris scissors. Fusiform Excision Additional Text (Leave Blank If You Do Not Want): The margin was drawn around the clinically apparent lesion.  A fusiform shape was then drawn on the skin incorporating the lesion and margins.  Incisions were then made along these lines to the appropriate tissue plane and the lesion was extirpated. Detail Level: Detailed Cartilage Graft Text: The defect edges were debeveled with a #15 scalpel blade.  Given the location of the defect, shape of the defect, the fact the defect involved a full thickness cartilage defect a cartilage graft was deemed most appropriate.  An appropriate donor site was identified, cleansed, and anesthetized. The cartilage graft was then harvested and transferred to the recipient site, oriented appropriately and then sutured into place.  The secondary defect was then repaired using a primary closure. Medical Necessity Information: It is in your best interest to select a reason for this procedure from the list below. All of these items fulfill various CMS LCD requirements except lesion extends to a margin. Complex Repair And O-T Advancement Flap Text: The defect edges were debeveled with a #15 scalpel blade.  The primary defect was closed partially with a complex linear closure.  Given the location of the remaining defect, shape of the defect and the proximity to free margins an O-T advancement flap was deemed most appropriate for complete closure of the defect.  Using a sterile surgical marker, an appropriate advancement flap was drawn incorporating the defect and placing the expected incisions within the relaxed skin tension lines where possible.    The area thus outlined was incised deep to adipose tissue with a #15 scalpel blade.  The skin margins were undermined to an appropriate distance in all directions utilizing iris scissors. Skin Substitute Text: The defect edges were debeveled with a #15 scalpel blade.  Given the location of the defect, shape of the defect and the proximity to free margins a skin substitute graft was deemed most appropriate.  The graft material was trimmed to fit the size of the defect. The graft was then placed in the primary defect and oriented appropriately. Anesthesia Type: 1% lidocaine with 1:100,000 epinephrine Lip Wedge Excision Repair Text: Given the location of the defect and the proximity to free margins a full thickness wedge repair was deemed most appropriate.  Using a sterile surgical marker, the appropriate repair was drawn incorporating the defect and placing the expected incisions perpendicular to the vermilion border.  The vermilion border was also meticulously outlined to ensure appropriate reapproximation during the repair.  The area thus outlined was incised through and through with a #15 scalpel blade.  The muscularis and dermis were reaproximated with deep sutures following hemostasis. Care was taken to realign the vermilion border before proceeding with the superficial closure.  Once the vermilion was realigned the superfical and mucosal closure was finished. Crescentic Complex Repair Preamble Text (Leave Blank If You Do Not Want): Extensive wide undermining was performed. Bilateral Helical Rim Advancement Flap Text: The defect edges were debeveled with a #15 blade scalpel.  Given the location of the defect and the proximity to free margins (helical rim) a bilateral helical rim advancement flap was deemed most appropriate.  Using a sterile surgical marker, the appropriate advancement flaps were drawn incorporating the defect and placing the expected incisions between the helical rim and antihelix where possible.  The area thus outlined was incised through and through with a #15 scalpel blade.  With a skin hook and iris scissors, the flaps were gently and sharply undermined and freed up. Double Island Pedicle Flap Text: The defect edges were debeveled with a #15 scalpel blade.  Given the location of the defect, shape of the defect and the proximity to free margins a double island pedicle advancement flap was deemed most appropriate.  Using a sterile surgical marker, an appropriate advancement flap was drawn incorporating the defect, outlining the appropriate donor tissue and placing the expected incisions within the relaxed skin tension lines where possible.    The area thus outlined was incised deep to adipose tissue with a #15 scalpel blade.  The skin margins were undermined to an appropriate distance in all directions around the primary defect and laterally outward around the island pedicle utilizing iris scissors.  There was minimal undermining beneath the pedicle flap. Rotation Flap Text: The defect edges were debeveled with a #15 scalpel blade.  Given the location of the defect, shape of the defect and the proximity to free margins a rotation flap was deemed most appropriate.  Using a sterile surgical marker, an appropriate rotation flap was drawn incorporating the defect and placing the expected incisions within the relaxed skin tension lines where possible.    The area thus outlined was incised deep to adipose tissue with a #15 scalpel blade.  The skin margins were undermined to an appropriate distance in all directions utilizing iris scissors. Xenograft Text: The defect edges were debeveled with a #15 scalpel blade.  Given the location of the defect, shape of the defect and the proximity to free margins a xenograft was deemed most appropriate.  The graft was then trimmed to fit the size of the defect.  The graft was then placed in the primary defect and oriented appropriately. H Plasty Text: Given the location of the defect, shape of the defect and the proximity to free margins a H-plasty was deemed most appropriate for repair.  Using a sterile surgical marker, the appropriate advancement arms of the H-plasty were drawn incorporating the defect and placing the expected incisions within the relaxed skin tension lines where possible. The area thus outlined was incised deep to adipose tissue with a #15 scalpel blade. The skin margins were undermined to an appropriate distance in all directions utilizing iris scissors.  The opposing advancement arms were then advanced into place in opposite direction and anchored with interrupted buried subcutaneous sutures. Complex Repair And Double M Plasty Text: The defect edges were debeveled with a #15 scalpel blade.  The primary defect was closed partially with a complex linear closure.  Given the location of the remaining defect, shape of the defect and the proximity to free margins a double M plasty was deemed most appropriate for complete closure of the defect.  Using a sterile surgical marker, an appropriate advancement flap was drawn incorporating the defect and placing the expected incisions within the relaxed skin tension lines where possible.    The area thus outlined was incised deep to adipose tissue with a #15 scalpel blade.  The skin margins were undermined to an appropriate distance in all directions utilizing iris scissors. Mucosal Advancement Flap Text: Given the location of the defect, shape of the defect and the proximity to free margins a mucosal advancement flap was deemed most appropriate. Incisions were made with a 15 blade scalpel in the appropriate fashion along the cutaneous vermillion border and the mucosal lip. The remaining actinically damaged mucosal tissue was excised.  The mucosal advancement flap was then elevated to the gingival sulcus with care taken to preserve the neurovascular structures and advanced into the primary defect. Care was taken to ensure that precise realignment of the vermilion border was achieved. Complex Repair And M Plasty Text: The defect edges were debeveled with a #15 scalpel blade.  The primary defect was closed partially with a complex linear closure.  Given the location of the remaining defect, shape of the defect and the proximity to free margins an M plasty was deemed most appropriate for complete closure of the defect.  Using a sterile surgical marker, an appropriate advancement flap was drawn incorporating the defect and placing the expected incisions within the relaxed skin tension lines where possible.    The area thus outlined was incised deep to adipose tissue with a #15 scalpel blade.  The skin margins were undermined to an appropriate distance in all directions utilizing iris scissors. O-T Advancement Flap Text: The defect edges were debeveled with a #15 scalpel blade.  Given the location of the defect, shape of the defect and the proximity to free margins an O-T advancement flap was deemed most appropriate.  Using a sterile surgical marker, an appropriate advancement flap was drawn incorporating the defect and placing the expected incisions within the relaxed skin tension lines where possible.    The area thus outlined was incised deep to adipose tissue with a #15 scalpel blade.  The skin margins were undermined to an appropriate distance in all directions utilizing iris scissors. O-Z Plasty Text: The defect edges were debeveled with a #15 scalpel blade.  Given the location of the defect, shape of the defect and the proximity to free margins an O-Z plasty (double transposition flap) was deemed most appropriate.  Using a sterile surgical marker, the appropriate transposition flaps were drawn incorporating the defect and placing the expected incisions within the relaxed skin tension lines where possible.    The area thus outlined was incised deep to adipose tissue with a #15 scalpel blade.  The skin margins were undermined to an appropriate distance in all directions utilizing iris scissors.  Hemostasis was achieved with electrocautery.  The flaps were then transposed into place, one clockwise and the other counterclockwise, and anchored with interrupted buried subcutaneous sutures. O-L Flap Text: The defect edges were debeveled with a #15 scalpel blade.  Given the location of the defect, shape of the defect and the proximity to free margins an O-L flap was deemed most appropriate.  Using a sterile surgical marker, an appropriate advancement flap was drawn incorporating the defect and placing the expected incisions within the relaxed skin tension lines where possible.    The area thus outlined was incised deep to adipose tissue with a #15 scalpel blade.  The skin margins were undermined to an appropriate distance in all directions utilizing iris scissors. V-Y Plasty Text: The defect edges were debeveled with a #15 scalpel blade.  Given the location of the defect, shape of the defect and the proximity to free margins an V-Y advancement flap was deemed most appropriate.  Using a sterile surgical marker, an appropriate advancement flap was drawn incorporating the defect and placing the expected incisions within the relaxed skin tension lines where possible.    The area thus outlined was incised deep to adipose tissue with a #15 scalpel blade.  The skin margins were undermined to an appropriate distance in all directions utilizing iris scissors. Island Pedicle Flap With Canthal Suspension Text: The defect edges were debeveled with a #15 scalpel blade.  Given the location of the defect, shape of the defect and the proximity to free margins an island pedicle advancement flap was deemed most appropriate.  Using a sterile surgical marker, an appropriate advancement flap was drawn incorporating the defect, outlining the appropriate donor tissue and placing the expected incisions within the relaxed skin tension lines where possible. The area thus outlined was incised deep to adipose tissue with a #15 scalpel blade.  The skin margins were undermined to an appropriate distance in all directions around the primary defect and laterally outward around the island pedicle utilizing iris scissors.  There was minimal undermining beneath the pedicle flap. A suspension suture was placed in the canthal tendon to prevent tension and prevent ectropion. Epidermal Closure Graft Donor Site (Optional): simple interrupted Advancement Flap (Double) Text: The defect edges were debeveled with a #15 scalpel blade.  Given the location of the defect and the proximity to free margins a double advancement flap was deemed most appropriate.  Using a sterile surgical marker, the appropriate advancement flaps were drawn incorporating the defect and placing the expected incisions within the relaxed skin tension lines where possible.    The area thus outlined was incised deep to adipose tissue with a #15 scalpel blade.  The skin margins were undermined to an appropriate distance in all directions utilizing iris scissors. Hemostasis: Electrocautery Complex Repair And Rotation Flap Text: The defect edges were debeveled with a #15 scalpel blade.  The primary defect was closed partially with a complex linear closure.  Given the location of the remaining defect, shape of the defect and the proximity to free margins a rotation flap was deemed most appropriate for complete closure of the defect.  Using a sterile surgical marker, an appropriate advancement flap was drawn incorporating the defect and placing the expected incisions within the relaxed skin tension lines where possible.    The area thus outlined was incised deep to adipose tissue with a #15 scalpel blade.  The skin margins were undermined to an appropriate distance in all directions utilizing iris scissors. Complex Repair And Tissue Cultured Epidermal Autograft Text: The defect edges were debeveled with a #15 scalpel blade.  The primary defect was closed partially with a complex linear closure.  Given the location of the defect, shape of the defect and the proximity to free margins an tissue cultured epidermal autograft was deemed most appropriate to repair the remaining defect.  The graft was trimmed to fit the size of the remaining defect.  The graft was then placed in the primary defect, oriented appropriately, and sutured into place. Complex Repair And Transposition Flap Text: The defect edges were debeveled with a #15 scalpel blade.  The primary defect was closed partially with a complex linear closure.  Given the location of the remaining defect, shape of the defect and the proximity to free margins a transposition flap was deemed most appropriate for complete closure of the defect.  Using a sterile surgical marker, an appropriate advancement flap was drawn incorporating the defect and placing the expected incisions within the relaxed skin tension lines where possible.    The area thus outlined was incised deep to adipose tissue with a #15 scalpel blade.  The skin margins were undermined to an appropriate distance in all directions utilizing iris scissors. Rhombic Flap Text: The defect edges were debeveled with a #15 scalpel blade.  Given the location of the defect and the proximity to free margins a rhombic flap was deemed most appropriate.  Using a sterile surgical marker, an appropriate rhombic flap was drawn incorporating the defect.    The area thus outlined was incised deep to adipose tissue with a #15 scalpel blade.  The skin margins were undermined to an appropriate distance in all directions utilizing iris scissors. Dorsal Nasal Flap Text: The defect edges were debeveled with a #15 scalpel blade.  Given the location of the defect and the proximity to free margins a dorsal nasal flap was deemed most appropriate.  Using a sterile surgical marker, an appropriate dorsal nasal flap was drawn around the defect.    The area thus outlined was incised deep to adipose tissue with a #15 scalpel blade.  The skin margins were undermined to an appropriate distance in all directions utilizing iris scissors. Melolabial Interpolation Flap Text: A decision was made to reconstruct the defect utilizing an interpolation axial flap and a staged reconstruction.  A telfa template was made of the defect.  This telfa template was then used to outline the melolabial interpolation flap.  The donor area for the pedicle flap was then injected with anesthesia.  The flap was excised through the skin and subcutaneous tissue down to the layer of the underlying musculature.  The pedicle flap was carefully excised within this deep plane to maintain its blood supply.  The edges of the donor site were undermined.   The donor site was closed in a primary fashion.  The pedicle was then rotated into position and sutured.  Once the tube was sutured into place, adequate blood supply was confirmed with blanching and refill.  The pedicle was then wrapped with xeroform gauze and dressed appropriately with a telfa and gauze bandage to ensure continued blood supply and protect the attached pedicle. Complex Repair And Modified Advancement Flap Text: The defect edges were debeveled with a #15 scalpel blade.  The primary defect was closed partially with a complex linear closure.  Given the location of the remaining defect, shape of the defect and the proximity to free margins a modified advancement flap was deemed most appropriate for complete closure of the defect.  Using a sterile surgical marker, an appropriate advancement flap was drawn incorporating the defect and placing the expected incisions within the relaxed skin tension lines where possible.    The area thus outlined was incised deep to adipose tissue with a #15 scalpel blade.  The skin margins were undermined to an appropriate distance in all directions utilizing iris scissors. Additional Anesthesia Volume In Cc: 6 Complex Repair And Xenograft Text: The defect edges were debeveled with a #15 scalpel blade.  The primary defect was closed partially with a complex linear closure.  Given the location of the defect, shape of the defect and the proximity to free margins a xenograft was deemed most appropriate to repair the remaining defect.  The graft was trimmed to fit the size of the remaining defect.  The graft was then placed in the primary defect, oriented appropriately, and sutured into place. Complex Repair And O-L Flap Text: The defect edges were debeveled with a #15 scalpel blade.  The primary defect was closed partially with a complex linear closure.  Given the location of the remaining defect, shape of the defect and the proximity to free margins an O-L flap was deemed most appropriate for complete closure of the defect.  Using a sterile surgical marker, an appropriate flap was drawn incorporating the defect and placing the expected incisions within the relaxed skin tension lines where possible.    The area thus outlined was incised deep to adipose tissue with a #15 scalpel blade.  The skin margins were undermined to an appropriate distance in all directions utilizing iris scissors. Excision Depth: adipose tissue Bilobed Transposition Flap Text: The defect edges were debeveled with a #15 scalpel blade.  Given the location of the defect and the proximity to free margins a bilobed transposition flap was deemed most appropriate.  Using a sterile surgical marker, an appropriate bilobe flap drawn around the defect.    The area thus outlined was incised deep to adipose tissue with a #15 scalpel blade.  The skin margins were undermined to an appropriate distance in all directions utilizing iris scissors. Complex Repair And Dorsal Nasal Flap Text: The defect edges were debeveled with a #15 scalpel blade.  The primary defect was closed partially with a complex linear closure.  Given the location of the remaining defect, shape of the defect and the proximity to free margins a dorsal nasal flap was deemed most appropriate for complete closure of the defect.  Using a sterile surgical marker, an appropriate flap was drawn incorporating the defect and placing the expected incisions within the relaxed skin tension lines where possible.    The area thus outlined was incised deep to adipose tissue with a #15 scalpel blade.  The skin margins were undermined to an appropriate distance in all directions utilizing iris scissors. V-Y Flap Text: The defect edges were debeveled with a #15 scalpel blade.  Given the location of the defect, shape of the defect and the proximity to free margins a V-Y flap was deemed most appropriate.  Using a sterile surgical marker, an appropriate advancement flap was drawn incorporating the defect and placing the expected incisions within the relaxed skin tension lines where possible.    The area thus outlined was incised deep to adipose tissue with a #15 scalpel blade.  The skin margins were undermined to an appropriate distance in all directions utilizing iris scissors. Posterior Auricular Interpolation Flap Text: A decision was made to reconstruct the defect utilizing an interpolation axial flap and a staged reconstruction.  A telfa template was made of the defect.  This telfa template was then used to outline the posterior auricular interpolation flap.  The donor area for the pedicle flap was then injected with anesthesia.  The flap was excised through the skin and subcutaneous tissue down to the layer of the underlying musculature.  The pedicle flap was carefully excised within this deep plane to maintain its blood supply.  The edges of the donor site were undermined.   The donor site was closed in a primary fashion.  The pedicle was then rotated into position and sutured.  Once the tube was sutured into place, adequate blood supply was confirmed with blanching and refill.  The pedicle was then wrapped with xeroform gauze and dressed appropriately with a telfa and gauze bandage to ensure continued blood supply and protect the attached pedicle. Complex Repair And Skin Substitute Graft Text: The defect edges were debeveled with a #15 scalpel blade.  The primary defect was closed partially with a complex linear closure.  Given the location of the remaining defect, shape of the defect and the proximity to free margins a skin substitute graft was deemed most appropriate to repair the remaining defect.  The graft was trimmed to fit the size of the remaining defect.  The graft was then placed in the primary defect, oriented appropriately, and sutured into place. Helical Rim Advancement Flap Text: The defect edges were debeveled with a #15 blade scalpel.  Given the location of the defect and the proximity to free margins (helical rim) a double helical rim advancement flap was deemed most appropriate.  Using a sterile surgical marker, the appropriate advancement flaps were drawn incorporating the defect and placing the expected incisions between the helical rim and antihelix where possible.  The area thus outlined was incised through and through with a #15 scalpel blade.  With a skin hook and iris scissors, the flaps were gently and sharply undermined and freed up. Dermal Autograft Text: The defect edges were debeveled with a #15 scalpel blade.  Given the location of the defect, shape of the defect and the proximity to free margins a dermal autograft was deemed most appropriate.  Using a sterile surgical marker, the primary defect shape was transferred to the donor site. The area thus outlined was incised deep to adipose tissue with a #15 scalpel blade.  The harvested graft was then trimmed of adipose and epidermal tissue until only dermis was left.  The skin graft was then placed in the primary defect and oriented appropriately. Modified Advancement Flap Text: The defect edges were debeveled with a #15 scalpel blade.  Given the location of the defect, shape of the defect and the proximity to free margins a modified advancement flap was deemed most appropriate.  Using a sterile surgical marker, an appropriate advancement flap was drawn incorporating the defect and placing the expected incisions within the relaxed skin tension lines where possible.    The area thus outlined was incised deep to adipose tissue with a #15 scalpel blade.  The skin margins were undermined to an appropriate distance in all directions utilizing iris scissors.

## 2025-07-11 NOTE — ED PROVIDER NOTE - ED STEMI HIDDEN
Care Due:                  Date            Visit Type   Department     Provider  --------------------------------------------------------------------------------                                [facility]                              PRIMARY      MED/ INTERNAL  Christiane Gupta  Last Visit: 06-      CARE ([facility])   MED/ PEDS      Maryuri Galindo  Next Visit: None Scheduled  None         None Found                                                            Last  Test          Frequency    Reason                     Performed    Due Date  --------------------------------------------------------------------------------    Office Visit  12 months..  atorvastatin, colchicine,   06- 05-                             furosemide, ibuprofen,                             valsartan................    CBC.........  12 months..  ibuprofen................  Not Found    Overdue    Uric Acid...  12 months..  colchicine...............  Not Found    Overdue    [employer] Embedded Care Due Messages. Reference number: [id number].   10/03/2024 10:26:53 PM CDT  
Please see the attached refill request.  
Refill Routing Note   Medication(s) are not appropriate for processing by [facility] for the following reason(s):        Patient not seen by provider within 15 months    [facility] action(s):  Defer   Requires appointment : Yes     Requires labs : Yes             Appointments  past 12m or future 3m with PCP    Date Provider   Last Visit   Visit date not found Riddhi Galindo MD   Next Visit   10/3/2024 Riddhi Galindo MD   ED visits in past 90 days: 0        Note composed:9:18 AM 10/04/2024          
hide

## 2025-07-11 NOTE — ED PROVIDER NOTE - ATTENDING CONTRIBUTION TO CARE
Dr. Smart, Attending Physician-  I performed a face to face bedside interview with patient regarding history of present illness, review of symptoms and past medical history. I completed an independent physical exam.  I have discussed patient's plan of care with the resident.    64yoM w/Hx of CAD s/p CABG on DAPT, HTN, HLD p/w R lateral hip abscess. Pt had swelling and redness along R hip 2 weeks ago, seen at urgent care and started on amoxicillin, 1w later on 7/4 pt returned to urgent care at which point they did I&D and started pt on doxycycline which he finished yesterday.  Currently reports that the wound is continuing to drain clear liquid so he came to ED for evaluation.  Denies fever, pain, purulent/bloody drainage.  No other concerns such as chest pain, shortness of breath, nausea, vomiting, belly pain.  Reports redness around wound has been shrinking.     VS unremarkable  General: WN/WD NAD  Head: Atraumatic  Eyes: EOM grossly in tact, no scleral icterus  ENT: dry mucous membranes  Neurology: A&Ox3, nonfocal, SINGLETARY x 4  Respiratory: normal respiratory effort  CV: Extremities warm and well perfused  Abdominal: Soft, non-distended  : Accompanied by Dr. Dupree. R hip notable for firm wound 2cm s/p I&D w/tiny central area open draining serous yellow fluid, small volume fluid expressed at bedside (~0.5ccs), w/mild surrounding erythema, no warmth, nontender  Extremities: No edema  Skin: No rashes    DDx incl. poor wound healing vs persistent abscess, pt well appearing afebrile no associated signs of worsening infection denies nausea/vomiting/diarrhea/fevers bedside US w/o obvious fluid pocket for drainage; plan to switch patient to clindamycin given wound is still draining, DC w/ wound care follow up. Medically cleared for DCTH. Pt agreeable to plan. - Marjorie Smart MD. EM Attending

## 2025-07-11 NOTE — ED PROVIDER NOTE - NSFOLLOWUPINSTRUCTIONS_ED_ALL_ED_FT
You were seen in the ER on 7/11/2025 for evaluation of a right sided hip abscess/infection.  We did a physical exam and ultrasound, which showed you do not have any more abscess to drain, and it appears to be healing okay.  There is no need for a procedure or further testing today, we are sending you with new antibiotic called clindamycin, and wound care for follow-up.  You can also talk to your regular doctor as needed for care.  Thank you for allow us to see you in the ER. You were seen in the ER on 7/11/2025 for evaluation of a right sided hip abscess/infection.  We did a physical exam and ultrasound, which showed you do not have any more fluid to drain at this time, and it appears to be healing appropriately. Please follow up with a WOUND CARE SPECIALIST within 1 week for further management of your RIGHT HIP WOUND. We will call you with the follow up information or you can refer to the referral information in your discharge paperwork. You can also talk to your regular doctor for follow up with a wound care specialist.  Thank you for allow us to see you in the ER.    You have been prescribed CLINDAMYCIN to treat your BUTTOCK WOUND. This is an antibiotic medication. Please COMPLETELY finish this course of medication even if you begin to feel better. If you develop any side effects from your antibiotic, please talk to your primary care doctor.     We strongly recommend you see your primary care doctor within 1 week for a comprehensive evaluation of your health. Thank you for choosing [facility].

## 2025-07-11 NOTE — ED PROVIDER NOTE - PATIENT PORTAL LINK FT
You can access the FollowMyHealth Patient Portal offered by [facility] by registering at the following website: [email or web address]. By joining [facility]’s FollowMyHealth portal, you will also be able to view your health information using other applications (apps) compatible with our system.

## 2025-07-11 NOTE — ED PROVIDER NOTE - NSFOLLOWUPCLINICS_GEN_ALL_ED_FT
[facility]  Podiatry/Wound Care  [address] [place] [address]  Phone: [phone]  Fax: [phone]    [facility]  Wound Care  [address] [place] [address]  Phone: [phone]  Fax: [phone]    [facility] [place]  Wound Care  [address] [place] [address]  Phone: [phone]  Fax:

## 2025-07-11 NOTE — ED ADULT NURSE NOTE - OBJECTIVE STATEMENT
Pt is 63 y/o male, presenting to the ED c/o abscess to R hip. Pt reports on 7/4, went to  where they drained abscess and started him on oral abx. Pt reports he has finished course of abx, but abscess still draining. As per pt, has not grown in size, but noted to have clear drainage every AM. No redness or erythema noted to area. Pt denies any pain to site. PMH of CAD, HTN, s/p CABG. Upon assessment, pt AxOx3, sitting up in stretcher and speaking in full sentences. Breathing spontaneously and unlabored, >95% RA. Pt denies SOB, chest pain, n/v/d, dizziness, vision changes, chills, and fever. Safety and comfort measures provided- bed in lowest position, locked, and blanket given.

## 2025-07-11 NOTE — ED PROVIDER NOTE - CLINICAL SUMMARY MEDICAL DECISION MAKING FREE TEXT BOX
(Yesika Dupree MD-PGY2): 64M CAD s/p CABG on DAPT, HLD, HTN, here for evaluation of R lateral hip abscess.  Patient reports that he developed a swelling and redness on the right hip region about 2 weeks ago.  He was seen by urgent care and given amoxicillin.  1 week ago, he returned to urgent care given increased swelling and redness.  They performed an I&D with expression of pus.  He was given a new prescription for doxycycline, which he just finished.  Currently reports that the wound is continuing to drain a clear liquid, and is concerned for if it is improving.  Denies fever, pain, states drained fluid is not pus like, bloody.  No other concerns such as chest pain, shortness of breath, nausea, vomiting, belly pain.  He lives with 2 daughters who are physicians and they were hoping that he could have an ultrasound to determine if the abscess is gone.  Does not have wound care follow-up. Denies any other concerns at this time.      Vitals on arrival were within normal limits and appropriate.  Physical exam with right sided hip wound.  Approximately 3-4cm linear laceration overlying prior scar from hip replacement, small open areas along incision line with clear fluid expressed, no pus, no active bleeding. Skin feels firm, but no fluctuance. Mild erythema around wound, but no erythema of hip itself or buttock or spread to groin. POCUS with attg showing no fluid collection and there is small opening to skin to facilitate drainage, otherwise there is evidence of mild edema/cobblestone apeparance consistent with resolving abscess/cellulitis. DDx: this appears like a wound that is healing with residual drainage that does not look infected. As wound is open, will continue antibiotics in abundance of caution (Clinda) and refer to wound care with pcp follow up as needed. Pt amenable without further questions or needs. Discharging home.

## 2025-07-17 ENCOUNTER — OUTPATIENT (OUTPATIENT)
Dept: OUTPATIENT SERVICES | Facility: HOSPITAL | Age: 65
LOS: 1 days | End: 2025-07-17
Payer: MEDICARE

## 2025-07-17 ENCOUNTER — NON-APPOINTMENT (OUTPATIENT)
Age: 65
End: 2025-07-17

## 2025-07-17 ENCOUNTER — APPOINTMENT (OUTPATIENT)
Dept: WOUND CARE | Facility: HOSPITAL | Age: 65
End: 2025-07-17

## 2025-07-17 VITALS
WEIGHT: 193 LBS | TEMPERATURE: 98.2 F | BODY MASS INDEX: 30.29 KG/M2 | HEART RATE: 87 BPM | RESPIRATION RATE: 15 BRPM | HEIGHT: 67 IN | SYSTOLIC BLOOD PRESSURE: 134 MMHG | OXYGEN SATURATION: 97 % | DIASTOLIC BLOOD PRESSURE: 82 MMHG

## 2025-07-17 DIAGNOSIS — Z98.890 OTHER SPECIFIED POSTPROCEDURAL STATES: Chronic | ICD-10-CM

## 2025-07-17 DIAGNOSIS — Z96.641 PRESENCE OF RIGHT ARTIFICIAL HIP JOINT: Chronic | ICD-10-CM

## 2025-07-17 DIAGNOSIS — Z95.1 PRESENCE OF AORTOCORONARY BYPASS GRAFT: Chronic | ICD-10-CM

## 2025-07-17 DIAGNOSIS — R22.0 LOCALIZED SWELLING, MASS AND LUMP, HEAD: Chronic | ICD-10-CM

## 2025-07-17 DIAGNOSIS — L89.150 PRESSURE ULCER OF SACRAL REGION, UNSTAGEABLE: ICD-10-CM

## 2025-07-17 PROBLEM — T81.89XA NONHEALING SURGICAL WOUND, INITIAL ENCOUNTER: Status: ACTIVE | Noted: 2025-07-17

## 2025-07-17 PROCEDURE — 99202 OFFICE O/P NEW SF 15 MIN: CPT

## 2025-07-17 PROCEDURE — G0463: CPT

## 2025-07-19 DIAGNOSIS — Z82.49 FAMILY HISTORY OF ISCHEMIC HEART DISEASE AND OTHER DISEASES OF THE CIRCULATORY SYSTEM: ICD-10-CM
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
## 2025-07-19 DIAGNOSIS — Z79.82 LONG TERM (CURRENT) USE OF ASPIRIN: ICD-10-CM

## 2025-07-19 DIAGNOSIS — T81.89XA OTHER COMPLICATIONS OF PROCEDURES, NOT ELSEWHERE CLASSIFIED, INITIAL ENCOUNTER: ICD-10-CM

## 2025-07-19 DIAGNOSIS — E78.5 HYPERLIPIDEMIA, UNSPECIFIED: ICD-10-CM

## 2025-07-19 DIAGNOSIS — Z95.1 PRESENCE OF AORTOCORONARY BYPASS GRAFT: ICD-10-CM

## 2025-07-19 DIAGNOSIS — Z98.890 OTHER SPECIFIED POSTPROCEDURAL STATES: ICD-10-CM

## 2025-07-19 DIAGNOSIS — I25.810 ATHEROSCLEROSIS OF CORONARY ARTERY BYPASS GRAFT(S) WITHOUT ANGINA PECTORIS: ICD-10-CM

## 2025-07-19 DIAGNOSIS — I10 ESSENTIAL (PRIMARY) HYPERTENSION: ICD-10-CM

## 2025-07-19 DIAGNOSIS — Z96.641 PRESENCE OF RIGHT ARTIFICIAL HIP JOINT: ICD-10-CM
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
## 2025-07-19 DIAGNOSIS — Z79.899 OTHER LONG TERM (CURRENT) DRUG THERAPY: ICD-10-CM

## 2025-07-24 ENCOUNTER — APPOINTMENT (OUTPATIENT)
Dept: WOUND CARE | Facility: HOSPITAL | Age: 65
End: 2025-07-24
Payer: MEDICARE

## 2025-07-24 ENCOUNTER — OUTPATIENT (OUTPATIENT)
Dept: OUTPATIENT SERVICES | Facility: HOSPITAL | Age: 65
LOS: 1 days | End: 2025-07-24
Payer: MEDICARE

## 2025-07-24 VITALS
BODY MASS INDEX: 30.29 KG/M2 | DIASTOLIC BLOOD PRESSURE: 72 MMHG | TEMPERATURE: 98.2 F | SYSTOLIC BLOOD PRESSURE: 109 MMHG | HEIGHT: 67 IN | WEIGHT: 193 LBS | HEART RATE: 72 BPM | OXYGEN SATURATION: 98 % | RESPIRATION RATE: 16 BRPM

## 2025-07-24 DIAGNOSIS — Z98.890 OTHER SPECIFIED POSTPROCEDURAL STATES: Chronic | ICD-10-CM

## 2025-07-24 DIAGNOSIS — Z96.641 PRESENCE OF RIGHT ARTIFICIAL HIP JOINT: Chronic | ICD-10-CM

## 2025-07-24 DIAGNOSIS — R22.0 LOCALIZED SWELLING, MASS AND LUMP, HEAD: Chronic | ICD-10-CM

## 2025-07-24 DIAGNOSIS — Z95.1 PRESENCE OF AORTOCORONARY BYPASS GRAFT: Chronic | ICD-10-CM

## 2025-07-24 DIAGNOSIS — L89.150 PRESSURE ULCER OF SACRAL REGION, UNSTAGEABLE: ICD-10-CM

## 2025-07-24 PROCEDURE — 87070 CULTURE OTHR SPECIMN AEROBIC: CPT

## 2025-07-24 PROCEDURE — G0463: CPT

## 2025-07-24 PROCEDURE — 99213 OFFICE O/P EST LOW 20 MIN: CPT

## 2025-07-26 LAB
CULTURE RESULTS: NO GROWTH — SIGNIFICANT CHANGE UP
SPECIMEN SOURCE: SIGNIFICANT CHANGE UP
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
## 2025-07-27 DIAGNOSIS — Z95.1 PRESENCE OF AORTOCORONARY BYPASS GRAFT: ICD-10-CM

## 2025-07-27 DIAGNOSIS — Z96.641 PRESENCE OF RIGHT ARTIFICIAL HIP JOINT: ICD-10-CM

## 2025-07-27 DIAGNOSIS — T81.89XD OTHER COMPLICATIONS OF PROCEDURES, NOT ELSEWHERE CLASSIFIED, SUBSEQUENT ENCOUNTER: ICD-10-CM

## 2025-07-27 DIAGNOSIS — E78.5 HYPERLIPIDEMIA, UNSPECIFIED: ICD-10-CM

## 2025-07-27 DIAGNOSIS — I10 ESSENTIAL (PRIMARY) HYPERTENSION: ICD-10-CM

## 2025-07-27 DIAGNOSIS — Z82.49 FAMILY HISTORY OF ISCHEMIC HEART DISEASE AND OTHER DISEASES OF THE CIRCULATORY SYSTEM: ICD-10-CM

## 2025-07-27 DIAGNOSIS — I25.810 ATHEROSCLEROSIS OF CORONARY ARTERY BYPASS GRAFT(S) WITHOUT ANGINA PECTORIS: ICD-10-CM

## 2025-07-27 DIAGNOSIS — Z79.82 LONG TERM (CURRENT) USE OF ASPIRIN: ICD-10-CM

## 2025-07-27 DIAGNOSIS — Z79.899 OTHER LONG TERM (CURRENT) DRUG THERAPY: ICD-10-CM
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
## 2025-07-27 DIAGNOSIS — Z98.890 OTHER SPECIFIED POSTPROCEDURAL STATES: ICD-10-CM

## 2025-07-31 ENCOUNTER — APPOINTMENT (OUTPATIENT)
Dept: ORTHOPEDIC SURGERY | Facility: CLINIC | Age: 65
End: 2025-07-31
Payer: MEDICARE

## 2025-07-31 VITALS — BODY MASS INDEX: 29.51 KG/M2 | HEIGHT: 67 IN | WEIGHT: 188 LBS

## 2025-07-31 DIAGNOSIS — Z96.641 PRESENCE OF RIGHT ARTIFICIAL HIP JOINT: ICD-10-CM

## 2025-07-31 PROCEDURE — 99213 OFFICE O/P EST LOW 20 MIN: CPT

## 2025-08-04 ENCOUNTER — RESULT REVIEW (OUTPATIENT)
Age: 65
End: 2025-08-04

## 2025-08-04 ENCOUNTER — APPOINTMENT (OUTPATIENT)
Dept: CV DIAGNOSITCS | Facility: HOSPITAL | Age: 65
End: 2025-08-04

## 2025-08-04 ENCOUNTER — OUTPATIENT (OUTPATIENT)
Dept: OUTPATIENT SERVICES | Facility: HOSPITAL | Age: 65
LOS: 1 days | End: 2025-08-04
Payer: MEDICARE

## 2025-08-04 DIAGNOSIS — I25.10 ATHEROSCLEROTIC HEART DISEASE OF NATIVE CORONARY ARTERY WITHOUT ANGINA PECTORIS: ICD-10-CM

## 2025-08-04 DIAGNOSIS — Z96.641 PRESENCE OF RIGHT ARTIFICIAL HIP JOINT: Chronic | ICD-10-CM

## 2025-08-04 DIAGNOSIS — Z98.890 OTHER SPECIFIED POSTPROCEDURAL STATES: Chronic | ICD-10-CM

## 2025-08-04 DIAGNOSIS — Z95.1 PRESENCE OF AORTOCORONARY BYPASS GRAFT: Chronic | ICD-10-CM

## 2025-08-04 PROCEDURE — C8929: CPT

## 2025-08-04 PROCEDURE — 93306 TTE W/DOPPLER COMPLETE: CPT | Mod: 26

## 2025-08-05 ENCOUNTER — OUTPATIENT (OUTPATIENT)
Dept: OUTPATIENT SERVICES | Facility: HOSPITAL | Age: 65
LOS: 1 days | End: 2025-08-05
Payer: MEDICARE

## 2025-08-05 ENCOUNTER — NON-APPOINTMENT (OUTPATIENT)
Age: 65
End: 2025-08-05

## 2025-08-05 ENCOUNTER — APPOINTMENT (OUTPATIENT)
Dept: WOUND CARE | Facility: HOSPITAL | Age: 65
End: 2025-08-05
Payer: MEDICARE

## 2025-08-05 DIAGNOSIS — R22.0 LOCALIZED SWELLING, MASS AND LUMP, HEAD: Chronic | ICD-10-CM

## 2025-08-05 DIAGNOSIS — Z95.1 PRESENCE OF AORTOCORONARY BYPASS GRAFT: Chronic | ICD-10-CM

## 2025-08-05 DIAGNOSIS — Z96.641 PRESENCE OF RIGHT ARTIFICIAL HIP JOINT: Chronic | ICD-10-CM

## 2025-08-05 DIAGNOSIS — T81.89XD OTHER COMPLICATIONS OF PROCEDURES, NOT ELSEWHERE CLASSIFIED, SUBSEQUENT ENCOUNTER: ICD-10-CM

## 2025-08-05 DIAGNOSIS — L89.150 PRESSURE ULCER OF SACRAL REGION, UNSTAGEABLE: ICD-10-CM

## 2025-08-05 DIAGNOSIS — Z98.890 OTHER SPECIFIED POSTPROCEDURAL STATES: Chronic | ICD-10-CM

## 2025-08-05 PROCEDURE — 99203 OFFICE O/P NEW LOW 30 MIN: CPT

## 2025-08-05 PROCEDURE — G0463: CPT
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
## 2025-08-06 DIAGNOSIS — E78.5 HYPERLIPIDEMIA, UNSPECIFIED: ICD-10-CM
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
## 2025-08-06 DIAGNOSIS — Z96.641 PRESENCE OF RIGHT ARTIFICIAL HIP JOINT: ICD-10-CM

## 2025-08-06 DIAGNOSIS — Z79.82 LONG TERM (CURRENT) USE OF ASPIRIN: ICD-10-CM

## 2025-08-06 DIAGNOSIS — T81.89XD OTHER COMPLICATIONS OF PROCEDURES, NOT ELSEWHERE CLASSIFIED, SUBSEQUENT ENCOUNTER: ICD-10-CM

## 2025-08-06 DIAGNOSIS — Z98.890 OTHER SPECIFIED POSTPROCEDURAL STATES: ICD-10-CM

## 2025-08-06 DIAGNOSIS — Z95.1 PRESENCE OF AORTOCORONARY BYPASS GRAFT: ICD-10-CM

## 2025-08-06 DIAGNOSIS — I10 ESSENTIAL (PRIMARY) HYPERTENSION: ICD-10-CM

## 2025-08-06 DIAGNOSIS — Z82.49 FAMILY HISTORY OF ISCHEMIC HEART DISEASE AND OTHER DISEASES OF THE CIRCULATORY SYSTEM: ICD-10-CM

## 2025-08-06 DIAGNOSIS — I25.810 ATHEROSCLEROSIS OF CORONARY ARTERY BYPASS GRAFT(S) WITHOUT ANGINA PECTORIS: ICD-10-CM

## 2025-08-07 PROBLEM — Z92.3 HISTORY OF RADIATION THERAPY: Status: ACTIVE | Noted: 2025-08-07

## 2025-08-12 ENCOUNTER — OUTPATIENT (OUTPATIENT)
Dept: OUTPATIENT SERVICES | Facility: HOSPITAL | Age: 65
LOS: 1 days | End: 2025-08-12
Payer: MEDICARE

## 2025-08-12 ENCOUNTER — APPOINTMENT (OUTPATIENT)
Dept: MRI IMAGING | Facility: IMAGING CENTER | Age: 65
End: 2025-08-12

## 2025-08-12 DIAGNOSIS — C49.9 MALIGNANT NEOPLASM OF CONNECTIVE AND SOFT TISSUE, UNSPECIFIED: ICD-10-CM

## 2025-08-12 DIAGNOSIS — Z95.1 PRESENCE OF AORTOCORONARY BYPASS GRAFT: Chronic | ICD-10-CM

## 2025-08-12 DIAGNOSIS — Z96.641 PRESENCE OF RIGHT ARTIFICIAL HIP JOINT: Chronic | ICD-10-CM

## 2025-08-12 DIAGNOSIS — Z98.890 OTHER SPECIFIED POSTPROCEDURAL STATES: Chronic | ICD-10-CM

## 2025-08-12 PROCEDURE — A9585: CPT

## 2025-08-12 PROCEDURE — 70543 MRI ORBT/FAC/NCK W/O &W/DYE: CPT

## 2025-08-12 PROCEDURE — 70543 MRI ORBT/FAC/NCK W/O &W/DYE: CPT | Mod: 26

## 2025-08-13 ENCOUNTER — APPOINTMENT (OUTPATIENT)
Dept: CARDIOLOGY | Facility: CLINIC | Age: 65
End: 2025-08-13

## 2025-08-13 VITALS
SYSTOLIC BLOOD PRESSURE: 110 MMHG | HEART RATE: 85 BPM | BODY MASS INDEX: 29.19 KG/M2 | WEIGHT: 186 LBS | DIASTOLIC BLOOD PRESSURE: 72 MMHG | HEIGHT: 67 IN | OXYGEN SATURATION: 96 %

## 2025-08-14 ENCOUNTER — OUTPATIENT (OUTPATIENT)
Dept: OUTPATIENT SERVICES | Facility: HOSPITAL | Age: 65
LOS: 1 days | End: 2025-08-14
Payer: MEDICARE

## 2025-08-14 ENCOUNTER — APPOINTMENT (OUTPATIENT)
Dept: MRI IMAGING | Facility: CLINIC | Age: 65
End: 2025-08-14
Payer: MEDICARE

## 2025-08-14 DIAGNOSIS — Z96.641 PRESENCE OF RIGHT ARTIFICIAL HIP JOINT: Chronic | ICD-10-CM

## 2025-08-14 DIAGNOSIS — R22.0 LOCALIZED SWELLING, MASS AND LUMP, HEAD: Chronic | ICD-10-CM

## 2025-08-14 DIAGNOSIS — Z96.641 PRESENCE OF RIGHT ARTIFICIAL HIP JOINT: ICD-10-CM

## 2025-08-14 PROCEDURE — 73721 MRI JNT OF LWR EXTRE W/O DYE: CPT | Mod: 26,RT

## 2025-08-14 PROCEDURE — 73721 MRI JNT OF LWR EXTRE W/O DYE: CPT

## 2025-08-18 ENCOUNTER — APPOINTMENT (OUTPATIENT)
Dept: RADIATION ONCOLOGY | Facility: CLINIC | Age: 65
End: 2025-08-18

## 2025-08-18 ENCOUNTER — NON-APPOINTMENT (OUTPATIENT)
Age: 65
End: 2025-08-18

## 2025-08-18 VITALS
TEMPERATURE: 96.44 F | SYSTOLIC BLOOD PRESSURE: 125 MMHG | HEART RATE: 79 BPM | DIASTOLIC BLOOD PRESSURE: 78 MMHG | OXYGEN SATURATION: 99 % | RESPIRATION RATE: 16 BRPM | WEIGHT: 192.35 LBS | BODY MASS INDEX: 30.13 KG/M2

## 2025-08-18 DIAGNOSIS — M79.89 OTHER SPECIFIED SOFT TISSUE DISORDERS: ICD-10-CM

## 2025-08-18 DIAGNOSIS — Z92.3 PERSONAL HISTORY OF IRRADIATION: ICD-10-CM

## 2025-08-18 DIAGNOSIS — C49.9 MALIGNANT NEOPLASM OF CONNECTIVE AND SOFT TISSUE, UNSPECIFIED: ICD-10-CM

## 2025-08-18 PROCEDURE — 99205 OFFICE O/P NEW HI 60 MIN: CPT

## 2025-08-19 ENCOUNTER — OUTPATIENT (OUTPATIENT)
Dept: OUTPATIENT SERVICES | Facility: HOSPITAL | Age: 65
LOS: 1 days | End: 2025-08-19
Payer: MEDICARE

## 2025-08-19 ENCOUNTER — APPOINTMENT (OUTPATIENT)
Dept: WOUND CARE | Facility: HOSPITAL | Age: 65
End: 2025-08-19
Payer: MEDICARE

## 2025-08-19 VITALS
HEIGHT: 67 IN | OXYGEN SATURATION: 99 % | RESPIRATION RATE: 18 BRPM | TEMPERATURE: 97.9 F | SYSTOLIC BLOOD PRESSURE: 113 MMHG | DIASTOLIC BLOOD PRESSURE: 71 MMHG | HEART RATE: 73 BPM | BODY MASS INDEX: 30.13 KG/M2 | WEIGHT: 192 LBS

## 2025-08-19 DIAGNOSIS — L89.150 PRESSURE ULCER OF SACRAL REGION, UNSTAGEABLE: ICD-10-CM

## 2025-08-19 DIAGNOSIS — Z95.1 PRESENCE OF AORTOCORONARY BYPASS GRAFT: Chronic | ICD-10-CM

## 2025-08-19 DIAGNOSIS — R22.0 LOCALIZED SWELLING, MASS AND LUMP, HEAD: Chronic | ICD-10-CM

## 2025-08-19 PROCEDURE — G0463: CPT

## 2025-08-19 PROCEDURE — 87070 CULTURE OTHR SPECIMN AEROBIC: CPT

## 2025-08-19 PROCEDURE — 99213 OFFICE O/P EST LOW 20 MIN: CPT

## 2025-08-20 ENCOUNTER — NON-APPOINTMENT (OUTPATIENT)
Age: 65
End: 2025-08-20
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
## 2025-08-20 DIAGNOSIS — Z79.899 OTHER LONG TERM (CURRENT) DRUG THERAPY: ICD-10-CM

## 2025-08-20 DIAGNOSIS — Z82.49 FAMILY HISTORY OF ISCHEMIC HEART DISEASE AND OTHER DISEASES OF THE CIRCULATORY SYSTEM: ICD-10-CM

## 2025-08-20 DIAGNOSIS — Z98.890 OTHER SPECIFIED POSTPROCEDURAL STATES: ICD-10-CM

## 2025-08-20 DIAGNOSIS — I10 ESSENTIAL (PRIMARY) HYPERTENSION: ICD-10-CM

## 2025-08-20 DIAGNOSIS — Z95.1 PRESENCE OF AORTOCORONARY BYPASS GRAFT: ICD-10-CM

## 2025-08-20 DIAGNOSIS — Z96.641 PRESENCE OF RIGHT ARTIFICIAL HIP JOINT: ICD-10-CM

## 2025-08-20 DIAGNOSIS — T81.89XD OTHER COMPLICATIONS OF PROCEDURES, NOT ELSEWHERE CLASSIFIED, SUBSEQUENT ENCOUNTER: ICD-10-CM
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
## 2025-08-20 DIAGNOSIS — I25.810 ATHEROSCLEROSIS OF CORONARY ARTERY BYPASS GRAFT(S) WITHOUT ANGINA PECTORIS: ICD-10-CM

## 2025-08-20 DIAGNOSIS — E78.5 HYPERLIPIDEMIA, UNSPECIFIED: ICD-10-CM

## 2025-08-20 DIAGNOSIS — Z79.82 LONG TERM (CURRENT) USE OF ASPIRIN: ICD-10-CM

## 2025-08-20 LAB
GRAM STN FLD: ABNORMAL
SPECIMEN SOURCE: SIGNIFICANT CHANGE UP

## 2025-08-21 ENCOUNTER — LABORATORY RESULT (OUTPATIENT)
Age: 65
End: 2025-08-21

## 2025-08-21 ENCOUNTER — APPOINTMENT (OUTPATIENT)
Dept: INFECTIOUS DISEASE | Facility: CLINIC | Age: 65
End: 2025-08-21
Payer: MEDICARE

## 2025-08-21 ENCOUNTER — OUTPATIENT (OUTPATIENT)
Dept: OUTPATIENT SERVICES | Facility: HOSPITAL | Age: 65
LOS: 1 days | End: 2025-08-21
Payer: MEDICARE

## 2025-08-21 ENCOUNTER — APPOINTMENT (OUTPATIENT)
Dept: WOUND CARE | Facility: HOSPITAL | Age: 65
End: 2025-08-21

## 2025-08-21 VITALS
DIASTOLIC BLOOD PRESSURE: 70 MMHG | HEIGHT: 60 IN | HEART RATE: 79 BPM | OXYGEN SATURATION: 97 % | WEIGHT: 190 LBS | BODY MASS INDEX: 37.3 KG/M2 | TEMPERATURE: 98.1 F | SYSTOLIC BLOOD PRESSURE: 121 MMHG

## 2025-08-21 DIAGNOSIS — Z98.890 OTHER SPECIFIED POSTPROCEDURAL STATES: Chronic | ICD-10-CM

## 2025-08-21 DIAGNOSIS — B18.1 CHRONIC VIRAL HEPATITIS B W/OUT DELTA-AGENT: ICD-10-CM

## 2025-08-21 DIAGNOSIS — Z95.1 PRESENCE OF AORTOCORONARY BYPASS GRAFT: Chronic | ICD-10-CM

## 2025-08-21 DIAGNOSIS — R22.0 LOCALIZED SWELLING, MASS AND LUMP, HEAD: Chronic | ICD-10-CM

## 2025-08-21 DIAGNOSIS — D84.9 IMMUNODEFICIENCY, UNSPECIFIED: ICD-10-CM

## 2025-08-21 DIAGNOSIS — L02.91 CUTANEOUS ABSCESS, UNSPECIFIED: ICD-10-CM

## 2025-08-21 DIAGNOSIS — L89.150 PRESSURE ULCER OF SACRAL REGION, UNSTAGEABLE: ICD-10-CM

## 2025-08-21 DIAGNOSIS — Z96.641 PRESENCE OF RIGHT ARTIFICIAL HIP JOINT: Chronic | ICD-10-CM

## 2025-08-21 PROBLEM — T84.51XA: Status: ACTIVE | Noted: 2025-08-21

## 2025-08-21 LAB
-  CLINDAMYCIN: SIGNIFICANT CHANGE UP
-  ERYTHROMYCIN: SIGNIFICANT CHANGE UP
-  GENTAMICIN: SIGNIFICANT CHANGE UP
-  OXACILLIN: SIGNIFICANT CHANGE UP
-  PENICILLIN: SIGNIFICANT CHANGE UP
-  RIFAMPIN: SIGNIFICANT CHANGE UP
-  TETRACYCLINE: SIGNIFICANT CHANGE UP
-  TRIMETHOPRIM/SULFAMETHOXAZOLE: SIGNIFICANT CHANGE UP
-  VANCOMYCIN: SIGNIFICANT CHANGE UP
METHOD TYPE: SIGNIFICANT CHANGE UP

## 2025-08-21 PROCEDURE — 99204 OFFICE O/P NEW MOD 45 MIN: CPT

## 2025-08-21 PROCEDURE — G0463: CPT

## 2025-08-21 PROCEDURE — ZZZZZ: CPT

## 2025-08-21 PROCEDURE — 99205 OFFICE O/P NEW HI 60 MIN: CPT

## 2025-08-21 RX ORDER — DOXYCYCLINE HYCLATE 100 MG/1
100 TABLET, COATED ORAL
Qty: 84 | Refills: 0 | Status: ACTIVE | COMMUNITY
Start: 2025-08-21 | End: 1900-01-01

## 2025-08-22 ENCOUNTER — APPOINTMENT (OUTPATIENT)
Dept: DERMATOLOGY | Facility: CLINIC | Age: 65
End: 2025-08-22
Payer: MEDICARE

## 2025-08-22 VITALS — HEIGHT: 67 IN | WEIGHT: 184 LBS | BODY MASS INDEX: 28.88 KG/M2

## 2025-08-22 DIAGNOSIS — L40.9 PSORIASIS, UNSPECIFIED: ICD-10-CM

## 2025-08-22 DIAGNOSIS — L80 VITILIGO: ICD-10-CM

## 2025-08-22 PROCEDURE — 99204 OFFICE O/P NEW MOD 45 MIN: CPT

## 2025-08-23 ENCOUNTER — APPOINTMENT (OUTPATIENT)
Dept: WOUND CARE | Facility: HOSPITAL | Age: 65
End: 2025-08-23
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
## 2025-08-23 DIAGNOSIS — T81.89XD OTHER COMPLICATIONS OF PROCEDURES, NOT ELSEWHERE CLASSIFIED, SUBSEQUENT ENCOUNTER: ICD-10-CM

## 2025-08-23 DIAGNOSIS — Z96.641 PRESENCE OF RIGHT ARTIFICIAL HIP JOINT: ICD-10-CM
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
## 2025-08-25 ENCOUNTER — OUTPATIENT (OUTPATIENT)
Dept: OUTPATIENT SERVICES | Facility: HOSPITAL | Age: 65
LOS: 1 days | End: 2025-08-25
Payer: MEDICARE

## 2025-08-25 ENCOUNTER — APPOINTMENT (OUTPATIENT)
Dept: WOUND CARE | Facility: HOSPITAL | Age: 65
End: 2025-08-25
Payer: MEDICARE

## 2025-08-25 VITALS
BODY MASS INDEX: 28.88 KG/M2 | RESPIRATION RATE: 16 BRPM | HEART RATE: 74 BPM | HEIGHT: 67 IN | WEIGHT: 184 LBS | SYSTOLIC BLOOD PRESSURE: 108 MMHG | OXYGEN SATURATION: 97 % | TEMPERATURE: 97.7 F | DIASTOLIC BLOOD PRESSURE: 73 MMHG

## 2025-08-25 DIAGNOSIS — Z95.1 PRESENCE OF AORTOCORONARY BYPASS GRAFT: Chronic | ICD-10-CM

## 2025-08-25 DIAGNOSIS — L89.150 PRESSURE ULCER OF SACRAL REGION, UNSTAGEABLE: ICD-10-CM

## 2025-08-25 DIAGNOSIS — Z98.890 OTHER SPECIFIED POSTPROCEDURAL STATES: Chronic | ICD-10-CM

## 2025-08-25 DIAGNOSIS — R22.0 LOCALIZED SWELLING, MASS AND LUMP, HEAD: Chronic | ICD-10-CM

## 2025-08-25 DIAGNOSIS — Z96.641 PRESENCE OF RIGHT ARTIFICIAL HIP JOINT: Chronic | ICD-10-CM

## 2025-08-25 PROCEDURE — G0463: CPT

## 2025-08-25 PROCEDURE — 99213 OFFICE O/P EST LOW 20 MIN: CPT

## 2025-08-26 ENCOUNTER — APPOINTMENT (OUTPATIENT)
Dept: WOUND CARE | Facility: HOSPITAL | Age: 65
End: 2025-08-26

## 2025-08-26 DIAGNOSIS — Z82.49 FAMILY HISTORY OF ISCHEMIC HEART DISEASE AND OTHER DISEASES OF THE CIRCULATORY SYSTEM: ICD-10-CM

## 2025-08-26 DIAGNOSIS — T81.89XD OTHER COMPLICATIONS OF PROCEDURES, NOT ELSEWHERE CLASSIFIED, SUBSEQUENT ENCOUNTER: ICD-10-CM

## 2025-08-26 DIAGNOSIS — I10 ESSENTIAL (PRIMARY) HYPERTENSION: ICD-10-CM

## 2025-08-26 DIAGNOSIS — Z98.890 OTHER SPECIFIED POSTPROCEDURAL STATES: ICD-10-CM

## 2025-08-26 DIAGNOSIS — Z96.641 PRESENCE OF RIGHT ARTIFICIAL HIP JOINT: ICD-10-CM
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
## 2025-08-26 DIAGNOSIS — Z95.1 PRESENCE OF AORTOCORONARY BYPASS GRAFT: ICD-10-CM

## 2025-08-26 DIAGNOSIS — Z79.899 OTHER LONG TERM (CURRENT) DRUG THERAPY: ICD-10-CM

## 2025-08-26 DIAGNOSIS — I25.810 ATHEROSCLEROSIS OF CORONARY ARTERY BYPASS GRAFT(S) WITHOUT ANGINA PECTORIS: ICD-10-CM

## 2025-08-26 DIAGNOSIS — E78.5 HYPERLIPIDEMIA, UNSPECIFIED: ICD-10-CM
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
## 2025-08-26 DIAGNOSIS — Z79.82 LONG TERM (CURRENT) USE OF ASPIRIN: ICD-10-CM

## 2025-08-27 ENCOUNTER — APPOINTMENT (OUTPATIENT)
Dept: ORTHOPEDIC SURGERY | Facility: CLINIC | Age: 65
End: 2025-08-27
Payer: MEDICARE

## 2025-08-27 VITALS — WEIGHT: 185 LBS | HEIGHT: 67 IN | BODY MASS INDEX: 29.03 KG/M2

## 2025-08-27 DIAGNOSIS — M86.9 OSTEOMYELITIS, UNSPECIFIED: ICD-10-CM

## 2025-08-27 DIAGNOSIS — Z95.1 PRESENCE OF AORTOCORONARY BYPASS GRAFT: ICD-10-CM

## 2025-08-27 PROCEDURE — 99214 OFFICE O/P EST MOD 30 MIN: CPT

## 2025-08-27 PROCEDURE — 72100 X-RAY EXAM L-S SPINE 2/3 VWS: CPT

## 2025-08-28 ENCOUNTER — OUTPATIENT (OUTPATIENT)
Dept: OUTPATIENT SERVICES | Facility: HOSPITAL | Age: 65
LOS: 1 days | End: 2025-08-28
Payer: MEDICARE

## 2025-08-28 ENCOUNTER — APPOINTMENT (OUTPATIENT)
Dept: WOUND CARE | Facility: HOSPITAL | Age: 65
End: 2025-08-28

## 2025-08-28 VITALS
RESPIRATION RATE: 18 BRPM | DIASTOLIC BLOOD PRESSURE: 72 MMHG | HEIGHT: 67 IN | BODY MASS INDEX: 29.03 KG/M2 | TEMPERATURE: 98.2 F | HEART RATE: 74 BPM | OXYGEN SATURATION: 97 % | SYSTOLIC BLOOD PRESSURE: 117 MMHG | WEIGHT: 185 LBS

## 2025-08-28 DIAGNOSIS — R22.0 LOCALIZED SWELLING, MASS AND LUMP, HEAD: Chronic | ICD-10-CM

## 2025-08-28 DIAGNOSIS — Z95.1 PRESENCE OF AORTOCORONARY BYPASS GRAFT: Chronic | ICD-10-CM
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
## 2025-08-28 DIAGNOSIS — Z96.641 PRESENCE OF RIGHT ARTIFICIAL HIP JOINT: Chronic | ICD-10-CM
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
## 2025-08-28 DIAGNOSIS — T81.89XD OTHER COMPLICATIONS OF PROCEDURES, NOT ELSEWHERE CLASSIFIED, SUBSEQUENT ENCOUNTER: ICD-10-CM

## 2025-08-28 DIAGNOSIS — Z98.890 OTHER SPECIFIED POSTPROCEDURAL STATES: Chronic | ICD-10-CM

## 2025-08-28 PROCEDURE — ZZZZZ: CPT

## 2025-08-28 PROCEDURE — G0463: CPT

## 2025-08-29 DIAGNOSIS — T84.51XA INFECTION AND INFLAMMATORY REACTION DUE TO INTERNAL RIGHT HIP PROSTHESIS, INITIAL ENCOUNTER: ICD-10-CM

## 2025-08-29 RX ORDER — CEPHALEXIN 500 MG/1
500 CAPSULE ORAL
Qty: 168 | Refills: 0 | Status: ACTIVE | COMMUNITY
Start: 2025-08-29 | End: 1900-01-01

## 2025-09-02 ENCOUNTER — OUTPATIENT (OUTPATIENT)
Dept: OUTPATIENT SERVICES | Facility: HOSPITAL | Age: 65
LOS: 1 days | End: 2025-09-02
Payer: MEDICARE

## 2025-09-02 ENCOUNTER — NON-APPOINTMENT (OUTPATIENT)
Age: 65
End: 2025-09-02

## 2025-09-02 ENCOUNTER — APPOINTMENT (OUTPATIENT)
Dept: WOUND CARE | Facility: HOSPITAL | Age: 65
End: 2025-09-02
Payer: MEDICARE

## 2025-09-02 VITALS
BODY MASS INDEX: 29.03 KG/M2 | SYSTOLIC BLOOD PRESSURE: 124 MMHG | OXYGEN SATURATION: 98 % | TEMPERATURE: 97.6 F | HEART RATE: 76 BPM | WEIGHT: 185 LBS | HEIGHT: 67 IN | RESPIRATION RATE: 16 BRPM | DIASTOLIC BLOOD PRESSURE: 78 MMHG

## 2025-09-02 DIAGNOSIS — R22.0 LOCALIZED SWELLING, MASS AND LUMP, HEAD: Chronic | ICD-10-CM

## 2025-09-02 DIAGNOSIS — Z98.890 OTHER SPECIFIED POSTPROCEDURAL STATES: Chronic | ICD-10-CM

## 2025-09-02 DIAGNOSIS — Z95.1 PRESENCE OF AORTOCORONARY BYPASS GRAFT: Chronic | ICD-10-CM

## 2025-09-02 DIAGNOSIS — T81.89XD OTHER COMPLICATIONS OF PROCEDURES, NOT ELSEWHERE CLASSIFIED, SUBSEQUENT ENCOUNTER: ICD-10-CM

## 2025-09-02 DIAGNOSIS — Z96.641 PRESENCE OF RIGHT ARTIFICIAL HIP JOINT: Chronic | ICD-10-CM

## 2025-09-02 DIAGNOSIS — Z96.641 PRESENCE OF RIGHT ARTIFICIAL HIP JOINT: ICD-10-CM

## 2025-09-02 LAB
CULTURE RESULTS: ABNORMAL
ORGANISM # SPEC MICROSCOPIC CNT: ABNORMAL
ORGANISM # SPEC MICROSCOPIC CNT: SIGNIFICANT CHANGE UP
SPECIMEN SOURCE: SIGNIFICANT CHANGE UP

## 2025-09-02 PROCEDURE — 99203 OFFICE O/P NEW LOW 30 MIN: CPT

## 2025-09-02 PROCEDURE — G0463: CPT

## 2025-09-04 DIAGNOSIS — Z79.899 OTHER LONG TERM (CURRENT) DRUG THERAPY: ICD-10-CM

## 2025-09-04 DIAGNOSIS — Z96.641 PRESENCE OF RIGHT ARTIFICIAL HIP JOINT: ICD-10-CM

## 2025-09-04 DIAGNOSIS — T81.89XD OTHER COMPLICATIONS OF PROCEDURES, NOT ELSEWHERE CLASSIFIED, SUBSEQUENT ENCOUNTER: ICD-10-CM

## 2025-09-04 DIAGNOSIS — Z79.82 LONG TERM (CURRENT) USE OF ASPIRIN: ICD-10-CM

## 2025-09-04 DIAGNOSIS — I25.810 ATHEROSCLEROSIS OF CORONARY ARTERY BYPASS GRAFT(S) WITHOUT ANGINA PECTORIS: ICD-10-CM

## 2025-09-04 DIAGNOSIS — Z98.890 OTHER SPECIFIED POSTPROCEDURAL STATES: ICD-10-CM

## 2025-09-04 DIAGNOSIS — I10 ESSENTIAL (PRIMARY) HYPERTENSION: ICD-10-CM

## 2025-09-04 DIAGNOSIS — Z82.49 FAMILY HISTORY OF ISCHEMIC HEART DISEASE AND OTHER DISEASES OF THE CIRCULATORY SYSTEM: ICD-10-CM

## 2025-09-04 DIAGNOSIS — E78.5 HYPERLIPIDEMIA, UNSPECIFIED: ICD-10-CM
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
## 2025-09-04 DIAGNOSIS — Z95.1 PRESENCE OF AORTOCORONARY BYPASS GRAFT: ICD-10-CM

## 2025-09-17 ENCOUNTER — APPOINTMENT (OUTPATIENT)
Dept: WOUND CARE | Facility: HOSPITAL | Age: 65
End: 2025-09-17

## 2025-09-17 VITALS
RESPIRATION RATE: 16 BRPM | OXYGEN SATURATION: 96 % | HEART RATE: 72 BPM | SYSTOLIC BLOOD PRESSURE: 131 MMHG | DIASTOLIC BLOOD PRESSURE: 75 MMHG | TEMPERATURE: 98 F

## 2025-09-17 DIAGNOSIS — L02.91 CUTANEOUS ABSCESS, UNSPECIFIED: ICD-10-CM

## 2025-09-26 PROBLEM — S71.001A OPEN WOUND OF RIGHT HIP: Status: ACTIVE | Noted: 2025-09-17

## (undated) DEVICE — FORCEP RADIAL JAW 4 JUMBO 2.8MM 3.2MM 240CM ORANGE DISP

## (undated) DEVICE — SUT FIBERWIRE #2 38" STRAND 1 BLUE T-5 TAPER

## (undated) DEVICE — SUT VICRYL 3-0 27" RB-1 UNDYED

## (undated) DEVICE — SYR LUER LOK 10CC

## (undated) DEVICE — SOL IRR POUR NS 0.9% 1500ML

## (undated) DEVICE — LABELS BLANK W PEN

## (undated) DEVICE — PACK IV START WITH CHG

## (undated) DEVICE — SUT ETHIBOND 5 30" V-40

## (undated) DEVICE — SUT NYLON 3-0 18" PS-2

## (undated) DEVICE — DRSG WEBRIL 3"

## (undated) DEVICE — SUT VICRYL 0 36" CT-1 UNDYED

## (undated) DEVICE — TUBING IV SET GRAVITY 3Y 100" MACRO

## (undated) DEVICE — CANISTER DISPOSABLE THIN WALL 3000CC

## (undated) DEVICE — SOL IRR POUR H2O 1500ML

## (undated) DEVICE — ELCTR GROUNDING PAD ADULT COVIDIEN

## (undated) DEVICE — CATH IV SAFE BC 20G X 1.16" (PINK)

## (undated) DEVICE — BALLOON US ENDO

## (undated) DEVICE — VESSEL LOOP BATRIK MAXI YELLOW

## (undated) DEVICE — PACK LIJ BASIC ORTHO

## (undated) DEVICE — SUT VICRYL 2-0 27" SH UNDYED

## (undated) DEVICE — DRSG COBAN 4"

## (undated) DEVICE — ARTHREX BIOTENODESIS KIT DISP

## (undated) DEVICE — SUT VICRYL 2-0 27" FS-1 UNDYED

## (undated) DEVICE — BITE BLOCK ADULT 20 X 27MM (GREEN)

## (undated) DEVICE — SYR LUER LOK 50CC

## (undated) DEVICE — SUCTION YANKAUER OPEN TIP NO VENT CURVE

## (undated) DEVICE — TOURNIQUET CUFF 34" DUAL PORT W PLC

## (undated) DEVICE — SENSOR O2 FINGER ADULT

## (undated) DEVICE — POSITIONER BLUE BOLSTER

## (undated) DEVICE — SUCTION YANKAUER NO CONTROL VENT

## (undated) DEVICE — SOL INJ NS 0.9% 500ML 2 PORT

## (undated) DEVICE — DRSG KLING 2"

## (undated) DEVICE — SUT ETHIBOND 5 4-30" CCS

## (undated) DEVICE — POSITIONER STRAP ARMBOARD VELCRO TS-30

## (undated) DEVICE — BIOPSY FORCEP RADIAL JAW 4 STANDARD WITH NEEDLE

## (undated) DEVICE — TUBING SUCTION CONN 6FT STERILE

## (undated) DEVICE — BRUSH COLONOSCOPY CYTOLOGY

## (undated) DEVICE — DRSG XEROFORM 5 X 9"

## (undated) DEVICE — PREP CHLORAPREP HI-LITE ORANGE 26ML

## (undated) DEVICE — POLY TRAP ETRAP

## (undated) DEVICE — VENODYNE/SCD SLEEVE CALF MEDIUM

## (undated) DEVICE — IRRIGATOR BIO SHIELD

## (undated) DEVICE — CATH IV SAFE BC 22G X 1" (BLUE)

## (undated) DEVICE — FOLEY HOLDER STATLOCK 2 WAY ADULT

## (undated) DEVICE — TOURNIQUET ESMARK 4"

## (undated) DEVICE — TUBING SUCTION 20FT

## (undated) DEVICE — DRSG STOCKINETTE IMPERVIOUS XL

## (undated) DEVICE — SYR ALLIANCE II INFLATION 60ML

## (undated) DEVICE — CLAMP BX HOT RAD JAW 3